# Patient Record
Sex: MALE | Race: BLACK OR AFRICAN AMERICAN | NOT HISPANIC OR LATINO | Employment: OTHER | URBAN - METROPOLITAN AREA
[De-identification: names, ages, dates, MRNs, and addresses within clinical notes are randomized per-mention and may not be internally consistent; named-entity substitution may affect disease eponyms.]

---

## 2017-01-04 ENCOUNTER — HOSPITAL ENCOUNTER (OUTPATIENT)
Dept: RADIOLOGY | Facility: HOSPITAL | Age: 77
Discharge: HOME | End: 2017-01-04
Attending: FAMILY MEDICINE
Payer: MEDICARE

## 2017-01-04 ENCOUNTER — OFFICE VISIT (OUTPATIENT)
Dept: FAMILY MEDICINE | Facility: CLINIC | Age: 77
End: 2017-01-04
Payer: MEDICARE

## 2017-01-04 VITALS
DIASTOLIC BLOOD PRESSURE: 74 MMHG | WEIGHT: 173.5 LBS | HEART RATE: 79 BPM | HEIGHT: 68 IN | SYSTOLIC BLOOD PRESSURE: 158 MMHG | OXYGEN SATURATION: 98 % | BODY MASS INDEX: 26.3 KG/M2

## 2017-01-04 DIAGNOSIS — M79.604 RIGHT LEG PAIN: ICD-10-CM

## 2017-01-04 DIAGNOSIS — I10 ESSENTIAL HYPERTENSION: ICD-10-CM

## 2017-01-04 DIAGNOSIS — R35.1 NOCTURIA: ICD-10-CM

## 2017-01-04 DIAGNOSIS — M25.551 RIGHT HIP PAIN: ICD-10-CM

## 2017-01-04 DIAGNOSIS — R35.0 URINARY FREQUENCY: ICD-10-CM

## 2017-01-04 DIAGNOSIS — Z00.00 ANNUAL PHYSICAL EXAM: Primary | ICD-10-CM

## 2017-01-04 DIAGNOSIS — Z23 NEED FOR INFLUENZA VACCINATION: ICD-10-CM

## 2017-01-04 LAB
BACTERIA #/AREA URNS AUTO: ABNORMAL /HPF
BILIRUB UR QL STRIP: NEGATIVE
CLARITY UR REFRACT.AUTO: CLEAR
COLOR UR AUTO: YELLOW
GLUCOSE UR QL STRIP: NEGATIVE
HGB UR QL STRIP: ABNORMAL
KETONES UR QL STRIP: NEGATIVE
LEUKOCYTE ESTERASE UR QL STRIP: ABNORMAL
MICROSCOPIC COMMENT: ABNORMAL
NITRITE UR QL STRIP: POSITIVE
PH UR STRIP: 5 [PH] (ref 5–8)
PROT UR QL STRIP: NEGATIVE
RBC #/AREA URNS AUTO: 1 /HPF (ref 0–4)
SP GR UR STRIP: 1.01 (ref 1–1.03)
URN SPEC COLLECT METH UR: ABNORMAL
UROBILINOGEN UR STRIP-ACNC: NEGATIVE EU/DL
WBC #/AREA URNS AUTO: 9 /HPF (ref 0–5)

## 2017-01-04 PROCEDURE — 99397 PER PM REEVAL EST PAT 65+ YR: CPT | Mod: S$PBB,,, | Performed by: FAMILY MEDICINE

## 2017-01-04 PROCEDURE — 73502 X-RAY EXAM HIP UNI 2-3 VIEWS: CPT | Mod: TC,PO,RT

## 2017-01-04 PROCEDURE — 99999 PR PBB SHADOW E&M-EST. PATIENT-LVL IV: CPT | Mod: PBBFAC,,, | Performed by: FAMILY MEDICINE

## 2017-01-04 PROCEDURE — 73502 X-RAY EXAM HIP UNI 2-3 VIEWS: CPT | Mod: 26,RT,, | Performed by: RADIOLOGY

## 2017-01-04 RX ORDER — TRAMADOL HYDROCHLORIDE AND ACETAMINOPHEN 37.5; 325 MG/1; MG/1
1 TABLET, FILM COATED ORAL 3 TIMES DAILY PRN
Qty: 40 TABLET | Refills: 0 | Status: SHIPPED | OUTPATIENT
Start: 2017-01-04 | End: 2017-01-14

## 2017-01-04 RX ORDER — CIPROFLOXACIN 500 MG/1
500 TABLET ORAL 2 TIMES DAILY
Qty: 14 TABLET | Refills: 0 | Status: SHIPPED | OUTPATIENT
Start: 2017-01-04 | End: 2017-01-11

## 2017-01-04 NOTE — PROGRESS NOTES
Subjective:       Patient ID: Laura Harris is a 76 y.o. male.    Chief Complaint: Annual Exam and Leg Pain (right leg )    HPI Comments: 76 years old male who came to the clinic for his physical examination.  Patient also with nocturia and urinary frequency for the last several weeks.  Patient with normal prostate without urinary retention.  Patient was treated for possible infection before.  No fever or chills.  Patient also with right hip pain for the last couple of weeks.  The pain is 6 out of 10 of intensity on and off aggravated with activity and better with rest and sometimes radiated to the right lower extremity.    Leg Pain        Review of Systems   Constitutional: Negative.  Negative for chills and fever.   HENT: Negative.    Eyes: Negative.    Respiratory: Negative.    Cardiovascular: Negative.  Negative for chest pain, palpitations and leg swelling.   Gastrointestinal: Negative.    Genitourinary: Positive for dysuria and frequency. Negative for flank pain, hematuria and urgency.   Musculoskeletal: Positive for arthralgias.   Skin: Negative.    Neurological: Negative.    Psychiatric/Behavioral: Negative.        Objective:      Physical Exam   Constitutional: He is oriented to person, place, and time. He appears well-developed and well-nourished. No distress.   HENT:   Head: Normocephalic and atraumatic.   Right Ear: External ear normal.   Left Ear: External ear normal.   Nose: Nose normal.   Mouth/Throat: Oropharynx is clear and moist. No oropharyngeal exudate.   Eyes: Conjunctivae and EOM are normal. Pupils are equal, round, and reactive to light. Right eye exhibits no discharge. Left eye exhibits no discharge. No scleral icterus.   Neck: Normal range of motion. Neck supple. No JVD present. No tracheal deviation present. No thyromegaly present.   Cardiovascular: Normal rate, regular rhythm, normal heart sounds and intact distal pulses.  Exam reveals no gallop and no friction rub.    No murmur  heard.  Pulmonary/Chest: Effort normal and breath sounds normal. No stridor. No respiratory distress. He has no wheezes. He has no rales. He exhibits no tenderness.   Abdominal: Soft. Bowel sounds are normal. He exhibits no distension and no mass. There is no tenderness. There is no rebound and no guarding.   Musculoskeletal: Normal range of motion. He exhibits no edema.        Right hip: He exhibits tenderness.   Lymphadenopathy:     He has no cervical adenopathy.   Neurological: He is alert and oriented to person, place, and time. He has normal reflexes. He displays normal reflexes. No cranial nerve deficit. He exhibits normal muscle tone. Coordination normal.   Skin: Skin is warm and dry. No rash noted. He is not diaphoretic. No erythema. No pallor.   Psychiatric: He has a normal mood and affect. His behavior is normal. Judgment and thought content normal.   Nursing note and vitals reviewed.      Assessment:       1. Annual physical exam    2. Urinary frequency    3. Nocturia    4. Essential hypertension    5. Right leg pain    6. Need for influenza vaccination    7. Right hip pain        Plan:         Laura was seen today for annual exam and leg pain.    Diagnoses and all orders for this visit:    Annual physical exam    Urinary frequency  -     Urine culture  -     Urinalysis  -     ciprofloxacin HCl (CIPRO) 500 MG tablet; Take 1 tablet (500 mg total) by mouth 2 (two) times daily.    Nocturia  -     Urine culture  -     Urinalysis  -     ciprofloxacin HCl (CIPRO) 500 MG tablet; Take 1 tablet (500 mg total) by mouth 2 (two) times daily.    Essential hypertension    Right leg pain  -     tramadol-acetaminophen 37.5-325 mg (ULTRACET) 37.5-325 mg Tab; Take 1 tablet by mouth 3 (three) times daily as needed.    Need for influenza vaccination  -     Influenza - High Dose (65+) (PF) (IM)    Right hip pain  -     X-Ray Hip 2 View Right; Future  -     Ambulatory referral to Orthopedics  -     tramadol-acetaminophen  37.5-325 mg (ULTRACET) 37.5-325 mg Tab; Take 1 tablet by mouth 3 (three) times daily as needed.    Continue monitoring blood pressure at home, low sodium diet.

## 2017-01-04 NOTE — MR AVS SNAPSHOT
[facility]   [place]  [place] [address]  Phone: [phone]  Fax: [phone]                  Laura Harris   2017 8:40 AM   Office Visit    Description:  Male : 1940   Provider:  Riley Villalobos MD   Department:  [facility]           Reason for Visit     Annual Exam     Leg Pain           Diagnoses this Visit        Comments    Annual physical exam    -  Primary     Urinary frequency         Nocturia         Essential hypertension         Right leg pain         Need for influenza vaccination         Right hip pain                To Do List           Future Appointments        Provider Department Dept Phone    2017 9:45 AM KENH XR1 300 LB LIMIT [facility] [phone]    2017 9:00 AM KN US1 [facility] [phone]    2017 8:45 AM RABIA UROLOGY [facility] [phone]      Goals (5 Years of Data)     None       These Medications        Disp Refills Start End    tramadol-acetaminophen 37.5-325 mg (ULTRACET) 37.5-325 mg Tab 40 tablet 0 2017    Take 1 tablet by mouth 3 (three) times daily as needed. - Oral    Pharmacy: [facility] [address] AT [facility] Ph #: [phone]       ciprofloxacin HCl (CIPRO) 500 MG tablet 14 tablet 0 2017    Take 1 tablet (500 mg total) by mouth 2 (two) times daily. - Oral    Pharmacy: [facility] [address] AT [facility] Ph #: [phone]         [facility]     [facility] Care Line -  Assistance  Registered nurses in the [facility] provide clinical advisement, health education, appointment booking, and other advisory services.  Call for this free service at [phone].             Medications           Message regarding Medications     Verify the  "changes and/or additions to your medication regime listed below are the same as discussed with your clinician today.  If any of these changes or additions are incorrect, please notify your healthcare provider.        START taking these NEW medications        Refills    tramadol-acetaminophen 37.5-325 mg (ULTRACET) 37.5-325 mg Tab 0    Sig: Take 1 tablet by mouth 3 (three) times daily as needed.    Class: Print    Route: Oral    ciprofloxacin HCl (CIPRO) 500 MG tablet 0    Sig: Take 1 tablet (500 mg total) by mouth 2 (two) times daily.    Class: Normal    Route: Oral      STOP taking these medications     hydrocodone-acetaminophen 5-325mg (NORCO) 5-325 mg per tablet Take 1 tablet by mouth every 6 (six) hours as needed for Pain.           Verify that the below list of medications is an accurate representation of the medications you are currently taking.  If none reported, the list may be blank. If incorrect, please contact your healthcare provider. Carry this list with you in case of emergency.           Current Medications     aspirin (BABY ASPIRIN) 81 MG chewable tablet Take 81 mg by mouth. 1 Tablet, Chewable Oral Every day    diltiaZEM (CARTIA XT) 300 MG 24 hr capsule Take 1 capsule (300 mg total) by mouth once daily.    losartan-hydrochlorothiazide 100-25 mg (HYZAAR) 100-25 mg per tablet Take 1 tablet by mouth once daily.    ciprofloxacin HCl (CIPRO) 500 MG tablet Take 1 tablet (500 mg total) by mouth 2 (two) times daily.    tramadol-acetaminophen 37.5-325 mg (ULTRACET) 37.5-325 mg Tab Take 1 tablet by mouth 3 (three) times daily as needed.           Clinical Reference Information           Vital Signs - Last Recorded  Most recent update: 1/4/2017  8:41 AM by Patricia Wallace MA    BP Pulse Ht Wt SpO2 BMI    (!) 158/74 (BP Location: Left arm, Patient Position: Sitting, BP Method: Manual) 79 5' 8" (1.727 m) 78.7 kg (173 lb 8 oz) 98% 26.38 kg/m2      Blood Pressure          Most Recent Value    BP  (!)  158/74    "   Allergies as of 1/4/2017     Ace Inhibitors      Immunizations Administered on Date of Encounter - 1/4/2017     Name Date Dose VIS Date Route    Influenza - High Dose  Incomplete 0.5 mL 8/7/2015 Intramuscular      Orders Placed During Today's Visit      Normal Orders This Visit    Ambulatory referral to Orthopedics     Influenza - High Dose (65+) (PF) (IM)     Urinalysis     Urine culture     Future Labs/Procedures Expected by Expires    X-Ray Hip 2 View Right  1/4/2017 1/4/2018      Smoking Cessation     If you would like to quit smoking:   You may be eligible for free services if you are a [place] resident and started smoking cigarettes before September 1, 1988.  Call the [employer]oking [employer]essation [employer] ([employer]) toll free at ([phone] or [phone].   Call 7-720-QUIT-NOW if you do not meet the above criteria.

## 2017-01-06 ENCOUNTER — HOSPITAL ENCOUNTER (OUTPATIENT)
Dept: RADIOLOGY | Facility: HOSPITAL | Age: 77
Discharge: HOME | End: 2017-01-06
Attending: UROLOGY
Payer: MEDICARE

## 2017-01-06 DIAGNOSIS — N39.0 ACUTE UTI: ICD-10-CM

## 2017-01-06 DIAGNOSIS — C67.9 MALIGNANT NEOPLASM OF URINARY BLADDER, UNSPECIFIED SITE: ICD-10-CM

## 2017-01-06 PROCEDURE — 76770 US EXAM ABDO BACK WALL COMP: CPT | Mod: 26,,, | Performed by: RADIOLOGY

## 2017-01-06 PROCEDURE — 76770 US EXAM ABDO BACK WALL COMP: CPT | Mod: TC

## 2017-01-07 LAB — BACTERIA UR CULT: NORMAL

## 2017-01-11 ENCOUNTER — HOSPITAL ENCOUNTER (OUTPATIENT)
Dept: RADIOLOGY | Facility: HOSPITAL | Age: 77
Discharge: HOME | End: 2017-01-11
Attending: ORTHOPAEDIC SURGERY
Payer: MEDICARE

## 2017-01-11 ENCOUNTER — OFFICE VISIT (OUTPATIENT)
Dept: ORTHOPEDICS | Facility: CLINIC | Age: 77
End: 2017-01-11
Payer: MEDICARE

## 2017-01-11 VITALS
SYSTOLIC BLOOD PRESSURE: 135 MMHG | WEIGHT: 172 LBS | DIASTOLIC BLOOD PRESSURE: 71 MMHG | BODY MASS INDEX: 26.07 KG/M2 | HEIGHT: 68 IN | HEART RATE: 67 BPM

## 2017-01-11 DIAGNOSIS — M54.41 ACUTE BACK PAIN WITH SCIATICA, RIGHT: Primary | ICD-10-CM

## 2017-01-11 DIAGNOSIS — M54.41 ACUTE BACK PAIN WITH SCIATICA, RIGHT: ICD-10-CM

## 2017-01-11 PROCEDURE — 99203 OFFICE O/P NEW LOW 30 MIN: CPT | Mod: S$PBB,,, | Performed by: PHYSICIAN ASSISTANT

## 2017-01-11 PROCEDURE — 72114 X-RAY EXAM L-S SPINE BENDING: CPT | Mod: TC

## 2017-01-11 PROCEDURE — 99999 PR PBB SHADOW E&M-EST. PATIENT-LVL III: CPT | Mod: PBBFAC,,, | Performed by: PHYSICIAN ASSISTANT

## 2017-01-11 PROCEDURE — 72114 X-RAY EXAM L-S SPINE BENDING: CPT | Mod: 26,,, | Performed by: RADIOLOGY

## 2017-01-11 NOTE — PROGRESS NOTES
SUBJECTIVE:     Chief Complaint & History of Present Illness:  Laura Harris is a New  patient 76 y.o. male who is seen here today with a complaint of    Chief Complaint   Patient presents with    Right Hip - Pain    .  He is seen here today for intermittent episodes of pain and tenderness in the right lateral hip radiating down the lateral thigh to the calf.  He reports he notices these episodes more often with changes in the weather they're usually shortening transient nature and have not really affected his daily activities.  At the time of his visit today his symptoms have subsided and he is essentially returned to baseline  On a scale of 1-10, with 10 being worst pain imaginable, he rates this pain as 1 on good days and 3 on bad days.  he describes the pain as tender and sore.    Review of patient's allergies indicates:   Allergen Reactions    Ace inhibitors      Other reaction(s): Angioedema         Current Outpatient Prescriptions   Medication Sig Dispense Refill    ciprofloxacin HCl (CIPRO) 500 MG tablet Take 1 tablet (500 mg total) by mouth 2 (two) times daily. 14 tablet 0    diltiaZEM (CARTIA XT) 300 MG 24 hr capsule Take 1 capsule (300 mg total) by mouth once daily. 90 capsule 0    losartan-hydrochlorothiazide 100-25 mg (HYZAAR) 100-25 mg per tablet Take 1 tablet by mouth once daily. 90 tablet 1    tramadol-acetaminophen 37.5-325 mg (ULTRACET) 37.5-325 mg Tab Take 1 tablet by mouth 3 (three) times daily as needed. 40 tablet 0    aspirin (BABY ASPIRIN) 81 MG chewable tablet Take 81 mg by mouth. 1 Tablet, Chewable Oral Every day       No current facility-administered medications for this visit.        Past Medical History   Diagnosis Date    CKD (chronic kidney disease) stage 3, GFR 30-59 ml/min 5/1/2014    Hyperlipidemia     Hypertension     Low tension glaucoma        Past Surgical History   Procedure Laterality Date    Cystoscopy         Vital Signs (Most Recent)  Vitals:    01/11/17  "0811   BP: 135/71   Pulse: 67           Review of Systems:  ROS:  Constitutional: no fever or chills  Eyes: no visual changes, Positive for open angle glaucoma, nuclear sclerosis  ENT: no nasal congestion or sore throat  Respiratory: no cough or shortness of breath  Cardiovascular: no chest pain or palpitations  Gastrointestinal: no nausea or vomiting, tolerating diet  Genitourinary: no hematuria or dysuria, Positive history of bladder cancer, chronic kidney disease stage III  Integument/Breast: no rash or pruritis  Hematologic/Lymphatic: no easy bruising or lymphadenopathy  Musculoskeletal: no arthralgias or myalgias  Neurological: no seizures or tremors  Behavioral/Psych: no auditory or visual hallucinations  Endocrine: no heat or cold intolerance                OBJECTIVE:     PHYSICAL EXAM:  Height: 5' 8" (172.7 cm) Weight: 78 kg (172 lb), General Appearance: Well nourished, well developed, in no acute distress.  Neurological: Mood & affect are normal.  Back Exam:  full range of motion, no tenderness, palpable spasm or pain on motion, negative straight-leg raise bilaterally , normal reflexes and strength bilateral lower extremities, sensory exam intact bilateral lower extremities  no kyphosis or scoliosis  Hip Examination:  full painless range of motion, without tenderness and Decreased internal rotation on the right    RADIOGRAPHS:  X-rays the lumbar spine taken today films reviewed by me demonstrate well-preserved disc spaces throughout the lumbar spine without evidence of lordosis kyphosis scoliosis, no evidence of step off there is moderate arthritic changes in the facet regions L4-L5 L5-S1    ASSESSMENT/PLAN:     Plan: We discussed with the patient at length all the different treatment options available for his spine including anti-inflammatories, acetaminophen, rest, ice, physical therapy, strengthening and range of motion exercise, occasional cortisone injections for temporary relief, and finally possible " surgical interventions  Patient has having no symptoms or pain at this point a course of physical therapy was recommended if he does have a recurrence of problems otherwise we will follow up when necessary

## 2017-01-11 NOTE — LETTER
January 11, 2017      Riley Villalobos MD  [address] [facility] [address]           [facility] - Orthopedics  [address]  [place] [address]  Phone: [phone]          Patient: Laura Harris   MR Number: [id number]   YOB: 1940   Date of Visit: 1/11/2017       Dear Dr. Riley Villalobos:    Thank you for referring Laura Harris to me for evaluation. Attached you will find relevant portions of my assessment and plan of care.    If you have questions, please do not hesitate to call me. I look forward to following Laura Harris along with you.    Sincerely,    Mao Morrison PA-C    Enclosure  CC:  No Recipients    If you would like to receive this communication electronically, please contact [email or web address] or [phone] to request more information on [facility] access.    For providers and/or their staff who would like to refer a patient to [facility], please contact us through our one-stop-shop provider referral line, [facility], at [phone].    If you feel you have received this communication in error or would no longer like to receive these types of communications, please e-mail [email or web address]

## 2017-01-18 ENCOUNTER — HOSPITAL ENCOUNTER (OUTPATIENT)
Dept: UROLOGY | Facility: HOSPITAL | Age: 77
Discharge: HOME | End: 2017-01-18
Attending: UROLOGY
Payer: MEDICARE

## 2017-01-18 VITALS
RESPIRATION RATE: 16 BRPM | DIASTOLIC BLOOD PRESSURE: 82 MMHG | SYSTOLIC BLOOD PRESSURE: 185 MMHG | HEIGHT: 68 IN | WEIGHT: 175 LBS | HEART RATE: 68 BPM | BODY MASS INDEX: 26.52 KG/M2 | TEMPERATURE: 98 F

## 2017-01-18 DIAGNOSIS — C67.9 MALIGNANT NEOPLASM OF URINARY BLADDER, UNSPECIFIED SITE: ICD-10-CM

## 2017-01-18 DIAGNOSIS — N39.0 ACUTE UTI: ICD-10-CM

## 2017-01-18 LAB
BILIRUB SERPL-MCNC: NORMAL MG/DL
BLOOD URINE, POC: NORMAL
COLOR, POC UA: YELLOW
GLUCOSE UR QL STRIP: NORMAL
KETONES UR QL STRIP: NORMAL
LEUKOCYTE ESTERASE URINE, POC: NORMAL
NITRITE, POC UA: NORMAL
PH, POC UA: 5
PROTEIN, POC: NORMAL
SPECIFIC GRAVITY, POC UA: 1.01
UROBILINOGEN, POC UA: NORMAL

## 2017-01-18 PROCEDURE — 52000 CYSTOURETHROSCOPY: CPT

## 2017-01-18 RX ORDER — DOXYCYCLINE HYCLATE 100 MG
100 TABLET ORAL
Status: COMPLETED | OUTPATIENT
Start: 2017-01-18 | End: 2017-01-18

## 2017-01-18 RX ORDER — LIDOCAINE HYDROCHLORIDE 20 MG/ML
10 JELLY TOPICAL
Status: COMPLETED | OUTPATIENT
Start: 2017-01-18 | End: 2017-01-18

## 2017-01-18 RX ADMIN — Medication 100 MG: at 09:01

## 2017-01-18 RX ADMIN — LIDOCAINE HYDROCHLORIDE 10 ML: 20 JELLY TOPICAL at 09:01

## 2017-01-18 NOTE — PATIENT INSTRUCTIONS
What to Expect After a Cystoscopy  For the next 24-48 hours, you may feel a mild burning when you urinate. This burning is normal and expected. Drink plenty of water to dilute the urine to help relieve the burning sensation. You may also see a small amount of blood in your urine after the procedure.    Unless you are already taking antibiotics, you may be given an antibiotic after the test to prevent infection.    Signs and Symptoms to Report  Call the [facility] at [phone] if you develop any of the following:  · Fever of 101 degrees or higher  · Chills or persistent bleeding  · Inability to urinate

## 2017-01-18 NOTE — IP AVS SNAPSHOT
[address]  [place] [address]  Phone: [phone]           I have received a copy of my After Visit Summary and discharge instructions from [facility].    INSTRUCTIONS RECEIVED AND UNDERSTOOD BY:                     Patient/Patient Representative: ________________________________________________________________     Date/Time: ________________________________________________________________                     Instructions Given By: ________________________________________________________________     Date/Time: ________________________________________________________________

## 2017-01-18 NOTE — H&P
Patient ID: Laura Harris is a 76 y.o. male.     Chief Complaint: Urinary Frequency     HPI Comments: Laura Harris is a 76 y.o. 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   8/2009 had left ureteral stricture, stent and subsequent removal.   Last cysto 4/15 showed no recurrence of bladder cancer.   Last upper tract imaging 1/17 showed no hydro.   Has CKD.     Having urinary frequency every 3 hours. Some dysuria. No gross hematuria. Some urgency.   Nocturia x 2.   No fevers.     12/8/16 and 1/4/17 - urine culture Klebsiella     Lab Results   Component Value Date    PSA 1.3 03/28/2015    PSA 1.08 05/07/2012    PSA 1.6 04/27/2011    PSA 1.2 08/04/2010    PSA 1.6 04/25/2009    PSA 0.8 02/04/2008    PSA 1.0 09/19/2006    PSA 1.3 08/12/2005    PSA 0.8 01/29/2004    PSADIAG 1.6 04/02/2014        Urinary Frequency    Associated symptoms include frequency and urgency. Pertinent negatives include no chills, flank pain, hematuria, constipation or rash.         Past Medical History   Diagnosis Date    CKD (chronic kidney disease) stage 3, GFR 30-59 ml/min 5/1/2014    Hyperlipidemia     Hypertension     Low tension glaucoma        Past Surgical History   Procedure Laterality Date    Cystoscopy         Family History   Problem Relation Age of Onset    Melanoma Neg Hx        Social History     Social History    Marital status: Single     Spouse name: N/A    Number of children: N/A    Years of education: N/A     Occupational History    Not on file.     Social History Main Topics    Smoking status: Current Every Day Smoker     Packs/day: 1.00     Types: Cigarettes    Smokeless tobacco: Current User    Alcohol use No    Drug use: No    Sexual activity: Not on file     Other Topics Concern    Not on file     Social History Narrative       Allergies:  Ace inhibitors    Medications:    Current Outpatient Prescriptions:     aspirin (BABY ASPIRIN) 81 MG chewable tablet,  Take 81 mg by mouth. 1 Tablet, Chewable Oral Every day, Disp: , Rfl:     diltiaZEM (CARTIA XT) 300 MG 24 hr capsule, Take 1 capsule (300 mg total) by mouth once daily., Disp: 90 capsule, Rfl: 0    losartan-hydrochlorothiazide 100-25 mg (HYZAAR) 100-25 mg per tablet, Take 1 tablet by mouth once daily., Disp: 90 tablet, Rfl: 1       Review of Systems   Constitutional: Negative for chills, fever and unexpected weight change.   HENT: Negative for hearing loss and nosebleeds.   Eyes: Positive for visual disturbance.   Respiratory: Negative for chest tightness.   Cardiovascular: Negative for chest pain.   Gastrointestinal: Negative for constipation and diarrhea.   Genitourinary:  Negative for flank pain and hematuria.   Musculoskeletal: Positive for arthralgias and back pain. Negative for joint swelling.   Skin: Negative for rash.   Neurological: Negative for seizures.   Hematological: Does not bruise/bleed easily.   Psychiatric/Behavioral: Negative for behavioral problems.       Objective:      Physical Exam   Constitutional: He is oriented to person, place, and time. He appears well-developed and well-nourished.   HENT:   Head: Normocephalic and atraumatic.   Eyes: No scleral icterus.   Neck: Neck supple.   Cardiovascular: Normal rate and regular rhythm.   Pulmonary/Chest: Effort normal. No respiratory distress.   Abdominal: He exhibits no mass. A hernia is present. Hernia confirmed positive in the right inguinal area. Hernia confirmed negative in the left inguinal area.   Genitourinary: Testes normal and penis normal. Circumcised.   Genitourinary Comments:Musculoskeletal: He exhibits no tenderness.   Lymphadenopathy:   He has no cervical adenopathy. No inguinal adenopathy noted on the right or left side.   Neurological: He is alert and oriented to person, place, and time.   Skin: Skin is warm. No rash noted.     Psychiatric: He has a normal mood and affect.     urine dip nitrite positive  Assessment:       1.  Malignant neoplasm of urinary bladder, unspecified site    2. Acute UTI    3. CKD (chronic kidney disease) stage 3, GFR 30-59 ml/min        Plan:     Cysto today

## 2017-01-18 NOTE — IP AVS SNAPSHOT
[facility]  [address]  [facility] [address]  Phone: [phone]  Fax: [phone]                  Laura Harris   2017  8:45 AM   Cystoscopy    Description:  Male : 1940   Provider:  [facility]   Department:  [facility]           Visit Information     Date & Time Provider Department    2017  8:45 AM [facility]      Recent Lab Values        4/15/2015 2016 2016 2017                  9:39 AM  9:00 AM 10:38 AM  9:19 AM        Urine Culture - - KLEBSIELLA PNEUMONIAE  &gt;100,000 cfu/ml   KLEBSIELLA PNEUMONIAE  &gt;100,000 cfu/ml          Color YELLOW - - Yellow        Specific Gravity 5 1.015 - 1.010        pH NEG 5 - 5.0        Leukocytes NEG n - -        Blood 50 n - -        Nitrite NEG + - Positive (A)        Ketones NEG n - Negative        Bilirubin NEG n - -        Urobilinogen NEG n - Negative        Protein NEG n - -        Glucose NEG n - -                 Reason for Visit     Bladder Cancer           Diagnoses this Visit        Comments    Malignant neoplasm of urinary bladder, unspecified site         Acute UTI                To Do List           Goals (5 Years of Data)     None           Medications                ** Verify the list of medication(s) below is accurate and up to date. Carry this with you in case of emergency. If your medications have changed, please notify your healthcare provider.             Medication List      TAKE these medications        Additional Info                      BABY ASPIRIN 81 MG Chew   Refills:  0   Dose:  81 mg   Generic drug:  aspirin    Instructions:  Take 81 mg by mouth. 1 Tablet, Chewable Oral Every day     Begin Date    AM    Noon    PM    Bedtime       diltiaZEM 300 MG 24 hr capsule   Commonly known as:  CARTIA XT   Quantity:  90 capsule   Refills:  0   Dose:  300 mg    Instructions:  Take 1 capsule (300 mg total) by mouth  "once daily.     Begin Date    AM    Noon    PM    Bedtime       losartan-hydrochlorothiazide 100-25 mg 100-25 mg per tablet   Commonly known as:  HYZAAR   Quantity:  90 tablet   Refills:  1   Dose:  1 tablet    Instructions:  Take 1 tablet by mouth once daily.     Begin Date    AM    Noon    PM    Bedtime               Your Vitals Were     BP Pulse Temp Resp Height Weight    185/82 68 98.3 °F (36.8 °C) 16 5' 8" (1.727 m) 79.4 kg (175 lb)    BMI                26.61 kg/m2          Allergies as of 1/18/2017     Ace Inhibitors      Immunizations Administered on Date of Encounter - 1/18/2017     None      Orders Placed During Today's Visit      Normal Orders This Visit    Cystoscopy     POCT urinalysis, dipstick or tablet reag       Administrations This Visit     doxycycline tablet 100 mg     Admin Date Action Dose Route Administered By             01/18/2017 Given 100 mg Oral Angie Pond RN                    lidocaine HCl 2% urojet 10 mL     Admin Date Action Dose Route Administered By             01/18/2017 Given 10 mL Urethral Angie Pond RN                      Instructions    What to Expect After a Cystoscopy  For the next 24-48 hours, you may feel a mild burning when you urinate. This burning is normal and expected. Drink plenty of water to dilute the urine to help relieve the burning sensation. You may also see a small amount of blood in your urine after the procedure.    Unless you are already taking antibiotics, you may be given an antibiotic after the test to prevent infection.    Signs and Symptoms to Report  Call the [facility] at [phone] if you develop any of the following:  · Fever of 101 degrees or higher  · Chills or persistent bleeding  · Inability to urinate       Smoking Cessation     If you would like to quit smoking:   You may be eligible for free services if you are a [place] resident and started smoking cigarettes before September 1, 1988.  Call the Smoking " [facility] toll free at [phone] or [phone].   Call 1-800-QUIT-NOW if you do not meet the above criteria.            Advance Directives     An advance directive is a document which, in the event you are no longer able to make decisions for yourself, tells your healthcare team what kind of treatment you do or do not want to receive, or who you would like to make those decisions for you.  If you do not currently have an advance directive, [facility] encourages you to create one.  For more information call:  [phone]), [phone] ([phone]),  or log on to [email or web address].        [facility]     [facility] Care [facility] - 24/7 Assistance  Registered nurses in the [facility] provide clinical advisement, health education, appointment booking, and other advisory services.  Call for this free service at [phone].        Language Assistance Services     ATTENTION: Language assistance services are available, free of charge. Please call [phone].      ATENCIÓN: Si habla español, tiene a saab disposición servicios gratuitos de asistencia lingüística. Llame al [phone].     CHÚ Ý: N?u b?n nói Ti?ng Vi?t, có các d?ch v? h? tr? ngôn ng? mi?n phí dành cho b?n. G?i s? [phone].         [facility] complies with applicable Federal civil rights laws and does not discriminate on the basis of race, color, national origin, age, disability, or sex.

## 2017-01-18 NOTE — PROCEDURES
Procedures: Flexible cystourethroscopy    Pre Procedure Diagnosis:h/o bladder cancer    Post Procedure Diagnosis:Normal cystoscopy    Surgeon: Ciara Mayorga MD    Anesthesia: 2% uro-jet lidocaine jelly for local analgesia    Flexible cysto-urethroscopy was performed after consent was obtained.  The risks and benefits were explained.    2% lidocaine urojet was used for local analgesia.  The genitalia was prepped and draped in the sterile fashion with betadine.    The flexible scope was advanced into the urethra and into the bladder.  Bilateral ureteral orifice were evaluated and noted to be normal with clear efflux.  The bladder was completely surveyed in a systematic fashion.   No bladder tumors or lesions were seen.  No strictures were noted.  The prostate showed Significant hypertrophy.  There was Mild median lobe present.    The patient tolerated the procedure well without complication.    They will follow up in 1 month. Restart tamsulosin.

## 2017-03-23 RX ORDER — DILTIAZEM HYDROCHLORIDE 300 MG/1
300 CAPSULE, COATED, EXTENDED RELEASE ORAL DAILY
Qty: 90 CAPSULE | Refills: 1 | Status: SHIPPED | OUTPATIENT
Start: 2017-03-23 | End: 2017-10-06 | Stop reason: SDUPTHER

## 2017-05-20 ENCOUNTER — HOSPITAL ENCOUNTER (EMERGENCY)
Facility: HOSPITAL | Age: 77
Discharge: HOME | End: 2017-05-20
Attending: EMERGENCY MEDICINE
Payer: MEDICARE

## 2017-05-20 VITALS
RESPIRATION RATE: 16 BRPM | BODY MASS INDEX: 25.76 KG/M2 | HEART RATE: 70 BPM | DIASTOLIC BLOOD PRESSURE: 74 MMHG | SYSTOLIC BLOOD PRESSURE: 155 MMHG | HEIGHT: 68 IN | OXYGEN SATURATION: 98 % | WEIGHT: 170 LBS | TEMPERATURE: 97 F

## 2017-05-20 DIAGNOSIS — M72.2 PLANTAR FASCIITIS OF RIGHT FOOT: Primary | ICD-10-CM

## 2017-05-20 DIAGNOSIS — R52 PAIN: ICD-10-CM

## 2017-05-20 PROCEDURE — 99283 EMERGENCY DEPT VISIT LOW MDM: CPT

## 2017-05-20 RX ORDER — TRAMADOL HYDROCHLORIDE AND ACETAMINOPHEN 37.5; 325 MG/1; MG/1
1 TABLET, FILM COATED ORAL EVERY 4 HOURS PRN
COMMUNITY
End: 2017-05-31

## 2017-05-20 RX ORDER — TRAMADOL HYDROCHLORIDE 50 MG/1
50 TABLET ORAL EVERY 6 HOURS PRN
Qty: 10 TABLET | Refills: 0 | Status: SHIPPED | OUTPATIENT
Start: 2017-05-20 | End: 2017-06-22

## 2017-05-20 RX ORDER — CELECOXIB 100 MG/1
100 CAPSULE ORAL 2 TIMES DAILY
COMMUNITY
End: 2017-05-31

## 2017-05-20 NOTE — ED NOTES
Pt co rt heel pain for the past 2 weeks, pt denies chest pain or sob, denies injury or trauma, no deformity noted, pt awake alert in no acute distress, rt heel non tender to palpate pt reports pain when walking on heel, skin warm dry intact, rt dp pulse +2

## 2017-05-20 NOTE — ED PROVIDER NOTES
Encounter Date: 5/20/2017       History     Chief Complaint   Patient presents with    Heel Pain     Right heel pain with no history of injury. Gait WNL     Review of patient's allergies indicates:   Allergen Reactions    Ace inhibitors      Other reaction(s): Angioedema    Hydrocodone Diarrhea     HPI Comments: Laura Harris 76 y.o. presented to the ED with c/o right foot pain for the past two weeks. He state pain is its worse first thing in the morning upon exiting bed and is redued with stretching and walking for a bit. He states pain returned later in the day after walking for some time.      Patient is a 76 y.o. male presenting with the following complaint: foot injury. The history is provided by the patient.   Foot Injury    The incident occurred at home. There was no injury mechanism. The incident occurred several weeks ago. The pain is present in the right foot and right heel. The quality of the pain is described as aching. The pain is at a severity of 6/10. The pain has been intermittent since onset. Pertinent negatives include no numbness, no inability to bear weight, no loss of motion, no muscle weakness, no loss of sensation and no tingling. He reports no foreign bodies present. The symptoms are aggravated by palpation (first few steps in the morning ). He has tried NSAIDs for the symptoms. The treatment provided mild relief.     Past Medical History:   Diagnosis Date    CKD (chronic kidney disease) stage 3, GFR 30-59 ml/min 5/1/2014    Hyperlipidemia     Hypertension     Low tension glaucoma      Past Surgical History:   Procedure Laterality Date    CYSTOSCOPY       Family History   Problem Relation Age of Onset    Melanoma Neg Hx      Social History   Substance Use Topics    Smoking status: Current Every Day Smoker     Packs/day: 1.00     Types: Cigarettes    Smokeless tobacco: Current User    Alcohol use No     Review of Systems   Constitutional: Negative for activity change, appetite  change, chills and fever.   HENT: Negative for sore throat.    Eyes: Negative for visual disturbance.   Respiratory: Negative for chest tightness and shortness of breath.    Cardiovascular: Negative for chest pain and leg swelling.   Gastrointestinal: Negative for nausea and vomiting.   Genitourinary: Negative for dysuria.   Musculoskeletal: Positive for arthralgias. Negative for back pain, gait problem and joint swelling.        Right foot pain   Skin: Negative for color change, rash and wound.   Neurological: Negative for tingling, weakness and numbness.   Hematological: Does not bruise/bleed easily.       Physical Exam   Initial Vitals   BP Pulse Resp Temp SpO2   05/20/17 1015 05/20/17 1015 05/20/17 1015 05/20/17 1015 05/20/17 1015   169/77 71 16 98.3 °F (36.8 °C) 98 %     Physical Exam    Nursing note and vitals reviewed.  Constitutional: He appears well-developed and well-nourished. He is cooperative.  Non-toxic appearance. He does not appear ill. No distress.   HENT:   Head: Normocephalic and atraumatic.   Eyes: Conjunctivae and lids are normal.   Neck: Normal range of motion. Neck supple.   Cardiovascular: Normal rate.   Pulses:       Dorsalis pedis pulses are 2+ on the right side, and 2+ on the left side.        Posterior tibial pulses are 2+ on the right side, and 2+ on the left side.   Pulmonary/Chest: No respiratory distress.   Abdominal: Soft. Normal appearance.   Musculoskeletal:        Right ankle: Normal. Achilles tendon normal.        Right foot: There is tenderness. There is normal range of motion, no bony tenderness, no swelling, normal capillary refill, no crepitus and no deformity.        Feet:    Neurological: He is alert and oriented to person, place, and time. GCS eye subscore is 4. GCS verbal subscore is 5. GCS motor subscore is 6.   Skin: Skin is warm, dry and intact. No abrasion, no bruising, no ecchymosis and no rash noted. No erythema.   Psychiatric: He has a normal mood and affect. His  speech is normal and behavior is normal. Thought content normal.         ED Course   Procedures  Labs Reviewed - No data to display      Imaging Results         X-Ray Foot Complete Right (Final result) Result time:  05/20/17 11:40:50    Final result by Sander Vu MD (05/20/17 11:40:50)    Impression:        No acute radiographic findings.      Electronically signed by: SANDER VU MD  Date:     05/20/17  Time:    11:40     Narrative:    Comparison: None    Technique: 3 views of the right foot.    Findings: There is no evidence of displaced fracture, dislocation, or bone destruction.  There are no abnormal radiopaque retained foreign bodies.  There are extensive vascular calcifications.  There is a hallux valgus deformity.                   Medical Decision Making:   Initial Assessment:   76 y.o. Male with PMH of HTN, HLD, glaucoma, and CKD presented with right nontraumatic heel pain for the past several weeks. He presents in no distress wit smooth steady gait to the room with TTP of the right plantar fascia with no bony tenderness, erythema , deformity or edema  Differential Diagnosis:   Plantar fascitis, fracture, bone spur, arthritis  Clinical Tests:   Radiological Study: Ordered and Reviewed  ED Management:  X-ray with no acute findings. Patient with CKD and not a canidate for NSAID'S. Instructed patient to take pain medication as needed however stretches and brace will likely provided more relief. He will follow up with podiatrist fr possible orthotic.              Attending Attestation:     Physician Attestation Statement for NP/PA:   I discussed this assessment and plan of this patient with the NP/PA, but I did not personally examine the patient. The face to face encounter was performed by the NP/PA.                  ED Course     Clinical Impression:   The primary encounter diagnosis was Plantar fasciitis of right foot. A diagnosis of Pain was also pertinent to this visit.          Jonna Cody,  PA  05/20/17 1925       Ruy Noe MD  05/21/17 0740

## 2017-05-31 ENCOUNTER — OFFICE VISIT (OUTPATIENT)
Dept: FAMILY MEDICINE | Facility: CLINIC | Age: 77
End: 2017-05-31
Payer: MEDICARE

## 2017-05-31 VITALS
SYSTOLIC BLOOD PRESSURE: 140 MMHG | BODY MASS INDEX: 25.79 KG/M2 | HEIGHT: 68 IN | HEART RATE: 72 BPM | WEIGHT: 170.19 LBS | DIASTOLIC BLOOD PRESSURE: 76 MMHG

## 2017-05-31 DIAGNOSIS — I10 ESSENTIAL HYPERTENSION: Primary | ICD-10-CM

## 2017-05-31 DIAGNOSIS — M72.2 PLANTAR FASCIITIS: ICD-10-CM

## 2017-05-31 PROCEDURE — 99213 OFFICE O/P EST LOW 20 MIN: CPT | Mod: PBBFAC,PO | Performed by: FAMILY MEDICINE

## 2017-05-31 PROCEDURE — 99999 PR PBB SHADOW E&M-EST. PATIENT-LVL III: CPT | Mod: PBBFAC,,, | Performed by: FAMILY MEDICINE

## 2017-05-31 PROCEDURE — 99214 OFFICE O/P EST MOD 30 MIN: CPT | Mod: S$PBB,,, | Performed by: FAMILY MEDICINE

## 2017-05-31 RX ORDER — METHYLPREDNISOLONE 4 MG/1
TABLET ORAL
Qty: 21 TABLET | Refills: 0 | Status: SHIPPED | OUTPATIENT
Start: 2017-05-31 | End: 2017-06-21

## 2017-05-31 NOTE — PATIENT INSTRUCTIONS
Taking Your Blood Pressure  Blood pressure is the force of blood against the artery wall as it moves from the heart through the blood vessels. You can take your own blood pressure reading using a digital monitor. Take readings as often as your healthcare provider instructs. Take each reading at the same time of day.  Step 1. Relax    · Take your blood pressure at the same time every day, such as in the morning or evening, or at the time your healthcare provider recommends.  · Wait at least a half-hour after smoking, eating, drinking caffeinated beverages, or exercising.  · Sit comfortably at a table with both feet on the floor. Do not cross your legs or feet. Place the monitor near you.  · Rest for a few minutes before you begin.  Step 2. Wrap the cuff    · Place your arm on the table, palm up. Your arm should be at the level of your heart. Wrap the cuff around your upper arm, just above your elbow. Its best done on bare skin, not over clothing. Most cuffs will indicate where the brachial artery (the blood vessel in the middle of the arm at the inner side of the elbow) should line up with the cuff. Look in your monitor's instruction booklet for an illustration. You can also bring your cuff to your healthcare provider and have them show you how to correctly place the cuff.  · Make sure your cuff fits. If it doesnt wrap around your upper arm, order a larger cuff.  Step 3. Inflate the cuff    · Pump the cuff until the scale reads 160. If you have a self-inflating cuff, push the button that starts the pump.  · The cuff will tighten, then loosen.  · The numbers will change. When they stop changing, your blood pressure reading will appear.  · Take 2 or 3 readings one minute apart.  Step 4. Write down the results of each reading    · Write down your blood pressure numbers for each reading. Note the date and time. Keep your results in one place, such as a notebook. Even if your monitor has a built-in memory, keep a hard  copy of the readings.  · Remove the cuff from your arm. Turn off the machine.  · Share your blood pressure records with your healthcare providers at each visit.  Date Last Reviewed: 4/27/2016  © 2268-4996 [employer]. [address] [place] [address], [place] [address]. All rights reserved. This information is not intended as a substitute for professional medical care. Always follow your healthcare professional's instructions.

## 2017-05-31 NOTE — PROGRESS NOTES
Subjective:       Patient ID: Laura Harris is a 76 y.o. male.    Chief Complaint: Foot Pain (right foot)    76 years old male came to the clinic with borderline blood pressure .  No chest pain palpitations orthopnea or PND.  Patient with right  plantar pain , the pain is 9 out of 10 intensity on and off aggravated with activity and better with rest.  Patient recently evaluated at the emergency room and treated with possible plantar fasciitis.      Foot Pain   Pertinent negatives include no chest pain.     Review of Systems   Constitutional: Negative.    HENT: Negative.    Eyes: Negative.    Respiratory: Negative.    Cardiovascular: Negative.  Negative for chest pain, palpitations and leg swelling.   Gastrointestinal: Negative.    Genitourinary: Negative.    Musculoskeletal: Negative.    Skin: Negative.    Neurological: Negative.    Psychiatric/Behavioral: Negative.        Objective:      Physical Exam   Constitutional: He is oriented to person, place, and time. He appears well-developed and well-nourished. No distress.   HENT:   Head: Normocephalic and atraumatic.   Right Ear: External ear normal.   Left Ear: External ear normal.   Nose: Nose normal.   Mouth/Throat: Oropharynx is clear and moist. No oropharyngeal exudate.   Eyes: Conjunctivae and EOM are normal. Pupils are equal, round, and reactive to light. Right eye exhibits no discharge. Left eye exhibits no discharge. No scleral icterus.   Neck: Normal range of motion. Neck supple. No JVD present. No tracheal deviation present. No thyromegaly present.   Cardiovascular: Normal rate, regular rhythm, normal heart sounds and intact distal pulses.  Exam reveals no gallop and no friction rub.    No murmur heard.  Pulmonary/Chest: Effort normal and breath sounds normal. No stridor. No respiratory distress. He has no wheezes. He has no rales. He exhibits no tenderness.   Abdominal: Soft. Bowel sounds are normal. He exhibits no distension and no mass. There is no  tenderness. There is no rebound and no guarding.   Musculoskeletal: He exhibits no edema.        Right foot: There is decreased range of motion and tenderness.   Lymphadenopathy:     He has no cervical adenopathy.   Neurological: He is alert and oriented to person, place, and time. He has normal reflexes. He displays normal reflexes. No cranial nerve deficit. He exhibits normal muscle tone. Coordination normal.   Skin: Skin is warm and dry. No rash noted. He is not diaphoretic. No erythema. No pallor.   Psychiatric: He has a normal mood and affect. His behavior is normal. Judgment and thought content normal.   Nursing note and vitals reviewed.      Assessment:       1. Essential hypertension    2. Plantar fasciitis        Plan:         Laura was seen today for foot pain.    Diagnoses and all orders for this visit:    Essential hypertension    Plantar fasciitis  -     methylPREDNISolone (MEDROL DOSEPACK) 4 mg tablet; Take as directed  -     Ambulatory referral to Podiatry    Continue monitoring blood pressure at home, low sodium diet.

## 2017-06-01 ENCOUNTER — OFFICE VISIT (OUTPATIENT)
Dept: PODIATRY | Facility: CLINIC | Age: 77
End: 2017-06-01
Payer: MEDICARE

## 2017-06-01 VITALS
HEIGHT: 68 IN | WEIGHT: 170 LBS | HEART RATE: 85 BPM | DIASTOLIC BLOOD PRESSURE: 74 MMHG | BODY MASS INDEX: 25.76 KG/M2 | SYSTOLIC BLOOD PRESSURE: 162 MMHG

## 2017-06-01 DIAGNOSIS — Q66.50 PES PLANUS, CONGENITAL, UNSPECIFIED LATERALITY: ICD-10-CM

## 2017-06-01 DIAGNOSIS — M62.461 GASTROCNEMIUS EQUINUS OF RIGHT LOWER EXTREMITY: ICD-10-CM

## 2017-06-01 DIAGNOSIS — M72.2 PLANTAR FASCIITIS, RIGHT: Primary | ICD-10-CM

## 2017-06-01 DIAGNOSIS — M79.671 PAIN OF RIGHT HEEL: ICD-10-CM

## 2017-06-01 PROCEDURE — 99213 OFFICE O/P EST LOW 20 MIN: CPT | Mod: PBBFAC,PO | Performed by: PODIATRIST

## 2017-06-01 PROCEDURE — 99203 OFFICE O/P NEW LOW 30 MIN: CPT | Mod: S$PBB,,, | Performed by: PODIATRIST

## 2017-06-01 PROCEDURE — 99999 PR PBB SHADOW E&M-EST. PATIENT-LVL III: CPT | Mod: PBBFAC,,, | Performed by: PODIATRIST

## 2017-06-01 NOTE — LETTER
June 2, 2017      Riley Villalobos MD  [address] [place] [address]           [facility]  [address] [place] [address]  Phone: [phone]          Patient: Laura Harris   MR Number: [id number]   YOB: 1940   Date of Visit: 6/1/2017       Dear Dr. Riley Villalobos:    Thank you for referring Laura Harris to me for evaluation. Attached you will find relevant portions of my assessment and plan of care.    If you have questions, please do not hesitate to call me. I look forward to following Laura Harris along with you.    Sincerely,    Kel Rush, DPSAUL    Enclosure  CC:  No Recipients    If you would like to receive this communication electronically, please contact [email or web address] or [phone] to request more information on [employer] access.    For providers and/or their staff who would like to refer a patient to [facility], please contact us through our one-stop-shop provider referral line, [facility], at [phone].    If you feel you have received this communication in error or would no longer like to receive these types of communications, please e-mail [email or web address]

## 2017-06-01 NOTE — PATIENT INSTRUCTIONS
Recommended Sof Sol Fit Series Arch Supports with ow arch found on amaz[employer].com or [employer] at [facility].    No flat shoes        Bent-Knee Calf Stretch  This exercise is designed to stretch and strengthen your feet and ankles. Before beginning the exercise, read through all the instructions. While exercising, breathe normally and dont bounce. If you feel any pain, stop the exercise. If pain persists, inform your healthcare provider:    · Stand an arms length away from a wall. Place the palms of your hands on the wall. Step forward about 12 inches with your ______ foot.  · Keeping toes pointed forward and both heels on the floor, bend both knees and lean forward. Hold for ___30___ seconds. Relax.  · Repeat __3____ times. Do __3____ sets a day.  Date Last Reviewed: 8/16/2015  © 0312-9527 [employer]. [address] [place] [address]. All rights reserved. This information is not intended as a substitute for professional medical care. Always follow your healthcare professional's instructions.        Understanding Plantar Fasciitis    Plantar fasciitis is a condition that causes foot and heel pain. The plantar fascia is a tough band of tissue that runs across the bottom of the foot from the heel to the toes. This tissue pulls on the heel bone. It supports the arch of the foot as it pushes off the ground. If the tissue becomes irritated or red and swollen (inflamed), it is called plantar fasciitis.  How to say it  PLAN-tuhr fa-see-IY-[id number]   What causes plantar fasciitis?  Plantar fasciitis most often occurs from overusing the plantar fascia. The tissue may become damaged from activities that put repeated stress on the heel and foot. Or it may wear down over time with age and ankle stiffness. You are more likely to have plantar fasciitis if you:  · Do activities that require a lot of running, jumping, or dancing  · Have a job that requires being on your feet for long periods  · Are  overweight or obese  · Have certain foot problems, such as a tight Achilles tendon, flat feet, or high arches  · Often wear poorly fitting shoes  Symptoms of plantar fasciitis  The condition most often causes pain in the heel and the bottom of the foot. The pain may occur when you take your first steps in the morning. It may get better as you walk throughout the day. But as you continue to put weight on the foot, the pain often returns. Pain may also occur after standing or sitting for long periods.  Treating plantar fasciitis  Treatments for plantar fasciitis include:  · Resting the foot. This involves limiting movements that make your foot hurt. You may also need to avoid certain sports and types of work for a time.  · Using cold packs. Put an ice pack on the heel and foot to help reduce pain and swelling.  · Taking pain medicines. Prescription and over-the-counter pain medicines can help relieve pain and swelling.  · Using heel cups or foot inserts (orthotics). These are placed in the shoes to help support the heel or arch and cushion the heel. You may also be told to buy proper-fitting shoes with good arch support and cushioned soles.  · Taping the foot. This supports the arch and limits the movement of the plantar fascia to help relieve symptoms.  · Wearing a night splint. This stretches the plantar fascia and leg muscles while you sleep. This may help relieve pain.  · Doing exercises and physical therapy. These stretch and strengthen the plantar fascia and the muscles in the leg that support the heel and foot.  · Getting shots of medicine into the foot. These may help relieve symptoms for a time.  · Having surgery. This may be needed if other treatments fail to relieve symptoms. During surgery, the surgeon may partially cut the plantar fascia to release tension.  Possible complications of plantar fasciitis  Without proper care and treatment, healing may take longer than normal. Also, symptoms may continue or  get worse. Over time, the plantar fascia may be damaged. This can make it hard to walk or even stand without pain.  When to call your healthcare provider  Call your healthcare provider right away if you have any of these:  · Fever of 100.4°F (38°C) or higher, or as directed  · Symptoms that dont get better with treatment, or get worse  · New symptoms, such as numbness, tingling, or weakness in the foot   Date Last Reviewed: 3/10/2016  © 9049-2741 [employer]. [address] [place] [address]. All rights reserved. This information is not intended as a substitute for professional medical care. Always follow your healthcare professional's instructions.        Treating Plantar Fasciitis  First, your healthcare provider tries to determine the cause of your problem in order to suggest ways to relieve pain. If your pain is due to poor foot mechanics, custom-made shoe inserts (orthoses) may help.    Reduce symptoms  · To relieve mild symptoms, try aspirin, ibuprofen, or other medicines as directed. Rubbing ice on the affected area may also help.  · To reduce severe pain and swelling, your healthcare provider may prescribe pills or injections or a walking cast in some instances. Physical therapy, such as ultrasound or a daily stretching program, may also be recommended. Surgery is rarely required.  · To reduce symptoms caused by poor foot mechanics, your foot may be taped. This supports the arch and temporarily controls movement. Night splints may also help by stretching the fascia.  Control movement  If taping helps, your healthcare provider may prescribe orthoses. Built from plaster casts of your feet, these inserts control the way your foot moves. As a result, your symptoms should go away.  Reduce overuse  Every time your foot strikes the ground, the plantar fascia is stretched. You can reduce the strain on the plantar fascia and the possibility of overuse by following these suggestions:  · Lose  any excess weight.  · Avoid running on hard or uneven ground.  · Use orthoses at all times in your shoes and house slippers.  If surgery is needed  Your healthcare provider may consider surgery if other types of treatment don't control your pain. During surgery, the plantar fascia is partially cut to release tension. As you heal, fibrous tissue fills the space between the heel bone and the plantar fascia.   Date Last Reviewed: 10/14/2015  © 2265-1875 [employer]. [address] [place] [address]. All rights reserved. This information is not intended as a substitute for professional medical care. Always follow your healthcare professional's instructions.

## 2017-06-03 NOTE — PROGRESS NOTES
"Subjective:      Patient ID: Laura Harris is a 76 y.o. male.    Chief Complaint: Plantar Fasciitis (Right Foot)    Laura is a 76 y.o. male who presents to the clinic complaining of heel pain in the right foot, especially with the first step in the morning. The pain is described as Aching and Sharp. The onset of the pain was gradual and has improved over the past several weeks. Laura rates the pain as 5/10. He denies a history of trauma. Prior treatments include taking medrol dose juan miguel prescribed per Dr. Dewey. He was [place] [facility] [place], however it did not help with his pain.   Vitals:    06/01/17 0735   BP: (!) 162/74   Pulse: 85   Weight: 77.1 kg (170 lb)   Height: 5' 8" (1.727 m)   PainSc:   5   PainLoc: Foot      Past Medical History:   Diagnosis Date    CKD (chronic kidney disease) stage 3, GFR 30-59 ml/min 5/1/2014    Hyperlipidemia     Hypertension     Low tension glaucoma        Past Surgical History:   Procedure Laterality Date    CYSTOSCOPY         Family History   Problem Relation Age of Onset    Melanoma Neg Hx        Social History     Social History    Marital status: Single     Spouse name: N/A    Number of children: N/A    Years of education: N/A     Social History Main Topics    Smoking status: Current Every Day Smoker     Packs/day: 1.00     Types: Cigarettes    Smokeless tobacco: Current User    Alcohol use No    Drug use: No    Sexual activity: Not Asked     Other Topics Concern    None     Social History Narrative    None       Current Outpatient Prescriptions   Medication Sig Dispense Refill    diltiaZEM (CARTIA XT) 300 MG 24 hr capsule Take 1 capsule (300 mg total) by mouth once daily. 90 capsule 1    losartan-hydrochlorothiazide 100-25 mg (HYZAAR) 100-25 mg per tablet Take 1 tablet by mouth once daily. 90 tablet 1    methylPREDNISolone (MEDROL DOSEPACK) 4 mg tablet Take as directed 21 tablet 0    tramadol (ULTRAM) 50 mg tablet Take 1 tablet (50 mg total) " by mouth every 6 (six) hours as needed for Pain. 10 tablet 0     No current facility-administered medications for this visit.        Review of patient's allergies indicates:   Allergen Reactions    Ace inhibitors      Other reaction(s): Angioedema    Hydrocodone Diarrhea         Review of Systems   Constitution: Negative for chills, fever, weakness and malaise/fatigue.   Cardiovascular: Negative for chest pain, claudication and leg swelling.   Respiratory: Negative for cough and shortness of breath.    Skin: Negative for color change, itching and rash.   Musculoskeletal: Negative for back pain, joint pain, muscle cramps and muscle weakness.   Gastrointestinal: Negative for nausea and vomiting.   Neurological: Negative for numbness and paresthesias.   Psychiatric/Behavioral: Negative for altered mental status.           Objective:      Physical Exam   Constitutional: He is oriented to person, place, and time. No distress.   Cardiovascular:   Pulses:       Dorsalis pedis pulses are 2+ on the right side, and 2+ on the left side.        Posterior tibial pulses are 2+ on the right side, and 2+ on the left side.   CFT< 3 secs all toes bilateral foot, skin temp warm bilateral foot, diminished digital hair growth bilateral foot, no lower extremity edema bilateral.     Musculoskeletal:        Feet:    + equinus that reduces with knee bent bilateral.    Flexible pes planus bilateral foot.   Neurological: He is alert and oriented to person, place, and time. He has normal strength. Gait normal.   Skin: Skin is warm, dry and intact. Capillary refill takes less than 2 seconds. No ecchymosis and no rash noted. He is not diaphoretic. No cyanosis or erythema. Nails show no clubbing.   No open lesions or macerations bilateral lower extremity.               Assessment:       Encounter Diagnoses   Name Primary?    Plantar fasciitis, right Yes    Pain of right heel     Gastrocnemius equinus of right lower extremity     Pes planus,  "congenital, unspecified laterality          Plan:       Laura was seen today for plantar fasciitis.    Diagnoses and all orders for this visit:    Plantar fasciitis, right    Pain of right heel    Gastrocnemius equinus of right lower extremity    Pes planus, congenital, unspecified laterality      I counseled the patient on his conditions, their implications and medical management.    Dispensed literature on and demonstrated calf muscle stretches. Reviewed appropriate shoe gear and discussed activity modification such as avoiding flat shoes and barefoot walking. Recommend 1/2-1" heel height.    Recommended Sof Sol Fit Series Arch Supports with low arch.    Rest, ice and elevation. May continue taking OTC NSAIDs.    RTC 3-4 weeks or prn.    .         "

## 2017-06-21 RX ORDER — LOSARTAN POTASSIUM AND HYDROCHLOROTHIAZIDE 25; 100 MG/1; MG/1
1 TABLET ORAL DAILY
Qty: 90 TABLET | Refills: 1 | Status: SHIPPED | OUTPATIENT
Start: 2017-06-21 | End: 2018-01-11 | Stop reason: SDUPTHER

## 2017-06-22 ENCOUNTER — OFFICE VISIT (OUTPATIENT)
Dept: PODIATRY | Facility: CLINIC | Age: 77
End: 2017-06-22
Payer: MEDICARE

## 2017-06-22 VITALS
DIASTOLIC BLOOD PRESSURE: 65 MMHG | BODY MASS INDEX: 25.76 KG/M2 | WEIGHT: 170 LBS | HEIGHT: 68 IN | SYSTOLIC BLOOD PRESSURE: 154 MMHG | HEART RATE: 64 BPM

## 2017-06-22 DIAGNOSIS — N18.30 CKD (CHRONIC KIDNEY DISEASE) STAGE 3, GFR 30-59 ML/MIN: ICD-10-CM

## 2017-06-22 DIAGNOSIS — M72.2 PLANTAR FASCIITIS, RIGHT: Primary | ICD-10-CM

## 2017-06-22 PROCEDURE — 99999 PR PBB SHADOW E&M-EST. PATIENT-LVL III: CPT | Mod: PBBFAC,,, | Performed by: PODIATRIST

## 2017-06-22 PROCEDURE — 99213 OFFICE O/P EST LOW 20 MIN: CPT | Mod: PBBFAC,PO | Performed by: PODIATRIST

## 2017-06-22 PROCEDURE — 1159F MED LIST DOCD IN RCRD: CPT | Mod: ,,, | Performed by: PODIATRIST

## 2017-06-22 PROCEDURE — 1125F AMNT PAIN NOTED PAIN PRSNT: CPT | Mod: ,,, | Performed by: PODIATRIST

## 2017-06-22 PROCEDURE — 99212 OFFICE O/P EST SF 10 MIN: CPT | Mod: S$PBB,,, | Performed by: PODIATRIST

## 2017-06-22 NOTE — PROGRESS NOTES
"Subjective:      Patient ID: Laura Harris is a 76 y.o. male.    Chief Complaint: Foot Pain (3 wk f/u)    Laura is a 76 y.o. male who presents to the clinic complaining of heel pain in the right foot, especially with the first step in the morning. The pain is described as Aching and Sharp. The onset of the pain was gradual and has improved over the past several weeks. Laura rates the pain as 5/10. He denies a history of trauma. Prior treatments include taking medrol dose juan miguel prescribed per Dr. Dewey. He was prescribed Ultram at [facility] visit, however it did not help with his pain.     6/22/17: Follow up for right heel pain. Relates he completed medrol dose juan miguel prescribed per Dr. Dewey. Pain is minimal today. Wearing OTC cushioned arch support and dress shoes.  Vitals:    06/22/17 0735   BP: (!) 154/65   Pulse: 64   Weight: 77.1 kg (170 lb)   Height: 5' 8" (1.727 m)   PainSc:   2   PainLoc: Foot      Past Medical History:   Diagnosis Date    CKD (chronic kidney disease) stage 3, GFR 30-59 ml/min 5/1/2014    Hyperlipidemia     Hypertension     Low tension glaucoma        Past Surgical History:   Procedure Laterality Date    CYSTOSCOPY         Family History   Problem Relation Age of Onset    Melanoma Neg Hx        Social History     Social History    Marital status: Single     Spouse name: N/A    Number of children: N/A    Years of education: N/A     Social History Main Topics    Smoking status: Current Every Day Smoker     Packs/day: 1.00     Types: Cigarettes    Smokeless tobacco: Current User    Alcohol use No    Drug use: No    Sexual activity: Not Asked     Other Topics Concern    None     Social History Narrative    None       Current Outpatient Prescriptions   Medication Sig Dispense Refill    diltiaZEM (CARTIA XT) 300 MG 24 hr capsule Take 1 capsule (300 mg total) by mouth once daily. 90 capsule 1    losartan-hydrochlorothiazide 100-25 mg (HYZAAR) 100-25 mg per tablet Take 1 tablet by " mouth once daily. 90 tablet 1     No current facility-administered medications for this visit.        Review of patient's allergies indicates:   Allergen Reactions    Ace inhibitors      Other reaction(s): Angioedema    Hydrocodone Diarrhea         Review of Systems   Constitution: Negative for chills, fever, weakness and malaise/fatigue.   Cardiovascular: Negative for chest pain, claudication and leg swelling.   Respiratory: Negative for cough and shortness of breath.    Skin: Negative for color change, itching and rash.   Musculoskeletal: Negative for back pain, joint pain, muscle cramps and muscle weakness.   Gastrointestinal: Negative for nausea and vomiting.   Neurological: Negative for numbness and paresthesias.   Psychiatric/Behavioral: Negative for altered mental status.           Objective:      Physical Exam   Constitutional: He is oriented to person, place, and time. No distress.   Cardiovascular:   Pulses:       Dorsalis pedis pulses are 2+ on the right side, and 2+ on the left side.        Posterior tibial pulses are 2+ on the right side, and 2+ on the left side.   CFT< 3 secs all toes bilateral foot, skin temp warm bilateral foot, diminished digital hair growth bilateral foot, no lower extremity edema bilateral.     Musculoskeletal:        Feet:    + equinus that reduces with knee bent bilateral.    Flexible pes planus bilateral foot.   Neurological: He is alert and oriented to person, place, and time. He has normal strength. Gait normal.   Skin: Skin is warm, dry and intact. Capillary refill takes less than 2 seconds. No ecchymosis and no rash noted. He is not diaphoretic. No cyanosis or erythema. Nails show no clubbing.   No open lesions or macerations bilateral lower extremity.               Assessment:       Encounter Diagnoses   Name Primary?    Plantar fasciitis, right Yes    CKD (chronic kidney disease) stage 3, GFR 30-59 ml/min          Plan:       Laura was seen today for foot  pain.    Diagnoses and all orders for this visit:    Plantar fasciitis, right    CKD (chronic kidney disease) stage 3, GFR 30-59 ml/min      I counseled the patient on his conditions, their implications and medical management.    Reviewed his labs noting CKD and discussed implications on medication use such as avoiding NSAIDs and staying well hydrated.    Discussed continued activity and shoe gear modification.    Discussed pain should continue to improve over next 3-4 weeks.    RTC prn.    .

## 2017-06-22 NOTE — LETTER
June 22, 2017      Riley Villalobos MD  [address] [place] [address]           [facility]  [address] [place] [address]  Phone: [phone]          Patient: Laura Harris   MR Number: [id number]   YOB: 1940   Date of Visit: 6/22/2017       Dear Dr. Riley Villalobos:    Thank you for referring Laura Harris to me for evaluation. Attached you will find relevant portions of my assessment and plan of care.    If you have questions, please do not hesitate to call me. I look forward to following Laura Harris along with you.    Sincerely,    Kel Rush, DPSAUL    Enclosure  CC:  No Recipients    If you would like to receive this communication electronically, please contact [email or web address] or [phone] to request more information on [employer] access.    For providers and/or their staff who would like to refer a patient to [facility], please contact us through our one-stop-shop provider referral line, [facility], at [phone].    If you feel you have received this communication in error or would no longer like to receive these types of communications, please e-mail [email or web address]

## 2017-09-15 ENCOUNTER — HOSPITAL ENCOUNTER (EMERGENCY)
Facility: HOSPITAL | Age: 77
Discharge: HOME | End: 2017-09-15
Attending: EMERGENCY MEDICINE
Payer: MEDICARE

## 2017-09-15 VITALS
BODY MASS INDEX: 25.76 KG/M2 | DIASTOLIC BLOOD PRESSURE: 81 MMHG | HEIGHT: 68 IN | RESPIRATION RATE: 18 BRPM | SYSTOLIC BLOOD PRESSURE: 188 MMHG | HEART RATE: 60 BPM | OXYGEN SATURATION: 98 % | TEMPERATURE: 98 F | WEIGHT: 170 LBS

## 2017-09-15 DIAGNOSIS — J40 BRONCHITIS: Primary | ICD-10-CM

## 2017-09-15 DIAGNOSIS — R05.9 COUGH: ICD-10-CM

## 2017-09-15 LAB
FLUAV AG SPEC QL IA: NEGATIVE
FLUBV AG SPEC QL IA: NEGATIVE
SPECIMEN SOURCE: NORMAL

## 2017-09-15 PROCEDURE — 87400 INFLUENZA A/B EACH AG IA: CPT | Mod: 59

## 2017-09-15 PROCEDURE — 99284 EMERGENCY DEPT VISIT MOD MDM: CPT

## 2017-09-15 RX ORDER — ALBUTEROL SULFATE 90 UG/1
1-2 AEROSOL, METERED RESPIRATORY (INHALATION) EVERY 6 HOURS PRN
Qty: 1 INHALER | Refills: 0 | Status: SHIPPED | OUTPATIENT
Start: 2017-09-15 | End: 2018-02-15

## 2017-09-15 RX ORDER — METHYLPREDNISOLONE 4 MG/1
TABLET ORAL
Qty: 1 PACKAGE | Refills: 0 | Status: SHIPPED | OUTPATIENT
Start: 2017-09-15 | End: 2018-02-15

## 2017-09-15 RX ORDER — AZITHROMYCIN 250 MG/1
250 TABLET, FILM COATED ORAL DAILY
Qty: 6 TABLET | Refills: 0 | Status: SHIPPED | OUTPATIENT
Start: 2017-09-15 | End: 2018-02-15

## 2017-09-15 NOTE — ED PROVIDER NOTES
Encounter Date: 9/15/2017       History     Chief Complaint   Patient presents with    Cough     productive cough that began a week ago with white, thin mucus. Reports SOB with activity. Denies N/V, diarrhea or constipation.     Nasal Congestion     Patient is a 76-year-old smoker with a past medical history chronic kidney disease hypertension and lipidemia who presents to emergency department for evaluation of a cough that began a week ago within white mucus but it hasn't improved.  He denies any other symptoms except for mild shortness of breath.  He does not use an inhaler has no history of COPD.  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  He denies any real headaches fevers anorexia abdominal pain back pain lower extremity edema history of CHF or PE hemoptysis weight loss or cancer.          Review of patient's allergies indicates:   Allergen Reactions    Ace inhibitors      Other reaction(s): Angioedema    Hydrocodone Diarrhea    Ultram [tramadol] Nausea Only and Other (See Comments)     Dizziness     Past Medical History:   Diagnosis Date    CKD (chronic kidney disease) stage 3, GFR 30-59 ml/min 5/1/2014    Hyperlipidemia     Hypertension     Low tension glaucoma      Past Surgical History:   Procedure Laterality Date    CYSTOSCOPY       Family History   Problem Relation Age of Onset    Melanoma Neg Hx      Social History   Substance Use Topics    Smoking status: Current Every Day Smoker     Packs/day: 1.00     Types: Cigarettes    Smokeless tobacco: Current User    Alcohol use No     Review of Systems   Constitutional: Negative for chills, fatigue and fever.   HENT: Negative for ear pain, rhinorrhea and sore throat.    Eyes: Negative for pain and visual disturbance.   Respiratory: Positive for cough and shortness of breath. Negative for chest tightness, wheezing and stridor.    Cardiovascular: Negative for chest pain, palpitations and leg  swelling.   Gastrointestinal: Negative for abdominal pain, diarrhea, nausea and vomiting.   Genitourinary: Negative for difficulty urinating, dysuria and flank pain.   Musculoskeletal: Negative for arthralgias.   Skin: Negative for rash.   Neurological: Negative for weakness, numbness and headaches.   Psychiatric/Behavioral: Negative for agitation and confusion.   All other systems reviewed and are negative.      Physical Exam     Initial Vitals [09/15/17 0937]   BP Pulse Resp Temp SpO2   (!) 179/90 96 18 98.3 °F (36.8 °C) 97 %      MAP       119.67         Physical Exam    Nursing note and vitals reviewed.  Constitutional: He appears well-developed and well-nourished. No distress.   HENT:   Head: Normocephalic and atraumatic.   Mouth/Throat: Oropharynx is clear and moist.   Eyes: Conjunctivae and EOM are normal. Pupils are equal, round, and reactive to light.   Neck: Normal range of motion. Neck supple.   Cardiovascular: Normal rate, regular rhythm, normal heart sounds and intact distal pulses. Exam reveals no gallop and no friction rub.    No murmur heard.  Pulmonary/Chest: No respiratory distress. He has wheezes (Mild on the left posterior and expiratory wheezing). He has no rhonchi. He has no rales. He exhibits no tenderness.   Abdominal: Soft. Bowel sounds are normal. There is no tenderness. There is no rebound and no guarding.   Musculoskeletal: Normal range of motion. He exhibits no edema.   Neurological: He is alert and oriented to person, place, and time. He has normal strength. No sensory deficit.   Skin: Skin is warm and dry. No rash noted.   Psychiatric: He has a normal mood and affect.         ED Course   Procedures  Labs Reviewed   INFLUENZA A AND B ANTIGEN             Medical Decision Making:   Patient appears to have a bronchitis.  Given the needs a smoker and his symptoms have lasted for a week, I will start him on albuterol azithromycin and a Medrol Dosepak.  Chest x-ray shows no acute  cardiopulmonary process no signs of pulmonary edema pneumothorax pneumonia or mass.  Patient is to follow-up with his regular physician in a given specific return precautions.  Patient's blood pressure is elevated here today be hasn't taken his medicine.  He can be discharged home and take his medicines and needs to follow-up with his primary care physician for repeat blood pressure check.                   ED Course      Clinical Impression:   The primary encounter diagnosis was Bronchitis. A diagnosis of Cough was also pertinent to this visit.                           Robinson Amor MD  09/15/17 3212

## 2017-09-15 NOTE — ED NOTES
Pt presents to ED with c/o cough and congestion x 2 weeks. Pt states started to feel better yesterday but today got worse. Reports productive cough with nasal congestion. Subjective fever but unsure bc he did not check temp at home. NAD noted. Chronic smoker x 1 pack per day.

## 2017-10-06 RX ORDER — DILTIAZEM HYDROCHLORIDE 300 MG/1
300 CAPSULE, COATED, EXTENDED RELEASE ORAL DAILY
Qty: 90 CAPSULE | Refills: 1 | Status: SHIPPED | OUTPATIENT
Start: 2017-10-06 | End: 2018-04-10 | Stop reason: SDUPTHER

## 2017-11-21 ENCOUNTER — TELEPHONE (OUTPATIENT)
Dept: UROLOGY | Facility: CLINIC | Age: 77
End: 2017-11-21

## 2017-11-21 DIAGNOSIS — C67.9 MALIGNANT NEOPLASM OF URINARY BLADDER, UNSPECIFIED SITE: Primary | ICD-10-CM

## 2017-11-21 NOTE — TELEPHONE ENCOUNTER
Order in for cysto, ultrasound, cmp and cxr.   ----- Message from Giovanna Jaramillo LPN sent at 11/21/2017  1:45 PM CST -----  Contact: Home: [phone]       ----- Message -----  From: Bindu Marinelli MA  Sent: 11/21/2017   1:27 PM  To: Mukesh HUGHES Staff    Home: [phone]   Pt states that he has an annual visit due to Jan. 2018. He does not have a recall. Pt said he usually gets a cysto. Can you verify if he needs a cysto or office visit. If yes to cysto, orders needed.

## 2018-01-10 ENCOUNTER — HOSPITAL ENCOUNTER (OUTPATIENT)
Dept: RADIOLOGY | Facility: HOSPITAL | Age: 78
Discharge: HOME | End: 2018-01-10
Attending: UROLOGY
Payer: MEDICARE

## 2018-01-10 ENCOUNTER — HOSPITAL ENCOUNTER (OUTPATIENT)
Dept: UROLOGY | Facility: HOSPITAL | Age: 78
Discharge: HOME | End: 2018-01-10
Attending: UROLOGY
Payer: MEDICARE

## 2018-01-10 VITALS
SYSTOLIC BLOOD PRESSURE: 190 MMHG | HEIGHT: 69 IN | BODY MASS INDEX: 25.47 KG/M2 | HEART RATE: 54 BPM | TEMPERATURE: 98 F | WEIGHT: 171.94 LBS | RESPIRATION RATE: 16 BRPM | DIASTOLIC BLOOD PRESSURE: 74 MMHG

## 2018-01-10 DIAGNOSIS — C67.9 MALIGNANT NEOPLASM OF URINARY BLADDER, UNSPECIFIED SITE: ICD-10-CM

## 2018-01-10 PROCEDURE — 71046 X-RAY EXAM CHEST 2 VIEWS: CPT | Mod: 26,,, | Performed by: RADIOLOGY

## 2018-01-10 PROCEDURE — 76770 US EXAM ABDO BACK WALL COMP: CPT | Mod: 26,GC,, | Performed by: RADIOLOGY

## 2018-01-10 PROCEDURE — 52000 CYSTOURETHROSCOPY: CPT

## 2018-01-10 PROCEDURE — 71046 X-RAY EXAM CHEST 2 VIEWS: CPT | Mod: TC

## 2018-01-10 PROCEDURE — 76770 US EXAM ABDO BACK WALL COMP: CPT | Mod: TC

## 2018-01-10 PROCEDURE — 88112 CYTOPATH CELL ENHANCE TECH: CPT | Mod: 26,,, | Performed by: PATHOLOGY

## 2018-01-10 PROCEDURE — 52000 CYSTOURETHROSCOPY: CPT | Mod: ,,, | Performed by: UROLOGY

## 2018-01-10 PROCEDURE — 88112 CYTOPATH CELL ENHANCE TECH: CPT | Performed by: PATHOLOGY

## 2018-01-10 RX ORDER — TAMSULOSIN HYDROCHLORIDE 0.4 MG/1
0.4 CAPSULE ORAL
Qty: 30 CAPSULE | Refills: 12 | Status: SHIPPED | OUTPATIENT
Start: 2018-01-10 | End: 2018-03-05

## 2018-01-10 RX ORDER — LIDOCAINE HYDROCHLORIDE 20 MG/ML
10 JELLY TOPICAL
Status: COMPLETED | OUTPATIENT
Start: 2018-01-10 | End: 2018-01-10

## 2018-01-10 RX ADMIN — LIDOCAINE HYDROCHLORIDE 10 ML: 20 JELLY TOPICAL at 10:01

## 2018-01-10 NOTE — PATIENT INSTRUCTIONS
What to Expect After a Cystoscopy  For the next 24-48 hours, you may feel a mild burning when you urinate. This burning is normal and expected. Drink plenty of water to dilute the urine to help relieve the burning sensation. You may also see a small amount of blood in your urine after the procedure.    Unless you are already taking antibiotics, you may be given an antibiotic after the test to prevent infection.    Signs and Symptoms to Report  Call the [facility] at [phone] if you develop any of the following:  · Fever of 101 degrees or higher  · Chills or persistent bleeding  · Inability to urinate

## 2018-01-10 NOTE — PROCEDURES
Procedures: Flexible cystourethroscopy    Pre Procedure Diagnosis:bladder cancer    Post Procedure Diagnosis:BPH    Surgeon: Ciara Mayorga MD    Anesthesia: 2% uro-jet lidocaine jelly for local analgesia    Flexible cysto-urethroscopy was performed after consent was obtained.  The risks and benefits were explained.    2% lidocaine urojet was used for local analgesia.  The genitalia was prepped and draped in the sterile fashion with betadine.     The flexible scope was advanced into the urethra and into the bladder.  Bilateral ureteral orifice were evaluated and noted to be normal with clear efflux.  The bladder was completely surveyed in a systematic fashion.   No bladder tumors or lesions were seen.  No strictures were noted.  The prostate showed Significant hypertrophy.  There was Mild median lobe present.    The patient tolerated the procedure well without complication.    They will follow up in 2 months after he restarts flomax. He is having urinary frequency, intermittency.

## 2018-01-10 NOTE — H&P
Patient ID: Laura Harris is a 77 y.o. male.     Chief Complaint: Urinary Frequency     HPI Comments: Laura Harris is a 77 y.o. 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   8/2009 had left ureteral stricture, stent and subsequent removal.   Last cysto 1/17 showed no recurrence of bladder cancer.   Last upper tract imaging renal ultrasound 1/18 and cxr ok.   Has CKD.     BPH - started back on flomax 1/2017. Was Having urinary frequency every 3 hours. Some dysuria. No gross hematuria. Some urgency.   Nocturia x 2.   No fevers.      12/8/16 and 1/4/17 - urine culture Klebsiella     Lab Results   Component Value Date    PSA 1.3 03/28/2015    PSA 1.08 05/07/2012    PSA 1.6 04/27/2011    PSA 1.2 08/04/2010    PSA 1.6 04/25/2009    PSA 0.8 02/04/2008    PSA 1.0 09/19/2006    PSA 1.3 08/12/2005    PSA 0.8 01/29/2004    PSADIAG 1.6 04/02/2014        Urinary Frequency    Associated symptoms include frequency and urgency. Pertinent negatives include no chills, flank pain, hematuria, constipation or rash.              Past Medical History   Diagnosis Date    CKD (chronic kidney disease) stage 3, GFR 30-59 ml/min 5/1/2014    Hyperlipidemia      Hypertension      Low tension glaucoma                 Past Surgical History   Procedure Laterality Date    Cystoscopy                   Family History   Problem Relation Age of Onset    Melanoma Neg Hx           Social History   Social History            Social History    Marital status: Single       Spouse name: N/A    Number of children: N/A    Years of education: N/A          Occupational History    Not on file.            Social History Main Topics    Smoking status: Current Every Day Smoker       Packs/day: 1.00       Types: Cigarettes    Smokeless tobacco: Current User    Alcohol use No    Drug use: No    Sexual activity: Not on file           Other Topics Concern    Not on file      Social History Narrative             Allergies:  Ace inhibitors     Medications:     Current Outpatient Prescriptions:     aspirin (BABY ASPIRIN) 81 MG chewable tablet, Take 81 mg by mouth. 1 Tablet, Chewable Oral Every day, Disp: , Rfl:     diltiaZEM (CARTIA XT) 300 MG 24 hr capsule, Take 1 capsule (300 mg total) by mouth once daily., Disp: 90 capsule, Rfl: 0    losartan-hydrochlorothiazide 100-25 mg (HYZAAR) 100-25 mg per tablet, Take 1 tablet by mouth once daily., Disp: 90 tablet, Rfl: 1        Review of Systems   Constitutional: Negative for chills, fever and unexpected weight change.   HENT: Negative for hearing loss and nosebleeds.   Eyes: Positive for visual disturbance.   Respiratory: Negative for chest tightness.   Cardiovascular: Negative for chest pain.   Gastrointestinal: Negative for constipation and diarrhea.   Genitourinary:  Negative for flank pain and hematuria.   Musculoskeletal: Positive for arthralgias and back pain. Negative for joint swelling.   Skin: Negative for rash.   Neurological: Negative for seizures.   Hematological: Does not bruise/bleed easily.   Psychiatric/Behavioral: Negative for behavioral problems.       Objective:      Physical Exam   Constitutional: He is oriented to person, place, and time. He appears well-developed and well-nourished.   HENT:   Head: Normocephalic and atraumatic.   Eyes: No scleral icterus.   Neck: Neck supple.   Cardiovascular: Normal rate and regular rhythm.   Pulmonary/Chest: Effort normal. No respiratory distress.   Abdominal: He exhibits no mass. A hernia is present. Hernia confirmed positive in the right inguinal area. Hernia confirmed negative in the left inguinal area.   Genitourinary: Testes normal and penis normal. Circumcised.   Genitourinary Comments:Musculoskeletal: He exhibits no tenderness.   Lymphadenopathy:   He has no cervical adenopathy. No inguinal adenopathy noted on the right or left side.   Neurological: He is alert and oriented to person, place, and time.    Skin: Skin is warm. No rash noted.     Psychiatric: He has a normal mood and affect.     urine dip nitrite positive  Assessment:       1. Malignant neoplasm of urinary bladder, unspecified site    2. Acute UTI    3. CKD (chronic kidney disease) stage 3, GFR 30-59 ml/min        Plan:     Cysto today

## 2018-01-11 RX ORDER — LOSARTAN POTASSIUM AND HYDROCHLOROTHIAZIDE 25; 100 MG/1; MG/1
1 TABLET ORAL DAILY
Qty: 90 TABLET | Refills: 1 | Status: SHIPPED | OUTPATIENT
Start: 2018-01-11 | End: 2018-08-14

## 2018-01-11 NOTE — TELEPHONE ENCOUNTER
----- Message from Diana Jameson sent at 1/11/2018  9:00 AM CST -----  Contact: [phone]/ self   Patient needs you to call [facility] to authorize his losartan Rx. Please advise.

## 2018-02-15 ENCOUNTER — HOSPITAL ENCOUNTER (OUTPATIENT)
Dept: RADIOLOGY | Facility: HOSPITAL | Age: 78
Discharge: HOME | End: 2018-02-15
Attending: FAMILY MEDICINE
Payer: MEDICARE

## 2018-02-15 ENCOUNTER — OFFICE VISIT (OUTPATIENT)
Dept: FAMILY MEDICINE | Facility: CLINIC | Age: 78
End: 2018-02-15
Payer: MEDICARE

## 2018-02-15 VITALS
HEIGHT: 69 IN | HEART RATE: 77 BPM | SYSTOLIC BLOOD PRESSURE: 142 MMHG | BODY MASS INDEX: 25.48 KG/M2 | OXYGEN SATURATION: 97 % | DIASTOLIC BLOOD PRESSURE: 80 MMHG | WEIGHT: 172 LBS

## 2018-02-15 DIAGNOSIS — I10 ESSENTIAL HYPERTENSION: ICD-10-CM

## 2018-02-15 DIAGNOSIS — R35.1 NOCTURIA: ICD-10-CM

## 2018-02-15 DIAGNOSIS — G89.29 CHRONIC LEFT HIP PAIN: ICD-10-CM

## 2018-02-15 DIAGNOSIS — M25.552 CHRONIC LEFT HIP PAIN: ICD-10-CM

## 2018-02-15 DIAGNOSIS — N40.1 BENIGN PROSTATIC HYPERPLASIA (BPH) WITH URINARY URGENCY: Primary | ICD-10-CM

## 2018-02-15 DIAGNOSIS — Z23 NEED FOR INFLUENZA VACCINATION: ICD-10-CM

## 2018-02-15 DIAGNOSIS — R39.15 BENIGN PROSTATIC HYPERPLASIA (BPH) WITH URINARY URGENCY: Primary | ICD-10-CM

## 2018-02-15 DIAGNOSIS — L84 CORN OF TOE: ICD-10-CM

## 2018-02-15 PROCEDURE — 1125F AMNT PAIN NOTED PAIN PRSNT: CPT | Mod: ,,, | Performed by: FAMILY MEDICINE

## 2018-02-15 PROCEDURE — 73502 X-RAY EXAM HIP UNI 2-3 VIEWS: CPT | Mod: 26,LT,, | Performed by: RADIOLOGY

## 2018-02-15 PROCEDURE — 99999 PR PBB SHADOW E&M-EST. PATIENT-LVL IV: CPT | Mod: PBBFAC,,, | Performed by: FAMILY MEDICINE

## 2018-02-15 PROCEDURE — 73502 X-RAY EXAM HIP UNI 2-3 VIEWS: CPT | Mod: TC,FY,PO,LT

## 2018-02-15 PROCEDURE — 99214 OFFICE O/P EST MOD 30 MIN: CPT | Mod: S$PBB,,, | Performed by: FAMILY MEDICINE

## 2018-02-15 PROCEDURE — 1159F MED LIST DOCD IN RCRD: CPT | Mod: ,,, | Performed by: FAMILY MEDICINE

## 2018-02-15 PROCEDURE — 90662 IIV NO PRSV INCREASED AG IM: CPT | Mod: PBBFAC,PO

## 2018-02-15 PROCEDURE — 99214 OFFICE O/P EST MOD 30 MIN: CPT | Mod: PBBFAC,PO,25 | Performed by: FAMILY MEDICINE

## 2018-02-15 RX ORDER — FINASTERIDE 5 MG/1
5 TABLET, FILM COATED ORAL DAILY
Qty: 30 TABLET | Refills: 11 | Status: SHIPPED | OUTPATIENT
Start: 2018-02-15 | End: 2018-03-06

## 2018-02-15 RX ORDER — FINASTERIDE 5 MG/1
5 TABLET, FILM COATED ORAL DAILY
Qty: 30 TABLET | Refills: 11 | Status: SHIPPED | OUTPATIENT
Start: 2018-02-15 | End: 2018-02-15 | Stop reason: SDUPTHER

## 2018-02-15 NOTE — PATIENT INSTRUCTIONS

## 2018-02-15 NOTE — PROGRESS NOTES
Subjective:       Patient ID: Laura Harris is a 77 y.o. male.    Chief Complaint: No chief complaint on file.    77 years old male came to the clinic for blood pressure check.  Blood pressure today is borderline.  No chest pain palpitations orthopnea or PND.  Patient with BPH associated with nocturia and urgency.  He is taking Flomax with no significant improvement.  Patient also with left hip pain for the last couple years.  The pain is 3 out of 10 of intensity on and off aggravated with activity and better with rest.  Patient with toe corn.  He requests evaluation by podiatric service.      Review of Systems   Constitutional: Negative.    HENT: Negative.    Eyes: Negative.    Respiratory: Negative.    Cardiovascular: Negative.    Gastrointestinal: Negative.    Genitourinary: Positive for frequency and urgency. Negative for dysuria and hematuria.   Musculoskeletal: Positive for arthralgias.   Skin: Negative.    Neurological: Negative.    Psychiatric/Behavioral: Negative.        Objective:      Physical Exam   Constitutional: He is oriented to person, place, and time. He appears well-developed and well-nourished. No distress.   HENT:   Head: Normocephalic and atraumatic.   Right Ear: External ear normal.   Left Ear: External ear normal.   Nose: Nose normal.   Mouth/Throat: Oropharynx is clear and moist. No oropharyngeal exudate.   Eyes: Conjunctivae and EOM are normal. Pupils are equal, round, and reactive to light. Right eye exhibits no discharge. Left eye exhibits no discharge. No scleral icterus.   Neck: Normal range of motion. Neck supple. No JVD present. No tracheal deviation present. No thyromegaly present.   Cardiovascular: Normal rate, regular rhythm, normal heart sounds and intact distal pulses.  Exam reveals no gallop and no friction rub.    No murmur heard.  Pulmonary/Chest: Effort normal and breath sounds normal. No stridor. No respiratory distress. He has no wheezes. He has no rales. He exhibits no  tenderness.   Abdominal: Soft. Bowel sounds are normal. He exhibits no distension and no mass. There is no tenderness. There is no rebound and no guarding.   Musculoskeletal: He exhibits no edema.        Left hip: He exhibits decreased range of motion and tenderness.   Lymphadenopathy:     He has no cervical adenopathy.   Neurological: He is alert and oriented to person, place, and time. He has normal reflexes. He displays normal reflexes. No cranial nerve deficit. He exhibits normal muscle tone. Coordination normal.   Skin: Skin is warm and dry. No rash noted. He is not diaphoretic. No erythema. No pallor.   Psychiatric: He has a normal mood and affect. His behavior is normal. Judgment and thought content normal.   Nursing note and vitals reviewed.      Assessment:       1. Benign prostatic hyperplasia (BPH) with urinary urgency    2. Nocturia    3. Essential hypertension    4. Corn of toe    5. Need for influenza vaccination    6. Chronic left hip pain        Plan:         Diagnoses and all orders for this visit:    Benign prostatic hyperplasia (BPH) with urinary urgency  -     finasteride (PROSCAR) 5 mg tablet; Take 1 tablet (5 mg total) by mouth once daily.    Nocturia  -     finasteride (PROSCAR) 5 mg tablet; Take 1 tablet (5 mg total) by mouth once daily.    Essential hypertension    [place] of toe  -     Ambulatory referral to Podiatry    Need for influenza vaccination    Chronic left hip pain  -     X-Ray Hip 2 or 3 views Left; Future  -     Ambulatory referral to Orthopedics    Other orders  -     Discontinue: finasteride (PROSCAR) 5 mg tablet; Take 1 tablet (5 mg total) by mouth once daily.    Continue monitoring blood pressure at home, low sodium diet.  Follow-up with urology to consider interventional treatment.

## 2018-02-19 ENCOUNTER — TELEPHONE (OUTPATIENT)
Dept: FAMILY MEDICINE | Facility: CLINIC | Age: 78
End: 2018-02-19

## 2018-02-19 NOTE — TELEPHONE ENCOUNTER
----- Message from Erika Carr sent at 2/19/2018  9:17 AM CST -----  Contact: [phone]/ self   Pt its requesting xray test results done 02/15/18. Please advise

## 2018-02-20 ENCOUNTER — TELEPHONE (OUTPATIENT)
Dept: ORTHOPEDICS | Facility: CLINIC | Age: 78
End: 2018-02-20

## 2018-03-05 ENCOUNTER — OFFICE VISIT (OUTPATIENT)
Dept: PODIATRY | Facility: CLINIC | Age: 78
End: 2018-03-05
Payer: MEDICARE

## 2018-03-05 VITALS
BODY MASS INDEX: 25.48 KG/M2 | SYSTOLIC BLOOD PRESSURE: 166 MMHG | WEIGHT: 172 LBS | DIASTOLIC BLOOD PRESSURE: 72 MMHG | HEIGHT: 69 IN | HEART RATE: 66 BPM

## 2018-03-05 DIAGNOSIS — M89.8X7 EXOSTOSIS OF TOE: Primary | ICD-10-CM

## 2018-03-05 DIAGNOSIS — M20.10 VALGUS DEFORMITY OF GREAT TOE, UNSPECIFIED LATERALITY: ICD-10-CM

## 2018-03-05 DIAGNOSIS — L84 CORN OR CALLUS: ICD-10-CM

## 2018-03-05 PROCEDURE — 99213 OFFICE O/P EST LOW 20 MIN: CPT | Mod: S$PBB,,, | Performed by: PODIATRIST

## 2018-03-05 PROCEDURE — 99999 PR PBB SHADOW E&M-EST. PATIENT-LVL III: CPT | Mod: PBBFAC,,, | Performed by: PODIATRIST

## 2018-03-05 PROCEDURE — 99213 OFFICE O/P EST LOW 20 MIN: CPT | Mod: PBBFAC,PO | Performed by: PODIATRIST

## 2018-03-05 NOTE — LETTER
March 5, 2018      Riley Villalobos MD  [address] [facility] [place] [address]           [place] [facility]  [address] [facility] [place] [address]  Phone: [phone]          Patient: Laura Harris   MR Number: [id number]   YOB: 1940   Date of Visit: 3/5/2018       Dear Dr. Riley Villalobos:    Thank you for referring Laura Harris to me for evaluation. Attached you will find relevant portions of my assessment and plan of care.    If you have questions, please do not hesitate to call me. I look forward to following Laura Harris along with you.    Sincerely,    Kel Rush, DPSAUL    Enclosure  CC:  No Recipients    If you would like to receive this communication electronically, please contact [email or web address] or [phone] to request more information on [employer] Link access.    For providers and/or their staff who would like to refer a patient to [facility], please contact us through our one-stop-shop provider referral line, [facility], at [phone].    If you feel you have received this communication in error or would no longer like to receive these types of communications, please e-mail [email or web address]

## 2018-03-06 ENCOUNTER — OFFICE VISIT (OUTPATIENT)
Dept: UROLOGY | Facility: CLINIC | Age: 78
End: 2018-03-06
Payer: MEDICARE

## 2018-03-06 VITALS
SYSTOLIC BLOOD PRESSURE: 178 MMHG | DIASTOLIC BLOOD PRESSURE: 79 MMHG | HEIGHT: 69 IN | BODY MASS INDEX: 25.77 KG/M2 | HEART RATE: 81 BPM | WEIGHT: 174 LBS

## 2018-03-06 DIAGNOSIS — N18.30 CKD (CHRONIC KIDNEY DISEASE) STAGE 3, GFR 30-59 ML/MIN: ICD-10-CM

## 2018-03-06 DIAGNOSIS — I10 ESSENTIAL HYPERTENSION: ICD-10-CM

## 2018-03-06 DIAGNOSIS — C67.9 MALIGNANT NEOPLASM OF URINARY BLADDER, UNSPECIFIED SITE: Primary | ICD-10-CM

## 2018-03-06 PROCEDURE — 99214 OFFICE O/P EST MOD 30 MIN: CPT | Mod: S$PBB,,, | Performed by: UROLOGY

## 2018-03-06 PROCEDURE — 99213 OFFICE O/P EST LOW 20 MIN: CPT | Mod: PBBFAC | Performed by: UROLOGY

## 2018-03-06 PROCEDURE — 99999 PR PBB SHADOW E&M-EST. PATIENT-LVL III: CPT | Mod: PBBFAC,,, | Performed by: UROLOGY

## 2018-03-06 RX ORDER — TAMSULOSIN HYDROCHLORIDE 0.4 MG/1
0.4 CAPSULE ORAL
Qty: 30 CAPSULE | Refills: 12 | Status: SHIPPED | OUTPATIENT
Start: 2018-03-06 | End: 2019-03-24 | Stop reason: SDUPTHER

## 2018-03-06 NOTE — PROGRESS NOTES
Subjective:       Patient ID: Laura Harris is a 77 y.o. male.    Chief Complaint: Follow-up (from cysto) and Medication Problem    Laura Harris is a 77 y.o. 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   8/2009 had left ureteral stricture, stent and subsequent removal.   Last cysto 1/17 showed no recurrence of bladder cancer.   Last upper tract imaging renal ultrasound 1/18 and cxr ok.   Has CKD.     BPH - started back on flomax 1/2017. He had stopped it and was restarted in January 2018.      12/8/16 and 1/4/17 - urine culture Klebsiella     His pcp added finasteride.   Patient is now having some ED.         Past Surgical History:   Procedure Laterality Date    CYSTOSCOPY         Past Medical History:   Diagnosis Date    CKD (chronic kidney disease) stage 3, GFR 30-59 ml/min 5/1/2014    Hyperlipidemia     Hypertension     Low tension glaucoma        Social History     Social History    Marital status: Single     Spouse name: N/A    Number of children: N/A    Years of education: N/A     Occupational History    Not on file.     Social History Main Topics    Smoking status: Current Every Day Smoker     Packs/day: 1.00     Types: Cigarettes    Smokeless tobacco: Current User    Alcohol use No    Drug use: No    Sexual activity: Not on file     Other Topics Concern    Not on file     Social History Narrative    No narrative on file       Family History   Problem Relation Age of Onset    Melanoma Neg Hx        Current Outpatient Prescriptions   Medication Sig Dispense Refill    diltiaZEM (CARTIA XT) 300 MG 24 hr capsule Take 1 capsule (300 mg total) by mouth once daily. 90 capsule 1    losartan-hydrochlorothiazide 100-25 mg (HYZAAR) 100-25 mg per tablet Take 1 tablet by mouth once daily. 90 tablet 1    tamsulosin (FLOMAX) 0.4 mg Cp24 Take 1 capsule (0.4 mg total) by mouth after dinner. 30 capsule 12     No current facility-administered medications for  this visit.        Review of patient's allergies indicates:   Allergen Reactions    Ace inhibitors      Other reaction(s): Angioedema    Ultram [tramadol] Nausea Only and Other (See Comments)     Dizziness        BMP  Lab Results   Component Value Date     01/10/2018    K 4.0 01/10/2018     01/10/2018    CO2 29 01/10/2018    BUN 18 01/10/2018    CREATININE 1.6 (H) 01/10/2018    CALCIUM 10.1 01/10/2018    ANIONGAP 8 01/10/2018    ESTGFRAFRICA 47.0 (A) 01/10/2018    EGFRNONAA 40.7 (A) 01/10/2018       Review of Systems   Constitutional: Negative for chills, fever and unexpected weight change.   HENT: Negative for hearing loss and nosebleeds.    Eyes: Negative for visual disturbance.   Respiratory: Negative for chest tightness.    Cardiovascular: Negative for chest pain.   Gastrointestinal: Negative for diarrhea.   Musculoskeletal: Negative for joint swelling.        Hip pain   Skin: Negative for rash.   Neurological: Negative for seizures.   Hematological: Does not bruise/bleed easily.   Psychiatric/Behavioral: Negative for behavioral problems.     Objective:      Physical Exam   Constitutional: He is oriented to person, place, and time. He appears well-developed and well-nourished.   HENT:   Head: Normocephalic and atraumatic.   Eyes: No scleral icterus.   Neck: Neck supple.   Cardiovascular: Normal rate and regular rhythm.    Pulmonary/Chest: Effort normal. No respiratory distress.   Abdominal: He exhibits no mass. Hernia confirmed negative in the right inguinal area and confirmed negative in the left inguinal area.   Genitourinary: Testes normal and penis normal. Circumcised.   Genitourinary Comments: Prostate was smooth without nodularity. No rectal masses.  40 grams.  No external hemorrhoids.   Normal perineum.     Musculoskeletal: He exhibits no tenderness.   Lymphadenopathy:     He has no cervical adenopathy. No inguinal adenopathy noted on the right or left side.   Neurological: He is alert and  oriented to person, place, and time.   Skin: Skin is warm. No rash noted.     Psychiatric: He has a normal mood and affect.       Assessment:       1. Malignant neoplasm of urinary bladder, unspecified site    2. Essential hypertension    3. CKD (chronic kidney disease) stage 3, GFR 30-59 ml/min        Plan:   Laura was seen today for follow-up and medication problem.    Diagnoses and all orders for this visit:    Malignant neoplasm of urinary bladder, unspecified site  -     POCT urinalysis, dipstick or tablet reag  -     Cystoscopy; Future    Essential hypertension    CKD (chronic kidney disease) stage 3, GFR 30-59 ml/min    Other orders  -     tamsulosin (FLOMAX) 0.4 mg Cp24; Take 1 capsule (0.4 mg total) by mouth after dinner.    I spent 25 minutes with the patient of which more than half was spent in direct consultation with the patient in regards to our treatment and plan.    Information on rezum given.   Stop finasteride to see if erections improve.   Continue flomax.

## 2018-03-06 NOTE — PROGRESS NOTES
"Subjective:      Patient ID: Laura Harris is a 77 y.o. male.    Chief Complaint: Callasya (Left 2nd metatarsal)    Complains of pain along the medial side of the left second toe in certain pairs of his shoes for past several months that seems to be getting worse. He has used Dr. Zepeda corn pads which helped remove the callus. Denies trauma. Relates intermittent burning pain.     Vitals:    03/05/18 0917   BP: (!) 166/72   Pulse: 66   Weight: 78 kg (172 lb)   Height: 5' 9" (1.753 m)   PainSc: 0-No pain      Past Medical History:   Diagnosis Date    CKD (chronic kidney disease) stage 3, GFR 30-59 ml/min 5/1/2014    Hyperlipidemia     Hypertension     Low tension glaucoma        Past Surgical History:   Procedure Laterality Date    CYSTOSCOPY         Family History   Problem Relation Age of Onset    Melanoma Neg Hx        Social History     Social History    Marital status: Single     Spouse name: N/A    Number of children: N/A    Years of education: N/A     Social History Main Topics    Smoking status: Current Every Day Smoker     Packs/day: 1.00     Types: Cigarettes    Smokeless tobacco: Current User    Alcohol use No    Drug use: No    Sexual activity: Not Asked     Other Topics Concern    None     Social History Narrative    None       Current Outpatient Prescriptions   Medication Sig Dispense Refill    diltiaZEM (CARTIA XT) 300 MG 24 hr capsule Take 1 capsule (300 mg total) by mouth once daily. 90 capsule 1    finasteride (PROSCAR) 5 mg tablet Take 1 tablet (5 mg total) by mouth once daily. 30 tablet 11    losartan-hydrochlorothiazide 100-25 mg (HYZAAR) 100-25 mg per tablet Take 1 tablet by mouth once daily. 90 tablet 1     No current facility-administered medications for this visit.        Review of patient's allergies indicates:   Allergen Reactions    Ace inhibitors      Other reaction(s): Angioedema    Ultram [tramadol] Nausea Only and Other (See Comments)     Dizziness "         Review of Systems   Constitution: Negative for chills, fever, weakness and malaise/fatigue.   Cardiovascular: Negative for chest pain, claudication and leg swelling.   Respiratory: Negative for cough and shortness of breath.    Skin: Negative for itching, poor wound healing and rash.   Musculoskeletal: Negative for back pain, joint pain, muscle cramps and muscle weakness.   Gastrointestinal: Negative for nausea and vomiting.   Neurological: Negative for numbness and paresthesias.   Psychiatric/Behavioral: Negative for altered mental status.           Objective:      Physical Exam   Constitutional: He is oriented to person, place, and time. He appears well-developed and well-nourished. No distress.   Cardiovascular:   Pulses:       Dorsalis pedis pulses are 2+ on the right side, and 2+ on the left side.        Posterior tibial pulses are 2+ on the right side, and 2+ on the left side.   CFT< 3 secs all toes bilateral foot, skin temp warm bilateral foot, diminished digital hair growth bilateral foot, mild lower extremity edema bilateral.     Musculoskeletal:   Non-track bound hallux abducto valgus underlapping left second toe with palpable bony prominence medial second toe PIPJ left foot. Reciprocal bony prominence palpated on lateral left hallux IPJ.       Neurological: He is alert and oriented to person, place, and time. He has normal strength. No sensory deficit.   Skin: Skin is warm, dry and intact. Capillary refill takes less than 2 seconds. Lesion noted. No ecchymosis and no rash noted. He is not diaphoretic. No cyanosis or erythema. No pallor. Nails show no clubbing.   + HPK lateral left second ote PIPJ and medial left hallux IPJ.    No open lesions or macerations bilateral lower extremity.               Assessment:       Encounter Diagnoses   Name Primary?    Exostosis of toe - Left Foot Yes    Valgus deformity of great toe, unspecified laterality     Corn or callus          Plan:       Laura was  seen today for [facility].    Diagnoses and all orders for this visit:    Exostosis of toe - Left Foot    Valgus deformity of great toe, unspecified laterality    Corn or callus      I counseled the patient on his conditions, their implications and medical management.    Discussed importance of wearing shoes with wider and longer toe box to accommodate his toes. Mentioned toe spacers, silicone toe sleeves, bunion splints vs surgical intervention addressing the bunion deformity and hammertoe in detail.    Patient elected for non-operative treatment as this time.    As a courtesy callus on left second and first toes trimmed using a steile #15 scalpel to healthy appearing skin.He tolerated the procedure well without any complication.    RTC prn.    .

## 2018-03-16 ENCOUNTER — OFFICE VISIT (OUTPATIENT)
Dept: ORTHOPEDICS | Facility: CLINIC | Age: 78
End: 2018-03-16
Payer: MEDICARE

## 2018-03-16 VITALS — WEIGHT: 174 LBS | BODY MASS INDEX: 25.77 KG/M2 | HEIGHT: 69 IN

## 2018-03-16 DIAGNOSIS — M70.62 TROCHANTERIC BURSITIS, LEFT HIP: Primary | ICD-10-CM

## 2018-03-16 PROCEDURE — 99213 OFFICE O/P EST LOW 20 MIN: CPT | Mod: S$PBB,,, | Performed by: ORTHOPAEDIC SURGERY

## 2018-03-16 PROCEDURE — 99212 OFFICE O/P EST SF 10 MIN: CPT | Mod: PBBFAC,PO | Performed by: ORTHOPAEDIC SURGERY

## 2018-03-16 PROCEDURE — 99999 PR PBB SHADOW E&M-EST. PATIENT-LVL II: CPT | Mod: PBBFAC,,, | Performed by: ORTHOPAEDIC SURGERY

## 2018-03-16 NOTE — LETTER
March 16, 2018      Riley Villalobos MD  [address] [facility]  [place] [address]           [facility]  [address] [place] [address] [address]  [place] [place] [address]  Phone: [phone]          Patient: Laura Harris   MR Number: [id number]   YOB: 1940   Date of Visit: 3/16/2018       Dear Dr. Riley Villalobos:    Thank you for referring Laura Harris to me for evaluation. Attached you will find relevant portions of my assessment and plan of care.    If you have questions, please do not hesitate to call me. I look forward to following Laura Harris along with you.    Sincerely,    Jefry Wade MD    Enclosure  CC:  No Recipients    If you would like to receive this communication electronically, please contact [email or web address] or [phone] to request more information on [employer] Link access.    For providers and/or their staff who would like to refer a patient to [facility], please contact us through our one-stop-shop provider referral line, [facility] [place], at [phone].    If you feel you have received this communication in error or would no longer like to receive these types of communications, please e-mail [email or web address]

## 2018-03-16 NOTE — PROGRESS NOTES
Subjective:      Patient ID: Laura Harris is a 77 y.o. male.    Chief Complaint: Pain of the Left Hip    HPI      Laura Harris is seen for evaluation and treatment of hip pain.  They have experienced problems with their left hip over the past 6 months Pain is located laterally. They have been treated with over the counter analgesics.   Symptoms have recently improved. Ambulation reportedly has not been impaired. Self care ADLs are not painful.    The patient reports that his symptoms are episodic and sometimes aggravated by prolonged walking.  He reports that he is currently asymptomatic  Review of Systems   Constitution: Negative for fever and weight loss.   HENT: Negative for congestion.    Eyes: Negative for visual disturbance.   Cardiovascular: Negative for chest pain.   Respiratory: Negative for shortness of breath.    Hematologic/Lymphatic: Negative for bleeding problem. Does not bruise/bleed easily.   Skin: Negative for poor wound healing.   Gastrointestinal: Negative for abdominal pain.   Genitourinary: Negative for dysuria.   Neurological: Negative for seizures.   Psychiatric/Behavioral: Negative for altered mental status.   Allergic/Immunologic: Negative for persistent infections.         Objective:            Ortho/SPM Exam      Left Hip    The patient is not in acute distress.   Body habitus is:normal.   The patient walks without a limp.   The skin over the hip is:intact.   There is:no local tenderness.  Range of motion- Flexion 120, External rotation 35, internal rotation 35.  Resisted SLR negative.  Pain with rotation negative  Sciatic tension findings negative.  Shortening/lengthening compared to the contralateral side absent, exam deferred.  Pulses DP present, PT present.  Motor normal 5/5 strength in all tested muscle groups.   Sensory normal.    I reviewed the relevant radiographic images for the patient's condition: Recent films are normal for the patient's age      Assessment:       No  diagnosis found.       Bursitis is currently quiescent  Plan:       There are no diagnoses linked to this encounter.    I explained my diagnostic impression and the reasoning behind it in detail, using layman's terms.  Models and/or pictures were used to help in the explanation.  Ice and over-the-counter analgesics are recommended

## 2018-03-27 ENCOUNTER — OFFICE VISIT (OUTPATIENT)
Dept: OPTOMETRY | Facility: CLINIC | Age: 78
End: 2018-03-27
Payer: MEDICARE

## 2018-03-27 DIAGNOSIS — H52.03 HYPEROPIA OF BOTH EYES WITH ASTIGMATISM: ICD-10-CM

## 2018-03-27 DIAGNOSIS — H52.203 HYPEROPIA OF BOTH EYES WITH ASTIGMATISM: ICD-10-CM

## 2018-03-27 DIAGNOSIS — H50.10 EXODEVIATION: ICD-10-CM

## 2018-03-27 DIAGNOSIS — I10 HYPERTENSION, UNSPECIFIED TYPE: ICD-10-CM

## 2018-03-27 DIAGNOSIS — H25.13 NUCLEAR SCLEROSIS OF BOTH EYES: ICD-10-CM

## 2018-03-27 DIAGNOSIS — H40.1230 LOW-TENSION GLAUCOMA OF BOTH EYES, UNSPECIFIED GLAUCOMA STAGE: Primary | ICD-10-CM

## 2018-03-27 PROCEDURE — 92250 FUNDUS PHOTOGRAPHY W/I&R: CPT | Mod: PBBFAC | Performed by: OPTOMETRIST

## 2018-03-27 PROCEDURE — 99213 OFFICE O/P EST LOW 20 MIN: CPT | Mod: PBBFAC | Performed by: OPTOMETRIST

## 2018-03-27 PROCEDURE — 92015 DETERMINE REFRACTIVE STATE: CPT | Mod: ,,, | Performed by: OPTOMETRIST

## 2018-03-27 PROCEDURE — 99999 PR PBB SHADOW E&M-EST. PATIENT-LVL III: CPT | Mod: PBBFAC,,, | Performed by: OPTOMETRIST

## 2018-03-27 PROCEDURE — 92004 COMPRE OPH EXAM NEW PT 1/>: CPT | Mod: S$PBB,,, | Performed by: OPTOMETRIST

## 2018-03-27 RX ORDER — LATANOPROST 50 UG/ML
1 SOLUTION/ DROPS OPHTHALMIC DAILY
Qty: 2.5 ML | Refills: 5 | Status: SHIPPED | OUTPATIENT
Start: 2018-03-27 | End: 2020-08-25 | Stop reason: SDUPTHER

## 2018-03-27 NOTE — PATIENT INSTRUCTIONS
Nuclear sclerosis of both eyes, consistent with age.  No compelling need for cataract surgery in either eye.    Hyperopia with astigmatism in each eye, and presbyopia consistent with age.   New spectacle lens Rx issued for use as desired.     Prior diagnosis of bilateral low-tension glaucoma.  Has been followed by Dr. Soliz, who prescribed Xalatan Ophthalmic solution for use daily in both eyes.  However, Mr. Long notes that he rarely uses Xalatan drops.  Stereo disc photos in both eyes.  Ms Mohr to schedule HVF test (24-2 TAI Standard) and OCT retinal nerve fiber layer thickness analysis and follow-up visit with Dr. Soliz.  Stressed need to use Xalatan/Latanoprost regularly (daily) in each eye.  New Rx for Xalatan Ophthalmic Solution sent to pharmacy - instill one drop into both eyes every evening.     Repeat refraction in one year.

## 2018-03-28 NOTE — PROGRESS NOTES
"HPI     Glaucoma    Additional comments: Overdue for glaucoma follow-up.  Prior diagnosis of bilateral low tension glaucoma.  States he rarely uses glaucoma eyedrops (both eyes).  Last instilled drops this AM.  Difficulty with distance VA and near VA with Rx - "but not blurry".           Comments   Patient's age: 77 y.o. AA male  Occupation: retired  Approximate date of last eye examination:  02/2014  Name of last eye doctor seen: Dr. Soliz at [facility]  Wears glasses? yes     If yes, wears  Full-time or part-time?  Full-time  Present glasses are: Bifocal, SV Distance, SV Reading?  Bifocal lenses ("I   think")  Approximate age of present glasses:  "ten years at least"   Got new glasses following last exam, or subsequently?:  no   Any problem with VA with glasses?  See notes above  Wears CLs?:  no  Headaches?  no  Eye pain/discomfort?  None, but bothered by "brightness" outside                                                                                  Flashes?  no  Floaters?  Sometimes (in OD?)  Diplopia/Double vision?  no  Patient's Ocular History:         Any eye surgeries? none         Any eye injury?  no         Any treatment for eye disease?  Bilateral low tension glaucoma -   rarely uses drops (Xalatan/Latanoprost)  Family history of eye disease?  Mother: glaucoma  Significant patient medical history:         1. Diabetes?  no       If yes, IDDM or NIDDM? n/a   2. HBP?  Yes.  Takes medication.  States well-controlled              3. Other (describe):  None reported   ! OTC eyedrops currently using:  ATs - uses rarely   ! Prescription eye meds currently using:  Xalatan/Latanoprost prescribed   for use once per day, but rarely uses.  States he did instill one drop   into both eyes this morning.   ! Any history of allergy/adverse reaction to any eye meds used   previously?  no   ! Any history of allergy/adverse reaction to eyedrops used during prior   eye exam(s)? no   ! Any history of allergy/adverse reaction " "to Epinephrine or similar meds?   None known    ! Patient okay with use of anesthetic eyedrops to check eye pressure?  no          ! Patient okay with use of eyedrops to dilate pupils today?  no   !  Allergies/Medications/Medical History/Family History reviewed today?    no      PD =     Desired reading distance =  18"                                                                  Last edited by Eric Goldstein, OD on 3/27/2018  8:33 AM. (History)            Assessment /Plan     For exam results, see Encounter Report.    1. Low-tension glaucoma of both eyes, unspecified glaucoma stage  Rios Visual Field - OU - Extended - Both Eyes    Color Fundus Photography - OU - Both Eyes    latanoprost (XALATAN) 0.005 % ophthalmic solution   2. Nuclear sclerosis of both eyes     3. Hypertension, unspecified type     4. Exodeviation     5. Hyperopia of both eyes with astigmatism                  Nuclear sclerosis of both eyes, consistent with age.  No compelling need for cataract surgery in either eye.    Hyperopia with astigmatism in each eye, and presbyopia consistent with age.   New spectacle lens Rx issued for use as desired.     Prior diagnosis of bilateral low-tension glaucoma.  Has been followed by Dr. Soliz, who prescribed Xalatan Ophthalmic solution for use daily in both eyes.  However, Mr. Long notes that he rarely uses Xalatan drops.  Stereo disc photos in both eyes.  Ms Mohr to schedule HVF test (24-2 TAI Standard) and OCT retinal nerve fiber layer thickness analysis and follow-up visit with Dr. Soliz.  Stressed need to use Xalatan/Latanoprost regularly (daily) in each eye.  New Rx for Xalatan Ophthalmic Solution sent to pharmacy - instill one drop into both eyes every evening.     Repeat refraction in one year.       "

## 2018-04-10 RX ORDER — DILTIAZEM HYDROCHLORIDE 300 MG/1
300 CAPSULE, COATED, EXTENDED RELEASE ORAL DAILY
Qty: 90 CAPSULE | Refills: 1 | Status: SHIPPED | OUTPATIENT
Start: 2018-04-10 | End: 2018-10-17 | Stop reason: SDUPTHER

## 2018-06-15 NOTE — PROGRESS NOTES
Assessment /Plan     For exam results, see Encounter Report.    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  Participation is voluntarily and patient can withdraw from study at any point in time without harm or prejudice. Patient voiced good understanding and we had Q & A. Patient signed consent and was given a signed copy of the study consent.         Plan  RTC 6 months with IOP & OCT RNFL  Sooner prn with good understanding

## 2018-06-20 ENCOUNTER — OFFICE VISIT (OUTPATIENT)
Dept: OPHTHALMOLOGY | Facility: CLINIC | Age: 78
End: 2018-06-20
Attending: OPTOMETRIST
Payer: MEDICARE

## 2018-06-20 DIAGNOSIS — H25.13 NUCLEAR SCLEROSIS OF BOTH EYES: ICD-10-CM

## 2018-06-20 DIAGNOSIS — H40.1230 LOW-TENSION GLAUCOMA OF BOTH EYES, UNSPECIFIED GLAUCOMA STAGE: ICD-10-CM

## 2018-06-20 DIAGNOSIS — H40.1232 LOW-TENSION GLAUCOMA OF BOTH EYES, MODERATE STAGE: ICD-10-CM

## 2018-06-20 DIAGNOSIS — H50.10 EXODEVIATION: ICD-10-CM

## 2018-06-20 DIAGNOSIS — H40.1232 LOW-TENSION GLAUCOMA OF BOTH EYES, MODERATE STAGE: Primary | ICD-10-CM

## 2018-06-20 PROCEDURE — 92083 EXTENDED VISUAL FIELD XM: CPT | Mod: PBBFAC

## 2018-06-20 PROCEDURE — 99999 PR PBB SHADOW E&M-EST. PATIENT-LVL II: CPT | Mod: PBBFAC,,, | Performed by: OPHTHALMOLOGY

## 2018-06-20 PROCEDURE — 99212 OFFICE O/P EST SF 10 MIN: CPT | Mod: PBBFAC | Performed by: OPHTHALMOLOGY

## 2018-06-20 PROCEDURE — 92014 COMPRE OPH EXAM EST PT 1/>: CPT | Mod: S$PBB,,, | Performed by: OPHTHALMOLOGY

## 2018-06-20 PROCEDURE — 92083 EXTENDED VISUAL FIELD XM: CPT | Mod: 26,S$PBB,, | Performed by: OPHTHALMOLOGY

## 2018-06-20 RX ORDER — TAMSULOSIN HYDROCHLORIDE 0.4 MG/1
CAPSULE ORAL
Refills: 12 | COMMUNITY
Start: 2018-05-02 | End: 2019-03-26

## 2018-06-20 NOTE — LETTER
June 20, 2018      Eric Goldstein, OD  [address]  [place] [facility] [id number]           [facility] [address] - Ophthalmology  [address]  [facility] [address]  Phone: [phone]  Fax: [phone]          Patient: Laura Harris   MR Number: [id number]   YOB: 1940   Date of Visit: 6/20/2018       Dear Dr. Eric Goldstein:    Thank you for referring Laura Harris to me for evaluation. Attached you will find relevant portions of my assessment and plan of care.    If you have questions, please do not hesitate to call me. I look forward to following Laura Harris along with you.    Sincerely,    Brennan Soliz MD    Enclosure  CC:  No Recipients    If you would like to receive this communication electronically, please contact [email or web address] or [phone] to request more information on [employer] access.    For providers and/or their staff who would like to refer a patient to [facility], please contact us through our one-stop-shop provider referral line, [facility] [place], at [phone].    If you feel you have received this communication in error or would no longer like to receive these types of communications, please e-mail [email or web address]

## 2018-07-20 ENCOUNTER — TELEPHONE (OUTPATIENT)
Dept: ORTHOPEDICS | Facility: CLINIC | Age: 78
End: 2018-07-20

## 2018-07-20 NOTE — TELEPHONE ENCOUNTER
Spoke with pt, scheduled appt. Pt aware of date, time, and location.   ----- Message from Karly Cornell sent at 7/20/2018 12:55 PM CDT -----  Contact: [phone]/ self  Patient requesting to speak with you regarding scheduling appt. Pt stated he's experiencing pain from back to hip. Please advise.

## 2018-08-14 ENCOUNTER — OFFICE VISIT (OUTPATIENT)
Dept: ORTHOPEDICS | Facility: CLINIC | Age: 78
End: 2018-08-14
Payer: MEDICARE

## 2018-08-14 VITALS — BODY MASS INDEX: 25.77 KG/M2 | HEIGHT: 69 IN | WEIGHT: 174 LBS

## 2018-08-14 DIAGNOSIS — M54.30 SCIATICA, UNSPECIFIED LATERALITY: Primary | ICD-10-CM

## 2018-08-14 PROCEDURE — 99212 OFFICE O/P EST SF 10 MIN: CPT | Mod: PBBFAC,PO | Performed by: ORTHOPAEDIC SURGERY

## 2018-08-14 PROCEDURE — 99999 PR PBB SHADOW E&M-EST. PATIENT-LVL II: CPT | Mod: PBBFAC,,, | Performed by: ORTHOPAEDIC SURGERY

## 2018-08-14 PROCEDURE — 99213 OFFICE O/P EST LOW 20 MIN: CPT | Mod: S$PBB,,, | Performed by: ORTHOPAEDIC SURGERY

## 2018-08-14 RX ORDER — METHYLPREDNISOLONE 4 MG/1
TABLET ORAL
Qty: 1 PACKAGE | Refills: 0 | Status: SHIPPED | OUTPATIENT
Start: 2018-08-14 | End: 2019-02-15

## 2018-08-14 NOTE — PROGRESS NOTES
Subjective:      Patient ID: Laura Harris is a 77 y.o. male.    Chief Complaint: Pain of the Left Hip    HPI       the patient complains of insidious pain that starts in his buttock and radiates down the lower extremity to the lateral leg area. He has occasional back pain.  He denies groin pain. He denies numbness, weakness and incontinence.  Recently his treated with over-the-counter analgesics without much benefit    Review of Systems   Constitution: Negative for fever and weight loss.   HENT: Negative for congestion.    Eyes: Negative for visual disturbance.   Cardiovascular: Negative for chest pain.   Respiratory: Negative for shortness of breath.    Hematologic/Lymphatic: Negative for bleeding problem. Does not bruise/bleed easily.   Skin: Negative for poor wound healing.   Musculoskeletal: Positive for back pain and joint pain.   Gastrointestinal: Negative for abdominal pain.   Genitourinary: Negative for dysuria.   Neurological: Negative for seizures.   Psychiatric/Behavioral: Negative for altered mental status.   Allergic/Immunologic: Negative for persistent infections.         Objective:            Ortho/SPM Exam    Left hip-    The patient is not in acute distress.   Body habitus is:normal.   The patient walks without a limp.   The skin over the hip is:intact.   There is:no local tenderness.  Range of motion- Flexion 110, External rotation 40, internal rotation 30.  Resisted SLR negative.  Pain with rotation negative  Sciatic tension findings positive.  Shortening/lengthening compared to the contralateral side exam deferred.  Pulses DP present, PT present.  Motor normal 5/5 strength in all tested muscle groups.   Sensory normal.              Assessment:       Encounter Diagnosis   Name Primary?    Sciatica, unspecified laterality Yes        the patient's history is strongly suggestive of sciatica.  His physical findings are equivocal.  Plan:       Laura was seen today for pain.    Diagnoses and all  orders for this visit:    Sciatica, unspecified laterality        I explained my diagnostic impression and the reasoning behind it in detail, using layman's terms.  Models and/or pictures were used to help in the explanation.    Medrol Dosepak    Lumbar MRI will be recommended if symptoms persist or worsen

## 2018-08-29 NOTE — TELEPHONE ENCOUNTER
Detail Level: Generalized Pt informed xray shows arthritis, he will get a letter in the mail, verb understanding

## 2018-10-17 RX ORDER — DILTIAZEM HYDROCHLORIDE 300 MG/1
300 CAPSULE, COATED, EXTENDED RELEASE ORAL DAILY
Qty: 90 CAPSULE | Refills: 3 | Status: ON HOLD | OUTPATIENT
Start: 2018-10-17 | End: 2019-03-05 | Stop reason: HOSPADM

## 2018-10-17 NOTE — TELEPHONE ENCOUNTER
----- Message from Thaddeus Hammonds sent at 10/17/2018  8:33 AM CDT -----  Contact: [phone]  Patient would like refill of diltiaZEM (CARTIA XT) 300 MG 24 hr capsule sent to [facility] [id number]. Please call.

## 2018-11-28 ENCOUNTER — PES CALL (OUTPATIENT)
Dept: ADMINISTRATIVE | Facility: CLINIC | Age: 78
End: 2018-11-28

## 2018-12-13 ENCOUNTER — TELEPHONE (OUTPATIENT)
Dept: UROLOGY | Facility: CLINIC | Age: 78
End: 2018-12-13

## 2018-12-13 NOTE — TELEPHONE ENCOUNTER
Called pt in an attempt to inform him that his Cysto has been scheduled. Mailed out appt. Left msg for him to call back.      ----- Message from Diana Gupta RN sent at 12/13/2018  2:15 PM CST -----  Contact: pt#[phone] or [phone]  Would you please schedule?  ----- Message -----  From: Alida Rachel  Sent: 12/13/2018   9:36 AM  To: Mukesh HUGHES Staff    Needs Advice    Reason for call:Pt is calling to schedule cysto       Communication Preference:call    Additional Information:

## 2019-01-09 ENCOUNTER — TELEPHONE (OUTPATIENT)
Dept: OPTOMETRY | Facility: CLINIC | Age: 79
End: 2019-01-09

## 2019-01-23 ENCOUNTER — HOSPITAL ENCOUNTER (OUTPATIENT)
Dept: UROLOGY | Facility: HOSPITAL | Age: 79
Discharge: HOME | End: 2019-01-23
Attending: UROLOGY
Payer: MEDICARE

## 2019-01-23 VITALS
BODY MASS INDEX: 26.06 KG/M2 | HEIGHT: 68 IN | RESPIRATION RATE: 18 BRPM | TEMPERATURE: 98 F | HEART RATE: 67 BPM | DIASTOLIC BLOOD PRESSURE: 86 MMHG | SYSTOLIC BLOOD PRESSURE: 132 MMHG | WEIGHT: 171.94 LBS

## 2019-01-23 DIAGNOSIS — C67.9 MALIGNANT NEOPLASM OF URINARY BLADDER, UNSPECIFIED SITE: ICD-10-CM

## 2019-01-23 PROCEDURE — 52000 CYSTOURETHROSCOPY: CPT | Mod: ,,, | Performed by: UROLOGY

## 2019-01-23 PROCEDURE — 52000 CYSTOURETHROSCOPY: CPT

## 2019-01-23 PROCEDURE — 52000 PR CYSTOURETHROSCOPY: ICD-10-PCS | Mod: ,,, | Performed by: UROLOGY

## 2019-01-23 RX ORDER — LIDOCAINE HYDROCHLORIDE 20 MG/ML
JELLY TOPICAL
Status: COMPLETED | OUTPATIENT
Start: 2019-01-23 | End: 2019-01-23

## 2019-01-23 RX ADMIN — LIDOCAINE HYDROCHLORIDE: 20 JELLY TOPICAL at 10:01

## 2019-01-23 NOTE — PATIENT INSTRUCTIONS
What to Expect After a Cystoscopy  For the next 24-48 hours, you may feel a mild burning when you urinate. This burning is normal and expected. Drink plenty of water to dilute the urine to help relieve the burning sensation. You may also see a small amount of blood in your urine after the procedure.    Unless you are already taking antibiotics, you may be given an antibiotic after the test to prevent infection.    Signs and Symptoms to Report  Call the [facility] at [phone] if you develop any of the following:  · Fever of 101 degrees or higher  · Chills or persistent bleeding  · Inability to urinate

## 2019-01-24 NOTE — H&P
Patient ID: Laura Harris is a 78 y.o. male.     Chief Complaint: here for cysto     Laura Harris is a 78 y.o. 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   8/2009 had left ureteral stricture, stent and subsequent removal.   Last cysto 1/17 showed no recurrence of bladder cancer.   Last upper tract imaging renal ultrasound 1/18 and cxr ok.   Has CKD.     BPH - started back on flomax 1/2017. He had stopped it and was restarted in January 2018.     ROS: negative    Vitals:    01/23/19 0954   BP: 132/86   Pulse: 67   Resp: 18   Temp: 98.4 °F (36.9 °C)     PE:  Resp: normal resp effort  CV: RRR  Abdomen: soft    A: bladder cancer  Plan: surveillance cysto

## 2019-01-24 NOTE — PROCEDURES
Procedures: Flexible cystourethroscopy    Pre Procedure Diagnosis:bladder cancer    Post Procedure Diagnosis:Normal cystoscopy    Surgeon: Ciara Mayorga MD    Anesthesia: 2% uro-jet lidocaine jelly for local analgesia    Flexible cysto-urethroscopy was performed after consent was obtained.  The risks and benefits were explained.    2% lidocaine urojet was used for local analgesia.  The genitalia was prepped and draped in the sterile fashion with betadine.    The flexible scope was advanced into the urethra and into the bladder.  Bilateral ureteral orifice were evaluated and noted to be normal with clear efflux.  The bladder was completely surveyed in a systematic fashion.   No bladder tumors or lesions were seen.  No strictures were noted.  The prostate showed No hypertrophy.  There was No median lobe present.    The patient tolerated the procedure well without complication.    They will follow up in 1 year for cystoscopy

## 2019-01-29 ENCOUNTER — CLINICAL SUPPORT (OUTPATIENT)
Dept: FAMILY MEDICINE | Facility: CLINIC | Age: 79
End: 2019-01-29
Payer: MEDICARE

## 2019-01-29 DIAGNOSIS — Z23 NEED FOR INFLUENZA VACCINATION: Primary | ICD-10-CM

## 2019-01-29 PROCEDURE — 90662 IIV NO PRSV INCREASED AG IM: CPT | Mod: PBBFAC,PO

## 2019-02-15 ENCOUNTER — OFFICE VISIT (OUTPATIENT)
Dept: FAMILY MEDICINE | Facility: CLINIC | Age: 79
End: 2019-02-15
Payer: MEDICARE

## 2019-02-15 VITALS
OXYGEN SATURATION: 98 % | WEIGHT: 171.5 LBS | HEART RATE: 67 BPM | HEIGHT: 68 IN | DIASTOLIC BLOOD PRESSURE: 80 MMHG | SYSTOLIC BLOOD PRESSURE: 140 MMHG | BODY MASS INDEX: 25.99 KG/M2

## 2019-02-15 DIAGNOSIS — G89.29 CHRONIC LOW BACK PAIN WITH BILATERAL SCIATICA, UNSPECIFIED BACK PAIN LATERALITY: Primary | ICD-10-CM

## 2019-02-15 DIAGNOSIS — M54.42 CHRONIC LOW BACK PAIN WITH BILATERAL SCIATICA, UNSPECIFIED BACK PAIN LATERALITY: Primary | ICD-10-CM

## 2019-02-15 DIAGNOSIS — J20.9 ACUTE BRONCHITIS, UNSPECIFIED ORGANISM: ICD-10-CM

## 2019-02-15 DIAGNOSIS — I10 ESSENTIAL HYPERTENSION: ICD-10-CM

## 2019-02-15 DIAGNOSIS — F17.200 SMOKER: ICD-10-CM

## 2019-02-15 DIAGNOSIS — M54.41 CHRONIC LOW BACK PAIN WITH BILATERAL SCIATICA, UNSPECIFIED BACK PAIN LATERALITY: Primary | ICD-10-CM

## 2019-02-15 PROCEDURE — 99999 PR PBB SHADOW E&M-EST. PATIENT-LVL III: ICD-10-PCS | Mod: PBBFAC,,, | Performed by: FAMILY MEDICINE

## 2019-02-15 PROCEDURE — 99213 OFFICE O/P EST LOW 20 MIN: CPT | Mod: PBBFAC,PO | Performed by: FAMILY MEDICINE

## 2019-02-15 PROCEDURE — 99214 PR OFFICE/OUTPT VISIT, EST, LEVL IV, 30-39 MIN: ICD-10-PCS | Mod: S$PBB,,, | Performed by: FAMILY MEDICINE

## 2019-02-15 PROCEDURE — 99214 OFFICE O/P EST MOD 30 MIN: CPT | Mod: S$PBB,,, | Performed by: FAMILY MEDICINE

## 2019-02-15 PROCEDURE — 99999 PR PBB SHADOW E&M-EST. PATIENT-LVL III: CPT | Mod: PBBFAC,,, | Performed by: FAMILY MEDICINE

## 2019-02-15 RX ORDER — FLUTICASONE PROPIONATE AND SALMETEROL XINAFOATE 230; 21 UG/1; UG/1
2 AEROSOL, METERED RESPIRATORY (INHALATION) 2 TIMES DAILY
Qty: 12 G | Refills: 1 | Status: SHIPPED | OUTPATIENT
Start: 2019-02-15 | End: 2020-03-04

## 2019-02-15 RX ORDER — METHYLPREDNISOLONE 4 MG/1
TABLET ORAL
Qty: 30 TABLET | Refills: 0 | Status: SHIPPED | OUTPATIENT
Start: 2019-02-15 | End: 2019-11-05

## 2019-02-15 RX ORDER — METHOCARBAMOL 500 MG/1
500 TABLET, FILM COATED ORAL 2 TIMES DAILY
Qty: 20 TABLET | Refills: 0 | Status: SHIPPED | OUTPATIENT
Start: 2019-02-15 | End: 2019-02-25

## 2019-02-15 NOTE — PATIENT INSTRUCTIONS
What Is Acute Bronchitis?  Acute bronchitis is when the airways in your lungs (bronchial tubes) become red and swollen (inflamed). It is usually caused by a viral infection. But it can also occur because of a bacteria or allergen. Symptoms include a cough that produces yellow or greenish mucus and can last for days or sometimes weeks.  Inside healthy lungs    Air travels in and out of the lungs through the airways. The linings of these airways produce sticky mucus. This mucus traps particles that enter the lungs. Tiny structures called cilia then sweep the particles out of the airways.     Healthy airway: Airways are normally open. Air moves in and out easily.      Healthy cilia: Tiny, hairlike cilia sweep mucus and particles up and out of the airways.   Lungs with bronchitis  Bronchitis often occurs with a cold or the flu virus. The airways become inflamed (red and swollen). There is a deep hacking cough from the extra mucus. Other symptoms may include:  · Wheezing  · Chest discomfort  · Shortness of breath  · Mild fever  A second infection, this time due to bacteria, may then occur. And airways irritated by allergens or smoke are more likely to get infected.        Inflamed airway: Inflammation and extra mucus narrow the airway, causing shortness of breath.      Impaired cilia: Extra mucus impairs cilia, causing congestion and wheezing. Smoking makes the problem worse.   Making a diagnosis  A physical exam, health history, and certain tests help your healthcare provider make the diagnosis.  Health history  Your healthcare provider will ask you about your symptoms.  The exam  Your provider listens to your chest for signs of congestion. He or she may also check your ears, nose, and throat.  Possible tests  · A sputum test for bacteria. This requires a sample of mucus from your lungs.  · A nasal or throat swab. This tests to see if you have a bacterial infection.  · A chest X-ray. This is done if your healthcare  provider thinks you have pneumonia.  · Tests to check for an underlying condition. Other tests may be done to check for things such as allergies, asthma, or COPD (chronic obstructive pulmonary disease). You may need to see a specialist for more lung function testing.  Treating a cough  The main treatment for bronchitis is easing symptoms. Avoiding smoke, allergens, and other things that trigger coughing can often help. If the infection is bacterial, you may be given antibiotics. During the illness, it's important to get plenty of sleep. To ease symptoms:  · Dont smoke. Also avoid secondhand smoke.  · Use a humidifier. Or try breathing in steam from a hot shower. This may help loosen mucus.  · Drink a lot of water and juice. They can soothe the throat and may help thin mucus.  · Sit up or use extra pillows when in bed. This helps to lessen coughing and congestion.  · Ask your provider about using medicine. Ask about using cough medicine, pain and fever medicine, or a decongestant.  Antibiotics  Most cases of bronchitis are caused by cold or flu viruses. They dont need antibiotics to treat them, even if your mucus is thick and green or yellow. Antibiotics dont treat viral illness and antibiotics have not been shown to have any benefit in cases of acute bronchitis. Taking antibiotics when they are not needed increases your risk of getting an infection later that is antibiotic-resistant. Antibiotics can also cause severe cases of diarrhea that require other antibiotics to treat.  It is important that you accept your healthcare provider's opinion to not use antibiotics. Your provider will prescribe antibiotics if the infection is caused by bacteria. If they are prescribed:  · Take all of the medicine. Take the medicine until it is used up, even if symptoms have improved. If you dont, the bronchitis may come back.  · Take the medicines as directed. For instance, some medicines should be taken with food.  · Ask about  side effects. Ask your provider or pharmacist what side effects are common, and what to do about them.  Follow-up care  You should see your provider again in 2 to 3 weeks. By this time, symptoms should have improved. An infection that lasts longer may mean you have a more serious problem.  Prevention  · Avoid tobacco smoke. If you smoke, quit. Stay away from smoky places. Ask friends and family not to smoke around you, or in your home or car.  · Get checked for allergies.  · Ask your provider about getting a yearly flu shot. Also ask about pneumococcal or pneumonia shots.  · Wash your hands often. This helps reduce the chance of picking up viruses that cause colds and flu.  Call your healthcare provider if:  · Symptoms worsen, or you have new symptoms  · Breathing problems worsen or  become severe  · Symptoms dont get better within a week, or within 3 days of taking antibiotics   Date Last Reviewed: 2/1/2017  © 1884-9606 [employer]. [address] [place] [address], [place] [address]. All rights reserved. This information is not intended as a substitute for professional medical care. Always follow your healthcare professional's instructions.

## 2019-02-15 NOTE — PROGRESS NOTES
Subjective:       Patient ID: Laura Harris is a 78 y.o. male.    Chief Complaint: Annual Exam; Back Pain (on and off); and Hip Pain (on and off)    78 years old male came to the clinic with cough and congestion for the last couple of weeks.  No fever or chills.  Patient is a chronic smoker.  Patient with lower back pain for the last 3 months.  The pain is 6/10 of intensity on and off aggravated with activity and better with rest.  Pain radiates to both lower extremities.  Patient did not want pain management or physical therapy for now.      Review of Systems   Constitutional: Negative.    HENT: Negative.    Eyes: Negative.    Respiratory: Negative.    Cardiovascular: Negative.  Negative for chest pain, palpitations and leg swelling.   Gastrointestinal: Negative.    Genitourinary: Negative.    Musculoskeletal: Positive for back pain.   Skin: Negative.    Neurological: Negative.    Psychiatric/Behavioral: Negative.        Objective:      Physical Exam   Constitutional: He is oriented to person, place, and time. He appears well-developed and well-nourished. No distress.   HENT:   Head: Normocephalic and atraumatic.   Right Ear: External ear normal.   Left Ear: External ear normal.   Nose: Nose normal.   Mouth/Throat: Oropharynx is clear and moist. No oropharyngeal exudate.   Eyes: Conjunctivae and EOM are normal. Pupils are equal, round, and reactive to light. Right eye exhibits no discharge. Left eye exhibits no discharge. No scleral icterus.   Neck: Normal range of motion. Neck supple. No JVD present. No tracheal deviation present. No thyromegaly present.   Cardiovascular: Normal rate, regular rhythm, normal heart sounds and intact distal pulses. Exam reveals no gallop and no friction rub.   No murmur heard.  Pulmonary/Chest: Effort normal. No stridor. No respiratory distress. He has no wheezes. He has rhonchi in the right middle field. He has no rales. He exhibits no tenderness.   Abdominal: Soft. Bowel sounds  are normal. He exhibits no distension and no mass. There is no tenderness. There is no rebound and no guarding.   Musculoskeletal: Normal range of motion. He exhibits no edema or tenderness.   Lymphadenopathy:     He has no cervical adenopathy.   Neurological: He is alert and oriented to person, place, and time. He has normal reflexes. He displays normal reflexes. No cranial nerve deficit. He exhibits normal muscle tone. Coordination normal.   Skin: Skin is warm and dry. No rash noted. He is not diaphoretic. No erythema. No pallor.   Psychiatric: He has a normal mood and affect. His behavior is normal. Judgment and thought content normal.   Nursing note and vitals reviewed.      Assessment:       1. Chronic low back pain with bilateral sciatica, unspecified back pain laterality    2. Essential hypertension    3. Smoker    4. Acute bronchitis, unspecified organism        Plan:         Laura was seen today for annual exam, back pain and hip pain.    Diagnoses and all orders for this visit:    Chronic low back pain with bilateral sciatica, unspecified back pain laterality  -     methocarbamol (ROBAXIN) 500 MG Tab; Take 1 tablet (500 mg total) by mouth 2 (two) times daily. for 10 days    Essential hypertension    Smoker  -     fluticasone-salmeterol 230-21 mcg/dose (ADVAIR HFA) 230-21 mcg/actuation HFAA inhaler; Inhale 2 puffs into the lungs 2 (two) times daily. Controller  -     methylPREDNISolone (MEDROL DOSEPACK) 4 mg tablet; Take as directed    Acute bronchitis, unspecified organism  -     fluticasone-salmeterol 230-21 mcg/dose (ADVAIR HFA) 230-21 mcg/actuation HFAA inhaler; Inhale 2 puffs into the lungs 2 (two) times daily. Controller  -     methylPREDNISolone (MEDROL DOSEPACK) 4 mg tablet; Take as directed    Continue monitoring blood pressure at home, low sodium diet.

## 2019-02-25 ENCOUNTER — OFFICE VISIT (OUTPATIENT)
Dept: OPHTHALMOLOGY | Facility: CLINIC | Age: 79
End: 2019-02-25
Payer: MEDICARE

## 2019-02-25 DIAGNOSIS — H50.10 EXODEVIATION: ICD-10-CM

## 2019-02-25 DIAGNOSIS — H40.1232 LOW-TENSION GLAUCOMA OF BOTH EYES, MODERATE STAGE: Primary | ICD-10-CM

## 2019-02-25 DIAGNOSIS — H25.13 NUCLEAR SCLEROSIS OF BOTH EYES: ICD-10-CM

## 2019-02-25 PROCEDURE — 92012 PR EYE EXAM, EST PATIENT,INTERMED: ICD-10-PCS | Mod: S$PBB,,, | Performed by: OPHTHALMOLOGY

## 2019-02-25 PROCEDURE — 99999 PR PBB SHADOW E&M-EST. PATIENT-LVL III: ICD-10-PCS | Mod: PBBFAC,,, | Performed by: OPHTHALMOLOGY

## 2019-02-25 PROCEDURE — 99213 OFFICE O/P EST LOW 20 MIN: CPT | Mod: PBBFAC,25 | Performed by: OPHTHALMOLOGY

## 2019-02-25 PROCEDURE — 99999 PR PBB SHADOW E&M-EST. PATIENT-LVL III: CPT | Mod: PBBFAC,,, | Performed by: OPHTHALMOLOGY

## 2019-02-25 PROCEDURE — 92012 INTRM OPH EXAM EST PATIENT: CPT | Mod: S$PBB,,, | Performed by: OPHTHALMOLOGY

## 2019-02-25 PROCEDURE — 92133 CPTRZD OPH DX IMG PST SGM ON: CPT | Mod: PBBFAC | Performed by: OPHTHALMOLOGY

## 2019-02-25 PROCEDURE — 92133 POSTERIOR SEGMENT OCT OPTIC NERVE(OCULAR COHERENCE TOMOGRAPHY) - OU - BOTH EYES: ICD-10-PCS | Mod: 26,S$PBB,, | Performed by: OPHTHALMOLOGY

## 2019-02-25 NOTE — PROGRESS NOTES
Assessment /Plan     For exam results, see Encounter Report.    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  Participation is voluntarily and patient can withdraw from study at any point in time without harm or prejudice. Patient voiced good understanding and we had Q & A. Patient signed consent and was given a signed copy of the study consent.         Plan  RTC 6 months with IOP & DFE and HVF  Sooner prn with good understanding

## 2019-03-01 ENCOUNTER — HOSPITAL ENCOUNTER (INPATIENT)
Facility: HOSPITAL | Age: 79
LOS: 4 days | Discharge: HOME | DRG: 247 | End: 2019-03-05
Attending: EMERGENCY MEDICINE | Admitting: INTERNAL MEDICINE
Payer: MEDICARE

## 2019-03-01 DIAGNOSIS — R07.9 CHEST PAIN: ICD-10-CM

## 2019-03-01 DIAGNOSIS — I24.9 ACUTE CORONARY SYNDROME: ICD-10-CM

## 2019-03-01 DIAGNOSIS — I21.4 NSTEMI (NON-ST ELEVATED MYOCARDIAL INFARCTION): Primary | ICD-10-CM

## 2019-03-01 LAB
ABO + RH BLD: NORMAL
ALBUMIN SERPL BCP-MCNC: 3.7 G/DL
ALP SERPL-CCNC: 63 U/L
ALT SERPL W/O P-5'-P-CCNC: 12 U/L
ANION GAP SERPL CALC-SCNC: 11 MMOL/L
ANION GAP SERPL CALC-SCNC: 8 MMOL/L
ANISOCYTOSIS BLD QL SMEAR: SLIGHT
APTT BLDCRRT: 21.1 SEC
AST SERPL-CCNC: 23 U/L
BASOPHILS # BLD AUTO: 0.01 K/UL
BASOPHILS # BLD AUTO: 0.02 K/UL
BASOPHILS NFR BLD: 0.1 %
BASOPHILS NFR BLD: 0.2 %
BILIRUB SERPL-MCNC: 0.5 MG/DL
BLD GP AB SCN CELLS X3 SERPL QL: NORMAL
BNP SERPL-MCNC: 151 PG/ML
BUN SERPL-MCNC: 15 MG/DL
BUN SERPL-MCNC: 15 MG/DL
CALCIUM SERPL-MCNC: 9.4 MG/DL
CALCIUM SERPL-MCNC: 9.9 MG/DL
CHLORIDE SERPL-SCNC: 102 MMOL/L
CHLORIDE SERPL-SCNC: 104 MMOL/L
CHOLEST SERPL-MCNC: 170 MG/DL
CHOLEST/HDLC SERPL: 3.5 {RATIO}
CO2 SERPL-SCNC: 25 MMOL/L
CO2 SERPL-SCNC: 25 MMOL/L
CREAT SERPL-MCNC: 1.4 MG/DL
CREAT SERPL-MCNC: 1.5 MG/DL
DIFFERENTIAL METHOD: ABNORMAL
DIFFERENTIAL METHOD: ABNORMAL
EOSINOPHIL # BLD AUTO: 0.1 K/UL
EOSINOPHIL # BLD AUTO: 0.1 K/UL
EOSINOPHIL NFR BLD: 0.7 %
EOSINOPHIL NFR BLD: 1.3 %
ERYTHROCYTE [DISTWIDTH] IN BLOOD BY AUTOMATED COUNT: 14.5 %
ERYTHROCYTE [DISTWIDTH] IN BLOOD BY AUTOMATED COUNT: 14.6 %
EST. GFR  (AFRICAN AMERICAN): 50 ML/MIN/1.73 M^2
EST. GFR  (AFRICAN AMERICAN): 55 ML/MIN/1.73 M^2
EST. GFR  (NON AFRICAN AMERICAN): 44 ML/MIN/1.73 M^2
EST. GFR  (NON AFRICAN AMERICAN): 47 ML/MIN/1.73 M^2
GLUCOSE SERPL-MCNC: 111 MG/DL
GLUCOSE SERPL-MCNC: 151 MG/DL
HCT VFR BLD AUTO: 38 %
HCT VFR BLD AUTO: 42.5 %
HDLC SERPL-MCNC: 49 MG/DL
HDLC SERPL: 28.8 %
HGB BLD-MCNC: 12.8 G/DL
HGB BLD-MCNC: 14.3 G/DL
HYPOCHROMIA BLD QL SMEAR: ABNORMAL
LDLC SERPL CALC-MCNC: 106.6 MG/DL
LYMPHOCYTES # BLD AUTO: 1.1 K/UL
LYMPHOCYTES # BLD AUTO: 1.5 K/UL
LYMPHOCYTES NFR BLD: 15.5 %
LYMPHOCYTES NFR BLD: 18.5 %
MCH RBC QN AUTO: 27.1 PG
MCH RBC QN AUTO: 27.3 PG
MCHC RBC AUTO-ENTMCNC: 33.6 G/DL
MCHC RBC AUTO-ENTMCNC: 33.7 G/DL
MCV RBC AUTO: 80 FL
MCV RBC AUTO: 81 FL
MONOCYTES # BLD AUTO: 0.3 K/UL
MONOCYTES # BLD AUTO: 0.3 K/UL
MONOCYTES NFR BLD: 3 %
MONOCYTES NFR BLD: 4.8 %
NEUTROPHILS # BLD AUTO: 5.5 K/UL
NEUTROPHILS # BLD AUTO: 6.4 K/UL
NEUTROPHILS NFR BLD: 77.6 %
NEUTROPHILS NFR BLD: 78 %
NONHDLC SERPL-MCNC: 121 MG/DL
PLATELET # BLD AUTO: 182 K/UL
PLATELET # BLD AUTO: 184 K/UL
PLATELET BLD QL SMEAR: ABNORMAL
PMV BLD AUTO: 10.2 FL
PMV BLD AUTO: 10.3 FL
POTASSIUM SERPL-SCNC: 3.8 MMOL/L
POTASSIUM SERPL-SCNC: 4.2 MMOL/L
PROT SERPL-MCNC: 7.1 G/DL
RBC # BLD AUTO: 4.73 M/UL
RBC # BLD AUTO: 5.23 M/UL
SODIUM SERPL-SCNC: 137 MMOL/L
SODIUM SERPL-SCNC: 138 MMOL/L
TRIGL SERPL-MCNC: 72 MG/DL
TROPONIN I SERPL DL<=0.01 NG/ML-MCNC: 1.01 NG/ML
TROPONIN I SERPL DL<=0.01 NG/ML-MCNC: 41.27 NG/ML
TROPONIN I SERPL DL<=0.01 NG/ML-MCNC: 5.36 NG/ML
WBC # BLD AUTO: 7.02 K/UL
WBC # BLD AUTO: 8.23 K/UL

## 2019-03-01 PROCEDURE — 63600175 PHARM REV CODE 636 W HCPCS: Performed by: EMERGENCY MEDICINE

## 2019-03-01 PROCEDURE — 84484 ASSAY OF TROPONIN QUANT: CPT | Mod: 91

## 2019-03-01 PROCEDURE — 93010 ELECTROCARDIOGRAM REPORT: CPT | Mod: 76,,, | Performed by: INTERNAL MEDICINE

## 2019-03-01 PROCEDURE — 93010 EKG 12-LEAD: ICD-10-PCS | Mod: 76,,, | Performed by: INTERNAL MEDICINE

## 2019-03-01 PROCEDURE — 99291 CRITICAL CARE FIRST HOUR: CPT | Mod: 25

## 2019-03-01 PROCEDURE — 96366 THER/PROPH/DIAG IV INF ADDON: CPT

## 2019-03-01 PROCEDURE — 85730 THROMBOPLASTIN TIME PARTIAL: CPT

## 2019-03-01 PROCEDURE — 93005 ELECTROCARDIOGRAM TRACING: CPT

## 2019-03-01 PROCEDURE — 80061 LIPID PANEL: CPT

## 2019-03-01 PROCEDURE — 96365 THER/PROPH/DIAG IV INF INIT: CPT

## 2019-03-01 PROCEDURE — 25000003 PHARM REV CODE 250: Performed by: EMERGENCY MEDICINE

## 2019-03-01 PROCEDURE — 12000002 HC ACUTE/MED SURGE SEMI-PRIVATE ROOM

## 2019-03-01 PROCEDURE — 80053 COMPREHEN METABOLIC PANEL: CPT

## 2019-03-01 PROCEDURE — 83880 ASSAY OF NATRIURETIC PEPTIDE: CPT

## 2019-03-01 PROCEDURE — 93010 ELECTROCARDIOGRAM REPORT: CPT | Mod: ,,, | Performed by: INTERNAL MEDICINE

## 2019-03-01 PROCEDURE — 85025 COMPLETE CBC W/AUTO DIFF WBC: CPT | Mod: 91

## 2019-03-01 PROCEDURE — 25000003 PHARM REV CODE 250: Performed by: INTERNAL MEDICINE

## 2019-03-01 PROCEDURE — 86850 RBC ANTIBODY SCREEN: CPT

## 2019-03-01 PROCEDURE — 80048 BASIC METABOLIC PNL TOTAL CA: CPT

## 2019-03-01 RX ORDER — METOPROLOL SUCCINATE 50 MG/1
50 TABLET, EXTENDED RELEASE ORAL DAILY
Status: DISCONTINUED | OUTPATIENT
Start: 2019-03-02 | End: 2019-03-02

## 2019-03-01 RX ORDER — CLOPIDOGREL BISULFATE 75 MG/1
300 TABLET ORAL
Status: COMPLETED | OUTPATIENT
Start: 2019-03-01 | End: 2019-03-01

## 2019-03-01 RX ORDER — SODIUM CHLORIDE 0.9 % (FLUSH) 0.9 %
3 SYRINGE (ML) INJECTION
Status: DISCONTINUED | OUTPATIENT
Start: 2019-03-01 | End: 2019-03-05 | Stop reason: HOSPADM

## 2019-03-01 RX ORDER — HEPARIN SODIUM 10000 [USP'U]/100ML
17 INJECTION, SOLUTION INTRAVENOUS
Status: COMPLETED | OUTPATIENT
Start: 2019-03-01 | End: 2019-03-01

## 2019-03-01 RX ORDER — FENTANYL CITRATE 50 UG/ML
50 INJECTION, SOLUTION INTRAMUSCULAR; INTRAVENOUS
Status: COMPLETED | OUTPATIENT
Start: 2019-03-01 | End: 2019-03-01

## 2019-03-01 RX ORDER — NITROGLYCERIN 0.4 MG/1
0.4 TABLET SUBLINGUAL EVERY 5 MIN PRN
Status: DISCONTINUED | OUTPATIENT
Start: 2019-03-01 | End: 2019-03-05 | Stop reason: HOSPADM

## 2019-03-01 RX ORDER — AMLODIPINE BESYLATE 5 MG/1
5 TABLET ORAL DAILY
Status: DISCONTINUED | OUTPATIENT
Start: 2019-03-01 | End: 2019-03-05 | Stop reason: HOSPADM

## 2019-03-01 RX ORDER — SODIUM CHLORIDE 0.9 % (FLUSH) 0.9 %
5 SYRINGE (ML) INJECTION
Status: DISCONTINUED | OUTPATIENT
Start: 2019-03-01 | End: 2019-03-05 | Stop reason: HOSPADM

## 2019-03-01 RX ORDER — CLOPIDOGREL BISULFATE 75 MG/1
75 TABLET ORAL DAILY
Status: DISCONTINUED | OUTPATIENT
Start: 2019-03-02 | End: 2019-03-05 | Stop reason: HOSPADM

## 2019-03-01 RX ORDER — NITROGLYCERIN 20 MG/100ML
5 INJECTION INTRAVENOUS CONTINUOUS
Status: DISCONTINUED | OUTPATIENT
Start: 2019-03-01 | End: 2019-03-05

## 2019-03-01 RX ORDER — DILTIAZEM HYDROCHLORIDE 300 MG/1
300 CAPSULE, COATED, EXTENDED RELEASE ORAL DAILY
Status: DISCONTINUED | OUTPATIENT
Start: 2019-03-02 | End: 2019-03-01

## 2019-03-01 RX ORDER — HEPARIN SODIUM,PORCINE/D5W 25000/250
12 INTRAVENOUS SOLUTION INTRAVENOUS CONTINUOUS
Status: DISCONTINUED | OUTPATIENT
Start: 2019-03-01 | End: 2019-03-05

## 2019-03-01 RX ORDER — ASPIRIN 325 MG
325 TABLET, DELAYED RELEASE (ENTERIC COATED) ORAL
Status: COMPLETED | OUTPATIENT
Start: 2019-03-01 | End: 2019-03-01

## 2019-03-01 RX ADMIN — FENTANYL CITRATE 50 MCG: 50 INJECTION, SOLUTION INTRAMUSCULAR; INTRAVENOUS at 04:03

## 2019-03-01 RX ADMIN — NITROGLYCERIN 0.4 MG: 0.4 TABLET, ORALLY DISINTEGRATING SUBLINGUAL at 03:03

## 2019-03-01 RX ADMIN — NITROGLYCERIN 0.4 MG: 0.4 TABLET, ORALLY DISINTEGRATING SUBLINGUAL at 04:03

## 2019-03-01 RX ADMIN — AMLODIPINE BESYLATE 5 MG: 5 TABLET ORAL at 07:03

## 2019-03-01 RX ADMIN — CLOPIDOGREL 300 MG: 75 TABLET, FILM COATED ORAL at 04:03

## 2019-03-01 RX ADMIN — ASPIRIN 325 MG: 325 TABLET, COATED ORAL at 03:03

## 2019-03-01 RX ADMIN — HEPARIN SODIUM AND DEXTROSE 12 UNITS/KG/HR: 10000; 5 INJECTION INTRAVENOUS at 08:03

## 2019-03-01 RX ADMIN — HEPARIN SODIUM 17 UNITS/KG/HR: 10000 INJECTION, SOLUTION INTRAVENOUS at 05:03

## 2019-03-01 RX ADMIN — NITROGLYCERIN 5 MCG/MIN: 20 INJECTION INTRAVENOUS at 04:03

## 2019-03-01 NOTE — ED PROVIDER NOTES
Encounter Date: 3/1/2019    SCRIBE #1 NOTE: I, Ailyn Henning, am scribing for, and in the presence of,  Dr. Farrell. I have scribed the entire note.       History     Chief Complaint   Patient presents with    Chest Pain     mid sternal chest pain since last night. pt. reports he became diaphoretic and pain increased with light activity just pta.      Time seen by provider: 3:32 PM    This is a 78 y.o. male with PMHx of HTN and chronic right leg pain who presents with complaint of midsternal chest pain onset last night. The patient denies chest pain radiating to bilateral upper extremities, shoulders, back, or groin. He reports of associating nausea and mild SOB. The patient reports he became diaphoretic PTA. He reports taking 2 baby aspirins which resolved his diaphoresis, but did not resolve his chest pain. The patient rates his chest pain 9/10. He denies any vomiting. The patient denies any previous similar episodes in the past. He reports of no history of heart attack or diabetes mellitus.         The history is provided by the patient.     Review of patient's allergies indicates:   Allergen Reactions    Ace inhibitors      Other reaction(s): Angioedema    Ultram [tramadol] Nausea Only and Other (See Comments)     Dizziness     Past Medical History:   Diagnosis Date    Arthritis     Cataract     CKD (chronic kidney disease) stage 3, GFR 30-59 ml/min 5/1/2014    Hyperlipidemia     Hypertension     Low tension glaucoma      Past Surgical History:   Procedure Laterality Date    COLONOSCOPY N/A 5/12/2015    Performed by Jd James [facility] at [facility] ENDO    CYSTOSCOPY      RELEASE-CARPAL TUNNEL Left 5/6/2016    Performed by Denis Velasquez Jr., MD at [facility] OR     Family History   Problem Relation Age of Onset    Melanoma Neg Hx      Social History     Tobacco Use    Smoking status: Current Every Day Smoker     Packs/day: 1.00     Types: Cigarettes    Smokeless tobacco: Current User   Substance Use Topics     Alcohol use: No    Drug use: No     Review of Systems   Constitutional: Positive for diaphoresis.   HENT: Negative.    Eyes: Negative.    Respiratory: Positive for shortness of breath.    Cardiovascular: Positive for chest pain.   Gastrointestinal: Positive for nausea. Negative for vomiting.   Genitourinary: Negative.    Musculoskeletal: Negative.    Skin: Negative.    Neurological: Negative.    Psychiatric/Behavioral: Negative.        Physical Exam     Initial Vitals   BP Pulse Resp Temp SpO2   03/01/19 1505 03/01/19 1505 03/01/19 1553 03/01/19 1505 03/01/19 1505   (!) 198/88 62 (!) 26 98.7 °F (37.1 °C) 98 %      MAP       --                Physical Exam    Nursing note and vitals reviewed.  Constitutional: He appears well-developed and well-nourished. He is not diaphoretic. No distress.   HENT:   Head: Normocephalic and atraumatic.   Mouth/Throat: Oropharynx is clear and moist.   Eyes: EOM are normal. Pupils are equal, round, and reactive to light.   Cardiovascular: Normal rate, regular rhythm and intact distal pulses.   Murmur heard.  Systolic murmurs with occasion ectopic.  Radial pulses are normal and symmetric  Normal DP pulses   Pulmonary/Chest: Breath sounds normal. No respiratory distress.   Abdominal: Soft. Bowel sounds are normal. There is no tenderness. There is no rebound and no guarding.   Musculoskeletal: He exhibits edema. He exhibits no tenderness.   subtle trace edema, non pitting.    Neurological: He is alert and oriented to person, place, and time. He has normal strength.   No focal neurological deficit   Skin: Skin is dry. Capillary refill takes less than 2 seconds.   Skin is cool         ED Course   Critical Care  Date/Time: 3/1/2019 5:33 PM  Performed by: Dereck Farrell MD  Authorized by: Dereck Farrell MD   Total critical care time (exclusive of procedural time) : 45 minutes  Critical care time was exclusive of separately billable procedures and treating other patients and teaching  time.  Critical care was necessary to treat or prevent imminent or life-threatening deterioration of the following conditions: cardiac failure.  Critical care was time spent personally by me on the following activities: blood draw for specimens, discussions with consultants, interpretation of cardiac output measurements, evaluation of patient's response to treatment, examination of patient, obtaining history from patient or surrogate, ordering and review of laboratory studies, ordering and review of radiographic studies, pulse oximetry and re-evaluation of patient's condition.        Labs Reviewed   CBC W/ AUTO DIFFERENTIAL - Abnormal; Notable for the following components:       Result Value    MCV 81 (*)     RDW 14.6 (*)     Gran% 78.0 (*)     Lymph% 15.5 (*)     All other components within normal limits   COMPREHENSIVE METABOLIC PANEL - Abnormal; Notable for the following components:    Glucose 151 (*)     Creatinine 1.5 (*)     eGFR if  50 (*)     eGFR if non  44 (*)     All other components within normal limits   TROPONIN I - Abnormal; Notable for the following components:    Troponin I 1.013 (*)     All other components within normal limits   B-TYPE NATRIURETIC PEPTIDE - Abnormal; Notable for the following components:     (*)     All other components within normal limits   TROPONIN I - Abnormal; Notable for the following components:    Troponin I 5.362 (*)     All other components within normal limits   TROPONIN I - Abnormal; Notable for the following components:    Troponin I 41.275 (*)     All other components within normal limits   CBC W/ AUTO DIFFERENTIAL - Abnormal; Notable for the following components:    Hemoglobin 12.8 (*)     Hematocrit 38.0 (*)     MCV 80 (*)     Gran% 77.6 (*)     Mono% 3.0 (*)     All other components within normal limits   BASIC METABOLIC PANEL - Abnormal; Notable for the following components:    Glucose 111 (*)     eGFR if  55  (*)     eGFR if non  47 (*)     All other components within normal limits   TROPONIN I - Abnormal; Notable for the following components:    Troponin I >50.000 (*)     All other components within normal limits   APTT   APTT   LIPID PANEL   APTT   CBC W/ AUTO DIFFERENTIAL   TYPE & SCREEN     EKG Readings: (Independently Interpreted)   Normal sinus rhythm at rate 59, ST depression in lateral leads, no ST elevation. Similar to EKG on 5/2016   Other EKG Interpretations: 3:43 PM Repeat EKG: Sinus rhythm at rate 59 bpm. ST depression in lateral leads, essentially unchanged from initial EKG     ECG Results          EKG 12-lead (Final result)  Result time 03/01/19 18:10:51    Final result by Interface, Lab In [facility] (03/01/19 18:10:51)                 Narrative:    Test Reason : R07.9,    Vent. Rate : 059 BPM     Atrial Rate : 059 BPM     P-R Int : 156 ms          QRS Dur : 084 ms      QT Int : 396 ms       P-R-T Axes : 055 054 256 degrees     QTc Int : 392 ms    Sinus bradycardia  LVH with repolarization abnormality  Abnormal ECG  When compared with ECG of 03-MAY-2016 08:55,  Premature ventricular complexes are no longer Present  ST more depressed in Lateral leads  T wave inversion no longer evident in Anterior leads  Confirmed by Mitchell Mesa MD ([id number]) on 3/1/2019 6:10:42 PM    Referred By: [email or web address]   SELF           Confirmed By:Mitchell Mesa MD                            X-Rays:   Independently Interpreted Readings:   Chest X-Ray: 3:51 PM CXR no acute process     Medical Decision Making:   Initial Assessment:   Patient has acute chest pain similar with cardiac ischemia. Initial EKG was not diagnostic for ischemia.  Will check labs for elevated Troponin, repeat EKG, and administer sublingual Nitroglycerin  Independently Interpreted Test(s):   I have ordered and independently interpreted X-rays - see prior notes.  I have ordered and independently interpreted EKG Reading(s) - see prior notes  Clinical  Tests:   Lab Tests: Reviewed and Ordered  Radiological Study: Ordered and Reviewed  Medical Tests: Ordered and Reviewed  ED Management:  3:49 PM Limited bedside echo. Grossly appearing normal EF. No apparent wall motion abnormality. No pericardial effusion. IVC is normal caliber. Aorta is not dilated. Images saved locally on machine    4:17 PM Troponin elevated at 1.01. Patient has on repeat exam slight improvement of chest pain currently rated at 5. Will give fentanyl and start Tridil drip and contact Cardiology.    4:25 PM Case discussed with Dr. Mesa of Cardiology who will accept the patient for admission. I will give Plavix and start patient on Heparin.     4:48 PM Much improved reports minimal pain at 1/10, starting Tridil now.    7:29 PM repeated Troponin elevated to 5. On repeat exam he is chest pain free and in no distress. I notified Dr. Mesa of updated results and pending admission.    12:19 AM Patient boarding in ED, repeat Troponin 42, EKG unchanged. Discussed with Dr. Mesa                   ED Course as of Mar 02 0239   Fri Mar 01, 2019   [id number] This is a SORT/MSE of a 78 y.o. male presenting to the ED with c/o midsternal chest pain since last night.  Patient states that he became diaphoretic and had shortness of breath with light activity.  Pertinent exam findings remarkable for elevated blood pressure.  Care will be transferred to an alternate provider when patient is roomed for a full evaluation and final disposition. JAZZ Glover 3:09 PM   [CH]      ED Course User Index  [CH] Lloyd Molina NP     Clinical Impression:     1. NSTEMI (non-ST elevated myocardial infarction)    2. Chest pain    3. Acute coronary syndrome         I, Dr. Gurjit Farrell, personally performed the services described in this documentation. All medical record entries made by the scribe were at my direction and in my presence.  I have reviewed the chart and agree that the record reflects my personal performance and  is accurate and complete. Gurjit Farrell MD.                    Dereck Farrell MD  03/02/19 [id number]

## 2019-03-02 LAB
ANION GAP SERPL CALC-SCNC: 12 MMOL/L
APTT BLDCRRT: 57.5 SEC
APTT BLDCRRT: 58.3 SEC
BASOPHILS # BLD AUTO: 0.01 K/UL
BASOPHILS NFR BLD: 0.1 %
BUN SERPL-MCNC: 14 MG/DL
CALCIUM SERPL-MCNC: 9.4 MG/DL
CHLORIDE SERPL-SCNC: 104 MMOL/L
CO2 SERPL-SCNC: 21 MMOL/L
CREAT SERPL-MCNC: 1.5 MG/DL
DIFFERENTIAL METHOD: ABNORMAL
EOSINOPHIL # BLD AUTO: 0.1 K/UL
EOSINOPHIL NFR BLD: 1.6 %
ERYTHROCYTE [DISTWIDTH] IN BLOOD BY AUTOMATED COUNT: 14.5 %
EST. GFR  (AFRICAN AMERICAN): 50 ML/MIN/1.73 M^2
EST. GFR  (NON AFRICAN AMERICAN): 44 ML/MIN/1.73 M^2
GLUCOSE SERPL-MCNC: 106 MG/DL
HCT VFR BLD AUTO: 38.2 %
HGB BLD-MCNC: 12.7 G/DL
LYMPHOCYTES # BLD AUTO: 1.5 K/UL
LYMPHOCYTES NFR BLD: 19.2 %
MCH RBC QN AUTO: 26.7 PG
MCHC RBC AUTO-ENTMCNC: 33.2 G/DL
MCV RBC AUTO: 80 FL
MONOCYTES # BLD AUTO: 0.5 K/UL
MONOCYTES NFR BLD: 6.9 %
NEUTROPHILS # BLD AUTO: 5.4 K/UL
NEUTROPHILS NFR BLD: 71.9 %
PLATELET # BLD AUTO: 172 K/UL
PMV BLD AUTO: 9.5 FL
POTASSIUM SERPL-SCNC: 4.1 MMOL/L
RBC # BLD AUTO: 4.76 M/UL
SODIUM SERPL-SCNC: 137 MMOL/L
TROPONIN I SERPL DL<=0.01 NG/ML-MCNC: >50 NG/ML
WBC # BLD AUTO: 7.54 K/UL

## 2019-03-02 PROCEDURE — 85730 THROMBOPLASTIN TIME PARTIAL: CPT

## 2019-03-02 PROCEDURE — 20000000 HC ICU ROOM

## 2019-03-02 PROCEDURE — 36415 COLL VENOUS BLD VENIPUNCTURE: CPT

## 2019-03-02 PROCEDURE — 25000003 PHARM REV CODE 250: Performed by: INTERNAL MEDICINE

## 2019-03-02 PROCEDURE — 84484 ASSAY OF TROPONIN QUANT: CPT

## 2019-03-02 PROCEDURE — 94761 N-INVAS EAR/PLS OXIMETRY MLT: CPT

## 2019-03-02 PROCEDURE — 80048 BASIC METABOLIC PNL TOTAL CA: CPT

## 2019-03-02 PROCEDURE — 99223 1ST HOSP IP/OBS HIGH 75: CPT | Mod: AI,,, | Performed by: INTERNAL MEDICINE

## 2019-03-02 PROCEDURE — 85025 COMPLETE CBC W/AUTO DIFF WBC: CPT

## 2019-03-02 PROCEDURE — 99223 PR INITIAL HOSPITAL CARE,LEVL III: ICD-10-PCS | Mod: AI,,, | Performed by: INTERNAL MEDICINE

## 2019-03-02 RX ORDER — ISOSORBIDE MONONITRATE 60 MG/1
60 TABLET, EXTENDED RELEASE ORAL DAILY
Status: DISCONTINUED | OUTPATIENT
Start: 2019-03-02 | End: 2019-03-05

## 2019-03-02 RX ORDER — ATORVASTATIN CALCIUM 40 MG/1
80 TABLET, FILM COATED ORAL DAILY
Status: DISCONTINUED | OUTPATIENT
Start: 2019-03-02 | End: 2019-03-05 | Stop reason: HOSPADM

## 2019-03-02 RX ADMIN — ISOSORBIDE MONONITRATE 60 MG: 60 TABLET, EXTENDED RELEASE ORAL at 08:03

## 2019-03-02 RX ADMIN — AMLODIPINE BESYLATE 5 MG: 5 TABLET ORAL at 08:03

## 2019-03-02 RX ADMIN — ATORVASTATIN CALCIUM 80 MG: 40 TABLET, FILM COATED ORAL at 08:03

## 2019-03-02 RX ADMIN — CLOPIDOGREL 75 MG: 75 TABLET, FILM COATED ORAL at 08:03

## 2019-03-02 NOTE — ED NOTES
"Spoke to Dr. Mesa regarding Heparin infusion. MD states "follow the heparin nonagram and I would like it continuous." Spoke to Dr. Mesa regarding Nirto drip parameters, MD states "Hold if SPB <90."   "

## 2019-03-02 NOTE — ED NOTES
Morning labs drawn and sent to lab for analysis. Pt awake and alert. Pt denies cp at this time. RR even and unlabored on ra. Nadn. Pt connected to cardiac monitor and cont. bp cuff. Pt denies any needs at this time. Will continue to monitor.

## 2019-03-02 NOTE — ED NOTES
Pt sleeping. rr even and unlabored on ra. Nadn. Pt connected to cardiac monitor and cont BP cuff. Call light within reach. Will continue to monitor.

## 2019-03-02 NOTE — H&P
[facility]               H&P      Reason for Admission:        Assessment:    1. NSTEMI  2. HTN  3. Tobacco Depndence        Plan:    Continue nitro drip for chest pain and BP control  Start norvasc 5 mg po daily  Will attempt to wean nitro tomorrow  2d echo complete  S/p plavix load  Continue Heparin  Plan for LHC Monday  Initiate BB after intervention, bradycardic at present      NO Emergent LHC unless patient having uncontrolled chest pain, unstable ventricular arrhythmias or Cardiogenic Shock.    Mitchell Mesa MD  Cardiology Service      HPI: 77 y/o AA male with PMH of HTN, and smoking for over 60 years present with complaints of recurrent chest pain that started Thursday night. Pain was burning in quality and severe in intensity, located mid sternally and intermittent. Pain last a few minutes. He decided to present to ER on Friday where troponin was elevated. No ECG changes. He had associated dyspnea and diaphoresis with light headedness when symptoms were present. He is feeling better now on nitro drip, plavix load and heparin. No previous history of MI or CVA.           Review of Systems -Except for what was mentioned above in HPI  Constitution: Negative.   HENT: Negative.   Eyes: Negative.   Cardiovascular: Negative.   Respiratory: Negative.   Endocrine: Negative.   Hematologic/Lymphatic: Negative.   Skin: Negative.   Musculoskeletal: Negative.   Gastrointestinal: Negative.           Past Surgical History:   Procedure Laterality Date    COLONOSCOPY N/A 5/12/2015    Performed by Jd [facility] at [facility] END[facility]    CYSTOSCOPY      RELEASE-CARPAL TUNNEL Left 5/6/2016    Performed by Denis Velasquez Jr., MD at [facility] OR       Past Medical History:   Diagnosis Date    Arthritis     Cataract     CKD (chronic kidney disease) stage 3, GFR 30-59 ml/min 5/1/2014    Hyperlipidemia     Hypertension     Low tension glaucoma         reports that he has been smoking cigarettes.  He has been smoking  about 1.00 pack per day. He uses smokeless tobacco. He reports that he does not drink alcohol or use drugs.       Family History   Problem Relation Age of Onset    Melanoma Neg Hx           Review of patient's allergies indicates:   Allergen Reactions    Ace inhibitors      Other reaction(s): Angioedema    Ultram [tramadol] Nausea Only and Other (See Comments)     Dizziness       Current Discharge Medication List      CONTINUE these medications which have NOT CHANGED    Details   diltiaZEM (CARTIA XT) 300 MG 24 hr capsule Take 1 capsule (300 mg total) by mouth once daily.  Qty: 90 capsule, Refills: 3      fluticasone-salmeterol 230-21 mcg/dose (ADVAIR HFA) 230-21 mcg/actuation HFAA inhaler Inhale 2 puffs into the lungs 2 (two) times daily. Controller  Qty: 12 g, Refills: 1    Associated Diagnoses: Smoker; Acute bronchitis, unspecified organism      latanoprost (XALATAN) 0.005 % ophthalmic solution Place 1 drop into both eyes once daily.  Qty: 2.5 mL, Refills: 5    Associated Diagnoses: Low-tension glaucoma of both eyes, unspecified glaucoma stage      methylPREDNISolone (MEDROL DOSEPACK) 4 mg tablet Take as directed  Qty: 30 tablet, Refills: 0    Associated Diagnoses: Smoker; Acute bronchitis, unspecified organism      tamsulosin (FLOMAX) 0.4 mg Cp24 Refills: 12              amLODIPine  5 mg Oral Daily    atorvastatin  80 mg Oral Daily    clopidogrel  75 mg Oral Daily    metoprolol succinate  50 mg Oral Daily        heparin (porcine) in D5W 12 Units/kg/hr (03/01/19 2026)    nitroGLYCERIN 10 mcg/min (03/01/19 1700)       heparin (PORCINE), heparin (PORCINE), nitroGLYCERIN, sodium chloride 0.9%, sodium chloride 0.9%    Vitals:    03/02/19 0700 03/02/19 0715 03/02/19 0730 03/02/19 0800   BP: (!) 161/77  (!) 154/79 135/64   Pulse: (!) 54  (!) 55 (!) 54   Resp: (!) 22  (!) 28 17   Temp:  98.2 °F (36.8 °C)     TempSrc:  Oral     SpO2: 96%  96% (!) 94%   Weight:       Height:             Physical  Examination:  General Appearance: No acute distress  Mental: Alert to person, time and place  HEENT: Perrl  Chest: No pain with palpitations, no chest deformities  Heart: RRR, no murmurs, no gallops or rubs  Lung: CTAB  ABDOMEN: Soft, nontender, nondistended with good bowel sounds heard. No mass or hepatosplenomegaly  EXTREMITIES: Without cyanosis, clubbing or edema.   NEUROLOGICAL: Gross nonfocal. Skin: Warm and dry without any rash. There is no costovertebral angle tenderness.   Skin: no rashes lesions or ulcers    Lab Results   Component Value Date     03/01/2019    K 3.8 03/01/2019     03/01/2019    CO2 25 03/01/2019    BUN 15 03/01/2019    CREATININE 1.4 03/01/2019     (H) 03/01/2019    HGBA1C 6.3 (H) 04/10/2010    AST 23 03/01/2019    ALT 12 03/01/2019    ALBUMIN 3.7 03/01/2019    PROT 7.1 03/01/2019    BILITOT 0.5 03/01/2019    WBC 7.54 03/02/2019    HGB 12.7 (L) 03/02/2019    HCT 38.2 (L) 03/02/2019    MCV 80 (L) 03/02/2019     03/02/2019    INR 1.0 08/07/2009    PSA 1.3 03/28/2015    TSH 5.428 (H) 10/27/2015    CHOL 170 03/01/2019    HDL 49 03/01/2019    LDLCALC 106.6 03/01/2019    TRIG 72 03/01/2019       Recent Labs   Lab 03/02/19  0049   TROPONINI >50.000*       Recent Labs   Lab 03/02/19  0049   TROPONINI >50.000*         Lab Results   Component Value Date    TSH 5.428 (H) 10/27/2015       Lab Results   Component Value Date    HGBA1C 6.3 (H) 04/10/2010               Images and labs reviewed    ECG: NSR with LVH and St changes    The importance of plavix was explained to patient in details. Plavix should not be discontinued unless instructed by Cardiologist and plavix should be taken everyday, if not patient risk the chance of acute stent thrombosis and death. Patient questions were answered and patient verbalized understanding.

## 2019-03-02 NOTE — NURSING
Pt to room 259 from ED. AAox4. Denies CP or sob at this time. Placed on continuous monitoring. VS wnl. SB on monitor. Pt instructed on POC. Pt inst re NPO status and call for any needs. Call light in reach and urinal. Bed alarm on for safety.

## 2019-03-02 NOTE — ED NOTES
"Report received Caron MARCUM RN. Assumed care of pt at this time. Pt aaox3. rr even and unlabored on ra. Pt denies cp at this time. Pt on nitro drip and heparin drip. Pt with c/o RAC 20g stating "It is just uncomfortable when I bend my arm." New 18g R hand PIV placed. Pt connected to cardiac monitor. Call light within reach. Will continue to monitor.   "

## 2019-03-02 NOTE — ED NOTES
Pt sleeping. rr even and unlabored on ra. Nadn. Pt connected to cardiac monitor. Call light within reach. Will continue to monitor.

## 2019-03-02 NOTE — ED NOTES
Pt sleeping, easily woken. Pt denies cp at this time. rr even and unlabored. Nirto and heparin drips infusing. Pt connected to cardiac monitor and cont BP cuff. Will continue to monitor.

## 2019-03-03 PROBLEM — I21.4 NSTEMI (NON-ST ELEVATED MYOCARDIAL INFARCTION): Status: ACTIVE | Noted: 2019-03-03

## 2019-03-03 LAB
AORTIC ROOT ANNULUS: 2.95 CM
AORTIC VALVE CUSP SEPERATION: 1.7 CM
APTT BLDCRRT: 57.2 SEC
AV INDEX (PROSTH): 0.65
AV MEAN GRADIENT: 14 MMHG
AV PEAK GRADIENT: 32.95 MMHG
AV VALVE AREA: 1.85 CM2
AV VELOCITY RATIO: 0.55
BSA FOR ECHO PROCEDURE: 1.91 M2
CV ECHO LV RWT: 0.29 CM
DOP CALC AO PEAK VEL: 2.87 M/S
DOP CALC AO VTI: 52 CM
DOP CALC LVOT AREA: 2.83 CM2
DOP CALC LVOT DIAMETER: 1.9 CM
DOP CALC LVOT PEAK VEL: 1.57 M/S
DOP CALC LVOT STROKE VOLUME: 96.35 CM3
DOP CALCLVOT PEAK VEL VTI: 34 CM
E WAVE DECELERATION TIME: 236.81 MSEC
E/A RATIO: 0.67
ECHO LV POSTERIOR WALL: 0.74 CM (ref 0.6–1.1)
FRACTIONAL SHORTENING: 35 % (ref 28–44)
INTERVENTRICULAR SEPTUM: 0.89 CM (ref 0.6–1.1)
LA MAJOR: 4.46 CM
LA MINOR: 5.09 CM
LA WIDTH: 3.35 CM
LEFT ATRIUM SIZE: 3.75 CM
LEFT ATRIUM VOLUME INDEX: 26.8 ML/M2
LEFT ATRIUM VOLUME: 50.77 CM3
LEFT INTERNAL DIMENSION IN SYSTOLE: 3.28 CM (ref 2.1–4)
LEFT VENTRICLE DIASTOLIC VOLUME INDEX: 63.08 ML/M2
LEFT VENTRICLE DIASTOLIC VOLUME: 119.58 ML
LEFT VENTRICLE MASS INDEX: 73.9 G/M2
LEFT VENTRICLE SYSTOLIC VOLUME INDEX: 22.9 ML/M2
LEFT VENTRICLE SYSTOLIC VOLUME: 43.33 ML
LEFT VENTRICULAR INTERNAL DIMENSION IN DIASTOLE: 5.02 CM (ref 3.5–6)
LEFT VENTRICULAR MASS: 140.02 G
LV LATERAL E/E' RATIO: 11
MV PEAK A VEL: 0.99 M/S
MV PEAK E VEL: 0.66 M/S
PISA TR MAX VEL: 1.5 M/S
PULM VEIN S/D RATIO: 2.12
PV PEAK D VEL: 0.26 M/S
PV PEAK S VEL: 0.55 M/S
PV PEAK VELOCITY: 1.13 CM/S
RA MAJOR: 4.47 CM
TDI LATERAL: 0.06
TR MAX PG: 9 MMHG

## 2019-03-03 PROCEDURE — 36415 COLL VENOUS BLD VENIPUNCTURE: CPT

## 2019-03-03 PROCEDURE — 99232 SBSQ HOSP IP/OBS MODERATE 35: CPT | Mod: ,,, | Performed by: INTERNAL MEDICINE

## 2019-03-03 PROCEDURE — 99232 PR SUBSEQUENT HOSPITAL CARE,LEVL II: ICD-10-PCS | Mod: ,,, | Performed by: INTERNAL MEDICINE

## 2019-03-03 PROCEDURE — 85730 THROMBOPLASTIN TIME PARTIAL: CPT

## 2019-03-03 PROCEDURE — 25000003 PHARM REV CODE 250: Performed by: INTERNAL MEDICINE

## 2019-03-03 PROCEDURE — 93010 ELECTROCARDIOGRAM REPORT: CPT | Mod: ,,, | Performed by: INTERNAL MEDICINE

## 2019-03-03 PROCEDURE — 93010 EKG 12-LEAD: ICD-10-PCS | Mod: ,,, | Performed by: INTERNAL MEDICINE

## 2019-03-03 PROCEDURE — 94761 N-INVAS EAR/PLS OXIMETRY MLT: CPT

## 2019-03-03 PROCEDURE — 93005 ELECTROCARDIOGRAM TRACING: CPT

## 2019-03-03 PROCEDURE — 63600175 PHARM REV CODE 636 W HCPCS: Performed by: EMERGENCY MEDICINE

## 2019-03-03 PROCEDURE — 20000000 HC ICU ROOM

## 2019-03-03 RX ADMIN — ISOSORBIDE MONONITRATE 60 MG: 60 TABLET, EXTENDED RELEASE ORAL at 08:03

## 2019-03-03 RX ADMIN — AMLODIPINE BESYLATE 5 MG: 5 TABLET ORAL at 08:03

## 2019-03-03 RX ADMIN — ATORVASTATIN CALCIUM 80 MG: 40 TABLET, FILM COATED ORAL at 08:03

## 2019-03-03 RX ADMIN — HEPARIN SODIUM AND DEXTROSE 12 UNITS/KG/HR: 10000; 5 INJECTION INTRAVENOUS at 09:03

## 2019-03-03 RX ADMIN — CLOPIDOGREL 75 MG: 75 TABLET, FILM COATED ORAL at 08:03

## 2019-03-03 NOTE — NURSING
No acute events overnight. No complaints of CP or SOB. VSS. Pt up to chair per assist per request to get up. NAD noted. Labs pending.

## 2019-03-03 NOTE — PROGRESS NOTES
Progress Note            [facility]    Subjective: Patient is doing well. Ef normal on echo. No arrhythmias overnight. No chest pain and BP better controlled.         Review of patient's allergies indicates:   Allergen Reactions    Ace inhibitors      Other reaction(s): Angioedema    Ultram [tramadol] Nausea Only and Other (See Comments)     Dizziness          amLODIPine  5 mg Oral Daily    atorvastatin  80 mg Oral Daily    clopidogrel  75 mg Oral Daily    isosorbide mononitrate  60 mg Oral Daily        heparin (porcine) in D5W 12 Units/kg/hr (03/01/19 2026)    nitroGLYCERIN 10 mcg/min (03/01/19 1700)       heparin (PORCINE), heparin (PORCINE), nitroGLYCERIN, sodium chloride 0.9%, sodium chloride 0.9%    Vitals:    03/03/19 0600 03/03/19 0615 03/03/19 0700 03/03/19 0714   BP: 136/73  139/89    Pulse: 85 81 79 84   Resp: (!) 34 17 19 19   Temp:    98.2 °F (36.8 °C)   TempSrc:    Oral   SpO2: (!) 94% (!) 94% (!) 93% 95%   Weight:       Height:           Physical Examination:  General Appearance: No acute distress  Mental: Alert to person, time and place  HEENT: Perrl  Chest: No pain with palpitations, no chest deformities  Heart: RRR, no murmurs, no gallops or rubs  Lung: CTAB  ABDOMEN: Soft, nontender, nondistended with good bowel sounds heard. No mass or hepatosplenomegaly  EXTREMITIES: Without cyanosis, clubbing or edema.   NEUROLOGICAL: Gross nonfocal. Skin: Warm and dry without any rash. There is no costovertebral angle tenderness.   Skin: no rashes lesions or ulcers    Lab Results   Component Value Date     03/02/2019    K 4.1 03/02/2019     03/02/2019    CO2 21 (L) 03/02/2019    BUN 14 03/02/2019    CREATININE 1.5 (H) 03/02/2019     03/02/2019    HGBA1C 6.3 (H) 04/10/2010    AST 23 03/01/2019    ALT 12 03/01/2019    ALBUMIN 3.7 03/01/2019    PROT 7.1 03/01/2019    BILITOT 0.5 03/01/2019    WBC 7.54 03/02/2019    HGB 12.7 (L) 03/02/2019    HCT 38.2 (L)  03/02/2019    MCV 80 (L) 03/02/2019     03/02/2019    INR 1.0 08/07/2009    PSA 1.3 03/28/2015    TSH 5.428 (H) 10/27/2015    CHOL 170 03/01/2019    HDL 49 03/01/2019    LDLCALC 106.6 03/01/2019    TRIG 72 03/01/2019       No results for input(s): CPK, CPKMB, TROPONINI, MB in the last 24 hours.    Recent Labs   Lab 03/02/19  0049   TROPONINI >50.000*         Lab Results   Component Value Date    TSH 5.428 (H) 10/27/2015       Lab Results   Component Value Date    HGBA1C 6.3 (H) 04/10/2010           Images and labs reviewed    ECG: NSR with LVH with repolarization changes vs ischemia    Assessment:     1. NSTEMI  2. HTN  3. Tobacco Depndence           Plan:     Continue nitro drip for chest pain and BP control  Continue current medications  Continue Heparin  Plan for LHC Monday  Initiate BB after intervention, bradycardic at present        NO Emergent LHC unless patient having uncontrolled chest pain, unstable ventricular arrhythmias or Cardiogenic Shock.           Mitchell Mesa MD  [facility]

## 2019-03-04 LAB
ANION GAP SERPL CALC-SCNC: 9 MMOL/L
APTT BLDCRRT: 46.5 SEC
BASOPHILS # BLD AUTO: 0.01 K/UL
BASOPHILS NFR BLD: 0.2 %
BUN SERPL-MCNC: 17 MG/DL
CALCIUM SERPL-MCNC: 9.4 MG/DL
CHLORIDE SERPL-SCNC: 103 MMOL/L
CO2 SERPL-SCNC: 27 MMOL/L
CREAT SERPL-MCNC: 1.6 MG/DL
DIFFERENTIAL METHOD: ABNORMAL
EOSINOPHIL # BLD AUTO: 0.1 K/UL
EOSINOPHIL NFR BLD: 2.1 %
ERYTHROCYTE [DISTWIDTH] IN BLOOD BY AUTOMATED COUNT: 14.5 %
EST. GFR  (AFRICAN AMERICAN): 47 ML/MIN/1.73 M^2
EST. GFR  (NON AFRICAN AMERICAN): 40 ML/MIN/1.73 M^2
GLUCOSE SERPL-MCNC: 88 MG/DL
HCT VFR BLD AUTO: 41.2 %
HGB BLD-MCNC: 13.5 G/DL
LYMPHOCYTES # BLD AUTO: 1.6 K/UL
LYMPHOCYTES NFR BLD: 24 %
MCH RBC QN AUTO: 26.4 PG
MCHC RBC AUTO-ENTMCNC: 32.8 G/DL
MCV RBC AUTO: 81 FL
MONOCYTES # BLD AUTO: 0.6 K/UL
MONOCYTES NFR BLD: 8.8 %
NEUTROPHILS # BLD AUTO: 4.3 K/UL
NEUTROPHILS NFR BLD: 64.6 %
PLATELET # BLD AUTO: 169 K/UL
PMV BLD AUTO: 10.1 FL
POTASSIUM SERPL-SCNC: 3.9 MMOL/L
RBC # BLD AUTO: 5.11 M/UL
SODIUM SERPL-SCNC: 139 MMOL/L
WBC # BLD AUTO: 6.59 K/UL

## 2019-03-04 PROCEDURE — 20000000 HC ICU ROOM

## 2019-03-04 PROCEDURE — 92978 PR IVUS, CORONARY, 1ST VESSEL: ICD-10-PCS | Mod: 26,LC,, | Performed by: INTERNAL MEDICINE

## 2019-03-04 PROCEDURE — 92978 ENDOLUMINL IVUS OCT C 1ST: CPT | Mod: 26,LC,, | Performed by: INTERNAL MEDICINE

## 2019-03-04 PROCEDURE — C1753 CATH, INTRAVAS ULTRASOUND: HCPCS | Performed by: INTERNAL MEDICINE

## 2019-03-04 PROCEDURE — 25500020 PHARM REV CODE 255: Performed by: INTERNAL MEDICINE

## 2019-03-04 PROCEDURE — 25000003 PHARM REV CODE 250: Performed by: INTERNAL MEDICINE

## 2019-03-04 PROCEDURE — C1894 INTRO/SHEATH, NON-LASER: HCPCS | Performed by: INTERNAL MEDICINE

## 2019-03-04 PROCEDURE — 99152 PR MOD CONSCIOUS SEDATION, SAME PHYS, 5+ YRS, FIRST 15 MIN: ICD-10-PCS | Mod: ,,, | Performed by: INTERNAL MEDICINE

## 2019-03-04 PROCEDURE — 85025 COMPLETE CBC W/AUTO DIFF WBC: CPT

## 2019-03-04 PROCEDURE — 92928 PR STENT: ICD-10-PCS | Mod: LC,,, | Performed by: INTERNAL MEDICINE

## 2019-03-04 PROCEDURE — 92928 PRQ TCAT PLMT NTRAC ST 1 LES: CPT | Mod: LC,,, | Performed by: INTERNAL MEDICINE

## 2019-03-04 PROCEDURE — C9600 PERC DRUG-EL COR STENT SING: HCPCS | Mod: LC | Performed by: INTERNAL MEDICINE

## 2019-03-04 PROCEDURE — 85730 THROMBOPLASTIN TIME PARTIAL: CPT

## 2019-03-04 PROCEDURE — C1725 CATH, TRANSLUMIN NON-LASER: HCPCS | Performed by: INTERNAL MEDICINE

## 2019-03-04 PROCEDURE — 85347 COAGULATION TIME ACTIVATED: CPT | Performed by: INTERNAL MEDICINE

## 2019-03-04 PROCEDURE — 93458 PR CATH PLACE/CORON ANGIO, IMG SUPER/INTERP,W LEFT HEART VENTRICULOGRAPHY: ICD-10-PCS | Mod: 26,59,, | Performed by: INTERNAL MEDICINE

## 2019-03-04 PROCEDURE — 80048 BASIC METABOLIC PNL TOTAL CA: CPT

## 2019-03-04 PROCEDURE — C1887 CATHETER, GUIDING: HCPCS | Performed by: INTERNAL MEDICINE

## 2019-03-04 PROCEDURE — 27201423 OPTIME MED/SURG SUP & DEVICES STERILE SUPPLY: Performed by: INTERNAL MEDICINE

## 2019-03-04 PROCEDURE — 99152 MOD SED SAME PHYS/QHP 5/>YRS: CPT | Performed by: INTERNAL MEDICINE

## 2019-03-04 PROCEDURE — 99153 MOD SED SAME PHYS/QHP EA: CPT | Performed by: INTERNAL MEDICINE

## 2019-03-04 PROCEDURE — C1874 STENT, COATED/COV W/DEL SYS: HCPCS | Performed by: INTERNAL MEDICINE

## 2019-03-04 PROCEDURE — 99152 MOD SED SAME PHYS/QHP 5/>YRS: CPT | Mod: ,,, | Performed by: INTERNAL MEDICINE

## 2019-03-04 PROCEDURE — 36415 COLL VENOUS BLD VENIPUNCTURE: CPT

## 2019-03-04 PROCEDURE — 25000003 PHARM REV CODE 250: Performed by: NURSE PRACTITIONER

## 2019-03-04 PROCEDURE — 93458 L HRT ARTERY/VENTRICLE ANGIO: CPT | Mod: 59 | Performed by: INTERNAL MEDICINE

## 2019-03-04 PROCEDURE — 92978 ENDOLUMINL IVUS OCT C 1ST: CPT | Mod: LC | Performed by: INTERNAL MEDICINE

## 2019-03-04 PROCEDURE — 93458 L HRT ARTERY/VENTRICLE ANGIO: CPT | Mod: 26,59,, | Performed by: INTERNAL MEDICINE

## 2019-03-04 PROCEDURE — C1769 GUIDE WIRE: HCPCS | Performed by: INTERNAL MEDICINE

## 2019-03-04 PROCEDURE — 63600175 PHARM REV CODE 636 W HCPCS: Performed by: INTERNAL MEDICINE

## 2019-03-04 PROCEDURE — 94761 N-INVAS EAR/PLS OXIMETRY MLT: CPT

## 2019-03-04 DEVICE — STENT RSINT27518UX MICROTRAC 2.75X18RX
Type: IMPLANTABLE DEVICE | Site: CORONARY | Status: FUNCTIONAL
Brand: RESOLUTE INTEGRITY™

## 2019-03-04 RX ORDER — NAPROXEN SODIUM 220 MG/1
TABLET, FILM COATED ORAL
Status: DISCONTINUED | OUTPATIENT
Start: 2019-03-04 | End: 2019-03-04 | Stop reason: HOSPADM

## 2019-03-04 RX ORDER — VERAPAMIL HYDROCHLORIDE 2.5 MG/ML
INJECTION, SOLUTION INTRAVENOUS
Status: DISCONTINUED | OUTPATIENT
Start: 2019-03-04 | End: 2019-03-04 | Stop reason: HOSPADM

## 2019-03-04 RX ORDER — MIDAZOLAM HYDROCHLORIDE 1 MG/ML
INJECTION, SOLUTION INTRAMUSCULAR; INTRAVENOUS
Status: DISCONTINUED | OUTPATIENT
Start: 2019-03-04 | End: 2019-03-04 | Stop reason: HOSPADM

## 2019-03-04 RX ORDER — HEPARIN SODIUM 1000 [USP'U]/ML
INJECTION, SOLUTION INTRAVENOUS; SUBCUTANEOUS
Status: DISCONTINUED | OUTPATIENT
Start: 2019-03-04 | End: 2019-03-04 | Stop reason: HOSPADM

## 2019-03-04 RX ORDER — IODIXANOL 320 MG/ML
INJECTION, SOLUTION INTRAVASCULAR
Status: DISCONTINUED | OUTPATIENT
Start: 2019-03-04 | End: 2019-03-04 | Stop reason: HOSPADM

## 2019-03-04 RX ORDER — FENTANYL CITRATE 50 UG/ML
INJECTION, SOLUTION INTRAMUSCULAR; INTRAVENOUS
Status: DISCONTINUED | OUTPATIENT
Start: 2019-03-04 | End: 2019-03-04 | Stop reason: HOSPADM

## 2019-03-04 RX ORDER — HEPARIN SODIUM 200 [USP'U]/100ML
INJECTION, SOLUTION INTRAVENOUS
Status: DISCONTINUED | OUTPATIENT
Start: 2019-03-04 | End: 2019-03-05

## 2019-03-04 RX ORDER — SODIUM CHLORIDE 9 MG/ML
INJECTION, SOLUTION INTRAVENOUS CONTINUOUS
Status: DISCONTINUED | OUTPATIENT
Start: 2019-03-04 | End: 2019-03-04

## 2019-03-04 RX ORDER — SODIUM CHLORIDE 9 MG/ML
INJECTION, SOLUTION INTRAVENOUS
Status: DISCONTINUED | OUTPATIENT
Start: 2019-03-04 | End: 2019-03-05 | Stop reason: HOSPADM

## 2019-03-04 RX ORDER — DIPHENHYDRAMINE HYDROCHLORIDE 50 MG/ML
INJECTION INTRAMUSCULAR; INTRAVENOUS
Status: DISCONTINUED | OUTPATIENT
Start: 2019-03-04 | End: 2019-03-04 | Stop reason: HOSPADM

## 2019-03-04 RX ADMIN — CLOPIDOGREL 75 MG: 75 TABLET, FILM COATED ORAL at 08:03

## 2019-03-04 RX ADMIN — ISOSORBIDE MONONITRATE 60 MG: 60 TABLET, EXTENDED RELEASE ORAL at 08:03

## 2019-03-04 RX ADMIN — SODIUM CHLORIDE: 0.9 INJECTION, SOLUTION INTRAVENOUS at 09:03

## 2019-03-04 RX ADMIN — AMLODIPINE BESYLATE 5 MG: 5 TABLET ORAL at 08:03

## 2019-03-04 RX ADMIN — ATORVASTATIN CALCIUM 80 MG: 40 TABLET, FILM COATED ORAL at 08:03

## 2019-03-04 NOTE — PROCEDURES
: Brennan Stein MD  Pre-procedure diagnosis: NSTEMI  Post-procedure diagnosis: CAD    Post Procedure Note: PCI    The pt was brought to the cath lab and under sterile technique, right radial access was obtained without difficulty. Images were obtained in multiple views and severe mid LCX lesion noted. Successful IVUS guided PCI with RAFAELA with excellent result. No ventriculogram in an effort to spare contrast due to Cr. Please see full report for details. The pt tolerated the procedure well without complications. Radial band device used with successful hemostasis.     Vitals:    03/04/19 1100 03/04/19 1200 03/04/19 1240 03/04/19 1300   BP: (!) 151/88 (!) 161/78  (!) 142/62   Pulse: 91 96  85   Resp: (!) 26 (!) 33  (!) 21   Temp:   98.5 °F (36.9 °C)    TempSrc:   Oral    SpO2: (!) 93% 97%  (!) 91%   Weight:       Height:             Gen: NAD  Ext: 2+ radial pulse, no evidence of hematoma  Estimated blood loss: < 50 cc    Plan:  -Post cath care per protocol  -ASA for life, plavix at least a year  -Cardiac rehab

## 2019-03-04 NOTE — INTERVAL H&P NOTE
The patient has been examined and the H&P has been reviewed:    I concur with the findings and no changes have occurred since H&P was written.    Anesthesia/Surgery risks, benefits and alternative options discussed and understood by patient/family.          Active Hospital Problems    Diagnosis  POA    *NSTEMI (non-ST elevated myocardial infarction) [I21.4]  Yes    Acute coronary syndrome [I24.9]  Yes      Resolved Hospital Problems   No resolved problems to display.

## 2019-03-04 NOTE — NURSING
NAD noted tonight, No complaints of CP or SOB. POC probable cath lab procedure today. Pt aware of NPO status and has been NPO after midnight. Groins clipped.

## 2019-03-04 NOTE — PLAN OF CARE
Chief Complaint   Patient presents with    Chest Pain       mid sternal chest pain since last night. pt. reports he became diaphoretic and pain increased with light activity just pta.           Pt independent with ADLs, no HH/HME. Lives alone, sister: Rebeka Preciado and CHARU live across the street and can provide transportation on discharge and assistance whenever needed. Pt drives.    Pt denies any needs or concerns at this time. Discharge planning brochure and business card provided. CM numbers written on white board. Pt encouraged to call with any questions or needs. CM will continue to follow patient throughout the transitions of care, and assist with any discharge needs.       03/03/19 0478   Discharge Assessment   Assessment Type Discharge Planning Assessment   Confirmed/corrected address and phone number on facesheet? Yes   Assessment information obtained from? Patient   Expected Length of Stay (days) 3   Communicated expected length of stay with patient/caregiver yes   Prior to hospitilization cognitive status: Alert/Oriented   Prior to hospitalization functional status: Independent   Current cognitive status: Alert/Oriented   Current Functional Status: Needs Assistance   Facility Arrived From: (home)   Lives With alone   Able to Return to Prior Arrangements yes   Is patient able to care for self after discharge? Yes   Who are your caregiver(s) and their phone number(s)? Sister: Rebeka Preciado [phone]   Patient's perception of discharge disposition home or selfcare   Readmission Within the Last 30 Days no previous admission in last 30 days   Patient currently being followed by outpatient case management? No   Patient currently receives any other outside agency services? No   Equipment Currently Used at Home none   Do you have any problems affording any of your prescribed medications? No   Is the patient taking medications as prescribed? yes   Does the patient have transportation home? Yes    Transportation Anticipated family or friend will provide   Dialysis Name and Scheduled days N/A   Does the patient receive services at the [facility]? No   Discharge Plan A Home   Discharge Plan B Home with family   DME Needed Upon Discharge  (TBD)   Patient/Family in Agreement with Plan yes     Donna Ayon RN Transitional Navigator  [phone]

## 2019-03-05 VITALS
WEIGHT: 170.88 LBS | RESPIRATION RATE: 20 BRPM | OXYGEN SATURATION: 98 % | SYSTOLIC BLOOD PRESSURE: 149 MMHG | HEIGHT: 68 IN | DIASTOLIC BLOOD PRESSURE: 72 MMHG | BODY MASS INDEX: 25.9 KG/M2 | TEMPERATURE: 99 F | HEART RATE: 91 BPM

## 2019-03-05 LAB — APTT BLDCRRT: 31.5 SEC

## 2019-03-05 PROCEDURE — 99239 PR HOSPITAL DISCHARGE DAY,>30 MIN: ICD-10-PCS | Mod: ,,, | Performed by: INTERNAL MEDICINE

## 2019-03-05 PROCEDURE — 85730 THROMBOPLASTIN TIME PARTIAL: CPT

## 2019-03-05 PROCEDURE — 36415 COLL VENOUS BLD VENIPUNCTURE: CPT

## 2019-03-05 PROCEDURE — 99239 HOSP IP/OBS DSCHRG MGMT >30: CPT | Mod: ,,, | Performed by: INTERNAL MEDICINE

## 2019-03-05 PROCEDURE — 94761 N-INVAS EAR/PLS OXIMETRY MLT: CPT

## 2019-03-05 PROCEDURE — 25000003 PHARM REV CODE 250: Performed by: INTERNAL MEDICINE

## 2019-03-05 RX ORDER — METOPROLOL SUCCINATE 25 MG/1
25 TABLET, EXTENDED RELEASE ORAL DAILY
Status: DISCONTINUED | OUTPATIENT
Start: 2019-03-05 | End: 2019-03-05 | Stop reason: HOSPADM

## 2019-03-05 RX ORDER — CLOPIDOGREL BISULFATE 75 MG/1
75 TABLET ORAL DAILY
Qty: 30 TABLET | Refills: 11 | Status: SHIPPED | OUTPATIENT
Start: 2019-03-05 | End: 2020-03-02

## 2019-03-05 RX ORDER — LOSARTAN POTASSIUM 50 MG/1
50 TABLET ORAL DAILY
Status: DISCONTINUED | OUTPATIENT
Start: 2019-03-05 | End: 2019-03-05 | Stop reason: HOSPADM

## 2019-03-05 RX ORDER — AMLODIPINE BESYLATE 5 MG/1
5 TABLET ORAL DAILY
Qty: 30 TABLET | Refills: 11 | Status: SHIPPED | OUTPATIENT
Start: 2019-03-06 | End: 2020-03-02

## 2019-03-05 RX ORDER — METOPROLOL SUCCINATE 25 MG/1
25 TABLET, EXTENDED RELEASE ORAL DAILY
Qty: 30 TABLET | Refills: 11 | Status: SHIPPED | OUTPATIENT
Start: 2019-03-06 | End: 2020-03-02 | Stop reason: SDUPTHER

## 2019-03-05 RX ORDER — NITROGLYCERIN 0.4 MG/1
0.4 TABLET SUBLINGUAL EVERY 5 MIN PRN
Qty: 30 TABLET | Refills: 2 | Status: SHIPPED | OUTPATIENT
Start: 2019-03-05 | End: 2021-07-14 | Stop reason: SDUPTHER

## 2019-03-05 RX ORDER — ATORVASTATIN CALCIUM 80 MG/1
80 TABLET, FILM COATED ORAL DAILY
Qty: 90 TABLET | Refills: 3 | Status: SHIPPED | OUTPATIENT
Start: 2019-03-05 | End: 2020-06-24 | Stop reason: SDUPTHER

## 2019-03-05 RX ORDER — LOSARTAN POTASSIUM 50 MG/1
50 TABLET ORAL DAILY
Qty: 90 TABLET | Refills: 3 | Status: SHIPPED | OUTPATIENT
Start: 2019-03-06 | End: 2020-02-26

## 2019-03-05 RX ADMIN — LOSARTAN POTASSIUM 50 MG: 50 TABLET, FILM COATED ORAL at 07:03

## 2019-03-05 RX ADMIN — METOPROLOL SUCCINATE 25 MG: 25 TABLET, EXTENDED RELEASE ORAL at 07:03

## 2019-03-05 RX ADMIN — CLOPIDOGREL 75 MG: 75 TABLET, FILM COATED ORAL at 07:03

## 2019-03-05 RX ADMIN — AMLODIPINE BESYLATE 5 MG: 5 TABLET ORAL at 07:03

## 2019-03-05 RX ADMIN — ATORVASTATIN CALCIUM 80 MG: 40 TABLET, FILM COATED ORAL at 07:03

## 2019-03-05 NOTE — NURSING
Rested well w/o distress. No complaints of CP or SOB. Right wrist vascband off since 1935. Gauze and tegaderm dsg remains CDI. No bleeding or hematoma at site. Pt given stent card as well. POC probable transfer out of ICU today.

## 2019-03-05 NOTE — PROGRESS NOTES
Progress Note            [facility]    Subjective: Patient doing well with no acute concerns. Denies chest pain or dyspnea . BP elevated      Review of patient's allergies indicates:   Allergen Reactions    Ace inhibitors      Other reaction(s): Angioedema    Ultram [tramadol] Nausea Only and Other (See Comments)     Dizziness          amLODIPine  5 mg Oral Daily    atorvastatin  80 mg Oral Daily    clopidogrel  75 mg Oral Daily    losartan  50 mg Oral Daily    metoprolol succinate  25 mg Oral Daily        sodium chloride 0.9% Stopped (03/04/19 1800)       sodium chloride 0.9%, nitroGLYCERIN, sodium chloride 0.9%, sodium chloride 0.9%    Vitals:    03/05/19 0600 03/05/19 0615 03/05/19 0707 03/05/19 0718   BP: (!) 180/84  (!) 184/87 (!) 184/87   Pulse: 66 67 76    Resp: (!) 25 17 (!) 46    Temp:   98.7 °F (37.1 °C)    TempSrc:   Oral    SpO2: (!) 93% (!) 93% (!) 94%    Weight: 77.5 kg (170 lb 13.7 oz)      Height:           Physical Examination:  General Appearance: No acute distress  Mental: Alert to person, time and place  HEENT: Perrl  Chest: No pain with palpitations, no chest deformities  Heart: RRR, no murmurs, no gallops or rubs  Lung: CTAB  ABDOMEN: Soft, nontender, nondistended with good bowel sounds heard. No mass or hepatosplenomegaly  EXTREMITIES: Without cyanosis, clubbing or edema.   NEUROLOGICAL: Gross nonfocal. Skin: Warm and dry without any rash. There is no costovertebral angle tenderness.   Skin: no rashes lesions or ulcers    Lab Results   Component Value Date     03/04/2019    K 3.9 03/04/2019     03/04/2019    CO2 27 03/04/2019    BUN 17 03/04/2019    CREATININE 1.6 (H) 03/04/2019    GLU 88 03/04/2019    HGBA1C 6.3 (H) 04/10/2010    AST 23 03/01/2019    ALT 12 03/01/2019    ALBUMIN 3.7 03/01/2019    PROT 7.1 03/01/2019    BILITOT 0.5 03/01/2019    WBC 6.59 03/04/2019    HGB 13.5 (L) 03/04/2019    HCT 41.2 03/04/2019    MCV 81 (L) 03/04/2019    PLT  169 03/04/2019    INR 1.0 08/07/2009    PSA 1.3 03/28/2015    TSH 5.428 (H) 10/27/2015    CHOL 170 03/01/2019    HDL 49 03/01/2019    LDLCALC 106.6 03/01/2019    TRIG 72 03/01/2019       No results for input(s): CPK, CPKMB, TROPONINI, MB in the last 24 hours.    Recent Labs   Lab 03/02/19  0049   TROPONINI >50.000*         Lab Results   Component Value Date    TSH 5.428 (H) 10/27/2015       Lab Results   Component Value Date    HGBA1C 6.3 (H) 04/10/2010           Images and labs reviewed    ECG: NSR      Assessment/Plan    1. NSTEMI s/p LHC with LCx lesion and RAFAELA placement. Continue asa, plavix, lipitor.   2. HTN uncontrolled. Will start losartan 50 mg po daily and metoprolol 25 mg po daily. HR have improved after LCx intervention  3. Bradycardia now improved after Lcx intervention, likely ischemic in etiology.   4. Tobacco dependence- smoking cessation discussed with patient.    Patient doing well and stable for discharge.  No chest pain or dyspnea.    The importance of plavix was explained to patient in details. Plavix should not be discontinued unless instructed by Cardiologist and plavix should be taken everyday, if not patient risk the chance of acute stent thrombosis and death. Patient questions were answered and patient verbalized understanding.      Mitchell Mesa MD  [facility]

## 2019-03-05 NOTE — NURSING
Notified Dr. Mesa of blood pressure trends overniight of SBP > 180 and DBP > 90. Ordered to give am doses now and he will see the patient shortly.

## 2019-03-05 NOTE — DISCHARGE SUMMARY
DC Summary    Admission Date: 3/1/2019  Discharge Date: 3/5/2019  Attending Physician: Mitchell Mesa      Consulting Physician(s): Dr. Stein for [facility]  Condition on Discharge: Stable    Final Diagnosis:   NSTEMI, CAD, ACS  Procedures:   C with RAFAELA to Lcx  History of Present Illness : Refer to H&P admission note  Laboratory/Data :   Lab Results   Component Value Date     03/04/2019    K 3.9 03/04/2019     03/04/2019    CO2 27 03/04/2019    BUN 17 03/04/2019    CREATININE 1.6 (H) 03/04/2019    GLU 88 03/04/2019    HGBA1C 6.3 (H) 04/10/2010    AST 23 03/01/2019    ALT 12 03/01/2019    ALBUMIN 3.7 03/01/2019    PROT 7.1 03/01/2019    BILITOT 0.5 03/01/2019    WBC 6.59 03/04/2019    HGB 13.5 (L) 03/04/2019    HCT 41.2 03/04/2019    MCV 81 (L) 03/04/2019     03/04/2019    INR 1.0 08/07/2009    PSA 1.3 03/28/2015    TSH 5.428 (H) 10/27/2015    CHOL 170 03/01/2019    HDL 49 03/01/2019    LDLCALC 106.6 03/01/2019    TRIG 72 03/01/2019       No results for input(s): CPK, CPKMB, TROPONINI, MB in the last 24 hours.    Recent Labs   Lab 03/02/19  0049   TROPONINI >50.000*         Lab Results   Component Value Date    TSH 5.428 (H) 10/27/2015       Lab Results   Component Value Date    HGBA1C 6.3 (H) 04/10/2010         Hospital Course:       1. NSTEMI s/p [facility] with LCx lesion and RAFAELA placement. Continue asa, plavix, lipitor.   2. HTN uncontrolled. Will start losartan 50 mg po daily and metoprolol 25 mg po daily. HR have improved after LCx intervention  3. Bradycardia now improved after Lcx intervention, likely ischemic in etiology.   4. Tobacco dependence- smoking cessation discussed with patient.     Patient doing well and stable for discharge.  No chest pain or dyspnea.       Discharge Medications:    Laura Harris   Home Medication Instructions TATO:[id number]    Printed on:03/05/19 0741   Medication Information                      amLODIPine (NORVASC) 5 MG tablet  Take 1 tablet (5 mg total) by mouth  once daily.             atorvastatin (LIPITOR) 80 MG tablet  Take 1 tablet (80 mg total) by mouth once daily.             clopidogrel (PLAVIX) 75 mg tablet  Take 1 tablet (75 mg total) by mouth once daily.             fluticasone-salmeterol 230-21 mcg/dose (ADVAIR HFA) 230-21 mcg/actuation HFAA inhaler  Inhale 2 puffs into the lungs 2 (two) times daily. Controller             latanoprost (XALATAN) 0.005 % ophthalmic solution  Place 1 drop into both eyes once daily.             losartan (COZAAR) 50 MG tablet  Take 1 tablet (50 mg total) by mouth once daily.             methylPREDNISolone (MEDROL DOSEPACK) 4 mg tablet  Take as directed             metoprolol succinate (TOPROL-XL) 25 MG 24 hr tablet  Take 1 tablet (25 mg total) by mouth once daily.             nitroGLYCERIN (NITROSTAT) 0.4 MG SL tablet  Place 1 tablet (0.4 mg total) under the tongue every 5 (five) minutes as needed for Chest pain.             tamsulosin (FLOMAX) 0.4 mg Cp24                 Addendum 3/8/2019:  Aspirin 81 mg added to medication list.    Discharge Instructions: Heart healthy diet, compliance with medications  Follow up Appointments: f/u in 2 weeks with Dr. Mesa    Disposition: discharge HOME    The importance of plavix was explained to patient in details. Plavix should not be discontinued unless instructed by Cardiologist and plavix should be taken everyday, if not patient risk the chance of acute stent thrombosis and death. Patient questions were answered and patient verbalized understanding.        > 30 minutes was spent working on the discharge of this patient.

## 2019-03-06 LAB — OHS CV CATH LVEDP PRE: 10

## 2019-03-08 DIAGNOSIS — I25.10 CORONARY ARTERY DISEASE INVOLVING NATIVE CORONARY ARTERY OF NATIVE HEART WITHOUT ANGINA PECTORIS: Primary | ICD-10-CM

## 2019-03-08 RX ORDER — ASPIRIN 81 MG/1
81 TABLET ORAL DAILY
Refills: 0 | Status: ON HOLD | COMMUNITY
Start: 2019-03-08 | End: 2021-09-16

## 2019-03-26 RX ORDER — TAMSULOSIN HYDROCHLORIDE 0.4 MG/1
CAPSULE ORAL
Qty: 30 CAPSULE | Refills: 12 | Status: SHIPPED | OUTPATIENT
Start: 2019-03-26 | End: 2020-04-01

## 2019-04-04 LAB
POC ACTIVATED CLOTTING TIME K: 153 SEC (ref 74–137)
POC ACTIVATED CLOTTING TIME K: 268 SEC (ref 74–137)
SAMPLE: ABNORMAL
SAMPLE: ABNORMAL

## 2019-04-12 ENCOUNTER — PES CALL (OUTPATIENT)
Dept: ADMINISTRATIVE | Facility: CLINIC | Age: 79
End: 2019-04-12

## 2019-08-01 ENCOUNTER — TELEPHONE (OUTPATIENT)
Dept: FAMILY MEDICINE | Facility: CLINIC | Age: 79
End: 2019-08-01

## 2019-08-01 NOTE — TELEPHONE ENCOUNTER
----- Message from Azucena Chandler sent at 8/1/2019  1:49 PM CDT -----  Contact: self  Type:  Patient Returning Call    Who Called:   Patient   Who Left Message for Patient:  Does the patient know what this is regarding?:  Would the patient rather a call back or a response via [employer]? call  Best Call Back Number:[phone]  Additional Information:

## 2019-09-06 ENCOUNTER — HOSPITAL ENCOUNTER (EMERGENCY)
Facility: HOSPITAL | Age: 79
Discharge: HOME | End: 2019-09-06
Attending: EMERGENCY MEDICINE
Payer: MEDICARE

## 2019-09-06 VITALS
WEIGHT: 170 LBS | DIASTOLIC BLOOD PRESSURE: 82 MMHG | OXYGEN SATURATION: 99 % | BODY MASS INDEX: 25.85 KG/M2 | HEART RATE: 56 BPM | RESPIRATION RATE: 18 BRPM | TEMPERATURE: 99 F | SYSTOLIC BLOOD PRESSURE: 178 MMHG

## 2019-09-06 DIAGNOSIS — R19.7 DIARRHEA, UNSPECIFIED TYPE: Primary | ICD-10-CM

## 2019-09-06 LAB
ALBUMIN SERPL BCP-MCNC: 3.7 G/DL (ref 3.5–5.2)
ALP SERPL-CCNC: 93 U/L (ref 55–135)
ALT SERPL W/O P-5'-P-CCNC: 11 U/L (ref 10–44)
ANION GAP SERPL CALC-SCNC: 9 MMOL/L (ref 8–16)
AST SERPL-CCNC: 15 U/L (ref 10–40)
BACTERIA #/AREA URNS HPF: NORMAL /HPF
BASOPHILS # BLD AUTO: 0.03 K/UL (ref 0–0.2)
BASOPHILS NFR BLD: 0.6 % (ref 0–1.9)
BILIRUB SERPL-MCNC: 0.5 MG/DL (ref 0.1–1)
BILIRUB UR QL STRIP: NEGATIVE
BUN SERPL-MCNC: 12 MG/DL (ref 8–23)
CALCIUM SERPL-MCNC: 8.9 MG/DL (ref 8.7–10.5)
CHLORIDE SERPL-SCNC: 112 MMOL/L (ref 95–110)
CLARITY UR: CLEAR
CO2 SERPL-SCNC: 23 MMOL/L (ref 23–29)
COLOR UR: YELLOW
CREAT SERPL-MCNC: 1.3 MG/DL (ref 0.5–1.4)
DIFFERENTIAL METHOD: ABNORMAL
EOSINOPHIL # BLD AUTO: 0.3 K/UL (ref 0–0.5)
EOSINOPHIL NFR BLD: 5.1 % (ref 0–8)
ERYTHROCYTE [DISTWIDTH] IN BLOOD BY AUTOMATED COUNT: 14.3 % (ref 11.5–14.5)
EST. GFR  (AFRICAN AMERICAN): 60 ML/MIN/1.73 M^2
EST. GFR  (NON AFRICAN AMERICAN): 52 ML/MIN/1.73 M^2
GLUCOSE SERPL-MCNC: 65 MG/DL (ref 70–110)
GLUCOSE UR QL STRIP: NEGATIVE
HCT VFR BLD AUTO: 35.9 % (ref 40–54)
HGB BLD-MCNC: 11.7 G/DL (ref 14–18)
HGB UR QL STRIP: ABNORMAL
KETONES UR QL STRIP: NEGATIVE
LEUKOCYTE ESTERASE UR QL STRIP: ABNORMAL
LYMPHOCYTES # BLD AUTO: 1.5 K/UL (ref 1–4.8)
LYMPHOCYTES NFR BLD: 28.4 % (ref 18–48)
MAGNESIUM SERPL-MCNC: 1.4 MG/DL (ref 1.6–2.6)
MCH RBC QN AUTO: 26.2 PG (ref 27–31)
MCHC RBC AUTO-ENTMCNC: 32.6 G/DL (ref 32–36)
MCV RBC AUTO: 81 FL (ref 82–98)
MICROSCOPIC COMMENT: NORMAL
MONOCYTES # BLD AUTO: 0.4 K/UL (ref 0.3–1)
MONOCYTES NFR BLD: 7.8 % (ref 4–15)
NEUTROPHILS # BLD AUTO: 3 K/UL (ref 1.8–7.7)
NEUTROPHILS NFR BLD: 58.1 % (ref 38–73)
NITRITE UR QL STRIP: NEGATIVE
PH UR STRIP: 6 [PH] (ref 5–8)
PLATELET # BLD AUTO: 202 K/UL (ref 150–350)
PMV BLD AUTO: 10.6 FL (ref 9.2–12.9)
POTASSIUM SERPL-SCNC: 3.3 MMOL/L (ref 3.5–5.1)
PROT SERPL-MCNC: 6.7 G/DL (ref 6–8.4)
PROT UR QL STRIP: NEGATIVE
RBC # BLD AUTO: 4.46 M/UL (ref 4.6–6.2)
RBC #/AREA URNS HPF: 1 /HPF (ref 0–4)
SODIUM SERPL-SCNC: 144 MMOL/L (ref 136–145)
SP GR UR STRIP: 1.01 (ref 1–1.03)
SQUAMOUS #/AREA URNS HPF: 0 /HPF
URN SPEC COLLECT METH UR: ABNORMAL
UROBILINOGEN UR STRIP-ACNC: NEGATIVE EU/DL
WBC # BLD AUTO: 5.25 K/UL (ref 3.9–12.7)
WBC #/AREA URNS HPF: 2 /HPF (ref 0–5)

## 2019-09-06 PROCEDURE — 83735 ASSAY OF MAGNESIUM: CPT

## 2019-09-06 PROCEDURE — 99284 EMERGENCY DEPT VISIT MOD MDM: CPT

## 2019-09-06 PROCEDURE — 81000 URINALYSIS NONAUTO W/SCOPE: CPT

## 2019-09-06 PROCEDURE — 25000003 PHARM REV CODE 250: Performed by: EMERGENCY MEDICINE

## 2019-09-06 PROCEDURE — 80053 COMPREHEN METABOLIC PANEL: CPT

## 2019-09-06 PROCEDURE — 85025 COMPLETE CBC W/AUTO DIFF WBC: CPT

## 2019-09-06 RX ORDER — POTASSIUM CHLORIDE 20 MEQ/1
20 TABLET, EXTENDED RELEASE ORAL
Status: COMPLETED | OUTPATIENT
Start: 2019-09-06 | End: 2019-09-06

## 2019-09-06 RX ADMIN — POTASSIUM CHLORIDE 20 MEQ: 20 TABLET, EXTENDED RELEASE ORAL at 01:09

## 2019-09-06 NOTE — ED PROVIDER NOTES
Encounter Date: 9/6/2019    SCRIBE #1 NOTE: I, Brenda Nunez, am scribing for, and in the presence of,  Dr. Rivas. I have scribed the entire note.       History     Chief Complaint   Patient presents with    Diarrhea     78 year old male presents to ed cc of diarrhea patient reports had heart attack few months ago reports medications cardiologist prescribed are causing diarrhea and drowsinees has not notified cardiologist of reactions     Time seen by provider: 10:45 AM    This is a 78 y.o. male who presents with complaint of non bloody diarrhea. Patient reports he had a heart attack in May and was given 5 different medications. He reports 2-3 episodes of diarrhea every day since taking his new medications. He states he would get diarrhea from drinking water. He also reports dizziness, increased sleep, and no weight gain with the medication. Patient denies any of these symptoms prior to taking his new medication, and no diet changes. He denies any nausea, vomiting, or abdominal pain. He has no other complaints at the moment.     The history is provided by the patient.     Review of patient's allergies indicates:   Allergen Reactions    Ace inhibitors      Other reaction(s): Angioedema    Ultram [tramadol] Nausea Only and Other (See Comments)     Dizziness     Past Medical History:   Diagnosis Date    Arthritis     Cataract     CKD (chronic kidney disease) stage 3, GFR 30-59 ml/min 5/1/2014    Hyperlipidemia     Hypertension     Low tension glaucoma      Past Surgical History:   Procedure Laterality Date    COLONOSCOPY N/A 5/12/2015    [facility] ENDO    CYSTOSCOPY      INSERTION, STENT, CORONARY ARTERY N/A 3/4/2019    [facility] CATH LAB/EP    Left heart cath N/A 3/4/2019    [facility] CATH LAB/EP    PTCA, Single Vessel  3/4/2019    [facility] CATH LAB/EP    RELEASE-CARPAL TUNNEL  Left 5/6/2016    [facility] Jr[facility] at [facility] OR    Ultrasound-coronary N/A 3/4/2019    [facility] by [facility] at [facility] CATH LAB/EP     Family History   Problem Relation Age of Onset    Melanoma Neg Hx      Social History     Tobacco Use    Smoking status: Current Every Day Smoker     Packs/day: 1.00     Types: Cigarettes    Smokeless tobacco: Current User   Substance Use Topics    Alcohol use: No    Drug use: No     Review of Systems   Constitutional:        No weight gain.   Increased sleepiness.    Gastrointestinal: Positive for diarrhea (2-3X a day). Negative for abdominal pain, blood in stool, nausea and vomiting.   Neurological: Positive for dizziness.   All other systems reviewed and are negative.      Physical Exam     Initial Vitals [09/06/19 1005]   BP Pulse Resp Temp SpO2   (!) 177/82 71 20 98.6 °F (37 °C) 98 %      MAP       --         Physical Exam    Nursing note and vitals reviewed.  Constitutional: He appears well-developed and well-nourished. He is not diaphoretic. No distress.   HENT:   Head: Normocephalic and atraumatic.   Mouth/Throat: Oropharynx is clear and moist.   Eyes: Conjunctivae and EOM are normal.   Conjunctiva look good, normal appearing.    Neck: Normal range of motion. Neck supple.   Cardiovascular: Normal rate and regular rhythm. Exam reveals no gallop and no friction rub.    Murmur heard.  Systolic ejection murmur.    Pulmonary/Chest: Breath sounds normal. He has no wheezes. He has no rhonchi. He has no rales.   Abdominal: Soft. There is no tenderness. There is no rebound and no guarding.   Abdomen is soft and non tender.    Musculoskeletal: Normal range of motion. He exhibits no edema or tenderness.   No lower extremity edema.    Lymphadenopathy:     He has no cervical adenopathy.   Neurological: He is alert and oriented to person, place, and time. He has normal strength.   Skin: Skin is warm and dry. No rash noted.         ED Course    Procedures  Labs Reviewed   CBC W/ AUTO DIFFERENTIAL - Abnormal; Notable for the following components:       Result Value    RBC 4.46 (*)     Hemoglobin 11.7 (*)     Hematocrit 35.9 (*)     Mean Corpuscular Volume 81 (*)     Mean Corpuscular Hemoglobin 26.2 (*)     All other components within normal limits   COMPREHENSIVE METABOLIC PANEL - Abnormal; Notable for the following components:    Potassium 3.3 (*)     Chloride 112 (*)     Glucose 65 (*)     eGFR if non  52 (*)     All other components within normal limits   MAGNESIUM - Abnormal; Notable for the following components:    Magnesium 1.4 (*)     All other components within normal limits   URINALYSIS - Abnormal; Notable for the following components:    Occult Blood UA 1+ (*)     Leukocytes, UA Trace (*)     All other components within normal limits   URINALYSIS MICROSCOPIC          Imaging Results    None          Medical Decision Making:   Clinical Tests:   Lab Tests: Ordered and Reviewed  ED Management:  78-year-old male who complains of diarrhea since beginning his cardiac medications after having a MI recently.  He denies melena or hematochezia.  Abdominal exam is benign.  He is noted with a slightly low potassium level of 3.3 for which he was orally supplemented.  I have suggested he use Imodium for diarrhea and follow up with his cardiologist as soon as able.  Patient may also return here for any worsening of his condition.                      Clinical Impression:       ICD-10-CM ICD-9-CM   1. Diarrhea, unspecified type R19.7 787.91            I, Dr. uRy Noe, personally performed the services described in this documentation. All medical record entries made by the scribe were at my direction and in my presence. I have reviewed the chart and agree that the record reflects my personal performance and is accurate and complete. Ruy Noe MD.  6:20 AM 09/11/2019       Ryu Noe MD  09/11/19 0621

## 2019-09-06 NOTE — ED TRIAGE NOTES
Pt seen in the ED with complaints of diarrhea. Pt reports that he had a heart attack in May and that he got prescribed multiple medications after he had an angiogram. Pt reports that since he started taking the new medications he has been having diarrhea, and that sometimes he feels itchy. There are no new events today, the pt wanted to get this checked out. Pt denies any itching, or other signs of allergic reaction, the last diarrhea episode was today. Pt denies chest pain, SOB, taking any ABX prior to diarrhea or any abd discomfort. Pt is in no acute distress during time of assessment, stable, will continue to monitor.

## 2019-09-06 NOTE — PLAN OF CARE
Post Acute CM at patients bedside to address social needs. Patient drove himself to the ER today. He is ambulatory. He can in for diarrhea that he has been having since his MI with stent placement in May. He can afford all of his own meds and has them here at the bedside. He does take them as prescribed. He has support from family at home and is planning to be discharged from the ER with instructions to take imodium OTC as needed for the diarrhea. CM also recommended high fiber diet and continue to drink plenty fluids.     CM will address C3 department regarding AWV. Patient is overdue and needs to be scheduled.     CM also instructed patient on the nurse triage line.     Patient verbalized understanding to above.

## 2019-09-23 NOTE — PROGRESS NOTES
Assessment /Plan     For exam results, see Encounter Report.    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  Participation is voluntarily and patient can withdraw from study at any point in time without harm or prejudice. Patient voiced good understanding and we had Q & A. Patient signed consent and was given a signed copy of the study consent.         Plan  RTC 6 months with IOP & OCT RNFL  Sooner prn with good understanding

## 2019-09-24 ENCOUNTER — PATIENT OUTREACH (OUTPATIENT)
Dept: ADMINISTRATIVE | Facility: OTHER | Age: 79
End: 2019-09-24

## 2019-09-25 ENCOUNTER — CLINICAL SUPPORT (OUTPATIENT)
Dept: OPHTHALMOLOGY | Facility: CLINIC | Age: 79
End: 2019-09-25
Payer: MEDICARE

## 2019-09-25 ENCOUNTER — OFFICE VISIT (OUTPATIENT)
Dept: OPHTHALMOLOGY | Facility: CLINIC | Age: 79
End: 2019-09-25
Payer: MEDICARE

## 2019-09-25 DIAGNOSIS — H40.1232 LOW-TENSION GLAUCOMA OF BOTH EYES, MODERATE STAGE: Primary | ICD-10-CM

## 2019-09-25 DIAGNOSIS — H25.13 NUCLEAR SCLEROSIS OF BOTH EYES: ICD-10-CM

## 2019-09-25 DIAGNOSIS — H40.1232 LOW-TENSION GLAUCOMA OF BOTH EYES, MODERATE STAGE: ICD-10-CM

## 2019-09-25 PROCEDURE — 99999 PR PBB SHADOW E&M-EST. PATIENT-LVL II: ICD-10-PCS | Mod: PBBFAC,,, | Performed by: OPHTHALMOLOGY

## 2019-09-25 PROCEDURE — 92014 PR EYE EXAM, EST PATIENT,COMPREHESV: ICD-10-PCS | Mod: S$PBB,,, | Performed by: OPHTHALMOLOGY

## 2019-09-25 PROCEDURE — 99999 PR PBB SHADOW E&M-EST. PATIENT-LVL II: CPT | Mod: PBBFAC,,, | Performed by: OPHTHALMOLOGY

## 2019-09-25 PROCEDURE — 92083 EXTENDED VISUAL FIELD XM: CPT | Mod: PBBFAC

## 2019-09-25 PROCEDURE — 99212 OFFICE O/P EST SF 10 MIN: CPT | Mod: PBBFAC,25 | Performed by: OPHTHALMOLOGY

## 2019-09-25 PROCEDURE — 92014 COMPRE OPH EXAM EST PT 1/>: CPT | Mod: S$PBB,,, | Performed by: OPHTHALMOLOGY

## 2019-09-25 PROCEDURE — 92083 EXTENDED VISUAL FIELD XM: CPT | Mod: 26,S$PBB,, | Performed by: OPHTHALMOLOGY

## 2019-09-25 PROCEDURE — 92083 HUMPHREY VISUAL FIELD - OU - BOTH EYES: ICD-10-PCS | Mod: 26,S$PBB,, | Performed by: OPHTHALMOLOGY

## 2019-10-07 ENCOUNTER — OFFICE VISIT (OUTPATIENT)
Dept: HOME HEALTH SERVICES | Facility: CLINIC | Age: 79
End: 2019-10-07
Payer: MEDICARE

## 2019-10-07 VITALS
DIASTOLIC BLOOD PRESSURE: 81 MMHG | HEART RATE: 70 BPM | WEIGHT: 170 LBS | BODY MASS INDEX: 25.76 KG/M2 | HEIGHT: 68 IN | SYSTOLIC BLOOD PRESSURE: 146 MMHG

## 2019-10-07 DIAGNOSIS — I25.10 CORONARY ARTERY DISEASE INVOLVING NATIVE CORONARY ARTERY OF NATIVE HEART WITHOUT ANGINA PECTORIS: ICD-10-CM

## 2019-10-07 DIAGNOSIS — H25.13 NUCLEAR SCLEROSIS OF BOTH EYES: ICD-10-CM

## 2019-10-07 DIAGNOSIS — I77.1 TORTUOUS AORTA: ICD-10-CM

## 2019-10-07 DIAGNOSIS — H40.1232 LOW-TENSION GLAUCOMA OF BOTH EYES, MODERATE STAGE: ICD-10-CM

## 2019-10-07 DIAGNOSIS — Z98.890 S/P LEFT HEART CATHETERIZATION BY PERCUTANEOUS APPROACH: ICD-10-CM

## 2019-10-07 DIAGNOSIS — Z85.51 HISTORY OF BLADDER CANCER: ICD-10-CM

## 2019-10-07 DIAGNOSIS — I10 HYPERTENSION, UNSPECIFIED TYPE: ICD-10-CM

## 2019-10-07 DIAGNOSIS — Z00.00 ENCOUNTER FOR PREVENTIVE HEALTH EXAMINATION: Primary | ICD-10-CM

## 2019-10-07 PROCEDURE — G0439 PR MEDICARE ANNUAL WELLNESS SUBSEQUENT VISIT: ICD-10-PCS | Mod: S$GLB,,, | Performed by: NURSE PRACTITIONER

## 2019-10-07 PROCEDURE — G0439 PPPS, SUBSEQ VISIT: HCPCS | Mod: S$GLB,,, | Performed by: NURSE PRACTITIONER

## 2019-10-07 NOTE — PROGRESS NOTES
"Laura Harris presented for a  Medicare AWV and comprehensive Health Risk Assessment today. The following components were reviewed and updated:    · Medical history  · Family History  · Social history  · Allergies and Current Medications  · Health Risk Assessment  · Health Maintenance  · Care Team     ** See Completed Assessments for Annual Wellness Visit within the encounter summary.**       The following assessments were completed:  · Living Situation  · CAGE  · Depression Screening  · Timed Get Up and Go  · Whisper Test  · Cognitive Function Screening  ·   ·   ·   · Nutrition Screening  · ADL Screening  · PAQ Screening    Vitals:    10/07/19 0942   BP: (!) 146/81   Pulse: 70   Weight: 77.1 kg (170 lb)   Height: 5' 8" (1.727 m)     Body mass index is 25.85 kg/m².  Physical Exam   Constitutional: He is oriented to person, place, and time. He appears well-developed.   HENT:   Head: Normocephalic and atraumatic.   Eyes: Pupils are equal, round, and reactive to light. EOM are normal.   Neck: Normal range of motion.   Cardiovascular: Normal rate and regular rhythm.   Pulmonary/Chest: Effort normal and breath sounds normal. No respiratory distress.   Musculoskeletal: Normal range of motion. He exhibits no edema.   Neurological: He is alert and oriented to person, place, and time.   Skin: Skin is warm and dry.   Psychiatric: He has a normal mood and affect. His behavior is normal.         Diagnoses and health risks identified today and associated recommendations/orders:    1. Encounter for preventive health examination  Assessment completed. Preventive measures reviewed.    2. Low-tension glaucoma of both eyes, moderate stage  Stable, followed by Optometry.    3. Nuclear sclerosis of both eyes  Stable, followed by Optometry.    4. Hypertension, unspecified type  Stable, followed by PCP.    5. History of bladder cancer  Stable, followed by Urology.    6. Tortuous aorta  Stable, followed by Cardiology.    7. Coronary " artery disease  Stable, followed by Cardiology.    8. S/P Left heart Catheterization by percutaneous approach  Stable, followed by Cardiology.    Provided Laura with a 5-10 year written screening schedule and personal prevention plan. Recommendations were developed using the USPSTF age appropriate recommendations. Education, counseling, and referrals were provided as needed. After Visit Summary printed and given to patient which includes a list of additional screenings\tests needed.    No follow-ups on file.    Zoe Murdock, KEDAR  I offered to discuss end of life issues, including information on how to make advance directives that the patient could use to name someone who would make medical decisions on their behalf if they became too ill to make themselves.    ___Patient declined  _X_Patient is interested, I provided paper work and offered to discuss.

## 2019-10-07 NOTE — PATIENT INSTRUCTIONS
Counseling and Referral of Other Preventative  (Italic type indicates deductible and co-insurance are waived)    Patient Name: Laura Harris  Today's Date: 10/7/2019    Health Maintenance       Date Due Completion Date    Shingles Vaccine (1 of 2) 11/25/2019 (Originally 10/25/1990) ---    Influenza Vaccine (1) 11/25/2019 (Originally 9/1/2019) 1/29/2019    Lipid Panel 03/01/2024 3/1/2019    TETANUS VACCINE 10/22/2025 10/22/2015        No orders of the defined types were placed in this encounter.    The following information is provided to all patients.  This information is to help you find resources for any of the problems found today that may be affecting your health:                Living healthy guide: www.[facility].[place].gov      Understanding Diabetes: www.diabetes.org      Eating healthy: www.cdc.gov/healthyweight      [facility] home safety checklist: www.cdc.gov/steadi/patient.html      Agency on Aging: www.goea.[place].gov      Alcoholics anonymous (AA): www.aa.org      Physical Activity: www.adelso.nih.gov/sn7muza      Tobacco use: www.quitwithusla.org

## 2019-10-08 ENCOUNTER — HOSPITAL ENCOUNTER (EMERGENCY)
Facility: HOSPITAL | Age: 79
Discharge: HOME | End: 2019-10-08
Attending: EMERGENCY MEDICINE
Payer: MEDICARE

## 2019-10-08 VITALS
TEMPERATURE: 99 F | OXYGEN SATURATION: 99 % | RESPIRATION RATE: 17 BRPM | DIASTOLIC BLOOD PRESSURE: 58 MMHG | HEART RATE: 82 BPM | SYSTOLIC BLOOD PRESSURE: 122 MMHG

## 2019-10-08 DIAGNOSIS — R06.02 SHORTNESS OF BREATH: ICD-10-CM

## 2019-10-08 DIAGNOSIS — R07.9 CHEST PAIN: Primary | ICD-10-CM

## 2019-10-08 LAB
ALBUMIN SERPL BCP-MCNC: 3.4 G/DL (ref 3.5–5.2)
ALP SERPL-CCNC: 62 U/L (ref 55–135)
ALT SERPL W/O P-5'-P-CCNC: 11 U/L (ref 10–44)
ANION GAP SERPL CALC-SCNC: 15 MMOL/L (ref 8–16)
AST SERPL-CCNC: 11 U/L (ref 10–40)
BASOPHILS # BLD AUTO: 0.03 K/UL (ref 0–0.2)
BASOPHILS NFR BLD: 0.5 % (ref 0–1.9)
BILIRUB SERPL-MCNC: 0.3 MG/DL (ref 0.1–1)
BNP SERPL-MCNC: 29 PG/ML (ref 0–99)
BUN SERPL-MCNC: 57 MG/DL (ref 8–23)
CALCIUM SERPL-MCNC: 9 MG/DL (ref 8.7–10.5)
CHLORIDE SERPL-SCNC: 111 MMOL/L (ref 95–110)
CO2 SERPL-SCNC: 17 MMOL/L (ref 23–29)
CREAT SERPL-MCNC: 1.6 MG/DL (ref 0.5–1.4)
DIFFERENTIAL METHOD: ABNORMAL
EOSINOPHIL # BLD AUTO: 0.2 K/UL (ref 0–0.5)
EOSINOPHIL NFR BLD: 2.7 % (ref 0–8)
ERYTHROCYTE [DISTWIDTH] IN BLOOD BY AUTOMATED COUNT: 14.9 % (ref 11.5–14.5)
EST. GFR  (AFRICAN AMERICAN): 47 ML/MIN/1.73 M^2
EST. GFR  (NON AFRICAN AMERICAN): 40 ML/MIN/1.73 M^2
GLUCOSE SERPL-MCNC: 168 MG/DL (ref 70–110)
HCT VFR BLD AUTO: 28.6 % (ref 40–54)
HGB BLD-MCNC: 9 G/DL (ref 14–18)
INFLUENZA A, MOLECULAR: NEGATIVE
INFLUENZA B, MOLECULAR: NEGATIVE
LIPASE SERPL-CCNC: 40 U/L (ref 4–60)
LYMPHOCYTES # BLD AUTO: 1.2 K/UL (ref 1–4.8)
LYMPHOCYTES NFR BLD: 22.6 % (ref 18–48)
MCH RBC QN AUTO: 26 PG (ref 27–31)
MCHC RBC AUTO-ENTMCNC: 31.5 G/DL (ref 32–36)
MCV RBC AUTO: 83 FL (ref 82–98)
MONOCYTES # BLD AUTO: 0.2 K/UL (ref 0.3–1)
MONOCYTES NFR BLD: 3.5 % (ref 4–15)
NEUTROPHILS # BLD AUTO: 3.9 K/UL (ref 1.8–7.7)
NEUTROPHILS NFR BLD: 70.7 % (ref 38–73)
PLATELET # BLD AUTO: 192 K/UL (ref 150–350)
PMV BLD AUTO: 10.6 FL (ref 9.2–12.9)
POTASSIUM SERPL-SCNC: 4.2 MMOL/L (ref 3.5–5.1)
PROT SERPL-MCNC: 6.1 G/DL (ref 6–8.4)
RBC # BLD AUTO: 3.46 M/UL (ref 4.6–6.2)
SODIUM SERPL-SCNC: 143 MMOL/L (ref 136–145)
SPECIMEN SOURCE: NORMAL
TROPONIN I SERPL DL<=0.01 NG/ML-MCNC: 0.01 NG/ML (ref 0–0.03)
TROPONIN I SERPL DL<=0.01 NG/ML-MCNC: 0.01 NG/ML (ref 0–0.03)
WBC # BLD AUTO: 5.49 K/UL (ref 3.9–12.7)

## 2019-10-08 PROCEDURE — 87502 INFLUENZA DNA AMP PROBE: CPT

## 2019-10-08 PROCEDURE — 93010 EKG 12-LEAD: ICD-10-PCS | Mod: ,,, | Performed by: INTERNAL MEDICINE

## 2019-10-08 PROCEDURE — 83690 ASSAY OF LIPASE: CPT

## 2019-10-08 PROCEDURE — 93010 ELECTROCARDIOGRAM REPORT: CPT | Mod: ,,, | Performed by: INTERNAL MEDICINE

## 2019-10-08 PROCEDURE — 84484 ASSAY OF TROPONIN QUANT: CPT

## 2019-10-08 PROCEDURE — 93005 ELECTROCARDIOGRAM TRACING: CPT

## 2019-10-08 PROCEDURE — 25000003 PHARM REV CODE 250: Performed by: EMERGENCY MEDICINE

## 2019-10-08 PROCEDURE — 94640 AIRWAY INHALATION TREATMENT: CPT

## 2019-10-08 PROCEDURE — 83880 ASSAY OF NATRIURETIC PEPTIDE: CPT

## 2019-10-08 PROCEDURE — 25000242 PHARM REV CODE 250 ALT 637 W/ HCPCS: Performed by: EMERGENCY MEDICINE

## 2019-10-08 PROCEDURE — 80053 COMPREHEN METABOLIC PANEL: CPT

## 2019-10-08 PROCEDURE — 85025 COMPLETE CBC W/AUTO DIFF WBC: CPT

## 2019-10-08 PROCEDURE — 96360 HYDRATION IV INFUSION INIT: CPT

## 2019-10-08 PROCEDURE — 63600175 PHARM REV CODE 636 W HCPCS: Performed by: EMERGENCY MEDICINE

## 2019-10-08 PROCEDURE — 96361 HYDRATE IV INFUSION ADD-ON: CPT

## 2019-10-08 PROCEDURE — 99285 EMERGENCY DEPT VISIT HI MDM: CPT | Mod: 25

## 2019-10-08 RX ORDER — IPRATROPIUM BROMIDE AND ALBUTEROL SULFATE 2.5; .5 MG/3ML; MG/3ML
3 SOLUTION RESPIRATORY (INHALATION)
Status: COMPLETED | OUTPATIENT
Start: 2019-10-08 | End: 2019-10-08

## 2019-10-08 RX ORDER — ASPIRIN 325 MG
325 TABLET ORAL
Status: COMPLETED | OUTPATIENT
Start: 2019-10-08 | End: 2019-10-08

## 2019-10-08 RX ADMIN — SODIUM CHLORIDE, SODIUM LACTATE, POTASSIUM CHLORIDE, AND CALCIUM CHLORIDE 500 ML: .6; .31; .03; .02 INJECTION, SOLUTION INTRAVENOUS at 06:10

## 2019-10-08 RX ADMIN — ASPIRIN 325 MG ORAL TABLET 325 MG: 325 PILL ORAL at 04:10

## 2019-10-08 RX ADMIN — IPRATROPIUM BROMIDE AND ALBUTEROL SULFATE 3 ML: .5; 3 SOLUTION RESPIRATORY (INHALATION) at 06:10

## 2019-10-08 NOTE — ED PROVIDER NOTES
"Encounter Date: 10/8/2019    SCRIBE #1 NOTE: I, Tony Puentes, am scribing for, and in the presence of,  Dr. Cole. I have scribed the entire note.       History     Chief Complaint   Patient presents with    Shortness of Breath     pt presents to ED today c/o SOB and chills x 2 days ago. pt reports history of MI     Dizziness     Time seen by provider: 4:13 PM    This is a 78 y.o. male with PMHx including HTN and NSTEMI in 03/2019 s/p stent presents with shortness of breath with diaphoresis. He reports a sudden onset of SOB with diaphoresis and nausea beginning 45 minutes PTA, slightly improved since onset. The patient also notes that his BP was running high yesterday, and he arrives with a BP of 129/62mmHg. He denies any chest pain, but does reports a mild "chest discomfort" to the middle of his chest. Patient denies any fever, syncope, vomiting, abdominal pain, diarrhea, leg pain/swelling, numbness, or weakness. He is concerned he is having another heart attack.  Of note, the patient received a flu shot yesterday.    The history is provided by the patient.     Review of patient's allergies indicates:   Allergen Reactions    Ace inhibitors      Other reaction(s): Angioedema    Ultram [tramadol] Nausea Only and Other (See Comments)     Dizziness     Past Medical History:   Diagnosis Date    Arthritis     Cataract     CKD (chronic kidney disease) stage 3, GFR 30-59 ml/min 5/1/2014    Coronary artery disease involving native coronary artery of native heart without angina pectoris 10/7/2019    Hyperlipidemia     Hypertension     Low tension glaucoma      Past Surgical History:   Procedure Laterality Date    CORONARY STENT PLACEMENT N/A 3/4/2019    Procedure: INSERTION, STENT, CORONARY ARTERY;  Surgeon: Brennan Stein MD;  Location: [facility] CATH LAB/EP;  Service: Cardiology;  Laterality: N/A;    CYSTOSCOPY      LEFT HEART CATHETERIZATION N/A 3/4/2019    Procedure: Left heart cath;  Surgeon: Brennan" MD Emil;  Location: [facility] CATH LAB/EP;  Service: Cardiology;  Laterality: N/A;     Family History   Problem Relation Age of Onset    Melanoma Neg Hx      Social History     Tobacco Use    Smoking status: Current Every Day Smoker     Packs/day: 1.00     Types: Cigarettes    Smokeless tobacco: Current User   Substance Use Topics    Alcohol use: No    Drug use: No     Review of Systems   Constitutional: Positive for diaphoresis. Negative for chills and fever.   HENT: Negative for sore throat.    Respiratory: Positive for shortness of breath. Negative for cough.    Cardiovascular: Negative for chest pain.   Gastrointestinal: Positive for nausea. Negative for abdominal pain, diarrhea and vomiting.   Genitourinary: Negative for dysuria, frequency and urgency.   Musculoskeletal: Negative for back pain, neck pain and neck stiffness.   Skin: Negative for rash and wound.   Neurological: Negative for syncope and weakness.   Hematological: Does not bruise/bleed easily.   Psychiatric/Behavioral: Negative for agitation, behavioral problems and confusion.       Physical Exam     Initial Vitals [10/08/19 1605]   BP Pulse Resp Temp SpO2   129/62 71 20 98.1 °F (36.7 °C) 100 %      MAP       --         Physical Exam    Nursing note and vitals reviewed.  Constitutional: He appears well-developed and well-nourished. He is not diaphoretic.  Non-toxic appearance. He does not appear ill. No distress.   HENT:   Head: Normocephalic and atraumatic.   Mouth/Throat: Oropharynx is clear and moist.   Eyes: Conjunctivae and EOM are normal.   Neck: Normal range of motion. Neck supple.   Cardiovascular: Normal rate, regular rhythm, normal heart sounds and intact distal pulses. Exam reveals no decreased pulses.    2+ radial pulses   Pulmonary/Chest: Effort normal and breath sounds normal. No accessory muscle usage. No tachypnea and no bradypnea. No respiratory distress. He has no decreased breath sounds. He exhibits no tenderness.    Abdominal: Soft. There is no tenderness. There is no rebound and no guarding.   Musculoskeletal: Normal range of motion. He exhibits no edema or tenderness.        Right lower leg: Normal. He exhibits no tenderness and no edema.        Left lower leg: Normal. He exhibits no tenderness and no edema.   No peripheral edema   Neurological: He is alert and oriented to person, place, and time. He has normal strength. No cranial nerve deficit.   Skin: Skin is warm and dry. Capillary refill takes less than 2 seconds.   Psychiatric: He has a normal mood and affect.         ED Course   Procedures  Labs Reviewed   CBC W/ AUTO DIFFERENTIAL - Abnormal; Notable for the following components:       Result Value    RBC 3.46 (*)     Hemoglobin 9.0 (*)     Hematocrit 28.6 (*)     Mean Corpuscular Hemoglobin 26.0 (*)     Mean Corpuscular Hemoglobin Conc 31.5 (*)     RDW 14.9 (*)     Mono # 0.2 (*)     Mono% 3.5 (*)     All other components within normal limits   COMPREHENSIVE METABOLIC PANEL - Abnormal; Notable for the following components:    Chloride 111 (*)     CO2 17 (*)     Glucose 168 (*)     BUN, Bld 57 (*)     Creatinine 1.6 (*)     Albumin 3.4 (*)     eGFR if  47 (*)     eGFR if non  40 (*)     All other components within normal limits   INFLUENZA A & B BY MOLECULAR   TROPONIN I   B-TYPE NATRIURETIC PEPTIDE   TROPONIN I   LIPASE   LIPASE          Imaging Results          X-Ray Chest 1 View (Final result)  Result time 10/08/19 17:01:47   Procedure changed from X-Ray Chest PA And Lateral     Final result by Madhu Magallon MD (10/08/19 17:01:47)                 Impression:      No acute cardiopulmonary process.      Electronically signed by: Madhu Magallon MD  Date:    10/08/2019  Time:    17:01             Narrative:    EXAMINATION:  XR CHEST 1 VIEW    CLINICAL HISTORY:  SOB; Shortness of breath    TECHNIQUE:  Single frontal view of the chest was  performed.    COMPARISON:  03/01/2019    FINDINGS:  Trachea is patent.  Cardiac silhouette appears normal in size.  There is atherosclerosis.  Lungs are well expanded and overall unchanged from prior study.  No large effusion, pneumothorax or free air below the diaphragm.  No acute osseous abnormality.                                 Medical Decision Making:   Initial Assessment:   This is a 78 y.o. male with PMHx including HTN and MI in 03/2019 s/p stent presents with 1 day of chest discomfort with associated SOB.  Differential Diagnosis:   Myocardial ischemia, pericarditis, pulmonary embolus, chest wall pain, pleural inflammation and pulmonary infectious causes.  pulmonary infectious process, COPD, asthma, pulmonary embolus and congestive heart failure.    Independently Interpreted Test(s):   I have ordered and independently interpreted EKG Reading(s) - see prior notes  Clinical Tests:   Lab Tests: Ordered and Reviewed  Radiological Study: Ordered and Reviewed  Medical Tests: Ordered and Reviewed  ED Management:  Upon re-evaluation, the patient's status has improved.    After complete ED evaluation, clinical impression is most consistent with SOB.  - troponin negative x2  - CXR negative for acute finding  - EKG without ischemic changes.    Low suspicion for ACS.    cardiology follow-up within 2-3 days was recommended.    After taking into careful account the patient's history, physical exam findings, as well as empirical and objective data obtained throughout ED workup, I feel no emergent medical condition has been identified. No further evaluation or admission was felt to be required, and the patient is stable for discharge from the ED. The patient and any additional family present were updated with test results, overall clinical impression, and recommended further plan of care, including discharge instructions as provided and outpatient follow-up for continued evaluation and management as needed. All questions  were answered. The patient expressed understanding and agreed with current plan for discharge and follow-up plan of care. Strict ED return precautions were provided, including return/worsening of current symptoms, new symptoms, or any other concerns.                     ED Course as of Oct 09 0044   Tue Oct 08, 2019   1622 BP: 129/62 [LD]   1622 Temp: 98.1 °F (36.7 °C) [LD]   1622 Pulse: 71 [LD]   1622 Resp: 20 [LD]   1622 SpO2: 100 % [LD]   1750 EKG with sinus rhythm with rate of 65 bpm.  Lateral T wave inversions.  + occasional PVCs.  Similar to previous EKG from 3/29/19    [LD]   1755 Troponin I: 0.011 [LD]   1935 Influenza A, Molecular: Negative [LD]   1935 Influenza B, Molecular: Negative [LD]   1935 Lipase: 40 [LD]   2023 Troponin I: 0.011 [LD]   2038 Patient states that he is feeling better and is ready to go.    [LD]      ED Course User Index  [LD] Taina Cole MD     Clinical Impression:       ICD-10-CM ICD-9-CM   1. Chest pain R07.9 786.50   2. Shortness of breath R06.02 786.05     Disposition:   Disposition: Discharged  Condition: Stable      I, Taina Cole,  personally performed the services described in this documentation. All medical record entries made by the scribe were at my direction and in my presence.  I have reviewed the chart and agree that the record reflects my personal performance and is accurate and complete. Taina Cole M.D. 12:46 AM10/09/2019       Taina Cole MD  10/09/19 0046

## 2019-10-09 NOTE — ED TRIAGE NOTES
Pt presents to ED with complaint of having a heart attack.  Reports shortness of breath over the past 2 days with mild cough and intermittent indigestion.  SoB worsened 45 minutes PTA with diaphoresis.  Hx of MI in March.  Rec'd flu shot this morning.

## 2019-10-09 NOTE — ED NOTES
"Pt currently in bed, appears to be resting comfortably. When asked if he still feels SOB pt replied "I don't know". Pt's respirations are even and unlabored. NAD noted.   "

## 2019-11-05 ENCOUNTER — OFFICE VISIT (OUTPATIENT)
Dept: FAMILY MEDICINE | Facility: CLINIC | Age: 79
End: 2019-11-05
Payer: MEDICARE

## 2019-11-05 VITALS
DIASTOLIC BLOOD PRESSURE: 60 MMHG | BODY MASS INDEX: 27.26 KG/M2 | HEART RATE: 58 BPM | HEIGHT: 68 IN | SYSTOLIC BLOOD PRESSURE: 110 MMHG | OXYGEN SATURATION: 97 % | WEIGHT: 179.88 LBS

## 2019-11-05 DIAGNOSIS — I10 ESSENTIAL HYPERTENSION: Primary | ICD-10-CM

## 2019-11-05 DIAGNOSIS — N18.30 CKD (CHRONIC KIDNEY DISEASE) STAGE 3, GFR 30-59 ML/MIN: ICD-10-CM

## 2019-11-05 DIAGNOSIS — I73.9 CLAUDICATION: ICD-10-CM

## 2019-11-05 DIAGNOSIS — D63.8 CHRONIC DISEASE ANEMIA: ICD-10-CM

## 2019-11-05 DIAGNOSIS — F17.200 SMOKER: ICD-10-CM

## 2019-11-05 DIAGNOSIS — R39.15 BENIGN PROSTATIC HYPERPLASIA (BPH) WITH URINARY URGENCY: ICD-10-CM

## 2019-11-05 DIAGNOSIS — I21.4 NSTEMI (NON-ST ELEVATED MYOCARDIAL INFARCTION): ICD-10-CM

## 2019-11-05 DIAGNOSIS — N40.1 BENIGN PROSTATIC HYPERPLASIA (BPH) WITH URINARY URGENCY: ICD-10-CM

## 2019-11-05 DIAGNOSIS — I73.9 PAD (PERIPHERAL ARTERY DISEASE): ICD-10-CM

## 2019-11-05 PROCEDURE — 99214 PR OFFICE/OUTPT VISIT, EST, LEVL IV, 30-39 MIN: ICD-10-PCS | Mod: S$PBB,,, | Performed by: FAMILY MEDICINE

## 2019-11-05 PROCEDURE — 99214 OFFICE O/P EST MOD 30 MIN: CPT | Mod: PBBFAC,PO | Performed by: FAMILY MEDICINE

## 2019-11-05 PROCEDURE — 99999 PR PBB SHADOW E&M-EST. PATIENT-LVL IV: ICD-10-PCS | Mod: PBBFAC,,, | Performed by: FAMILY MEDICINE

## 2019-11-05 PROCEDURE — 99214 OFFICE O/P EST MOD 30 MIN: CPT | Mod: S$PBB,,, | Performed by: FAMILY MEDICINE

## 2019-11-05 PROCEDURE — 99999 PR PBB SHADOW E&M-EST. PATIENT-LVL IV: CPT | Mod: PBBFAC,,, | Performed by: FAMILY MEDICINE

## 2019-11-05 RX ORDER — FERROUS SULFATE 325(65) MG
325 TABLET ORAL
Qty: 90 TABLET | Refills: 3 | Status: SHIPPED | OUTPATIENT
Start: 2019-11-05 | End: 2020-07-27

## 2019-11-05 NOTE — PROGRESS NOTES
Subjective:       Patient ID: Laura Harris is a 79 y.o. male.    Chief Complaint: Follow-up (on ER) and Hypertension    79 years old male came to the clinic after recent hospitalization secondary to acute coronary syndrome.  Patient is doing significantly better.  Patient with good compliance with blood pressure medicine.  No Chest pain, palpitation, orthopnea  Or PND.  He reports episodic claudication.  Patient with decreased kidney function but stable in comparison with previous reports .  Patient with mild anemia but stable in comparison with previous reports.  Patient with good compliance with Flomax.  No Urinary symptoms.    Review of Systems   Constitutional: Negative.    HENT: Negative.    Eyes: Negative.    Respiratory: Negative.    Cardiovascular: Negative.  Negative for chest pain, palpitations and leg swelling.   Gastrointestinal: Negative.    Genitourinary: Negative.    Musculoskeletal: Negative.    Skin: Negative.    Neurological: Negative.    Psychiatric/Behavioral: Negative.        Objective:      Physical Exam   Constitutional: He is oriented to person, place, and time. He appears well-developed and well-nourished. No distress.   HENT:   Head: Normocephalic and atraumatic.   Right Ear: External ear normal.   Left Ear: External ear normal.   Nose: Nose normal.   Mouth/Throat: Oropharynx is clear and moist. No oropharyngeal exudate.   Eyes: Pupils are equal, round, and reactive to light. Conjunctivae and EOM are normal. Right eye exhibits no discharge. Left eye exhibits no discharge. No scleral icterus.   Neck: Normal range of motion. Neck supple. No JVD present. No tracheal deviation present. No thyromegaly present.   Cardiovascular: Normal rate, regular rhythm, normal heart sounds and intact distal pulses. Exam reveals no gallop and no friction rub.   No murmur heard.  Pulmonary/Chest: Effort normal and breath sounds normal. No stridor. No respiratory distress. He has no wheezes. He has no rales.  He exhibits no tenderness.   Abdominal: Soft. Bowel sounds are normal. He exhibits no distension and no mass. There is no tenderness. There is no rebound and no guarding.   Musculoskeletal: Normal range of motion. He exhibits no edema or tenderness.   Lymphadenopathy:     He has no cervical adenopathy.   Neurological: He is alert and oriented to person, place, and time. He has normal reflexes. He displays normal reflexes. No cranial nerve deficit. He exhibits normal muscle tone. Coordination normal.   Skin: Skin is warm and dry. No rash noted. He is not diaphoretic. No erythema. No pallor.   Psychiatric: He has a normal mood and affect. His behavior is normal. Judgment and thought content normal.   Nursing note and vitals reviewed.      Assessment:       1. Essential hypertension    2. Benign prostatic hyperplasia (BPH) with urinary urgency    3. NSTEMI (non-ST elevated myocardial infarction)    4. CKD (chronic kidney disease) stage 3, GFR 30-59 ml/min    5. Claudication    6. Smoker    7. PAD (peripheral artery disease)    8. Chronic disease anemia        Plan:         Laura was seen today for follow-up and hypertension.    Diagnoses and all orders for this visit:    Essential hypertension  -     TSH; Future  -     Comprehensive metabolic panel; Future  -     Lipid panel; Future  -     CBC auto differential; Future    Benign prostatic hyperplasia (BPH) with urinary urgency  -     Comprehensive metabolic panel; Future    NSTEMI (non-ST elevated myocardial infarction)  -     Lipid panel; Future  -     Ambulatory referral to Cardiology    CKD (chronic kidney disease) stage 3, GFR 30-59 ml/min  -     Comprehensive metabolic panel; Future    Claudication  -     Ambulatory referral to Cardiology    Smoker  -     US Lower Extremity Arteries Bilateral; Future  -     Ambulatory referral to Cardiology    PAD (peripheral artery disease)  -     US Lower Extremity Arteries Bilateral; Future  -     Lipid panel; Future  -      Ambulatory referral to Cardiology    Chronic disease anemia  -     CBC auto differential; Future  -     ferrous sulfate (FEOSOL) 325 mg (65 mg iron) Tab tablet; Take 1 tablet (325 mg total) by mouth daily with breakfast.

## 2019-11-06 ENCOUNTER — LAB VISIT (OUTPATIENT)
Dept: LAB | Facility: HOSPITAL | Age: 79
End: 2019-11-06
Attending: FAMILY MEDICINE
Payer: MEDICARE

## 2019-11-06 DIAGNOSIS — N40.1 BENIGN PROSTATIC HYPERPLASIA (BPH) WITH URINARY URGENCY: ICD-10-CM

## 2019-11-06 DIAGNOSIS — R39.15 BENIGN PROSTATIC HYPERPLASIA (BPH) WITH URINARY URGENCY: ICD-10-CM

## 2019-11-06 DIAGNOSIS — I21.4 NSTEMI (NON-ST ELEVATED MYOCARDIAL INFARCTION): ICD-10-CM

## 2019-11-06 DIAGNOSIS — I73.9 PAD (PERIPHERAL ARTERY DISEASE): ICD-10-CM

## 2019-11-06 DIAGNOSIS — I10 ESSENTIAL HYPERTENSION: ICD-10-CM

## 2019-11-06 DIAGNOSIS — D63.8 CHRONIC DISEASE ANEMIA: ICD-10-CM

## 2019-11-06 DIAGNOSIS — N18.30 CKD (CHRONIC KIDNEY DISEASE) STAGE 3, GFR 30-59 ML/MIN: ICD-10-CM

## 2019-11-06 LAB
ALBUMIN SERPL BCP-MCNC: 3.6 G/DL (ref 3.5–5.2)
ALP SERPL-CCNC: 82 U/L (ref 55–135)
ALT SERPL W/O P-5'-P-CCNC: 30 U/L (ref 10–44)
ANION GAP SERPL CALC-SCNC: 8 MMOL/L (ref 8–16)
AST SERPL-CCNC: 18 U/L (ref 10–40)
BASOPHILS # BLD AUTO: 0.05 K/UL (ref 0–0.2)
BASOPHILS NFR BLD: 1.1 % (ref 0–1.9)
BILIRUB SERPL-MCNC: 0.4 MG/DL (ref 0.1–1)
BUN SERPL-MCNC: 17 MG/DL (ref 8–23)
CALCIUM SERPL-MCNC: 8.6 MG/DL (ref 8.7–10.5)
CHLORIDE SERPL-SCNC: 110 MMOL/L (ref 95–110)
CHOLEST SERPL-MCNC: 106 MG/DL (ref 120–199)
CHOLEST/HDLC SERPL: 2.9 {RATIO} (ref 2–5)
CO2 SERPL-SCNC: 24 MMOL/L (ref 23–29)
CREAT SERPL-MCNC: 1.6 MG/DL (ref 0.5–1.4)
DIFFERENTIAL METHOD: ABNORMAL
EOSINOPHIL # BLD AUTO: 0.2 K/UL (ref 0–0.5)
EOSINOPHIL NFR BLD: 4.7 % (ref 0–8)
ERYTHROCYTE [DISTWIDTH] IN BLOOD BY AUTOMATED COUNT: 17.1 % (ref 11.5–14.5)
EST. GFR  (AFRICAN AMERICAN): 46.3 ML/MIN/1.73 M^2
EST. GFR  (NON AFRICAN AMERICAN): 40.1 ML/MIN/1.73 M^2
GLUCOSE SERPL-MCNC: 97 MG/DL (ref 70–110)
HCT VFR BLD AUTO: 24.3 % (ref 40–54)
HDLC SERPL-MCNC: 36 MG/DL (ref 40–75)
HDLC SERPL: 34 % (ref 20–50)
HGB BLD-MCNC: 7.1 G/DL (ref 14–18)
IMM GRANULOCYTES # BLD AUTO: 0.02 K/UL (ref 0–0.04)
IMM GRANULOCYTES NFR BLD AUTO: 0.4 % (ref 0–0.5)
LDLC SERPL CALC-MCNC: 58.6 MG/DL (ref 63–159)
LYMPHOCYTES # BLD AUTO: 1.2 K/UL (ref 1–4.8)
LYMPHOCYTES NFR BLD: 26.4 % (ref 18–48)
MCH RBC QN AUTO: 23.8 PG (ref 27–31)
MCHC RBC AUTO-ENTMCNC: 29.2 G/DL (ref 32–36)
MCV RBC AUTO: 82 FL (ref 82–98)
MONOCYTES # BLD AUTO: 0.4 K/UL (ref 0.3–1)
MONOCYTES NFR BLD: 9.4 % (ref 4–15)
NEUTROPHILS # BLD AUTO: 2.7 K/UL (ref 1.8–7.7)
NEUTROPHILS NFR BLD: 58 % (ref 38–73)
NONHDLC SERPL-MCNC: 70 MG/DL
NRBC BLD-RTO: 0 /100 WBC
PLATELET # BLD AUTO: 207 K/UL (ref 150–350)
PMV BLD AUTO: 11.8 FL (ref 9.2–12.9)
POTASSIUM SERPL-SCNC: 4 MMOL/L (ref 3.5–5.1)
PROT SERPL-MCNC: 6.4 G/DL (ref 6–8.4)
RBC # BLD AUTO: 2.98 M/UL (ref 4.6–6.2)
SODIUM SERPL-SCNC: 142 MMOL/L (ref 136–145)
TRIGL SERPL-MCNC: 57 MG/DL (ref 30–150)
TSH SERPL DL<=0.005 MIU/L-ACNC: 3.35 UIU/ML (ref 0.4–4)
WBC # BLD AUTO: 4.7 K/UL (ref 3.9–12.7)

## 2019-11-06 PROCEDURE — 36415 COLL VENOUS BLD VENIPUNCTURE: CPT | Mod: PO

## 2019-11-06 PROCEDURE — 84443 ASSAY THYROID STIM HORMONE: CPT

## 2019-11-06 PROCEDURE — 85025 COMPLETE CBC W/AUTO DIFF WBC: CPT

## 2019-11-06 PROCEDURE — 80061 LIPID PANEL: CPT

## 2019-11-06 PROCEDURE — 80053 COMPREHEN METABOLIC PANEL: CPT

## 2019-11-12 ENCOUNTER — HOSPITAL ENCOUNTER (OUTPATIENT)
Dept: RADIOLOGY | Facility: HOSPITAL | Age: 79
Discharge: HOME | End: 2019-11-12
Attending: FAMILY MEDICINE
Payer: MEDICARE

## 2019-11-12 DIAGNOSIS — F17.200 SMOKER: ICD-10-CM

## 2019-11-12 DIAGNOSIS — I73.9 PAD (PERIPHERAL ARTERY DISEASE): ICD-10-CM

## 2019-11-12 PROCEDURE — 93925 LOWER EXTREMITY STUDY: CPT | Mod: 26,,, | Performed by: RADIOLOGY

## 2019-11-12 PROCEDURE — 93925 US LOWER EXTREMITY ARTERIES BILATERAL: ICD-10-PCS | Mod: 26,,, | Performed by: RADIOLOGY

## 2019-11-12 PROCEDURE — 93925 LOWER EXTREMITY STUDY: CPT | Mod: TC

## 2019-11-13 ENCOUNTER — TELEPHONE (OUTPATIENT)
Dept: FAMILY MEDICINE | Facility: CLINIC | Age: 79
End: 2019-11-13

## 2019-11-13 DIAGNOSIS — I73.9 PAD (PERIPHERAL ARTERY DISEASE): Primary | ICD-10-CM

## 2019-11-13 NOTE — TELEPHONE ENCOUNTER
Patient with PAD.    Vascular surgery was ordered .    Please contact the patient with appointment.

## 2019-11-16 ENCOUNTER — TELEPHONE (OUTPATIENT)
Dept: FAMILY MEDICINE | Facility: CLINIC | Age: 79
End: 2019-11-16

## 2019-11-16 DIAGNOSIS — D63.8 CHRONIC DISEASE ANEMIA: Primary | ICD-10-CM

## 2019-11-16 NOTE — TELEPHONE ENCOUNTER
Try to contact Abran Kimberly by phone, he was not responding.  Please try to contact the patient for hematology evaluation a soon as possible.

## 2019-11-16 NOTE — TELEPHONE ENCOUNTER
Patient with significant anemia in comparison with previous reports.    Please advise the patient to continue with iron daily.    Follow-up appointment with hematologist to consider additional treatment.    Please contact the patient with the appointment.

## 2019-11-18 ENCOUNTER — TELEPHONE (OUTPATIENT)
Dept: HEMATOLOGY/ONCOLOGY | Facility: CLINIC | Age: 79
End: 2019-11-18

## 2019-11-18 ENCOUNTER — TELEPHONE (OUTPATIENT)
Dept: FAMILY MEDICINE | Facility: CLINIC | Age: 79
End: 2019-11-18

## 2019-11-18 NOTE — TELEPHONE ENCOUNTER
Called patient, the referral coordinator will call patient today to schedule appointments to vascular surgeon and hematologist. Verbalized understanding.

## 2019-11-19 ENCOUNTER — INITIAL CONSULT (OUTPATIENT)
Dept: HEMATOLOGY/ONCOLOGY | Facility: CLINIC | Age: 79
End: 2019-11-19
Payer: MEDICARE

## 2019-11-19 ENCOUNTER — LAB VISIT (OUTPATIENT)
Dept: LAB | Facility: HOSPITAL | Age: 79
End: 2019-11-19
Attending: INTERNAL MEDICINE
Payer: MEDICARE

## 2019-11-19 VITALS
WEIGHT: 181.19 LBS | RESPIRATION RATE: 16 BRPM | SYSTOLIC BLOOD PRESSURE: 172 MMHG | HEART RATE: 69 BPM | HEIGHT: 68 IN | DIASTOLIC BLOOD PRESSURE: 73 MMHG | TEMPERATURE: 98 F | BODY MASS INDEX: 27.46 KG/M2 | OXYGEN SATURATION: 99 %

## 2019-11-19 DIAGNOSIS — Z71.89 ADVANCE CARE PLANNING: ICD-10-CM

## 2019-11-19 DIAGNOSIS — D53.9 ANEMIA ASSOCIATED WITH NUTRITIONAL DEFICIENCY: ICD-10-CM

## 2019-11-19 DIAGNOSIS — E55.9 VITAMIN D DEFICIENCY: ICD-10-CM

## 2019-11-19 DIAGNOSIS — D63.8 ANEMIA OF CHRONIC DISEASE: ICD-10-CM

## 2019-11-19 DIAGNOSIS — Z98.890 S/P LEFT HEART CATHETERIZATION BY PERCUTANEOUS APPROACH: ICD-10-CM

## 2019-11-19 DIAGNOSIS — Z12.5 SCREENING FOR MALIGNANT NEOPLASM OF PROSTATE: ICD-10-CM

## 2019-11-19 DIAGNOSIS — D63.8 ANEMIA OF CHRONIC DISEASE: Primary | ICD-10-CM

## 2019-11-19 PROBLEM — D64.9 ABSOLUTE ANEMIA: Status: ACTIVE | Noted: 2019-11-19

## 2019-11-19 LAB
25(OH)D3+25(OH)D2 SERPL-MCNC: 23 NG/ML (ref 30–96)
ALBUMIN SERPL BCP-MCNC: 4 G/DL (ref 3.5–5.2)
ALP SERPL-CCNC: 95 U/L (ref 55–135)
ALT SERPL W/O P-5'-P-CCNC: 28 U/L (ref 10–44)
ANION GAP SERPL CALC-SCNC: 10 MMOL/L (ref 8–16)
AST SERPL-CCNC: 21 U/L (ref 10–40)
BASOPHILS # BLD AUTO: 0.04 K/UL (ref 0–0.2)
BASOPHILS NFR BLD: 0.8 % (ref 0–1.9)
BILIRUB SERPL-MCNC: 0.4 MG/DL (ref 0.1–1)
BUN SERPL-MCNC: 13 MG/DL (ref 8–23)
CALCIUM SERPL-MCNC: 9.3 MG/DL (ref 8.7–10.5)
CHLORIDE SERPL-SCNC: 109 MMOL/L (ref 95–110)
CO2 SERPL-SCNC: 25 MMOL/L (ref 23–29)
COMPLEXED PSA SERPL-MCNC: 1.4 NG/ML (ref 0–4)
CREAT SERPL-MCNC: 1.5 MG/DL (ref 0.5–1.4)
DIFFERENTIAL METHOD: ABNORMAL
EOSINOPHIL # BLD AUTO: 0.3 K/UL (ref 0–0.5)
EOSINOPHIL NFR BLD: 5.1 % (ref 0–8)
ERYTHROCYTE [DISTWIDTH] IN BLOOD BY AUTOMATED COUNT: 19.5 % (ref 11.5–14.5)
ERYTHROCYTE [SEDIMENTATION RATE] IN BLOOD BY WESTERGREN METHOD: 23 MM/HR (ref 0–10)
EST. GFR  (AFRICAN AMERICAN): 50 ML/MIN/1.73 M^2
EST. GFR  (NON AFRICAN AMERICAN): 43 ML/MIN/1.73 M^2
FERRITIN SERPL-MCNC: 24 NG/ML (ref 20–300)
FOLATE SERPL-MCNC: 7.3 NG/ML (ref 4–24)
GLUCOSE SERPL-MCNC: 75 MG/DL (ref 70–110)
HCT VFR BLD AUTO: 30.3 % (ref 40–54)
HGB BLD-MCNC: 9.1 G/DL (ref 14–18)
IRON SERPL-MCNC: 241 UG/DL (ref 45–160)
LDH SERPL L TO P-CCNC: 281 U/L (ref 110–260)
LYMPHOCYTES # BLD AUTO: 1.1 K/UL (ref 1–4.8)
LYMPHOCYTES NFR BLD: 22.9 % (ref 18–48)
MCH RBC QN AUTO: 23.7 PG (ref 27–31)
MCHC RBC AUTO-ENTMCNC: 30 G/DL (ref 32–36)
MCV RBC AUTO: 79 FL (ref 82–98)
MONOCYTES # BLD AUTO: 0.3 K/UL (ref 0.3–1)
MONOCYTES NFR BLD: 6.3 % (ref 4–15)
NEUTROPHILS # BLD AUTO: 3.2 K/UL (ref 1.8–7.7)
NEUTROPHILS NFR BLD: 64.9 % (ref 38–73)
PLATELET # BLD AUTO: 276 K/UL (ref 150–350)
PMV BLD AUTO: 10.4 FL (ref 9.2–12.9)
POTASSIUM SERPL-SCNC: 3.9 MMOL/L (ref 3.5–5.1)
PROT SERPL-MCNC: 7.1 G/DL (ref 6–8.4)
RBC # BLD AUTO: 3.84 M/UL (ref 4.6–6.2)
RETICS/RBC NFR AUTO: 3.5 % (ref 0.4–2)
SATURATED IRON: 51 % (ref 20–50)
SODIUM SERPL-SCNC: 144 MMOL/L (ref 136–145)
TOTAL IRON BINDING CAPACITY: 469 UG/DL (ref 250–450)
TRANSFERRIN SERPL-MCNC: 317 MG/DL (ref 200–375)
URATE SERPL-MCNC: 7.5 MG/DL (ref 3.4–7)
VIT B12 SERPL-MCNC: 266 PG/ML (ref 210–950)
WBC # BLD AUTO: 4.94 K/UL (ref 3.9–12.7)

## 2019-11-19 PROCEDURE — 86334 PATHOLOGIST INTERPRETATION IFE: ICD-10-PCS | Mod: 26,,, | Performed by: PATHOLOGY

## 2019-11-19 PROCEDURE — 85652 RBC SED RATE AUTOMATED: CPT

## 2019-11-19 PROCEDURE — 99205 OFFICE O/P NEW HI 60 MIN: CPT | Mod: S$PBB,,, | Performed by: INTERNAL MEDICINE

## 2019-11-19 PROCEDURE — 83520 IMMUNOASSAY QUANT NOS NONAB: CPT

## 2019-11-19 PROCEDURE — 80053 COMPREHEN METABOLIC PANEL: CPT

## 2019-11-19 PROCEDURE — 85045 AUTOMATED RETICULOCYTE COUNT: CPT

## 2019-11-19 PROCEDURE — 83540 ASSAY OF IRON: CPT

## 2019-11-19 PROCEDURE — 99205 PR OFFICE/OUTPT VISIT, NEW, LEVL V, 60-74 MIN: ICD-10-PCS | Mod: S$PBB,,, | Performed by: INTERNAL MEDICINE

## 2019-11-19 PROCEDURE — 84165 PROTEIN E-PHORESIS SERUM: CPT | Mod: 26,,, | Performed by: PATHOLOGY

## 2019-11-19 PROCEDURE — 84165 PROTEIN E-PHORESIS SERUM: CPT

## 2019-11-19 PROCEDURE — 82668 ASSAY OF ERYTHROPOIETIN: CPT

## 2019-11-19 PROCEDURE — 99214 OFFICE O/P EST MOD 30 MIN: CPT | Mod: PBBFAC,PO,25 | Performed by: INTERNAL MEDICINE

## 2019-11-19 PROCEDURE — 82746 ASSAY OF FOLIC ACID SERUM: CPT

## 2019-11-19 PROCEDURE — 86334 IMMUNOFIX E-PHORESIS SERUM: CPT | Mod: 26,,, | Performed by: PATHOLOGY

## 2019-11-19 PROCEDURE — 84165 PATHOLOGIST INTERPRETATION SPE: ICD-10-PCS | Mod: 26,,, | Performed by: PATHOLOGY

## 2019-11-19 PROCEDURE — 83615 LACTATE (LD) (LDH) ENZYME: CPT

## 2019-11-19 PROCEDURE — 99497 ADVNCD CARE PLAN 30 MIN: CPT | Mod: PBBFAC,PO | Performed by: INTERNAL MEDICINE

## 2019-11-19 PROCEDURE — 99999 PR PBB SHADOW E&M-EST. PATIENT-LVL IV: CPT | Mod: PBBFAC,,, | Performed by: INTERNAL MEDICINE

## 2019-11-19 PROCEDURE — 82607 VITAMIN B-12: CPT

## 2019-11-19 PROCEDURE — 84153 ASSAY OF PSA TOTAL: CPT

## 2019-11-19 PROCEDURE — 86334 IMMUNOFIX E-PHORESIS SERUM: CPT

## 2019-11-19 PROCEDURE — 84550 ASSAY OF BLOOD/URIC ACID: CPT

## 2019-11-19 PROCEDURE — 36415 COLL VENOUS BLD VENIPUNCTURE: CPT

## 2019-11-19 PROCEDURE — 85025 COMPLETE CBC W/AUTO DIFF WBC: CPT

## 2019-11-19 PROCEDURE — 82728 ASSAY OF FERRITIN: CPT

## 2019-11-19 PROCEDURE — 99999 PR PBB SHADOW E&M-EST. PATIENT-LVL IV: ICD-10-PCS | Mod: PBBFAC,,, | Performed by: INTERNAL MEDICINE

## 2019-11-19 PROCEDURE — 82306 VITAMIN D 25 HYDROXY: CPT

## 2019-11-19 NOTE — LETTER
November 19, 2019      Riley Villalobos MD  [address]  [place] [address]           [place] [facility]  [address]  [facility] [address]  Phone: [phone]          Patient: Laura Harris   MR Number: [id number]   YOB: 1940   Date of Visit: 11/19/2019       Dear Dr. Riley Villalobos:    Thank you for referring Laura Harris to me for evaluation. Attached you will find relevant portions of my assessment and plan of care.    If you have questions, please do not hesitate to call me. I look forward to following Laura Harris along with you.    Sincerely,    Buck Glass MD    Enclosure  CC:  No Recipients    If you would like to receive this communication electronically, please contact [email or web address] or [phone] to request more information on [facility] [employer] access.    For providers and/or their staff who would like to refer a patient to [facility], please contact us through our one-stop-shop provider referral line, [facility], at [phone].    If you feel you have received this communication in error or would no longer like to receive these types of communications, please e-mail [email or web address]

## 2019-11-19 NOTE — PROGRESS NOTES
PATIENT: Laura Harris  MRN: [id number]  DATE: 11/19/2019    Diagnosis:   1. Anemia of chronic disease    2. Anemia associated with nutritional deficiency    3. Vitamin D deficiency    4. Screening for malignant neoplasm of prostate    5. S/P left heart catheterization by percutaneous approach      Chief Complaint: Anemia    Subjective:     History of Present Illness:     This is an 79-year-old male referred by Dr. Maco Denton for anemia evaluation.    CBC done 11/06/2019 reveals hemoglobin 7.1, MCV 82.  Platelets and WBC within normal limits.    September 2019, hemoglobin was 11.7.    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  In August 2009 he had a left ureteral stricture that was treated with placement of a stent and subsequent removal.    He is on Flomax for BPH.  He also has stage III CKD.    He has CAD status post placement of a stent March 2019.  Follows up with cardiology    He complains of tiredness and fatigue and dyspnea on exertion that has been worsening over the past few months.  Denies any blood in the stools or hematuria.    Past Medical History:   Past Medical History:   Diagnosis Date    Anemia of chronic disease 11/19/2019    Arthritis     Cataract     CKD (chronic kidney disease) stage 3, GFR 30-59 ml/min 5/1/2014    Coronary artery disease involving native coronary artery of native heart without angina pectoris 10/7/2019    Hyperlipidemia     Hypertension     Low tension glaucoma        Past Surgical History:   Past Surgical History:   Procedure Laterality Date    CORONARY STENT PLACEMENT N/A 3/4/2019    Procedure: INSERTION, STENT, CORONARY ARTERY;  Surgeon: Brennan Stein MD;  Location: [facility] CATH LAB/EP;  Service: Cardiology;  Laterality: N/A;    CYSTOSCOPY      LEFT HEART CATHETERIZATION N/A 3/4/2019    Procedure: Left heart cath;  Surgeon: Brennan Stein MD;  Location: [facility] CATH  LAB/EP;  Service: Cardiology;  Laterality: N/A;       Family History:   Family History   Problem Relation Age of Onset    Melanoma Neg Hx        Social History:  reports that he quit smoking about 64 years ago. His smoking use included cigarettes. He smoked 1.00 pack per day. He uses smokeless tobacco. He reports that he does not drink alcohol or use drugs.    Allergies:  Review of patient's allergies indicates:   Allergen Reactions    Ace inhibitors      Other reaction(s): Angioedema    Ultram [tramadol] Nausea Only and Other (See Comments)     Dizziness       Medications:  Current Outpatient Medications   Medication Sig Dispense Refill    amLODIPine (NORVASC) 5 MG tablet Take 1 tablet (5 mg total) by mouth once daily. 30 tablet 11    aspirin (ECOTRIN) 81 MG EC tablet Take 1 tablet (81 mg total) by mouth once daily.  0    atorvastatin (LIPITOR) 80 MG tablet Take 1 tablet (80 mg total) by mouth once daily. 90 tablet 3    clopidogrel (PLAVIX) 75 mg tablet Take 1 tablet (75 mg total) by mouth once daily. 30 tablet 11    ferrous sulfate (FEOSOL) 325 mg (65 mg iron) Tab tablet Take 1 tablet (325 mg total) by mouth daily with breakfast. 90 tablet 3    fluticasone-salmeterol 230-21 mcg/dose (ADVAIR HFA) 230-21 mcg/actuation HFAA inhaler Inhale 2 puffs into the lungs 2 (two) times daily. Controller 12 g 1    losartan (COZAAR) 50 MG tablet Take 1 tablet (50 mg total) by mouth once daily. 90 tablet 3    metoprolol succinate (TOPROL-XL) 25 MG 24 hr tablet Take 1 tablet (25 mg total) by mouth once daily. 30 tablet 11    nitroGLYCERIN (NITROSTAT) 0.4 MG SL tablet Place 1 tablet (0.4 mg total) under the tongue every 5 (five) minutes as needed for Chest pain. 30 tablet 2    tamsulosin (FLOMAX) 0.4 mg Cap TAKE 1 CAPSULE(0.4 MG) BY MOUTH AFTER DINNER 30 capsule 12    latanoprost (XALATAN) 0.005 % ophthalmic solution Place 1 drop into both eyes once daily. 2.5 mL 5     No current facility-administered medications for  "this visit.        Review of Systems   Constitutional: Positive for fatigue. Negative for fever and unexpected weight change.   HENT: Negative for mouth sores and nosebleeds.    Eyes: Negative for photophobia and pain.   Respiratory: Positive for shortness of breath. Negative for wheezing.    Cardiovascular: Negative for chest pain and palpitations.   Gastrointestinal: Negative for abdominal pain and vomiting.   Genitourinary: Negative for flank pain and hematuria.   Musculoskeletal: Negative for back pain, neck pain and neck stiffness.   Skin: Negative for rash and wound.   Neurological: Negative for seizures and syncope.   Hematological: Negative for adenopathy. Does not bruise/bleed easily.   Psychiatric/Behavioral: Negative for behavioral problems and confusion. The patient is nervous/anxious.    All other systems reviewed and are negative.      Objective:     Vitals:    11/19/19 0739   BP: (!) 172/73   Pulse: 69   Resp: 16   Temp: 98.2 °F (36.8 °C)   SpO2: 99%   Weight: 82.2 kg (181 lb 3.5 oz)   Height: 5' 8" (1.727 m)     BMI: Body mass index is 27.55 kg/m².    Physical Exam   Constitutional: He is oriented to person, place, and time.  Non-toxic appearance. He does not have a sickly appearance. No distress.   HENT:   Nose: No epistaxis.   Mouth/Throat: Oropharynx is clear and moist. Mucous membranes are not pale, not dry and not cyanotic. No lacerations. No oropharyngeal exudate.   Eyes: Conjunctivae are normal. No scleral icterus.   Neck: Neck supple. No thyroid mass and no thyromegaly present.   Cardiovascular: Normal rate, regular rhythm, S1 normal and normal heart sounds.   Pulmonary/Chest: Effort normal and breath sounds normal. No accessory muscle usage or stridor. No respiratory distress. He has no wheezes. He has no rales.   Abdominal: Soft. He exhibits no ascites and no mass. There is no hepatosplenomegaly. There is no tenderness.   Musculoskeletal: He exhibits no edema.   Lymphadenopathy:        Head " (right side): No submental and no submandibular adenopathy present.        Head (left side): No submental and no submandibular adenopathy present.     He has no cervical adenopathy.     He has no axillary adenopathy.   Neurological: He is alert and oriented to person, place, and time. He has normal strength. No cranial nerve deficit or sensory deficit. Gait normal.   Skin: No bruising, no petechiae and no rash noted. He is not diaphoretic. No cyanosis.   Psychiatric: He has a normal mood and affect. Judgment and thought content normal. Cognition and memory are normal.   Vitals reviewed.      Assessment:       1. Anemia of chronic disease    2. Anemia associated with nutritional deficiency    3. Vitamin D deficiency    4. Screening for malignant neoplasm of prostate    5. S/P left heart catheterization by percutaneous approach      Plan:   Normocytic anemia  -he has developed severe anemia over the past year and is symptomatic  -will initiate a workup for anemia, will perform extensive labs today  -I discussed differential diagnosis and reviewed the chronology of his labs with him in detail  -will see him back for follow-up next week.  Depending on his anemia workup will determine if we need to pursue a bone marrow biopsy    Transitional cell carcinoma of the bladder  -diagnosed in 2003  -had a stricture in the left ureter in August 2019 that was treated  -follows with Urology    Stage 3 chronic kidney disease  -creatinine stable    Coronary artery disease  -follows with cardiology    I discussed the importance of advance care planning today and explained the process to the patient. I reviewed the role for advance directives and their purpose in directing future healthcare if the patient is unable to speak for him/herself. The patient was then provided with detailed information about the importance of designating a Health Care Power of  (HCPOA) and was instructed to communicate with this person about their  wishes for future healthcare, should he/she become sick and lose decision-making capacity. At this point in time, the patient does have full decision-making capacity. The patient hasn't previously appointed a HCPOA. After our discussion, the patient completed a HCPOA. The form was witnessed and will be scanned into EP[employer].

## 2019-11-20 LAB
ALBUMIN SERPL ELPH-MCNC: 4 G/DL (ref 3.35–5.55)
ALPHA1 GLOB SERPL ELPH-MCNC: 0.41 G/DL (ref 0.17–0.41)
ALPHA2 GLOB SERPL ELPH-MCNC: 0.77 G/DL (ref 0.43–0.99)
B-GLOBULIN SERPL ELPH-MCNC: 0.8 G/DL (ref 0.5–1.1)
GAMMA GLOB SERPL ELPH-MCNC: 0.82 G/DL (ref 0.67–1.58)
INTERPRETATION SERPL IFE-IMP: NORMAL
KAPPA LC SER QL IA: 2.35 MG/DL (ref 0.33–1.94)
KAPPA LC/LAMBDA SER IA: 1.22 (ref 0.26–1.65)
LAMBDA LC SER QL IA: 1.92 MG/DL (ref 0.57–2.63)
PATHOLOGIST INTERPRETATION IFE: NORMAL
PATHOLOGIST INTERPRETATION SPE: NORMAL
PROT SERPL-MCNC: 6.8 G/DL (ref 6–8.4)

## 2019-11-21 LAB — EPO SERPL-ACNC: 101.8 MIU/ML (ref 2.6–18.5)

## 2019-11-27 ENCOUNTER — OFFICE VISIT (OUTPATIENT)
Dept: HEMATOLOGY/ONCOLOGY | Facility: CLINIC | Age: 79
End: 2019-11-27
Payer: MEDICARE

## 2019-11-27 VITALS
SYSTOLIC BLOOD PRESSURE: 168 MMHG | BODY MASS INDEX: 27.39 KG/M2 | RESPIRATION RATE: 16 BRPM | HEART RATE: 69 BPM | TEMPERATURE: 97 F | WEIGHT: 180.13 LBS | OXYGEN SATURATION: 99 % | DIASTOLIC BLOOD PRESSURE: 76 MMHG

## 2019-11-27 DIAGNOSIS — D63.8 ANEMIA OF CHRONIC DISEASE: ICD-10-CM

## 2019-11-27 DIAGNOSIS — E55.9 VITAMIN D DEFICIENCY: Primary | ICD-10-CM

## 2019-11-27 DIAGNOSIS — D53.9 ANEMIA ASSOCIATED WITH NUTRITIONAL DEFICIENCY: ICD-10-CM

## 2019-11-27 PROCEDURE — 99213 OFFICE O/P EST LOW 20 MIN: CPT | Mod: S$PBB,,, | Performed by: INTERNAL MEDICINE

## 2019-11-27 PROCEDURE — 99999 PR PBB SHADOW E&M-EST. PATIENT-LVL III: CPT | Mod: PBBFAC,,, | Performed by: INTERNAL MEDICINE

## 2019-11-27 PROCEDURE — 1126F AMNT PAIN NOTED NONE PRSNT: CPT | Mod: ,,, | Performed by: INTERNAL MEDICINE

## 2019-11-27 PROCEDURE — 1159F PR MEDICATION LIST DOCUMENTED IN MEDICAL RECORD: ICD-10-PCS | Mod: ,,, | Performed by: INTERNAL MEDICINE

## 2019-11-27 PROCEDURE — 1126F PR PAIN SEVERITY QUANTIFIED, NO PAIN PRESENT: ICD-10-PCS | Mod: ,,, | Performed by: INTERNAL MEDICINE

## 2019-11-27 PROCEDURE — 99213 OFFICE O/P EST LOW 20 MIN: CPT | Mod: PBBFAC,PO | Performed by: INTERNAL MEDICINE

## 2019-11-27 PROCEDURE — 99213 PR OFFICE/OUTPT VISIT, EST, LEVL III, 20-29 MIN: ICD-10-PCS | Mod: S$PBB,,, | Performed by: INTERNAL MEDICINE

## 2019-11-27 PROCEDURE — 99999 PR PBB SHADOW E&M-EST. PATIENT-LVL III: ICD-10-PCS | Mod: PBBFAC,,, | Performed by: INTERNAL MEDICINE

## 2019-11-27 PROCEDURE — 1159F MED LIST DOCD IN RCRD: CPT | Mod: ,,, | Performed by: INTERNAL MEDICINE

## 2019-11-27 RX ORDER — CHOLECALCIFEROL (VITAMIN D3) 1250 MCG
50000 TABLET ORAL WEEKLY
Qty: 12 TABLET | Refills: 3 | Status: SHIPPED | OUTPATIENT
Start: 2019-11-27 | End: 2022-08-03

## 2019-11-27 NOTE — PROGRESS NOTES
PATIENT: Laura Harris  MRN: [id number]  DATE: 11/27/2019    Diagnosis:   1. Vitamin D deficiency    2. Anemia associated with nutritional deficiency    3. Anemia of chronic disease      Chief Complaint: Anemia    Subjective:     History of Present Illness:     He is here for follow-up of anemia.  PCP is Dr. Maco Denton.    CBC done 11/06/2019 reveals hemoglobin 7.1, MCV 82.  Platelets and WBC within normal limits.    September 2019, hemoglobin was 11.7.    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  In August 2009 he had a left ureteral stricture that was treated with placement of a stent and subsequent removal.    He is on Flomax for BPH.  He also has stage III CKD.    He has CAD status post placement of a stent March 2019.  Follows up with cardiology    He complains of tiredness and fatigue and dyspnea on exertion that has been worsening over the past few months.  Denies any blood in the stools or hematuria.    INTERVAL HISTORY:  -underwent workup for anemia and is here to discuss test results    Past Medical History:   Past Medical History:   Diagnosis Date    Anemia of chronic disease 11/19/2019    Arthritis     Cataract     CKD (chronic kidney disease) stage 3, GFR 30-59 ml/min 5/1/2014    Coronary artery disease involving native coronary artery of native heart without angina pectoris 10/7/2019    Hyperlipidemia     Hypertension     Low tension glaucoma        Past Surgical History:   Past Surgical History:   Procedure Laterality Date    CORONARY STENT PLACEMENT N/A 3/4/2019    Procedure: INSERTION, STENT, CORONARY ARTERY;  Surgeon: Brennan Stein MD;  Location: [facility] CATH LAB/EP;  Service: Cardiology;  Laterality: N/A;    CYSTOSCOPY      LEFT HEART CATHETERIZATION N/A 3/4/2019    Procedure: Left heart cath;  Surgeon: Brennan Stein MD;  Location: [facility] CATH LAB/EP;  Service: Cardiology;  Laterality: N/A;        Family History:   Family History   Problem Relation Age of Onset    Melanoma Neg Hx        Social History:  reports that he quit smoking about 64 years ago. His smoking use included cigarettes. He smoked 1.00 pack per day. He uses smokeless tobacco. He reports that he does not drink alcohol or use drugs.    Allergies:  Review of patient's allergies indicates:   Allergen Reactions    Ace inhibitors      Other reaction(s): Angioedema    Ultram [tramadol] Nausea Only and Other (See Comments)     Dizziness       Medications:  Current Outpatient Medications   Medication Sig Dispense Refill    amLODIPine (NORVASC) 5 MG tablet Take 1 tablet (5 mg total) by mouth once daily. 30 tablet 11    aspirin (ECOTRIN) 81 MG EC tablet Take 1 tablet (81 mg total) by mouth once daily.  0    atorvastatin (LIPITOR) 80 MG tablet Take 1 tablet (80 mg total) by mouth once daily. 90 tablet 3    cholecalciferol, vitamin D3, 50,000 unit Tab Take 50,000 Units by mouth once a week. 12 tablet 3    clopidogrel (PLAVIX) 75 mg tablet Take 1 tablet (75 mg total) by mouth once daily. 30 tablet 11    ferrous sulfate (FEOSOL) 325 mg (65 mg iron) Tab tablet Take 1 tablet (325 mg total) by mouth daily with breakfast. 90 tablet 3    fluticasone-salmeterol 230-21 mcg/dose (ADVAIR HFA) 230-21 mcg/actuation HFAA inhaler Inhale 2 puffs into the lungs 2 (two) times daily. Controller 12 g 1    latanoprost (XALATAN) 0.005 % ophthalmic solution Place 1 drop into both eyes once daily. 2.5 mL 5    losartan (COZAAR) 50 MG tablet Take 1 tablet (50 mg total) by mouth once daily. 90 tablet 3    metoprolol succinate (TOPROL-XL) 25 MG 24 hr tablet Take 1 tablet (25 mg total) by mouth once daily. 30 tablet 11    nitroGLYCERIN (NITROSTAT) 0.4 MG SL tablet Place 1 tablet (0.4 mg total) under the tongue every 5 (five) minutes as needed for Chest pain. 30 tablet 2    tamsulosin (FLOMAX) 0.4 mg Cap TAKE 1 CAPSULE(0.4 MG) BY MOUTH AFTER DINNER 30 capsule 12      No current facility-administered medications for this visit.        Review of Systems   Constitutional: Positive for fatigue. Negative for fever and unexpected weight change.   HENT: Negative for mouth sores and nosebleeds.    Eyes: Negative for photophobia and pain.   Respiratory: Negative for shortness of breath and wheezing.    Cardiovascular: Negative for chest pain and palpitations.   Gastrointestinal: Negative for abdominal pain and vomiting.   Genitourinary: Negative for flank pain and hematuria.   Musculoskeletal: Negative for back pain, neck pain and neck stiffness.   Skin: Negative for rash and wound.   Neurological: Negative for seizures and syncope.   Hematological: Negative for adenopathy. Does not bruise/bleed easily.   Psychiatric/Behavioral: Negative for behavioral problems and confusion. The patient is nervous/anxious.    All other systems reviewed and are negative.      Objective:     Vitals:    11/27/19 0902   BP: (!) 168/76   Pulse: 69   Resp: 16   Temp: 97 °F (36.1 °C)   SpO2: 99%   Weight: 81.7 kg (180 lb 1.9 oz)     BMI: Body mass index is 27.39 kg/m².    Physical Exam   Constitutional: He is oriented to person, place, and time.  Non-toxic appearance. He does not have a sickly appearance. No distress.   HENT:   Nose: No epistaxis.   Mouth/Throat: Oropharynx is clear and moist. Mucous membranes are not pale, not dry and not cyanotic. No lacerations. No oropharyngeal exudate.   Eyes: Conjunctivae are normal. No scleral icterus.   Neck: Neck supple. No thyroid mass and no thyromegaly present.   Cardiovascular: Normal rate, regular rhythm, S1 normal and normal heart sounds.   Pulmonary/Chest: Effort normal and breath sounds normal. No accessory muscle usage or stridor. No respiratory distress. He has no wheezes. He has no rales.   Abdominal: Soft. He exhibits no ascites and no mass. There is no hepatosplenomegaly. There is no tenderness.   Musculoskeletal: He exhibits no edema.    Lymphadenopathy:        Head (right side): No submental and no submandibular adenopathy present.        Head (left side): No submental and no submandibular adenopathy present.     He has no cervical adenopathy.     He has no axillary adenopathy.   Neurological: He is alert and oriented to person, place, and time. He has normal strength. No cranial nerve deficit or sensory deficit. Gait normal.   Skin: No bruising, no petechiae and no rash noted. He is not diaphoretic. No cyanosis.   Psychiatric: He has a normal mood and affect. Judgment and thought content normal. Cognition and memory are normal.   Vitals reviewed.      Assessment:       1. Vitamin D deficiency    2. Anemia associated with nutritional deficiency    3. Anemia of chronic disease      Plan:   Normocytic anemia  -workup reviewed  -B12 deficiency and vitamin-D deficiency noted  -asked him to start over-the-counter vitamin B12, 5000 mcg once daily  -gave prescription for vitamin D3, 50,000 international units weekly  -will repeat CBC, retic count along with a B12 and vitamin D3 level in 3 months.  If his hemoglobin remains low or worsens then will pursue bone marrow biopsy    Transitional cell carcinoma of the bladder  -diagnosed in 2003  -had a stricture in the left ureter in August 2019 that was treated  -follows with Urology    Stage 3 chronic kidney disease  -creatinine stable    Coronary artery disease  -follows with cardiology

## 2019-12-27 ENCOUNTER — TELEPHONE (OUTPATIENT)
Dept: CARDIOLOGY | Facility: CLINIC | Age: 79
End: 2019-12-27

## 2019-12-27 NOTE — TELEPHONE ENCOUNTER
"I left V/Messages [phone],  And    [phone]>not accepting call" .    , Im reaching out to you, due to I had to rescheduled your appointment from 1-   to 1- at 12;20pm.     not here, so I saved you a spot and rescheduled you.    Please call us back and confirmed your new appointment.      Ann Ibanez (rita).  "

## 2020-01-02 ENCOUNTER — TELEPHONE (OUTPATIENT)
Dept: CARDIOLOGY | Facility: CLINIC | Age: 80
End: 2020-01-02

## 2020-01-02 NOTE — TELEPHONE ENCOUNTER
"2nd attempt.  I called patient to let him Know that we moved his appointment to see  from  1-  To  1-  Due to  Not Here.    Pt Ph. # [phone] " Recording - Mail Box full, cant leave a message".    [phone] I left  2nd  V/Message there regarding his appointments.      Ann Ibanez (rita)  "

## 2020-01-15 ENCOUNTER — OFFICE VISIT (OUTPATIENT)
Dept: CARDIOLOGY | Facility: CLINIC | Age: 80
End: 2020-01-15
Payer: MEDICARE

## 2020-01-15 VITALS
DIASTOLIC BLOOD PRESSURE: 75 MMHG | SYSTOLIC BLOOD PRESSURE: 144 MMHG | HEART RATE: 70 BPM | HEIGHT: 68 IN | WEIGHT: 175 LBS | BODY MASS INDEX: 26.52 KG/M2

## 2020-01-15 DIAGNOSIS — E78.2 MIXED HYPERLIPIDEMIA: ICD-10-CM

## 2020-01-15 DIAGNOSIS — I10 ESSENTIAL HYPERTENSION: ICD-10-CM

## 2020-01-15 DIAGNOSIS — I25.10 CORONARY ARTERY DISEASE INVOLVING NATIVE CORONARY ARTERY OF NATIVE HEART WITHOUT ANGINA PECTORIS: Primary | ICD-10-CM

## 2020-01-15 DIAGNOSIS — F17.201 TOBACCO USE DISORDER, MODERATE, IN SUSTAINED REMISSION: ICD-10-CM

## 2020-01-15 DIAGNOSIS — I73.9 PAD (PERIPHERAL ARTERY DISEASE): ICD-10-CM

## 2020-01-15 DIAGNOSIS — I77.1 TORTUOUS AORTA: ICD-10-CM

## 2020-01-15 PROCEDURE — 1126F PR PAIN SEVERITY QUANTIFIED, NO PAIN PRESENT: ICD-10-PCS | Mod: ,,, | Performed by: INTERNAL MEDICINE

## 2020-01-15 PROCEDURE — 1159F PR MEDICATION LIST DOCUMENTED IN MEDICAL RECORD: ICD-10-PCS | Mod: ,,, | Performed by: INTERNAL MEDICINE

## 2020-01-15 PROCEDURE — 99999 PR PBB SHADOW E&M-EST. PATIENT-LVL III: CPT | Mod: PBBFAC,,, | Performed by: INTERNAL MEDICINE

## 2020-01-15 PROCEDURE — 99215 PR OFFICE/OUTPT VISIT, EST, LEVL V, 40-54 MIN: ICD-10-PCS | Mod: S$PBB,,, | Performed by: INTERNAL MEDICINE

## 2020-01-15 PROCEDURE — 99999 PR PBB SHADOW E&M-EST. PATIENT-LVL III: ICD-10-PCS | Mod: PBBFAC,,, | Performed by: INTERNAL MEDICINE

## 2020-01-15 PROCEDURE — 99215 OFFICE O/P EST HI 40 MIN: CPT | Mod: S$PBB,,, | Performed by: INTERNAL MEDICINE

## 2020-01-15 PROCEDURE — 99213 OFFICE O/P EST LOW 20 MIN: CPT | Mod: PBBFAC,PO | Performed by: INTERNAL MEDICINE

## 2020-01-15 PROCEDURE — 1159F MED LIST DOCD IN RCRD: CPT | Mod: ,,, | Performed by: INTERNAL MEDICINE

## 2020-01-15 PROCEDURE — 1126F AMNT PAIN NOTED NONE PRSNT: CPT | Mod: ,,, | Performed by: INTERNAL MEDICINE

## 2020-01-15 NOTE — LETTER
January 16, 2020      Riley Villalobos MD  [address] [facility] [address]           [facility]  [address] [place] [address] [address]  [facility] [address]  Phone: [phone]          Patient: Laura Harris   MR Number: [id number]   YOB: 1940   Date of Visit: 1/15/2020       Dear Dr. Riley Villalobos:    Thank you for referring Laura Harris to me for evaluation. Attached you will find relevant portions of my assessment and plan of care.    If you have questions, please do not hesitate to call me. I look forward to following Laura Harris along with you.    Sincerely,    Bob Pinto MD    Enclosure  CC:  No Recipients    If you would like to receive this communication electronically, please contact [email or web address] or [phone] to request more information on [facility] access.    For providers and/or their staff who would like to refer a patient to [facility], please contact us through our one-stop-shop provider referral line, [facility], at [phone].    If you feel you have received this communication in error or would no longer like to receive these types of communications, please e-mail [email or web address]

## 2020-01-15 NOTE — PROGRESS NOTES
Subjective:   Patient ID:  Laura Harris is a 79 y.o. male who presents for follow up of Coronary Artery Disease; Hyperlipidemia; Hypertension; and Peripheral Arterial Disease      HPI:     Laura Harris 79 y.o. male is here follow up and feeling well without any new complaints. He was admitted for NSTEMI that required LCX PCI. He feels great. He quit smoking. He has HTN, HLP, and PAD with olaf IIa. No ulcers. No cardiovascular limitations.           3/2019 PCI of NSTEMI    · PCI with RAFAELA (2.75 x 18) to mid LCX for NSTEMI  · Normal EF  · Grade I diastolic dysfunction  · Mild AS wth MG 14 mmHg      US 2019     High grade L SFA disease             Patient Active Problem List    Diagnosis Date Noted    Hyperlipidemia 2020    PAD (peripheral artery disease) 2020    Tobacco use disorder, moderate, in sustained remission 2020    Vitamin D deficiency 2019    Anemia of chronic disease 2019    Absolute anemia 2019    Advance care planning 2019    History of bladder cancer 10/07/2019    Tortuous aorta 10/07/2019    Coronary artery disease involving native coronary artery of native heart without angina pectoris 10/07/2019         3/2019 PCI of NSTEMI    · PCI with RAFAELA (2.75 x 18) to mid LCX for NSTEMI  · Normal EF  · Grade I diastolic dysfunction  · Mild AS wth MG 14 mmHg        S/P left heart catheterization by percutaneous approach 10/07/2019    NSTEMI (non-ST elevated myocardial infarction) 2019    Acute coronary syndrome 2019    Carpal tunnel syndrome of left wrist 2016    Carpal tunnel syndrome on both sides 2015    Hypertension 2014    Ureteral stricture 2013    Low tension glaucoma 2012    Nuclear sclerosis - Both Eyes 2012    Exodeviation 2012     Alternating             Right Arm BP - Sittin/75  Left Arm BP - Sittin/74        LABS    LAST HbA1c  Lab Results   Component Value Date     HGBA1C 6.3 (H) 04/10/2010       Lipid panel  Lab Results   Component Value Date    CHOL 106 (L) 11/06/2019    CHOL 170 03/01/2019    CHOL 178 10/27/2015     Lab Results   Component Value Date    HDL 36 (L) 11/06/2019    HDL 49 03/01/2019    HDL 30 (L) 10/27/2015     Lab Results   Component Value Date    LDLCALC 58.6 (L) 11/06/2019    LDLCALC 106.6 03/01/2019    LDLCALC 115.4 10/27/2015     Lab Results   Component Value Date    TRIG 57 11/06/2019    TRIG 72 03/01/2019    TRIG 163 (H) 10/27/2015     Lab Results   Component Value Date    CHOLHDL 34.0 11/06/2019    CHOLHDL 28.8 03/01/2019    CHOLHDL 16.9 (L) 10/27/2015            Review of Systems   Constitution: Negative for diaphoresis, night sweats, weight gain and weight loss.   HENT: Negative for congestion.    Eyes: Negative for blurred vision, discharge and double vision.   Cardiovascular: Positive for claudication. Negative for chest pain, cyanosis, dyspnea on exertion, irregular heartbeat, leg swelling, near-syncope, orthopnea, palpitations, paroxysmal nocturnal dyspnea and syncope.   Respiratory: Negative for cough, shortness of breath and wheezing.    Endocrine: Negative for cold intolerance, heat intolerance and polyphagia.   Hematologic/Lymphatic: Negative for adenopathy and bleeding problem. Does not bruise/bleed easily.   Skin: Negative for dry skin and nail changes.   Musculoskeletal: Negative for arthritis, back pain, falls, joint pain, myalgias and neck pain.   Gastrointestinal: Negative for bloating, abdominal pain, change in bowel habit and constipation.   Genitourinary: Negative for bladder incontinence, dysuria, flank pain, genital sores and missed menses.   Neurological: Negative for aphonia, brief paralysis, difficulty with concentration, dizziness and weakness.   Psychiatric/Behavioral: Negative for altered mental status and memory loss. The patient does not have insomnia.    Allergic/Immunologic: Negative for environmental allergies.        Objective:   Physical Exam   Constitutional: He is oriented to person, place, and time. He appears well-developed and well-nourished. He is not intubated.   HENT:   Head: Normocephalic and atraumatic.   Right Ear: External ear normal.   Left Ear: External ear normal.   Mouth/Throat: Oropharynx is clear and moist.   Eyes: Pupils are equal, round, and reactive to light. Conjunctivae and EOM are normal. Right eye exhibits no discharge. Left eye exhibits no discharge. No scleral icterus.   Neck: Normal range of motion. Neck supple. Normal carotid pulses, no hepatojugular reflux and no JVD present. Carotid bruit is not present. No tracheal deviation present. No thyromegaly present.   Cardiovascular: Normal rate, regular rhythm, S1 normal and S2 normal.  No extrasystoles are present. PMI is not displaced. Exam reveals no gallop, no S3, no distant heart sounds, no friction rub and no midsystolic click.   Murmur heard.   Systolic murmur is present with a grade of 2/6 at the upper right sternal border.  Pulses:       Carotid pulses are 2+ on the right side, and 2+ on the left side.       Radial pulses are 2+ on the right side, and 2+ on the left side.        Femoral pulses are 2+ on the right side, and 2+ on the left side.       Popliteal pulses are 1+ on the right side, and 1+ on the left side.        Dorsalis pedis pulses are 0 on the right side, and 0 on the left side.        Posterior tibial pulses are 0 on the right side, and 0 on the left side.       Monophasic L PT and DP doppler signals      Biphasic R DP and PT doppler signals       Pulmonary/Chest: Effort normal and breath sounds normal. No accessory muscle usage or stridor. No apnea, no tachypnea and no bradypnea. He is not intubated. No respiratory distress. He has no decreased breath sounds. He has no wheezes. He has no rales. He exhibits no tenderness and no bony tenderness.   Abdominal: He exhibits no distension, no pulsatile liver, no abdominal bruit,  no ascites, no pulsatile midline mass and no mass. There is no tenderness. There is no rebound and no guarding.   Musculoskeletal: Normal range of motion. He exhibits no edema or tenderness.   Lymphadenopathy:     He has no cervical adenopathy.   Neurological: He is alert and oriented to person, place, and time. He has normal reflexes. No cranial nerve deficit. Coordination normal.   Skin: Skin is warm. No rash noted. No erythema. No pallor.       No ulcers         Psychiatric: He has a normal mood and affect. His behavior is normal. Judgment and thought content normal.       Assessment:     1. Coronary artery disease involving native coronary artery of native heart without angina pectoris    2. Tortuous aorta    3. Mixed hyperlipidemia    4. PAD (peripheral artery disease)    5. Essential hypertension    6. Tobacco use disorder, moderate, in sustained remission        Plan:         DAPT with aspirin + plavix  Statin therapy  BP target < 130/80      Cardiac rehab II for NSTEMI  PAD walking program   Pletal 50 mg po bid for PAD  No intervention for PAD at time   Best medical therapy       Follow up with echo to monitor EF + AS  Follow up with labs + ABIX       Establish care with podiatry  Proper foot care      Continue with current medical plan and lifestyle changes.  Return sooner for concerns or questions. If symptoms persist go to the ED  I have reviewed all pertinent data on this patient       I have reviewed the patient's medical history in detail and updated the computerized patient record.    Orders Placed This Encounter   Procedures    Lipid panel     Standing Status:   Future     Standing Expiration Date:   3/15/2021    Comprehensive metabolic panel     Standing Status:   Future     Standing Expiration Date:   3/15/2021    CBC auto differential     Standing Status:   Future     Standing Expiration Date:   3/15/2021    Ambulatory Referral to Podiatry     Referral Priority:   Routine     Referral Type:    Consultation     Referral Reason:   Specialty Services Required     Requested Specialty:   Podiatry     Number of Visits Requested:   1    Cardiac Rehab Phase II     Standing Status:   Future     Standing Expiration Date:   1/15/2021     Order Specific Question:   Department     Answer:   [facility] CARDIAC REHAB     Order Specific Question:   Select qualifying diagnosis:     Answer:   I21.4 - Non-ST elevation (NSTEMI) myocardial infarction    Cardiac Rehab Phase II     Standing Status:   Future     Standing Expiration Date:   1/15/2021     Order Specific Question:   Department     Answer:   [facility] CARDIAC REHAB     Order Specific Question:   Select qualifying diagnosis:     Answer:   Other - Specify     Order Specific Question:   Other (Specify):     Answer:   PAD program after he completes cardiac rehab II    CV Exercise RON     Standing Status:   Future     Standing Expiration Date:   1/15/2021    Echo Color Flow Doppler? Yes     Standing Status:   Future     Standing Expiration Date:   1/15/2021     Order Specific Question:   Color Flow Doppler?     Answer:   Yes       Follow up as scheduled. Return sooner for concerns or questions            He expressed verbal understanding and agreed with the plan        Patient's Medications   New Prescriptions    CILOSTAZOL (PLETAL) 50 MG TAB    Take 1 tablet (50 mg total) by mouth 2 (two) times daily.   Previous Medications    AMLODIPINE (NORVASC) 5 MG TABLET    Take 1 tablet (5 mg total) by mouth once daily.    ASPIRIN (ECOTRIN) 81 MG EC TABLET    Take 1 tablet (81 mg total) by mouth once daily.    ATORVASTATIN (LIPITOR) 80 MG TABLET    Take 1 tablet (80 mg total) by mouth once daily.    CHOLECALCIFEROL, VITAMIN D3, 50,000 UNIT TAB    Take 50,000 Units by mouth once a week.    CLOPIDOGREL (PLAVIX) 75 MG TABLET    Take 1 tablet (75 mg total) by mouth once daily.    FERROUS SULFATE (FEOSOL) 325 MG (65 MG IRON) TAB TABLET    Take 1 tablet (325 mg total) by mouth daily with  breakfast.    FLUTICASONE-SALMETEROL 230-21 MCG/DOSE (ADVAIR HFA) 230-21 MCG/ACTUATION HFAA INHALER    Inhale 2 puffs into the lungs 2 (two) times daily. Controller    LATANOPROST (XALATAN) 0.005 % OPHTHALMIC SOLUTION    Place 1 drop into both eyes once daily.    LOSARTAN (COZAAR) 50 MG TABLET    Take 1 tablet (50 mg total) by mouth once daily.    METOPROLOL SUCCINATE (TOPROL-XL) 25 MG 24 HR TABLET    Take 1 tablet (25 mg total) by mouth once daily.    NITROGLYCERIN (NITROSTAT) 0.4 MG SL TABLET    Place 1 tablet (0.4 mg total) under the tongue every 5 (five) minutes as needed for Chest pain.    TAMSULOSIN (FLOMAX) 0.4 MG CAP    TAKE 1 CAPSULE(0.4 MG) BY MOUTH AFTER DINNER   Modified Medications    No medications on file   Discontinued Medications    No medications on file

## 2020-01-16 ENCOUNTER — TELEPHONE (OUTPATIENT)
Dept: CARDIOLOGY | Facility: CLINIC | Age: 80
End: 2020-01-16

## 2020-01-16 PROBLEM — E78.5 HYPERLIPIDEMIA: Status: ACTIVE | Noted: 2020-01-16

## 2020-01-16 PROBLEM — F17.201 TOBACCO USE DISORDER, MODERATE, IN SUSTAINED REMISSION: Status: ACTIVE | Noted: 2020-01-16

## 2020-01-16 PROBLEM — I73.9 PAD (PERIPHERAL ARTERY DISEASE): Status: ACTIVE | Noted: 2020-01-16

## 2020-01-16 RX ORDER — CILOSTAZOL 50 MG/1
50 TABLET ORAL 2 TIMES DAILY
Qty: 60 TABLET | Refills: 11 | Status: ON HOLD | OUTPATIENT
Start: 2020-01-16 | End: 2021-09-16 | Stop reason: CLARIF

## 2020-01-16 NOTE — PATIENT INSTRUCTIONS

## 2020-02-26 RX ORDER — LOSARTAN POTASSIUM 50 MG/1
TABLET ORAL
Qty: 90 TABLET | Refills: 3 | Status: SHIPPED | OUTPATIENT
Start: 2020-02-26 | End: 2020-07-27

## 2020-02-26 RX ORDER — LOSARTAN POTASSIUM 50 MG/1
50 TABLET ORAL DAILY
Qty: 90 TABLET | Refills: 3 | Status: SHIPPED | OUTPATIENT
Start: 2020-02-26 | End: 2021-06-14 | Stop reason: SDUPTHER

## 2020-03-02 RX ORDER — METOPROLOL SUCCINATE 25 MG/1
TABLET, EXTENDED RELEASE ORAL
Qty: 30 TABLET | Refills: 11 | OUTPATIENT
Start: 2020-03-02

## 2020-03-02 RX ORDER — CLOPIDOGREL BISULFATE 75 MG/1
TABLET ORAL
Qty: 90 TABLET | Refills: 3 | Status: SHIPPED | OUTPATIENT
Start: 2020-03-02 | End: 2021-03-03

## 2020-03-02 RX ORDER — METOPROLOL SUCCINATE 25 MG/1
25 TABLET, EXTENDED RELEASE ORAL DAILY
Qty: 90 TABLET | Refills: 3 | Status: SHIPPED | OUTPATIENT
Start: 2020-03-02 | End: 2021-01-23

## 2020-03-02 RX ORDER — AMLODIPINE BESYLATE 5 MG/1
TABLET ORAL
Qty: 90 TABLET | Refills: 3 | Status: SHIPPED | OUTPATIENT
Start: 2020-03-02 | End: 2021-03-03

## 2020-03-04 DIAGNOSIS — J20.9 ACUTE BRONCHITIS, UNSPECIFIED ORGANISM: ICD-10-CM

## 2020-03-04 DIAGNOSIS — F17.200 SMOKER: ICD-10-CM

## 2020-03-04 RX ORDER — FLUTICASONE PROPIONATE AND SALMETEROL XINAFOATE 230; 21 UG/1; UG/1
AEROSOL, METERED RESPIRATORY (INHALATION)
Qty: 12 G | Refills: 1 | Status: SHIPPED | OUTPATIENT
Start: 2020-03-04 | End: 2022-08-03

## 2020-03-27 ENCOUNTER — LAB VISIT (OUTPATIENT)
Dept: LAB | Facility: HOSPITAL | Age: 80
End: 2020-03-27
Attending: INTERNAL MEDICINE
Payer: MEDICARE

## 2020-03-27 DIAGNOSIS — E55.9 VITAMIN D DEFICIENCY: ICD-10-CM

## 2020-03-27 DIAGNOSIS — D53.9 ANEMIA ASSOCIATED WITH NUTRITIONAL DEFICIENCY: ICD-10-CM

## 2020-03-27 LAB
25(OH)D3+25(OH)D2 SERPL-MCNC: 54 NG/ML (ref 30–96)
ALBUMIN SERPL BCP-MCNC: 3.8 G/DL (ref 3.5–5.2)
ALP SERPL-CCNC: 77 U/L (ref 55–135)
ALT SERPL W/O P-5'-P-CCNC: 14 U/L (ref 10–44)
ANION GAP SERPL CALC-SCNC: 12 MMOL/L (ref 8–16)
AST SERPL-CCNC: 14 U/L (ref 10–40)
BASOPHILS # BLD AUTO: 0.05 K/UL (ref 0–0.2)
BASOPHILS NFR BLD: 0.8 % (ref 0–1.9)
BILIRUB SERPL-MCNC: 0.5 MG/DL (ref 0.1–1)
BUN SERPL-MCNC: 19 MG/DL (ref 8–23)
CALCIUM SERPL-MCNC: 9.6 MG/DL (ref 8.7–10.5)
CHLORIDE SERPL-SCNC: 108 MMOL/L (ref 95–110)
CO2 SERPL-SCNC: 24 MMOL/L (ref 23–29)
CREAT SERPL-MCNC: 1.8 MG/DL (ref 0.5–1.4)
DIFFERENTIAL METHOD: ABNORMAL
EOSINOPHIL # BLD AUTO: 0.3 K/UL (ref 0–0.5)
EOSINOPHIL NFR BLD: 5 % (ref 0–8)
ERYTHROCYTE [DISTWIDTH] IN BLOOD BY AUTOMATED COUNT: 17 % (ref 11.5–14.5)
EST. GFR  (AFRICAN AMERICAN): 40 ML/MIN/1.73 M^2
EST. GFR  (NON AFRICAN AMERICAN): 35 ML/MIN/1.73 M^2
GLUCOSE SERPL-MCNC: 92 MG/DL (ref 70–110)
HCT VFR BLD AUTO: 43.6 % (ref 40–54)
HGB BLD-MCNC: 14 G/DL (ref 14–18)
IMM GRANULOCYTES # BLD AUTO: 0.01 K/UL (ref 0–0.04)
IMM GRANULOCYTES NFR BLD AUTO: 0.2 % (ref 0–0.5)
LDH SERPL L TO P-CCNC: 270 U/L (ref 110–260)
LYMPHOCYTES # BLD AUTO: 1.8 K/UL (ref 1–4.8)
LYMPHOCYTES NFR BLD: 27.1 % (ref 18–48)
MCH RBC QN AUTO: 25.6 PG (ref 27–31)
MCHC RBC AUTO-ENTMCNC: 32.1 G/DL (ref 32–36)
MCV RBC AUTO: 80 FL (ref 82–98)
MONOCYTES # BLD AUTO: 0.7 K/UL (ref 0.3–1)
MONOCYTES NFR BLD: 10.7 % (ref 4–15)
NEUTROPHILS # BLD AUTO: 3.7 K/UL (ref 1.8–7.7)
NEUTROPHILS NFR BLD: 56.2 % (ref 38–73)
NRBC BLD-RTO: 0 /100 WBC
PLATELET # BLD AUTO: 173 K/UL (ref 150–350)
PMV BLD AUTO: ABNORMAL FL (ref 9.2–12.9)
POTASSIUM SERPL-SCNC: 4.2 MMOL/L (ref 3.5–5.1)
PROT SERPL-MCNC: 7 G/DL (ref 6–8.4)
RBC # BLD AUTO: 5.47 M/UL (ref 4.6–6.2)
RETICS/RBC NFR AUTO: 1.4 % (ref 0.4–2)
SODIUM SERPL-SCNC: 144 MMOL/L (ref 136–145)
VIT B12 SERPL-MCNC: >2000 PG/ML (ref 210–950)
WBC # BLD AUTO: 6.54 K/UL (ref 3.9–12.7)

## 2020-03-27 PROCEDURE — 85045 AUTOMATED RETICULOCYTE COUNT: CPT

## 2020-03-27 PROCEDURE — 80053 COMPREHEN METABOLIC PANEL: CPT

## 2020-03-27 PROCEDURE — 82306 VITAMIN D 25 HYDROXY: CPT

## 2020-03-27 PROCEDURE — 82607 VITAMIN B-12: CPT

## 2020-03-27 PROCEDURE — 83615 LACTATE (LD) (LDH) ENZYME: CPT

## 2020-03-27 PROCEDURE — 36415 COLL VENOUS BLD VENIPUNCTURE: CPT

## 2020-03-27 PROCEDURE — 85025 COMPLETE CBC W/AUTO DIFF WBC: CPT

## 2020-03-30 ENCOUNTER — OFFICE VISIT (OUTPATIENT)
Dept: HEMATOLOGY/ONCOLOGY | Facility: CLINIC | Age: 80
End: 2020-03-30
Payer: MEDICARE

## 2020-03-30 DIAGNOSIS — D51.9 ANEMIA DUE TO VITAMIN B12 DEFICIENCY, UNSPECIFIED B12 DEFICIENCY TYPE: ICD-10-CM

## 2020-03-30 DIAGNOSIS — E55.9 VITAMIN D DEFICIENCY: ICD-10-CM

## 2020-03-30 DIAGNOSIS — D53.9 ANEMIA ASSOCIATED WITH NUTRITIONAL DEFICIENCY: Primary | ICD-10-CM

## 2020-03-30 PROCEDURE — 99442 PR PHYSICIAN TELEPHONE EVALUATION 11-20 MIN: CPT | Mod: 95,,, | Performed by: INTERNAL MEDICINE

## 2020-03-30 PROCEDURE — 99442 PR PHYSICIAN TELEPHONE EVALUATION 11-20 MIN: ICD-10-PCS | Mod: 95,,, | Performed by: INTERNAL MEDICINE

## 2020-03-30 NOTE — PROGRESS NOTES
PATIENT: Laura Harris  MRN: [id number]  DATE: 3/30/2020    The patient location is:  home  The chief complaint leading to consultation is:  Anemia  Visit type: Virtual visit with synchronous audio  Total time spent with patient:  15 min  Each patient to whom he or she provides medical services by telemedicine is:  (1) informed of the relationship between the physician and patient and the respective role of any other health care provider with respect to management of the patient; and (2) notified that he or she may decline to receive medical services by telemedicine and may withdraw from such care at any time.      Diagnosis:   1. Anemia associated with nutritional deficiency    2. Vitamin D deficiency    3. Anemia due to vitamin B12 deficiency, unspecified B12 deficiency type      Chief Complaint: No chief complaint on file.    Subjective:     History of Present Illness:     He is here for follow-up of anemia.  PCP is Dr. Maco Denton.    CBC done 11/06/2019 reveals hemoglobin 7.1, MCV 82.  Platelets and WBC within normal limits.    September 2019, hemoglobin was 11.7.    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  In August 2009 he had a left ureteral stricture that was treated with placement of a stent and subsequent removal.    He is on Flomax for BPH.  He also has stage III CKD.    He has CAD status post placement of a stent March 2019.  Follows up with cardiology    He complains of tiredness and fatigue and dyspnea on exertion that has been worsening over the past few months.  Denies any blood in the stools or hematuria.    INTERVAL HISTORY:  -underwent workup for anemia and is here to discuss test results    Past Medical History:   Past Medical History:   Diagnosis Date    Anemia of chronic disease 11/19/2019    Arthritis     Cataract     CKD (chronic kidney disease) stage 3, GFR 30-59 ml/min 5/1/2014    Coronary  artery disease involving native coronary artery of native heart without angina pectoris 10/7/2019    Hyperlipidemia     Hypertension     Low tension glaucoma        Past Surgical History:   Past Surgical History:   Procedure Laterality Date    CORONARY STENT PLACEMENT N/A 3/4/2019    Procedure: INSERTION, STENT, CORONARY ARTERY;  Surgeon: Brennan Stein MD;  Location: [facility] CATH LAB/EP;  Service: Cardiology;  Laterality: N/A;    CYSTOSCOPY      LEFT HEART CATHETERIZATION N/A 3/4/2019    Procedure: Left heart cath;  Surgeon: Brennan Stein MD;  Location: [facility] CATH LAB/EP;  Service: Cardiology;  Laterality: N/A;       Family History:   Family History   Problem Relation Age of Onset    Melanoma Neg Hx        Social History:  reports that he quit smoking about 65 years ago. His smoking use included cigarettes. He smoked 1.00 pack per day. He uses smokeless tobacco. He reports that he does not drink alcohol or use drugs.    Allergies:  Review of patient's allergies indicates:   Allergen Reactions    Ace inhibitors      Other reaction(s): Angioedema    Ultram [tramadol] Nausea Only and Other (See Comments)     Dizziness       Medications:  Current Outpatient Medications   Medication Sig Dispense Refill    ADVAIR -21 mcg/actuation HFAA inhaler INHALE 2 PUFFS INTO THE LUNGS TWICE DAILY( CONTROLLER) 12 g 1    amLODIPine (NORVASC) 5 MG tablet TAKE 1 TABLET BY MOUTH DAILY 90 tablet 3    aspirin (ECOTRIN) 81 MG EC tablet Take 1 tablet (81 mg total) by mouth once daily.  0    atorvastatin (LIPITOR) 80 MG tablet Take 1 tablet (80 mg total) by mouth once daily. 90 tablet 3    cholecalciferol, vitamin D3, 50,000 unit Tab Take 50,000 Units by mouth once a week. 12 tablet 3    cilostazol (PLETAL) 50 MG Tab Take 1 tablet (50 mg total) by mouth 2 (two) times daily. 60 tablet 11    clopidogreL (PLAVIX) 75 mg tablet TAKE 1 TABLET BY MOUTH DAILY 90 tablet 3    ferrous sulfate (FEOSOL) 325 mg (65 mg iron) Tab  tablet Take 1 tablet (325 mg total) by mouth daily with breakfast. 90 tablet 3    latanoprost (XALATAN) 0.005 % ophthalmic solution Place 1 drop into both eyes once daily. 2.5 mL 5    losartan (COZAAR) 50 MG tablet TAKE 1 TABLET BY MOUTH DAILY 90 tablet 3    losartan (COZAAR) 50 MG tablet Take 1 tablet (50 mg total) by mouth once daily. 90 tablet 3    metoprolol succinate (TOPROL-XL) 25 MG 24 hr tablet Take 1 tablet (25 mg total) by mouth once daily. 90 tablet 3    nitroGLYCERIN (NITROSTAT) 0.4 MG SL tablet Place 1 tablet (0.4 mg total) under the tongue every 5 (five) minutes as needed for Chest pain. 30 tablet 2    tamsulosin (FLOMAX) 0.4 mg Cap TAKE 1 CAPSULE(0.4 MG) BY MOUTH AFTER DINNER 30 capsule 12     No current facility-administered medications for this visit.        Review of Systems   Constitutional: Negative for fatigue, fever and unexpected weight change.   HENT: Negative for mouth sores and nosebleeds.    Eyes: Negative for photophobia and pain.   Respiratory: Negative for shortness of breath and wheezing.    Cardiovascular: Negative for chest pain and palpitations.   Gastrointestinal: Negative for abdominal pain and vomiting.   Genitourinary: Negative for flank pain and hematuria.   Musculoskeletal: Negative for back pain, neck pain and neck stiffness.   Skin: Negative for rash and wound.   Neurological: Negative for seizures and syncope.   Hematological: Negative for adenopathy. Does not bruise/bleed easily.   Psychiatric/Behavioral: Negative for behavioral problems and confusion. The patient is nervous/anxious.    All other systems reviewed and are negative.      Objective:     No exam done as this is a virtual visit    Assessment:       1. Anemia associated with nutritional deficiency    2. Vitamin D deficiency    3. Anemia due to vitamin B12 deficiency, unspecified B12 deficiency type      Plan:   Normocytic anemia - secondary to vitamin-D and B12 deficiency  -anemia resolved with B12 and  vitamin-D supplementation  -continue B12 5000 mcg daily  -continue vitamin-D, 84199 units weekly  -okay to stop oral iron    Repeat labs and follow-up in 4 mo    Transitional cell carcinoma of the bladder  -diagnosed in 2003  -had a stricture in the left ureter in August 2019 that was treated  -follows with Urology    Stage 3 chronic kidney disease  -creatinine stable    Coronary artery disease  -follows with cardiology

## 2020-04-01 ENCOUNTER — TELEPHONE (OUTPATIENT)
Dept: UROLOGY | Facility: CLINIC | Age: 80
End: 2020-04-01

## 2020-04-01 DIAGNOSIS — C67.9 MALIGNANT NEOPLASM OF URINARY BLADDER, UNSPECIFIED SITE: Primary | ICD-10-CM

## 2020-04-01 RX ORDER — TAMSULOSIN HYDROCHLORIDE 0.4 MG/1
CAPSULE ORAL
Qty: 30 CAPSULE | Refills: 0 | Status: SHIPPED | OUTPATIENT
Start: 2020-04-01 | End: 2021-11-08

## 2020-04-01 NOTE — TELEPHONE ENCOUNTER
See if he can do virtual visit.   Hasn't been seen since 1/2019.   Also needs cysto maybe June/july

## 2020-06-24 RX ORDER — ATORVASTATIN CALCIUM 80 MG/1
80 TABLET, FILM COATED ORAL DAILY
Qty: 90 TABLET | Refills: 3 | Status: SHIPPED | OUTPATIENT
Start: 2020-06-24 | End: 2021-06-24

## 2020-07-17 DIAGNOSIS — Z71.89 COMPLEX CARE COORDINATION: ICD-10-CM

## 2020-07-24 ENCOUNTER — LAB VISIT (OUTPATIENT)
Dept: LAB | Facility: HOSPITAL | Age: 80
End: 2020-07-24
Attending: INTERNAL MEDICINE
Payer: MEDICARE

## 2020-07-24 DIAGNOSIS — D51.9 ANEMIA DUE TO VITAMIN B12 DEFICIENCY, UNSPECIFIED B12 DEFICIENCY TYPE: ICD-10-CM

## 2020-07-24 DIAGNOSIS — D53.9 ANEMIA ASSOCIATED WITH NUTRITIONAL DEFICIENCY: ICD-10-CM

## 2020-07-24 DIAGNOSIS — E55.9 VITAMIN D DEFICIENCY: ICD-10-CM

## 2020-07-24 LAB
25(OH)D3+25(OH)D2 SERPL-MCNC: 41 NG/ML (ref 30–96)
ALBUMIN SERPL BCP-MCNC: 3.7 G/DL (ref 3.5–5.2)
ALP SERPL-CCNC: 69 U/L (ref 55–135)
ALT SERPL W/O P-5'-P-CCNC: 11 U/L (ref 10–44)
ANION GAP SERPL CALC-SCNC: 8 MMOL/L (ref 8–16)
AST SERPL-CCNC: 12 U/L (ref 10–40)
BASOPHILS # BLD AUTO: 0.04 K/UL (ref 0–0.2)
BASOPHILS NFR BLD: 0.8 % (ref 0–1.9)
BILIRUB SERPL-MCNC: 0.6 MG/DL (ref 0.1–1)
BUN SERPL-MCNC: 17 MG/DL (ref 8–23)
CALCIUM SERPL-MCNC: 8.7 MG/DL (ref 8.7–10.5)
CHLORIDE SERPL-SCNC: 109 MMOL/L (ref 95–110)
CO2 SERPL-SCNC: 23 MMOL/L (ref 23–29)
CREAT SERPL-MCNC: 1.6 MG/DL (ref 0.5–1.4)
DIFFERENTIAL METHOD: ABNORMAL
EOSINOPHIL # BLD AUTO: 0.2 K/UL (ref 0–0.5)
EOSINOPHIL NFR BLD: 4.2 % (ref 0–8)
ERYTHROCYTE [DISTWIDTH] IN BLOOD BY AUTOMATED COUNT: 14.1 % (ref 11.5–14.5)
EST. GFR  (AFRICAN AMERICAN): 46 ML/MIN/1.73 M^2
EST. GFR  (NON AFRICAN AMERICAN): 40 ML/MIN/1.73 M^2
FERRITIN SERPL-MCNC: 53 NG/ML (ref 20–300)
GLUCOSE SERPL-MCNC: 121 MG/DL (ref 70–110)
HCT VFR BLD AUTO: 40.5 % (ref 40–54)
HGB BLD-MCNC: 13.7 G/DL (ref 14–18)
IMM GRANULOCYTES # BLD AUTO: 0.01 K/UL (ref 0–0.04)
IMM GRANULOCYTES NFR BLD AUTO: 0.2 % (ref 0–0.5)
IRON SERPL-MCNC: 74 UG/DL (ref 45–160)
LYMPHOCYTES # BLD AUTO: 1.2 K/UL (ref 1–4.8)
LYMPHOCYTES NFR BLD: 23.3 % (ref 18–48)
MCH RBC QN AUTO: 27.6 PG (ref 27–31)
MCHC RBC AUTO-ENTMCNC: 33.8 G/DL (ref 32–36)
MCV RBC AUTO: 82 FL (ref 82–98)
MONOCYTES # BLD AUTO: 0.5 K/UL (ref 0.3–1)
MONOCYTES NFR BLD: 8.9 % (ref 4–15)
NEUTROPHILS # BLD AUTO: 3.2 K/UL (ref 1.8–7.7)
NEUTROPHILS NFR BLD: 62.6 % (ref 38–73)
NRBC BLD-RTO: 0 /100 WBC
PLATELET # BLD AUTO: 200 K/UL (ref 150–350)
PMV BLD AUTO: 11.2 FL (ref 9.2–12.9)
POTASSIUM SERPL-SCNC: 3.2 MMOL/L (ref 3.5–5.1)
PROT SERPL-MCNC: 6.6 G/DL (ref 6–8.4)
RBC # BLD AUTO: 4.96 M/UL (ref 4.6–6.2)
SATURATED IRON: 23 % (ref 20–50)
SODIUM SERPL-SCNC: 140 MMOL/L (ref 136–145)
TOTAL IRON BINDING CAPACITY: 317 UG/DL (ref 250–450)
TRANSFERRIN SERPL-MCNC: 214 MG/DL (ref 200–375)
VIT B12 SERPL-MCNC: >2000 PG/ML (ref 210–950)
WBC # BLD AUTO: 5.06 K/UL (ref 3.9–12.7)

## 2020-07-24 PROCEDURE — 82728 ASSAY OF FERRITIN: CPT

## 2020-07-24 PROCEDURE — 85025 COMPLETE CBC W/AUTO DIFF WBC: CPT

## 2020-07-24 PROCEDURE — 83540 ASSAY OF IRON: CPT

## 2020-07-24 PROCEDURE — 36415 COLL VENOUS BLD VENIPUNCTURE: CPT

## 2020-07-24 PROCEDURE — 82607 VITAMIN B-12: CPT

## 2020-07-24 PROCEDURE — 80053 COMPREHEN METABOLIC PANEL: CPT

## 2020-07-24 PROCEDURE — 82306 VITAMIN D 25 HYDROXY: CPT

## 2020-07-27 ENCOUNTER — OFFICE VISIT (OUTPATIENT)
Dept: HEMATOLOGY/ONCOLOGY | Facility: CLINIC | Age: 80
End: 2020-07-27
Payer: MEDICARE

## 2020-07-27 ENCOUNTER — TELEPHONE (OUTPATIENT)
Dept: UROLOGY | Facility: CLINIC | Age: 80
End: 2020-07-27

## 2020-07-27 VITALS
BODY MASS INDEX: 26.92 KG/M2 | SYSTOLIC BLOOD PRESSURE: 198 MMHG | HEART RATE: 69 BPM | DIASTOLIC BLOOD PRESSURE: 86 MMHG | RESPIRATION RATE: 18 BRPM | TEMPERATURE: 98 F | OXYGEN SATURATION: 98 % | WEIGHT: 177 LBS

## 2020-07-27 DIAGNOSIS — D53.9 ANEMIA ASSOCIATED WITH NUTRITIONAL DEFICIENCY: Primary | ICD-10-CM

## 2020-07-27 DIAGNOSIS — E55.9 VITAMIN D DEFICIENCY: ICD-10-CM

## 2020-07-27 DIAGNOSIS — D51.9 ANEMIA DUE TO VITAMIN B12 DEFICIENCY, UNSPECIFIED B12 DEFICIENCY TYPE: ICD-10-CM

## 2020-07-27 DIAGNOSIS — D63.8 ANEMIA OF CHRONIC DISEASE: ICD-10-CM

## 2020-07-27 PROCEDURE — 99213 OFFICE O/P EST LOW 20 MIN: CPT | Mod: S$PBB,,, | Performed by: INTERNAL MEDICINE

## 2020-07-27 PROCEDURE — 99999 PR PBB SHADOW E&M-EST. PATIENT-LVL III: CPT | Mod: PBBFAC,,, | Performed by: INTERNAL MEDICINE

## 2020-07-27 PROCEDURE — 99999 PR PBB SHADOW E&M-EST. PATIENT-LVL III: ICD-10-PCS | Mod: PBBFAC,,, | Performed by: INTERNAL MEDICINE

## 2020-07-27 PROCEDURE — 99213 OFFICE O/P EST LOW 20 MIN: CPT | Mod: PBBFAC,PO | Performed by: INTERNAL MEDICINE

## 2020-07-27 PROCEDURE — 99213 PR OFFICE/OUTPT VISIT, EST, LEVL III, 20-29 MIN: ICD-10-PCS | Mod: S$PBB,,, | Performed by: INTERNAL MEDICINE

## 2020-07-27 NOTE — TELEPHONE ENCOUNTER
I think I sent the following message 4/2020.     See if he can do virtual visit.   Hasn't been seen since 1/2019.   Also needs cysto maybe June/july

## 2020-07-27 NOTE — PROGRESS NOTES
PATIENT: Laura Harris  MRN: [id number]  DATE: 7/27/2020    Diagnosis:   1. Anemia associated with nutritional deficiency    2. Vitamin D deficiency    3. Anemia due to vitamin B12 deficiency, unspecified B12 deficiency type    4. Anemia of chronic disease      Chief Complaint: Anemia    Subjective:     History of Present Illness:     He is here for follow-up of anemia.  PCP is Dr. Maco Denton.    CBC done 11/06/2019 reveals hemoglobin 7.1, MCV 82.  Platelets and WBC within normal limits.    September 2019, hemoglobin was 11.7.    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  In August 2009 he had a left ureteral stricture that was treated with placement of a stent and subsequent removal.    He is on Flomax for BPH.  He also has stage III CKD.    He has CAD status post placement of a stent March 2019.  Follows up with cardiology    He complains of tiredness and fatigue and dyspnea on exertion that has been worsening over the past few months.  Denies any blood in the stools or hematuria.    INTERVAL HISTORY:  -here for follow-up of anemia  -continues to be active, working on his house  -complains of dry skin and itching in the neck      Past Medical History:   Past Medical History:   Diagnosis Date    Anemia of chronic disease 11/19/2019    Arthritis     Cataract     CKD (chronic kidney disease) stage 3, GFR 30-59 ml/min 5/1/2014    Coronary artery disease involving native coronary artery of native heart without angina pectoris 10/7/2019    Hyperlipidemia     Hypertension     Low tension glaucoma        Past Surgical History:   Past Surgical History:   Procedure Laterality Date    CORONARY STENT PLACEMENT N/A 3/4/2019    Procedure: INSERTION, STENT, CORONARY ARTERY;  Surgeon: Brennan Stein MD;  Location: [facility] CATH LAB/EP;  Service: Cardiology;  Laterality: N/A;    CYSTOSCOPY      LEFT HEART CATHETERIZATION N/A  3/4/2019    Procedure: Left heart cath;  Surgeon: Brennan Stein MD;  Location: [facility] CATH LAB/EP;  Service: Cardiology;  Laterality: N/A;       Family History:   Family History   Problem Relation Age of Onset    Melanoma Neg Hx        Social History:  reports that he quit smoking about 65 years ago. His smoking use included cigarettes. He smoked 1.00 pack per day. He uses smokeless tobacco. He reports that he does not drink alcohol or use drugs.    Allergies:  Review of patient's allergies indicates:   Allergen Reactions    Ace inhibitors      Other reaction(s): Angioedema    Ultram [tramadol] Nausea Only and Other (See Comments)     Dizziness       Medications:  Current Outpatient Medications   Medication Sig Dispense Refill    ADVAIR -21 mcg/actuation HFAA inhaler INHALE 2 PUFFS INTO THE LUNGS TWICE DAILY( CONTROLLER) 12 g 1    amLODIPine (NORVASC) 5 MG tablet TAKE 1 TABLET BY MOUTH DAILY 90 tablet 3    aspirin (ECOTRIN) 81 MG EC tablet Take 1 tablet (81 mg total) by mouth once daily.  0    atorvastatin (LIPITOR) 80 MG tablet Take 1 tablet (80 mg total) by mouth once daily. 90 tablet 3    cholecalciferol, vitamin D3, 50,000 unit Tab Take 50,000 Units by mouth once a week. 12 tablet 3    cilostazol (PLETAL) 50 MG Tab Take 1 tablet (50 mg total) by mouth 2 (two) times daily. 60 tablet 11    clopidogreL (PLAVIX) 75 mg tablet TAKE 1 TABLET BY MOUTH DAILY 90 tablet 3    latanoprost (XALATAN) 0.005 % ophthalmic solution Place 1 drop into both eyes once daily. 2.5 mL 5    losartan (COZAAR) 50 MG tablet Take 1 tablet (50 mg total) by mouth once daily. 90 tablet 3    metoprolol succinate (TOPROL-XL) 25 MG 24 hr tablet Take 1 tablet (25 mg total) by mouth once daily. 90 tablet 3    nitroGLYCERIN (NITROSTAT) 0.4 MG SL tablet Place 1 tablet (0.4 mg total) under the tongue every 5 (five) minutes as needed for Chest pain. 30 tablet 2    tamsulosin (FLOMAX) 0.4 mg Cap TAKE 1 CAPSULE(0.4 MG) BY MOUTH  AFTER DINNER 30 capsule 0     No current facility-administered medications for this visit.        Review of Systems   Constitutional: Negative for fatigue, fever and unexpected weight change.   HENT: Negative for mouth sores and nosebleeds.    Eyes: Negative for photophobia and pain.   Respiratory: Negative for shortness of breath and wheezing.    Cardiovascular: Negative for chest pain and palpitations.   Gastrointestinal: Negative for abdominal pain and vomiting.   Genitourinary: Negative for flank pain and hematuria.   Musculoskeletal: Negative for back pain, neck pain and neck stiffness.   Skin: Negative for rash and wound.   Neurological: Negative for seizures and syncope.   Hematological: Negative for adenopathy. Does not bruise/bleed easily.   Psychiatric/Behavioral: Negative for behavioral problems and confusion. The patient is nervous/anxious.    All other systems reviewed and are negative.      Objective:     Vitals:    07/27/20 1126   BP: (!) 198/86   Pulse: 69   Resp: 18   Temp: 98 °F (36.7 °C)   SpO2: 98%   Weight: 80.3 kg (177 lb 0.5 oz)     Body mass index is 26.92 kg/m².    General appearance: no acute distress. conversant  Ear, Nose, Mouth, Throat: oropharynx clear. mucous membranes moist. no oral ulcers. no gum bleeding.  Neck: no masses or enlarged lymph nodes  Lungs: clear to auscultation. no rales. breathing nonlabored  Heart: rhythm regular. no gallops. no edema.  Abdomen: soft and nontender. no ascites. no hepatosplenomegaly.  Neurologic/Psychiatric: alert. oriented to time, place and person. no anxiety or agitation.        Assessment:       1. Anemia associated with nutritional deficiency    2. Vitamin D deficiency    3. Anemia due to vitamin B12 deficiency, unspecified B12 deficiency type    4. Anemia of chronic disease      Plan:   Normocytic anemia - secondary to vitamin-D and B12 deficiency  -anemia resolved with B12 and vitamin-D supplementation  -continue B12 5000 mcg daily  -continue  vitamin-D, 13360 units weekly  -okay to stop oral iron    Repeat labs and follow-up in 6 mo    Uncontrolled blood pressure  -asked him to start checking his blood pressures at home and keep a log  -follow-up Dr. Maco Denton    Transitional cell carcinoma of the bladder  -diagnosed in 2003  -had a stricture in the left ureter in August 2019 that was treated  -follows with Urology    Stage 3 chronic kidney disease  -creatinine stable    Coronary artery disease  -follows with cardiology  -on plavix
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
## 2020-07-29 ENCOUNTER — OFFICE VISIT (OUTPATIENT)
Dept: OPHTHALMOLOGY | Facility: CLINIC | Age: 80
End: 2020-07-29
Payer: MEDICARE

## 2020-07-29 DIAGNOSIS — H40.1232 LOW-TENSION GLAUCOMA OF BOTH EYES, MODERATE STAGE: Primary | ICD-10-CM

## 2020-07-29 DIAGNOSIS — H25.13 NUCLEAR SCLEROSIS OF BOTH EYES: ICD-10-CM

## 2020-07-29 PROCEDURE — 99999 PR PBB SHADOW E&M-EST. PATIENT-LVL III: CPT | Mod: PBBFAC,,, | Performed by: OPHTHALMOLOGY

## 2020-07-29 PROCEDURE — 92133 POSTERIOR SEGMENT OCT OPTIC NERVE(OCULAR COHERENCE TOMOGRAPHY) - OU - BOTH EYES: ICD-10-PCS | Mod: 26,S$PBB,, | Performed by: OPHTHALMOLOGY

## 2020-07-29 PROCEDURE — 99213 OFFICE O/P EST LOW 20 MIN: CPT | Mod: PBBFAC,25 | Performed by: OPHTHALMOLOGY

## 2020-07-29 PROCEDURE — 92014 PR EYE EXAM, EST PATIENT,COMPREHESV: ICD-10-PCS | Mod: S$PBB,,, | Performed by: OPHTHALMOLOGY

## 2020-07-29 PROCEDURE — 99999 PR PBB SHADOW E&M-EST. PATIENT-LVL III: ICD-10-PCS | Mod: PBBFAC,,, | Performed by: OPHTHALMOLOGY

## 2020-07-29 PROCEDURE — 92014 COMPRE OPH EXAM EST PT 1/>: CPT | Mod: S$PBB,,, | Performed by: OPHTHALMOLOGY

## 2020-07-29 PROCEDURE — 92133 CPTRZD OPH DX IMG PST SGM ON: CPT | Mod: PBBFAC | Performed by: OPHTHALMOLOGY

## 2020-08-04 ENCOUNTER — TELEPHONE (OUTPATIENT)
Dept: UROLOGY | Facility: CLINIC | Age: 80
End: 2020-08-04

## 2020-08-04 NOTE — TELEPHONE ENCOUNTER
Several attempts to contact the patient were made. Unable to leave a VM on [phone] due to the VM being full. A recall will be sent out the patient.    Shala

## 2020-08-25 DIAGNOSIS — H40.1230 LOW-TENSION GLAUCOMA OF BOTH EYES, UNSPECIFIED GLAUCOMA STAGE: ICD-10-CM

## 2020-08-25 RX ORDER — LATANOPROST 50 UG/ML
1 SOLUTION/ DROPS OPHTHALMIC DAILY
Qty: 7.5 ML | Refills: 4 | Status: ON HOLD | OUTPATIENT
Start: 2020-08-25 | End: 2021-09-16 | Stop reason: CLARIF

## 2020-08-31 ENCOUNTER — TELEPHONE (OUTPATIENT)
Dept: UROLOGY | Facility: CLINIC | Age: 80
End: 2020-08-31

## 2020-08-31 NOTE — TELEPHONE ENCOUNTER
Phone call returned to the patient. Unable to leave a message. Last office was in 2018. Patient needs to follow up.    ----- Message from Julia Everett sent at 8/31/2020 12:34 PM CDT -----  Regarding: Pt  Reason: Refill request for tamsulosin (FLOMAX) 0.4 mg Cap    Pharmacy:[facility] #[id number] [place] [id number] [address] AT [facility] [address]    Communication: [phone]

## 2020-09-02 ENCOUNTER — TELEPHONE (OUTPATIENT)
Dept: FAMILY MEDICINE | Facility: CLINIC | Age: 80
End: 2020-09-02

## 2020-09-02 NOTE — TELEPHONE ENCOUNTER
I called the patient to schedule him an office visit, but there was no answer on neither number on file. The voicemail on the cell phone was full and could not leave a message.

## 2020-09-02 NOTE — TELEPHONE ENCOUNTER
----- Message from Denia Elias sent at 9/2/2020 10:04 AM CDT -----  Type:  Sooner Appointment Request     Name of Caller: patient  Symptoms or Reason: physical  Would the patient rather a call back or a response via [facility]? Call back  Best Call Back Number: [phone]  Additional Information: n/a

## 2020-09-21 ENCOUNTER — OUTPATIENT CASE MANAGEMENT (OUTPATIENT)
Dept: ADMINISTRATIVE | Facility: OTHER | Age: 80
End: 2020-09-21

## 2020-09-24 ENCOUNTER — PATIENT MESSAGE (OUTPATIENT)
Dept: ADMINISTRATIVE | Facility: OTHER | Age: 80
End: 2020-09-24

## 2020-10-26 ENCOUNTER — PATIENT OUTREACH (OUTPATIENT)
Dept: ADMINISTRATIVE | Facility: OTHER | Age: 80
End: 2020-10-26

## 2020-10-26 NOTE — PROGRESS NOTES
LINKS immunization registry updated  Care Everywhere updated  Health Maintenance updated  Chart reviewed for overdue [facility]active [facility] Encounters (JAYCEE) health maintenance testing (CRS, Breast Ca, Diabetic Eye Exam)   Orders entered:N/A

## 2020-10-27 ENCOUNTER — OFFICE VISIT (OUTPATIENT)
Dept: UROLOGY | Facility: CLINIC | Age: 80
End: 2020-10-27
Payer: MEDICARE

## 2020-10-27 VITALS
WEIGHT: 177 LBS | BODY MASS INDEX: 26.83 KG/M2 | HEIGHT: 68 IN | SYSTOLIC BLOOD PRESSURE: 178 MMHG | DIASTOLIC BLOOD PRESSURE: 81 MMHG | HEART RATE: 61 BPM

## 2020-10-27 DIAGNOSIS — Z85.51 HISTORY OF BLADDER CANCER: Primary | ICD-10-CM

## 2020-10-27 DIAGNOSIS — N40.0 BENIGN PROSTATIC HYPERPLASIA, UNSPECIFIED WHETHER LOWER URINARY TRACT SYMPTOMS PRESENT: ICD-10-CM

## 2020-10-27 DIAGNOSIS — I10 ESSENTIAL HYPERTENSION: ICD-10-CM

## 2020-10-27 DIAGNOSIS — N13.5 URETERAL STRICTURE: ICD-10-CM

## 2020-10-27 DIAGNOSIS — I25.10 CORONARY ARTERY DISEASE INVOLVING NATIVE CORONARY ARTERY OF NATIVE HEART WITHOUT ANGINA PECTORIS: ICD-10-CM

## 2020-10-27 PROCEDURE — 99214 PR OFFICE/OUTPT VISIT, EST, LEVL IV, 30-39 MIN: ICD-10-PCS | Mod: S$PBB,,, | Performed by: UROLOGY

## 2020-10-27 PROCEDURE — 99999 PR PBB SHADOW E&M-EST. PATIENT-LVL III: ICD-10-PCS | Mod: PBBFAC,,, | Performed by: UROLOGY

## 2020-10-27 PROCEDURE — 99214 OFFICE O/P EST MOD 30 MIN: CPT | Mod: S$PBB,,, | Performed by: UROLOGY

## 2020-10-27 PROCEDURE — 99213 OFFICE O/P EST LOW 20 MIN: CPT | Mod: PBBFAC | Performed by: UROLOGY

## 2020-10-27 PROCEDURE — 99999 PR PBB SHADOW E&M-EST. PATIENT-LVL III: CPT | Mod: PBBFAC,,, | Performed by: UROLOGY

## 2020-10-27 RX ORDER — TAMSULOSIN HYDROCHLORIDE 0.4 MG/1
0.4 CAPSULE ORAL
Qty: 90 CAPSULE | Refills: 4 | Status: SHIPPED | OUTPATIENT
Start: 2020-10-27 | End: 2020-11-26

## 2020-10-27 NOTE — PROGRESS NOTES
Subjective:       Patient ID: Laura Harris is a 80 y.o. male.    Chief Complaint: Medication Refill (flomax)    HPI  Laura Harris is a 80 y.o. male who has a history of grade 3 papillary transitional cell carcinoma back in 2003.   8/2009 had left ureteral stricture, stent and subsequent removal.   Last cysto 1/19 showed no recurrence of bladder cancer.   Last upper tract imaging renal ultrasound 1/18 and cxr ok.   Has CKD.    Nocturia 2 times at night. Urinary frequency every 2 hours.   Stream is weak. Has intermittency.   Ran out of flomax 3-4 months. Things have gotten worse.     He quit smoking 1.5 years ago after his MI.    BMP  Lab Results   Component Value Date     07/24/2020    K 3.2 (L) 07/24/2020     07/24/2020    CO2 23 07/24/2020    BUN 17 07/24/2020    CREATININE 1.6 (H) 07/24/2020    CALCIUM 8.7 07/24/2020    ANIONGAP 8 07/24/2020    ESTGFRAFRICA 46 (A) 07/24/2020    EGFRNONAA 40 (A) 07/24/2020          BPH - started back on flomax 1/2017. He had stopped it and was restarted in January 2018.     Lab Results   Component Value Date    PSA 1.4 11/19/2019    PSA 1.3 03/28/2015    PSA 1.08 05/07/2012    PSA 1.6 04/27/2011    PSA 1.2 08/04/2010    PSA 1.6 04/25/2009    PSA 0.8 02/04/2008    PSA 1.0 09/19/2006    PSA 1.3 08/12/2005    PSA 0.8 01/29/2004    PSADIAG 1.6 04/02/2014         Past Surgical History:   Procedure Laterality Date    CORONARY STENT PLACEMENT N/A 3/4/2019    Procedure: INSERTION, STENT, CORONARY ARTERY;  Surgeon: Brennan Stein MD;  Location: [facility] CATH LAB/EP;  Service: Cardiology;  Laterality: N/A;    CYSTOSCOPY      LEFT HEART CATHETERIZATION N/A 3/4/2019    Procedure: Left heart cath;  Surgeon: Brennan Stein MD;  Location: [facility] CATH LAB/EP;  Service: Cardiology;  Laterality: N/A;       Past Medical History:   Diagnosis Date    Anemia of chronic disease 11/19/2019    Arthritis     Cataract     CKD (chronic kidney disease) stage 3, GFR 30-59 ml/min  2014    Coronary artery disease involving native coronary artery of native heart without angina pectoris 10/7/2019    Hyperlipidemia     Hypertension     Low tension glaucoma        Social History     Socioeconomic History    Marital status: Single     Spouse name: Not on file    Number of children: Not on file    Years of education: Not on file    Highest education level: Not on file   Occupational History    Not on file   Social Needs    Financial resource strain: Not on file    Food insecurity     Worry: Not on file     Inability: Not on file    Transportation needs     Medical: Not on file     Non-medical: Not on file   Tobacco Use    Smoking status: Former Smoker     Packs/day: 1.00     Types: Cigarettes     Quit date: 1955     Years since quittin.8    Smokeless tobacco: Current User   Substance and Sexual Activity    Alcohol use: No    Drug use: No    Sexual activity: Not on file   Lifestyle    Physical activity     Days per week: Not on file     Minutes per session: Not on file    Stress: Not on file   Relationships    Social connections     Talks on phone: Not on file     Gets together: Not on file     Attends [employer] service: Not on file     Active member of club or organization: Not on file     Attends meetings of clubs or organizations: Not on file     Relationship status: Not on file   Other Topics Concern    Not on file   Social History Narrative    Not on file       Family History   Problem Relation Age of Onset    Melanoma Neg Hx        Current Outpatient Medications   Medication Sig Dispense Refill    ADVAIR -21 mcg/actuation HFAA inhaler INHALE 2 PUFFS INTO THE LUNGS TWICE DAILY( CONTROLLER) 12 g 1    amLODIPine (NORVASC) 5 MG tablet TAKE 1 TABLET BY MOUTH DAILY 90 tablet 3    aspirin (ECOTRIN) 81 MG EC tablet Take 1 tablet (81 mg total) by mouth once daily.  0    atorvastatin (LIPITOR) 80 MG tablet Take 1 tablet (80 mg total) by mouth once daily. 90  tablet 3    cholecalciferol, vitamin D3, 50,000 unit Tab Take 50,000 Units by mouth once a week. 12 tablet 3    cilostazol (PLETAL) 50 MG Tab Take 1 tablet (50 mg total) by mouth 2 (two) times daily. 60 tablet 11    clopidogreL (PLAVIX) 75 mg tablet TAKE 1 TABLET BY MOUTH DAILY 90 tablet 3    latanoprost (XALATAN) 0.005 % ophthalmic solution Place 1 drop into both eyes once daily. 7.5 mL 4    losartan (COZAAR) 50 MG tablet Take 1 tablet (50 mg total) by mouth once daily. 90 tablet 3    metoprolol succinate (TOPROL-XL) 25 MG 24 hr tablet Take 1 tablet (25 mg total) by mouth once daily. 90 tablet 3    nitroGLYCERIN (NITROSTAT) 0.4 MG SL tablet Place 1 tablet (0.4 mg total) under the tongue every 5 (five) minutes as needed for Chest pain. 30 tablet 2    tamsulosin (FLOMAX) 0.4 mg Cap TAKE 1 CAPSULE(0.4 MG) BY MOUTH AFTER DINNER 30 capsule 0     No current facility-administered medications for this visit.        Review of patient's allergies indicates:   Allergen Reactions    Ace inhibitors      Other reaction(s): Angioedema    Ultram [tramadol] Nausea Only and Other (See Comments)     Dizziness        BMP  Lab Results   Component Value Date     07/24/2020    K 3.2 (L) 07/24/2020     07/24/2020    CO2 23 07/24/2020    BUN 17 07/24/2020    CREATININE 1.6 (H) 07/24/2020    CALCIUM 8.7 07/24/2020    ANIONGAP 8 07/24/2020    ESTGFRAFRICA 46 (A) 07/24/2020    EGFRNONAA 40 (A) 07/24/2020       Review of Systems   Constitutional: Negative for chills, fever and unexpected weight change.   HENT: Negative for hearing loss and nosebleeds.    Eyes: Negative for visual disturbance.   Respiratory: Negative for chest tightness and shortness of breath.    Cardiovascular: Negative for chest pain.   Gastrointestinal: Positive for diarrhea. Negative for constipation.   Genitourinary: Positive for frequency and nocturia. Negative for dysuria and urgency.   Musculoskeletal: Negative for joint swelling.   Skin: Negative  for rash.   Neurological: Negative for seizures.   Hematological: Does not bruise/bleed easily.   Psychiatric/Behavioral: Negative for behavioral problems.     Objective:      Physical Exam  Constitutional:       Appearance: He is well-developed.   HENT:      Head: Normocephalic and atraumatic.   Eyes:      General: No scleral icterus.  Neck:      Musculoskeletal: Neck supple.   Cardiovascular:      Rate and Rhythm: Normal rate and regular rhythm.   Pulmonary:      Effort: Pulmonary effort is normal. No respiratory distress.   Abdominal:      Palpations: There is no mass.      Hernia: There is no hernia in the right inguinal area or left inguinal area.   Genitourinary:     Penis: Normal and circumcised.       Scrotum/Testes: Normal.      Comments: Prostate was smooth without nodularity. No rectal masses.  40 grams. External hemorrhoids were not present. Normal perineum.    Musculoskeletal:         General: No tenderness.   Lymphadenopathy:      Cervical: No cervical adenopathy.      Lower Body: No right inguinal adenopathy. No left inguinal adenopathy.   Skin:     General: Skin is warm.      Findings: No rash.   Neurological:      Mental Status: He is alert and oriented to person, place, and time.       no urine today.    bladder scan - The patient's PVR was noted to be 84cc.    Assessment:       1. History of bladder cancer    2. Ureteral stricture    3. Essential hypertension    4. Coronary artery disease involving native coronary artery of native heart without angina pectoris        Plan:   Laura was seen today for medication refill.    Diagnoses and all orders for this visit:    History of bladder cancer  -     Cystoscopy; Future    Ureteral stricture    Essential hypertension    Coronary artery disease involving native coronary artery of native heart without angina pectoris    Benign prostatic hyperplasia, unspecified whether lower urinary tract symptoms present  -     Cystoscopy; Future    Other orders  -      tamsulosin (FLOMAX) 0.4 mg Cap; Take 1 capsule (0.4 mg total) by mouth after dinner.      Needs cystoscopy  Restart flomax.

## 2020-11-11 ENCOUNTER — PROCEDURE VISIT (OUTPATIENT)
Dept: UROLOGY | Facility: CLINIC | Age: 80
End: 2020-11-11
Payer: MEDICARE

## 2020-11-11 ENCOUNTER — HOSPITAL ENCOUNTER (EMERGENCY)
Facility: HOSPITAL | Age: 80
Discharge: HOME | End: 2020-11-11
Attending: EMERGENCY MEDICINE
Payer: MEDICARE

## 2020-11-11 VITALS
DIASTOLIC BLOOD PRESSURE: 81 MMHG | SYSTOLIC BLOOD PRESSURE: 177 MMHG | WEIGHT: 182 LBS | TEMPERATURE: 98 F | OXYGEN SATURATION: 99 % | HEART RATE: 65 BPM | BODY MASS INDEX: 27.58 KG/M2 | HEIGHT: 68 IN | RESPIRATION RATE: 17 BRPM

## 2020-11-11 VITALS
BODY MASS INDEX: 27.68 KG/M2 | SYSTOLIC BLOOD PRESSURE: 173 MMHG | WEIGHT: 182.63 LBS | RESPIRATION RATE: 17 BRPM | DIASTOLIC BLOOD PRESSURE: 75 MMHG | HEART RATE: 33 BPM | TEMPERATURE: 98 F | HEIGHT: 68 IN

## 2020-11-11 DIAGNOSIS — N40.0 BENIGN PROSTATIC HYPERPLASIA, UNSPECIFIED WHETHER LOWER URINARY TRACT SYMPTOMS PRESENT: ICD-10-CM

## 2020-11-11 DIAGNOSIS — R00.1 BRADYCARDIA: ICD-10-CM

## 2020-11-11 DIAGNOSIS — Z85.51 HISTORY OF BLADDER CANCER: ICD-10-CM

## 2020-11-11 DIAGNOSIS — I49.3 PVC (PREMATURE VENTRICULAR CONTRACTION): Primary | ICD-10-CM

## 2020-11-11 LAB
ALBUMIN SERPL BCP-MCNC: 3.7 G/DL (ref 3.5–5.2)
ALP SERPL-CCNC: 81 U/L (ref 55–135)
ALT SERPL W/O P-5'-P-CCNC: 16 U/L (ref 10–44)
ANION GAP SERPL CALC-SCNC: 11 MMOL/L (ref 8–16)
AST SERPL-CCNC: 14 U/L (ref 10–40)
BASOPHILS # BLD AUTO: 0.05 K/UL (ref 0–0.2)
BASOPHILS NFR BLD: 0.9 % (ref 0–1.9)
BILIRUB SERPL-MCNC: 0.4 MG/DL (ref 0.1–1)
BNP SERPL-MCNC: 306 PG/ML (ref 0–99)
BUN SERPL-MCNC: 19 MG/DL (ref 8–23)
CALCIUM SERPL-MCNC: 8.8 MG/DL (ref 8.7–10.5)
CHLORIDE SERPL-SCNC: 112 MMOL/L (ref 95–110)
CO2 SERPL-SCNC: 21 MMOL/L (ref 23–29)
CREAT SERPL-MCNC: 1.5 MG/DL (ref 0.5–1.4)
DIFFERENTIAL METHOD: NORMAL
EOSINOPHIL # BLD AUTO: 0.2 K/UL (ref 0–0.5)
EOSINOPHIL NFR BLD: 3.3 % (ref 0–8)
ERYTHROCYTE [DISTWIDTH] IN BLOOD BY AUTOMATED COUNT: 13.8 % (ref 11.5–14.5)
EST. GFR  (AFRICAN AMERICAN): 49.8 ML/MIN/1.73 M^2
EST. GFR  (NON AFRICAN AMERICAN): 43 ML/MIN/1.73 M^2
GLUCOSE SERPL-MCNC: 78 MG/DL (ref 70–110)
HCT VFR BLD AUTO: 43.5 % (ref 40–54)
HGB BLD-MCNC: 14 G/DL (ref 14–18)
IMM GRANULOCYTES # BLD AUTO: 0.02 K/UL (ref 0–0.04)
IMM GRANULOCYTES NFR BLD AUTO: 0.4 % (ref 0–0.5)
LYMPHOCYTES # BLD AUTO: 1.8 K/UL (ref 1–4.8)
LYMPHOCYTES NFR BLD: 32.1 % (ref 18–48)
MAGNESIUM SERPL-MCNC: 1.5 MG/DL (ref 1.6–2.6)
MCH RBC QN AUTO: 27.5 PG (ref 27–31)
MCHC RBC AUTO-ENTMCNC: 32.2 G/DL (ref 32–36)
MCV RBC AUTO: 85 FL (ref 82–98)
MONOCYTES # BLD AUTO: 0.6 K/UL (ref 0.3–1)
MONOCYTES NFR BLD: 10.2 % (ref 4–15)
NEUTROPHILS # BLD AUTO: 2.9 K/UL (ref 1.8–7.7)
NEUTROPHILS NFR BLD: 53.1 % (ref 38–73)
NRBC BLD-RTO: 0 /100 WBC
PHOSPHATE SERPL-MCNC: 3 MG/DL (ref 2.7–4.5)
PLATELET # BLD AUTO: 190 K/UL (ref 150–350)
PMV BLD AUTO: 10.8 FL (ref 9.2–12.9)
POTASSIUM SERPL-SCNC: 4 MMOL/L (ref 3.5–5.1)
PROT SERPL-MCNC: 6.6 G/DL (ref 6–8.4)
RBC # BLD AUTO: 5.1 M/UL (ref 4.6–6.2)
SODIUM SERPL-SCNC: 144 MMOL/L (ref 136–145)
TROPONIN I SERPL DL<=0.01 NG/ML-MCNC: 0.01 NG/ML (ref 0–0.03)
WBC # BLD AUTO: 5.49 K/UL (ref 3.9–12.7)

## 2020-11-11 PROCEDURE — 93010 ELECTROCARDIOGRAM REPORT: CPT | Mod: ,,, | Performed by: INTERNAL MEDICINE

## 2020-11-11 PROCEDURE — 99284 EMERGENCY DEPT VISIT MOD MDM: CPT | Mod: 25

## 2020-11-11 PROCEDURE — 99285 EMERGENCY DEPT VISIT HI MDM: CPT | Mod: ,,, | Performed by: EMERGENCY MEDICINE

## 2020-11-11 PROCEDURE — 52000 CYSTOSCOPY: ICD-10-PCS | Mod: S$PBB,,, | Performed by: UROLOGY

## 2020-11-11 PROCEDURE — 52000 CYSTOURETHROSCOPY: CPT | Mod: PBBFAC | Performed by: UROLOGY

## 2020-11-11 PROCEDURE — 83880 ASSAY OF NATRIURETIC PEPTIDE: CPT

## 2020-11-11 PROCEDURE — 99285 PR EMERGENCY DEPT VISIT,LEVEL V: ICD-10-PCS | Mod: ,,, | Performed by: EMERGENCY MEDICINE

## 2020-11-11 PROCEDURE — 25000003 PHARM REV CODE 250: Performed by: EMERGENCY MEDICINE

## 2020-11-11 PROCEDURE — 84100 ASSAY OF PHOSPHORUS: CPT

## 2020-11-11 PROCEDURE — 93005 ELECTROCARDIOGRAM TRACING: CPT

## 2020-11-11 PROCEDURE — 80053 COMPREHEN METABOLIC PANEL: CPT

## 2020-11-11 PROCEDURE — 83735 ASSAY OF MAGNESIUM: CPT

## 2020-11-11 PROCEDURE — 84484 ASSAY OF TROPONIN QUANT: CPT

## 2020-11-11 PROCEDURE — 85025 COMPLETE CBC W/AUTO DIFF WBC: CPT

## 2020-11-11 PROCEDURE — 93010 EKG 12-LEAD: ICD-10-PCS | Mod: ,,, | Performed by: INTERNAL MEDICINE

## 2020-11-11 RX ORDER — TALC
500 POWDER (GRAM) TOPICAL ONCE
Status: DISCONTINUED | OUTPATIENT
Start: 2020-11-11 | End: 2020-11-11

## 2020-11-11 RX ORDER — LIDOCAINE HYDROCHLORIDE 20 MG/ML
JELLY TOPICAL
Status: COMPLETED | OUTPATIENT
Start: 2020-11-11 | End: 2020-11-11

## 2020-11-11 RX ORDER — LANOLIN ALCOHOL/MO/W.PET/CERES
400 CREAM (GRAM) TOPICAL ONCE
Status: COMPLETED | OUTPATIENT
Start: 2020-11-11 | End: 2020-11-11

## 2020-11-11 RX ORDER — DOXYCYCLINE HYCLATE 100 MG
100 TABLET ORAL
Status: COMPLETED | OUTPATIENT
Start: 2020-11-11 | End: 2020-11-11

## 2020-11-11 RX ADMIN — LIDOCAINE HYDROCHLORIDE: 20 JELLY TOPICAL at 09:11

## 2020-11-11 RX ADMIN — Medication 100 MG: at 09:11

## 2020-11-11 RX ADMIN — Medication 400 MG: at 11:11

## 2020-11-11 NOTE — PROCEDURES
"Cystoscopy    Date/Time: 11/11/2020 9:30 AM  Performed by: Ciara Mayorga MD  Authorized by: Ciara Mayorga MD     Consent Done?:  Yes (Written)  Time out: Immediately prior to procedure a "time out" was called to verify the correct patient, procedure, equipment, support staff and site/side marked as required.    Indications: history bladder cancer    Position:  Supine  Anesthesia:  Intraurethral instillation  Patient sedated?: No    Preparation: Patient was prepped and draped in usual sterile fashion      Scope type:  Flexible cystoscope  Stent inserted: No    Stent removed: No    External exam normal: Yes    Digital exam performed: No    Urethra normal: Yes    Prostate normal: Yes  Bladder neck normal: Bladder neck normal   Bladder normal: Yes      Patient tolerance:  Patient tolerated the procedure well with no immediate complications     No recurrence. F/u 1 year cysto.   Sent to ER for low HR in the 30s. Symptomatic with some SOB with tying his shoes. MI history 9/2019.      "

## 2020-11-11 NOTE — PATIENT INSTRUCTIONS
What to Expect After a Cystoscopy  For the next 24-48 hours, you may feel a mild burning when you urinate. This burning is normal and expected. Drink plenty of water to dilute the urine to help relieve the burning sensation. You may also see a small amount of blood in your urine after the procedure.    Unless you are already taking antibiotics, you may be given an antibiotic after the test to prevent infection.    Signs and Symptoms to Report  Call the [facility] at [phone] if you develop any of the following:  · Fever of 101 degrees or higher  · Chills or persistent bleeding  · Inability to urinate

## 2020-11-11 NOTE — ED TRIAGE NOTES
Laura Harris, a 80 y.o. male presents to the ED via wheelchair from clinic with clinic staff with CC bradycardia, reports was at clinic for a urology procedure, post procedure HR was in 40s and was sent to ED. Pt c/o intermittent dizziness, occurs with sudden changes in positions, states has been going on for years, denies currently. Pt does report feeling fatigue yesterday. Denies CP SOB or nausea. No other complaints at this time.     Patient identifiers verified verbally with patient and correct for Laura Harris.    LOC/ APPEARANCE: The patient is AAOx4. Pt is speaking appropriately, no slurred speech. Pt changed into hospital gown. Continuous cardiac monitor, cont pulse ox, and auto BP cuff applied to patient. Pt is clean and well groomed. No JVD visible. Pt updated on POC. Bed low and locked with side rails up x2, call bell in pt reach.  SKIN: Skin is warm dry and intact, and color is consistent with ethnicity. Capillary refill <3 seconds. No breakdown or brusing visible. Mucus membranes moist, acyanotic.  RESPIRATORY: Airway is open and patent. Respirations-spontaneous, unlabored, regular rate, equal bilaterally on inspiration and expiration. No accessory muscle use noted. Lungs clear to auscultation in all fields bilaterally anterior and posterior.   CARDIAC: Patient bradycardic. Patient has no c/o chest pain. Peripheral pulses present equal and strong throughout. Swelling noted to right ankle.  ABDOMEN: Soft and non-tender to palpation with no distention noted. Normoactive bowel sounds x4 quadrants. Pt has no complaints of abnormal bowel movements, denies blood in stool. Pt reports normal appetite.   NEUROLOGIC: Eyes open spontaneously and facial expression symmetrical. Pt behavior appropriate to situation, and pt follows commands. Pt reports sensation present in all extremities when touched with a finger, denies any numbness or tingling. PERRLA  MUSCULOSKELETAL: Spontaneous movement noted to all  extremities. Hand  equal and leg strength strong +5 bilaterally.   : No complaints of frequency, burning, urgency or blood in the urine. No complaints of incontinence.

## 2020-11-11 NOTE — ED PROVIDER NOTES
Encounter Date: 11/11/2020       History     Chief Complaint   Patient presents with    Bradycardia    Dizziness     Laura Harris is an 80 M hx of NSTEMI 3/19 s/p cath with LCx PCI, hypertension, hyperlipidemia, CKD presenting to the emergency department from cystoscopy for bradycardia.  During his procedure, his heart rate was in the 30s to 40s.  He did complain of mild shortness of breath at that time but denied chest pain, lightheadedness.  He has hx of bradycardia.  He for several years has been having dizziness when bending down to tie his shoes and standing up quickly.  This typically resolves within seconds and occurs daily.  He currently denies chest pain, shortness of breath, dizziness or lightheadedness.  He does report mild bilateral lower extremity swelling has been persistent for the past couple months.  He is eating and drinking well.  No recent fevers, chills.        Review of patient's allergies indicates:   Allergen Reactions    Ace inhibitors      Other reaction(s): Angioedema    Ultram [tramadol] Nausea Only and Other (See Comments)     Dizziness     Past Medical History:   Diagnosis Date    Anemia of chronic disease 11/19/2019    Arthritis     Cataract     CKD (chronic kidney disease) stage 3, GFR 30-59 ml/min 5/1/2014    Coronary artery disease involving native coronary artery of native heart without angina pectoris 10/7/2019    Hyperlipidemia     Hypertension     Low tension glaucoma      Past Surgical History:   Procedure Laterality Date    CORONARY STENT PLACEMENT N/A 3/4/2019    Procedure: INSERTION, STENT, CORONARY ARTERY;  Surgeon: Brennan Stein MD;  Location: [facility] CATH LAB/EP;  Service: Cardiology;  Laterality: N/A;    CYSTOSCOPY      LEFT HEART CATHETERIZATION N/A 3/4/2019    Procedure: Left heart cath;  Surgeon: Brennan Stein MD;  Location: [facility] CATH LAB/EP;  Service: Cardiology;  Laterality: N/A;     Family History   Problem Relation Age of Onset    Melanoma  Neg Hx      Social History     Tobacco Use    Smoking status: Former Smoker     Packs/day: 1.00     Types: Cigarettes     Quit date: 1955     Years since quittin.9    Smokeless tobacco: Current User   Substance Use Topics    Alcohol use: No    Drug use: No     Review of Systems   Constitutional: Negative for chills and fever.   HENT: Negative for congestion and sinus pressure.    Respiratory: Positive for shortness of breath. Negative for cough.    Cardiovascular: Positive for leg swelling. Negative for chest pain and palpitations.   Gastrointestinal: Negative for abdominal pain, diarrhea, nausea and vomiting.   Genitourinary: Positive for frequency and urgency. Negative for dysuria.   Musculoskeletal: Negative for back pain.   Skin: Negative for pallor.   Neurological: Positive for dizziness and light-headedness. Negative for weakness.   Psychiatric/Behavioral: Negative for confusion.       Physical Exam     Initial Vitals [20 1000]   BP Pulse Resp Temp SpO2   (!) 177/77 (!) 46 18 97.6 °F (36.4 °C) 98 %      MAP       --         Physical Exam    Nursing note and vitals reviewed.  Constitutional: He appears well-developed. No distress.   HENT:   Mouth/Throat: Oropharynx is clear and moist.   Eyes: Conjunctivae and EOM are normal. Pupils are equal, round, and reactive to light.   Neck: Neck supple.   Cardiovascular: Regular rhythm.   bradycardia   Pulmonary/Chest: Breath sounds normal. He has no wheezes. He has no rales.   Abdominal: Soft. Bowel sounds are normal. There is no abdominal tenderness.   Musculoskeletal: Edema (trace edema to ankles) present.   Neurological: He is alert and oriented to person, place, and time.   Skin: Skin is warm. No rash noted.         ED Course   Procedures  Labs Reviewed   COMPREHENSIVE METABOLIC PANEL - Abnormal; Notable for the following components:       Result Value    Chloride 112 (*)     CO2 21 (*)     Creatinine 1.5 (*)     eGFR if  49.8 (*)      eGFR if non  43.0 (*)     All other components within normal limits   MAGNESIUM - Abnormal; Notable for the following components:    Magnesium 1.5 (*)     All other components within normal limits   B-TYPE NATRIURETIC PEPTIDE - Abnormal; Notable for the following components:     (*)     All other components within normal limits   CBC W/ AUTO DIFFERENTIAL   TROPONIN I   PHOSPHORUS     EKG Readings: (Independently Interpreted)   EKG:  Sinus rhythm at 62, occasional PVCs, T-wave inversions in inferior lateral leads, no ST elevations.  T-wave changes noted on previous EKG October 8, 2019       Imaging Results    None          Medical Decision Making:   History:   Old Medical Records: I decided to obtain old medical records.  Old Records Summarized: records from clinic visits.       <> Summary of Records: Summarized in HPI  Initial Assessment:   Emergent evaluation of a 80-year-old M transferred from cystoscopy for bradycardia.  Heart rate has been ranging from the 50s to 70s in the emergency department.  He currently denies chest pain, dizziness or lightheadedness.  EKG reviewed with sinus rhythm, multiple PVCs.  No evidence of heart block.  He is also hypertensive, denies symptoms.  Took BP meds this morning at 5:00 a.m..  Differential Diagnosis:   Electrolyte derangement, arrhythmia, ectopic beat, medication effect  Clinical Tests:   Lab Tests: Reviewed and Ordered  ED Management:  - labs  - cardiac monitor  - EKG                     ED Course as of Nov 12 0720 Wed Nov 11, 2020   1108 Troponin I: 0.013 [GM]   1108 Phosphorus: 3.0 [GM]   1108 Chloride(!): 112 [GM]   1108 CO2(!): 21 [GM]   1108 Creatinine(!): 1.5 [GM]      ED Course User Index  [GM] Inga Gallardo MD     Patient remains asymptomatic while in the emergency department.  Labs reviewed, mild hypomagnesemia.  Mag-Ox ordered.  Patient has had a long standing history of bradycardia, recommend follow-up with his cardiologist for  further evaluation.            Clinical Impression:       ICD-10-CM ICD-9-CM   1. PVC (premature ventricular contraction)  I49.3 427.69   2. Bradycardia  R00.1 427.89                                               Inga Gallardo MD  11/12/20 2966

## 2021-01-05 ENCOUNTER — PATIENT MESSAGE (OUTPATIENT)
Dept: ADMINISTRATIVE | Facility: HOSPITAL | Age: 81
End: 2021-01-05

## 2021-01-09 ENCOUNTER — HOSPITAL ENCOUNTER (EMERGENCY)
Facility: HOSPITAL | Age: 81
Discharge: HOME | End: 2021-01-09
Attending: EMERGENCY MEDICINE
Payer: MEDICARE

## 2021-01-09 VITALS
DIASTOLIC BLOOD PRESSURE: 82 MMHG | OXYGEN SATURATION: 100 % | BODY MASS INDEX: 27.58 KG/M2 | SYSTOLIC BLOOD PRESSURE: 140 MMHG | WEIGHT: 182 LBS | TEMPERATURE: 97 F | RESPIRATION RATE: 18 BRPM | HEART RATE: 70 BPM | HEIGHT: 68 IN

## 2021-01-09 DIAGNOSIS — M79.601 RIGHT ARM PAIN: Primary | ICD-10-CM

## 2021-01-09 DIAGNOSIS — R52 PAIN: ICD-10-CM

## 2021-01-09 PROCEDURE — 25000003 PHARM REV CODE 250: Performed by: EMERGENCY MEDICINE

## 2021-01-09 PROCEDURE — 99284 EMERGENCY DEPT VISIT MOD MDM: CPT | Mod: 25

## 2021-01-09 RX ORDER — ACETAMINOPHEN 500 MG
1000 TABLET ORAL
Status: COMPLETED | OUTPATIENT
Start: 2021-01-09 | End: 2021-01-09

## 2021-01-09 RX ADMIN — ACETAMINOPHEN 1000 MG: 500 TABLET ORAL at 10:01

## 2021-01-22 ENCOUNTER — PATIENT MESSAGE (OUTPATIENT)
Dept: ADMINISTRATIVE | Facility: OTHER | Age: 81
End: 2021-01-22

## 2021-02-03 ENCOUNTER — PES CALL (OUTPATIENT)
Dept: ADMINISTRATIVE | Facility: CLINIC | Age: 81
End: 2021-02-03

## 2021-04-05 ENCOUNTER — PATIENT MESSAGE (OUTPATIENT)
Dept: ADMINISTRATIVE | Facility: HOSPITAL | Age: 81
End: 2021-04-05

## 2021-04-16 ENCOUNTER — PATIENT MESSAGE (OUTPATIENT)
Dept: RESEARCH | Facility: HOSPITAL | Age: 81
End: 2021-04-16

## 2021-04-28 ENCOUNTER — PES CALL (OUTPATIENT)
Dept: ADMINISTRATIVE | Facility: CLINIC | Age: 81
End: 2021-04-28

## 2021-06-01 ENCOUNTER — LAB VISIT (OUTPATIENT)
Dept: LAB | Facility: HOSPITAL | Age: 81
End: 2021-06-01
Attending: NURSE PRACTITIONER
Payer: MEDICARE

## 2021-06-01 DIAGNOSIS — Z12.11 SCREENING FOR COLON CANCER: ICD-10-CM

## 2021-06-01 PROCEDURE — 82274 ASSAY TEST FOR BLOOD FECAL: CPT | Performed by: NURSE PRACTITIONER

## 2021-06-09 ENCOUNTER — OFFICE VISIT (OUTPATIENT)
Dept: FAMILY MEDICINE | Facility: CLINIC | Age: 81
End: 2021-06-09
Payer: MEDICARE

## 2021-06-09 VITALS
SYSTOLIC BLOOD PRESSURE: 148 MMHG | BODY MASS INDEX: 27.93 KG/M2 | DIASTOLIC BLOOD PRESSURE: 60 MMHG | HEIGHT: 68 IN | OXYGEN SATURATION: 98 % | WEIGHT: 184.31 LBS | HEART RATE: 60 BPM

## 2021-06-09 DIAGNOSIS — Z12.11 SCREENING FOR COLON CANCER: ICD-10-CM

## 2021-06-09 DIAGNOSIS — I10 HYPERTENSION, UNSPECIFIED TYPE: ICD-10-CM

## 2021-06-09 DIAGNOSIS — I73.9 PAD (PERIPHERAL ARTERY DISEASE): ICD-10-CM

## 2021-06-09 DIAGNOSIS — E78.5 HYPERLIPIDEMIA, UNSPECIFIED HYPERLIPIDEMIA TYPE: ICD-10-CM

## 2021-06-09 DIAGNOSIS — I70.0 ABDOMINAL AORTIC ATHEROSCLEROSIS: ICD-10-CM

## 2021-06-09 DIAGNOSIS — E55.9 VITAMIN D DEFICIENCY: ICD-10-CM

## 2021-06-09 DIAGNOSIS — Z00.00 ENCOUNTER FOR PREVENTIVE HEALTH EXAMINATION: Primary | ICD-10-CM

## 2021-06-09 DIAGNOSIS — N18.30 STAGE 3 CHRONIC KIDNEY DISEASE, UNSPECIFIED WHETHER STAGE 3A OR 3B CKD: ICD-10-CM

## 2021-06-09 DIAGNOSIS — I25.10 CORONARY ARTERY DISEASE INVOLVING NATIVE CORONARY ARTERY OF NATIVE HEART WITHOUT ANGINA PECTORIS: ICD-10-CM

## 2021-06-09 DIAGNOSIS — H40.1290 LOW TENSION GLAUCOMA, UNSPECIFIED GLAUCOMA STAGE, UNSPECIFIED LATERALITY: ICD-10-CM

## 2021-06-09 DIAGNOSIS — I77.1 TORTUOUS AORTA: ICD-10-CM

## 2021-06-09 DIAGNOSIS — E66.3 OVERWEIGHT (BMI 25.0-29.9): ICD-10-CM

## 2021-06-09 DIAGNOSIS — H91.93 BILATERAL HEARING LOSS, UNSPECIFIED HEARING LOSS TYPE: ICD-10-CM

## 2021-06-09 DIAGNOSIS — D63.8 ANEMIA OF CHRONIC DISEASE: ICD-10-CM

## 2021-06-09 PROBLEM — Z71.89 ADVANCE CARE PLANNING: Status: RESOLVED | Noted: 2019-11-19 | Resolved: 2021-06-09

## 2021-06-09 PROBLEM — I25.2 HISTORY OF NON-ST ELEVATION MYOCARDIAL INFARCTION (NSTEMI): Status: ACTIVE | Noted: 2019-03-03

## 2021-06-09 PROBLEM — I24.9 ACUTE CORONARY SYNDROME: Status: RESOLVED | Noted: 2019-03-01 | Resolved: 2021-06-09

## 2021-06-09 PROCEDURE — 99999 PR PBB SHADOW E&M-EST. PATIENT-LVL V: ICD-10-PCS | Mod: PBBFAC,,, | Performed by: NURSE PRACTITIONER

## 2021-06-09 PROCEDURE — G0439 PPPS, SUBSEQ VISIT: HCPCS | Mod: ,,, | Performed by: NURSE PRACTITIONER

## 2021-06-09 PROCEDURE — G0439 PR MEDICARE ANNUAL WELLNESS SUBSEQUENT VISIT: ICD-10-PCS | Mod: ,,, | Performed by: NURSE PRACTITIONER

## 2021-06-09 PROCEDURE — 99999 PR PBB SHADOW E&M-EST. PATIENT-LVL V: CPT | Mod: PBBFAC,,, | Performed by: NURSE PRACTITIONER

## 2021-06-09 PROCEDURE — 99215 OFFICE O/P EST HI 40 MIN: CPT | Mod: PBBFAC,PO | Performed by: NURSE PRACTITIONER

## 2021-06-14 ENCOUNTER — OFFICE VISIT (OUTPATIENT)
Dept: FAMILY MEDICINE | Facility: CLINIC | Age: 81
End: 2021-06-14
Payer: MEDICARE

## 2021-06-14 VITALS
DIASTOLIC BLOOD PRESSURE: 72 MMHG | BODY MASS INDEX: 27.87 KG/M2 | SYSTOLIC BLOOD PRESSURE: 138 MMHG | WEIGHT: 183.88 LBS | OXYGEN SATURATION: 97 % | HEART RATE: 60 BPM | HEIGHT: 68 IN

## 2021-06-14 DIAGNOSIS — Z87.891 FORMER SMOKER: ICD-10-CM

## 2021-06-14 DIAGNOSIS — M25.552 HIP PAIN, BILATERAL: ICD-10-CM

## 2021-06-14 DIAGNOSIS — N18.32 STAGE 3B CHRONIC KIDNEY DISEASE: ICD-10-CM

## 2021-06-14 DIAGNOSIS — I10 ESSENTIAL HYPERTENSION: Primary | ICD-10-CM

## 2021-06-14 DIAGNOSIS — I70.0 ABDOMINAL AORTIC ATHEROSCLEROSIS: ICD-10-CM

## 2021-06-14 DIAGNOSIS — M25.551 HIP PAIN, BILATERAL: ICD-10-CM

## 2021-06-14 PROCEDURE — 99214 OFFICE O/P EST MOD 30 MIN: CPT | Mod: S$PBB,,, | Performed by: FAMILY MEDICINE

## 2021-06-14 PROCEDURE — 99999 PR PBB SHADOW E&M-EST. PATIENT-LVL IV: CPT | Mod: PBBFAC,,, | Performed by: FAMILY MEDICINE

## 2021-06-14 PROCEDURE — 99999 PR PBB SHADOW E&M-EST. PATIENT-LVL IV: ICD-10-PCS | Mod: PBBFAC,,, | Performed by: FAMILY MEDICINE

## 2021-06-14 PROCEDURE — 99214 OFFICE O/P EST MOD 30 MIN: CPT | Mod: PBBFAC,PO | Performed by: FAMILY MEDICINE

## 2021-06-14 PROCEDURE — 99214 PR OFFICE/OUTPT VISIT, EST, LEVL IV, 30-39 MIN: ICD-10-PCS | Mod: S$PBB,,, | Performed by: FAMILY MEDICINE

## 2021-06-14 RX ORDER — LOSARTAN POTASSIUM 25 MG/1
25 TABLET ORAL DAILY
Qty: 90 TABLET | Refills: 3 | Status: SHIPPED | OUTPATIENT
Start: 2021-06-14 | End: 2022-06-10

## 2021-06-14 RX ORDER — CELECOXIB 200 MG/1
200 CAPSULE ORAL DAILY PRN
Qty: 30 CAPSULE | Refills: 0 | Status: SHIPPED | OUTPATIENT
Start: 2021-06-14 | End: 2021-07-11

## 2021-06-15 ENCOUNTER — LAB VISIT (OUTPATIENT)
Dept: LAB | Facility: HOSPITAL | Age: 81
End: 2021-06-15
Attending: FAMILY MEDICINE
Payer: MEDICARE

## 2021-06-15 DIAGNOSIS — N18.32 STAGE 3B CHRONIC KIDNEY DISEASE: ICD-10-CM

## 2021-06-15 DIAGNOSIS — I70.0 ABDOMINAL AORTIC ATHEROSCLEROSIS: ICD-10-CM

## 2021-06-15 DIAGNOSIS — I10 ESSENTIAL HYPERTENSION: ICD-10-CM

## 2021-06-15 LAB
ALBUMIN SERPL BCP-MCNC: 3.5 G/DL (ref 3.5–5.2)
ALP SERPL-CCNC: 79 U/L (ref 55–135)
ALT SERPL W/O P-5'-P-CCNC: 12 U/L (ref 10–44)
ANION GAP SERPL CALC-SCNC: 11 MMOL/L (ref 8–16)
AST SERPL-CCNC: 12 U/L (ref 10–40)
BASOPHILS # BLD AUTO: 0.05 K/UL (ref 0–0.2)
BASOPHILS NFR BLD: 1.2 % (ref 0–1.9)
BILIRUB SERPL-MCNC: 0.5 MG/DL (ref 0.1–1)
BUN SERPL-MCNC: 12 MG/DL (ref 8–23)
CALCIUM SERPL-MCNC: 8.9 MG/DL (ref 8.7–10.5)
CHLORIDE SERPL-SCNC: 108 MMOL/L (ref 95–110)
CHOLEST SERPL-MCNC: 104 MG/DL (ref 120–199)
CHOLEST/HDLC SERPL: 3.9 {RATIO} (ref 2–5)
CO2 SERPL-SCNC: 19 MMOL/L (ref 23–29)
CREAT SERPL-MCNC: 1.7 MG/DL (ref 0.5–1.4)
DIFFERENTIAL METHOD: ABNORMAL
EOSINOPHIL # BLD AUTO: 0.3 K/UL (ref 0–0.5)
EOSINOPHIL NFR BLD: 6.1 % (ref 0–8)
ERYTHROCYTE [DISTWIDTH] IN BLOOD BY AUTOMATED COUNT: 14.1 % (ref 11.5–14.5)
EST. GFR  (AFRICAN AMERICAN): 42.8 ML/MIN/1.73 M^2
EST. GFR  (NON AFRICAN AMERICAN): 37 ML/MIN/1.73 M^2
GLUCOSE SERPL-MCNC: 211 MG/DL (ref 70–110)
HCT VFR BLD AUTO: 40 % (ref 40–54)
HDLC SERPL-MCNC: 27 MG/DL (ref 40–75)
HDLC SERPL: 26 % (ref 20–50)
HEMOCCULT STL QL IA: NEGATIVE
HGB BLD-MCNC: 13.1 G/DL (ref 14–18)
IMM GRANULOCYTES # BLD AUTO: 0.02 K/UL (ref 0–0.04)
IMM GRANULOCYTES NFR BLD AUTO: 0.5 % (ref 0–0.5)
LDLC SERPL CALC-MCNC: 45.2 MG/DL (ref 63–159)
LYMPHOCYTES # BLD AUTO: 1.1 K/UL (ref 1–4.8)
LYMPHOCYTES NFR BLD: 26.2 % (ref 18–48)
MCH RBC QN AUTO: 27.2 PG (ref 27–31)
MCHC RBC AUTO-ENTMCNC: 32.8 G/DL (ref 32–36)
MCV RBC AUTO: 83 FL (ref 82–98)
MONOCYTES # BLD AUTO: 0.3 K/UL (ref 0.3–1)
MONOCYTES NFR BLD: 8 % (ref 4–15)
NEUTROPHILS # BLD AUTO: 2.5 K/UL (ref 1.8–7.7)
NEUTROPHILS NFR BLD: 58 % (ref 38–73)
NONHDLC SERPL-MCNC: 77 MG/DL
NRBC BLD-RTO: 0 /100 WBC
PLATELET # BLD AUTO: 209 K/UL (ref 150–450)
PMV BLD AUTO: 11.5 FL (ref 9.2–12.9)
POTASSIUM SERPL-SCNC: 3.8 MMOL/L (ref 3.5–5.1)
PROT SERPL-MCNC: 6.3 G/DL (ref 6–8.4)
RBC # BLD AUTO: 4.82 M/UL (ref 4.6–6.2)
SODIUM SERPL-SCNC: 138 MMOL/L (ref 136–145)
TRIGL SERPL-MCNC: 159 MG/DL (ref 30–150)
WBC # BLD AUTO: 4.24 K/UL (ref 3.9–12.7)

## 2021-06-15 PROCEDURE — 80061 LIPID PANEL: CPT | Performed by: FAMILY MEDICINE

## 2021-06-15 PROCEDURE — 85025 COMPLETE CBC W/AUTO DIFF WBC: CPT | Performed by: FAMILY MEDICINE

## 2021-06-15 PROCEDURE — 36415 COLL VENOUS BLD VENIPUNCTURE: CPT | Mod: PO | Performed by: FAMILY MEDICINE

## 2021-06-15 PROCEDURE — 80053 COMPREHEN METABOLIC PANEL: CPT | Performed by: FAMILY MEDICINE

## 2021-06-20 ENCOUNTER — TELEPHONE (OUTPATIENT)
Dept: FAMILY MEDICINE | Facility: CLINIC | Age: 81
End: 2021-06-20

## 2021-06-20 DIAGNOSIS — E11.65 TYPE 2 DIABETES MELLITUS WITH HYPERGLYCEMIA, WITHOUT LONG-TERM CURRENT USE OF INSULIN: Primary | ICD-10-CM

## 2021-06-20 DIAGNOSIS — R54 FRAILTY: ICD-10-CM

## 2021-06-22 ENCOUNTER — TELEPHONE (OUTPATIENT)
Dept: ADMINISTRATIVE | Facility: OTHER | Age: 81
End: 2021-06-22

## 2021-06-22 ENCOUNTER — TELEPHONE (OUTPATIENT)
Dept: DIABETES | Facility: CLINIC | Age: 81
End: 2021-06-22

## 2021-06-23 ENCOUNTER — TELEPHONE (OUTPATIENT)
Dept: ADMINISTRATIVE | Facility: OTHER | Age: 81
End: 2021-06-23

## 2021-07-14 RX ORDER — NITROGLYCERIN 0.4 MG/1
0.4 TABLET SUBLINGUAL EVERY 5 MIN PRN
Qty: 30 TABLET | Refills: 2 | Status: SHIPPED | OUTPATIENT
Start: 2021-07-14 | End: 2022-10-07 | Stop reason: SDUPTHER

## 2021-07-23 ENCOUNTER — PATIENT OUTREACH (OUTPATIENT)
Dept: ADMINISTRATIVE | Facility: OTHER | Age: 81
End: 2021-07-23

## 2021-07-27 ENCOUNTER — OFFICE VISIT (OUTPATIENT)
Dept: OTOLARYNGOLOGY | Facility: CLINIC | Age: 81
End: 2021-07-27
Payer: MEDICARE

## 2021-07-27 ENCOUNTER — CLINICAL SUPPORT (OUTPATIENT)
Dept: OTOLARYNGOLOGY | Facility: CLINIC | Age: 81
End: 2021-07-27
Payer: MEDICARE

## 2021-07-27 VITALS
WEIGHT: 184.19 LBS | HEIGHT: 68 IN | HEART RATE: 58 BPM | BODY MASS INDEX: 27.92 KG/M2 | DIASTOLIC BLOOD PRESSURE: 69 MMHG | SYSTOLIC BLOOD PRESSURE: 152 MMHG

## 2021-07-27 DIAGNOSIS — M27.0 TORUS PALATINUS: Chronic | ICD-10-CM

## 2021-07-27 DIAGNOSIS — H91.93 BILATERAL HEARING LOSS, UNSPECIFIED HEARING LOSS TYPE: ICD-10-CM

## 2021-07-27 DIAGNOSIS — H90.3 SENSORINEURAL HEARING LOSS, BILATERAL: Primary | ICD-10-CM

## 2021-07-27 DIAGNOSIS — H93.13 TINNITUS OF BOTH EARS: Primary | Chronic | ICD-10-CM

## 2021-07-27 DIAGNOSIS — H90.3 SENSORINEURAL HEARING LOSS (SNHL) OF BOTH EARS: Chronic | ICD-10-CM

## 2021-07-27 DIAGNOSIS — J31.0 CHRONIC ATROPHIC RHINITIS: ICD-10-CM

## 2021-07-27 PROCEDURE — 99999 PR PBB SHADOW E&M-EST. PATIENT-LVL I: CPT | Mod: PBBFAC,,, | Performed by: PHYSICIAN ASSISTANT

## 2021-07-27 PROCEDURE — 99999 PR PBB SHADOW E&M-EST. PATIENT-LVL IV: CPT | Mod: PBBFAC,,, | Performed by: OTOLARYNGOLOGY

## 2021-07-27 PROCEDURE — 99999 PR PBB SHADOW E&M-EST. PATIENT-LVL I: ICD-10-PCS | Mod: PBBFAC,,, | Performed by: PHYSICIAN ASSISTANT

## 2021-07-27 PROCEDURE — 99211 OFF/OP EST MAY X REQ PHY/QHP: CPT | Mod: PBBFAC,PN | Performed by: PHYSICIAN ASSISTANT

## 2021-07-27 PROCEDURE — 99214 OFFICE O/P EST MOD 30 MIN: CPT | Mod: PBBFAC,27,PN | Performed by: OTOLARYNGOLOGY

## 2021-07-27 PROCEDURE — 92567 TYMPANOMETRY: CPT | Mod: PBBFAC,PN | Performed by: PHYSICIAN ASSISTANT

## 2021-07-27 PROCEDURE — 92557 COMPREHENSIVE HEARING TEST: CPT | Mod: PBBFAC,PN | Performed by: PHYSICIAN ASSISTANT

## 2021-07-27 PROCEDURE — 99204 PR OFFICE/OUTPT VISIT, NEW, LEVL IV, 45-59 MIN: ICD-10-PCS | Mod: S$PBB,,, | Performed by: OTOLARYNGOLOGY

## 2021-07-27 PROCEDURE — 99999 PR PBB SHADOW E&M-EST. PATIENT-LVL IV: ICD-10-PCS | Mod: PBBFAC,,, | Performed by: OTOLARYNGOLOGY

## 2021-07-27 PROCEDURE — 99204 OFFICE O/P NEW MOD 45 MIN: CPT | Mod: S$PBB,,, | Performed by: OTOLARYNGOLOGY

## 2021-07-30 ENCOUNTER — HOSPITAL ENCOUNTER (EMERGENCY)
Facility: HOSPITAL | Age: 81
Discharge: HOME | End: 2021-07-30
Attending: EMERGENCY MEDICINE
Payer: MEDICARE

## 2021-07-30 VITALS
TEMPERATURE: 98 F | WEIGHT: 170 LBS | RESPIRATION RATE: 20 BRPM | OXYGEN SATURATION: 99 % | SYSTOLIC BLOOD PRESSURE: 142 MMHG | HEART RATE: 62 BPM | DIASTOLIC BLOOD PRESSURE: 63 MMHG | HEIGHT: 69 IN | BODY MASS INDEX: 25.18 KG/M2

## 2021-07-30 DIAGNOSIS — R07.9 CHEST PAIN: ICD-10-CM

## 2021-07-30 DIAGNOSIS — D64.9 ANEMIA, UNSPECIFIED TYPE: Primary | ICD-10-CM

## 2021-07-30 DIAGNOSIS — N28.9 RENAL INSUFFICIENCY: ICD-10-CM

## 2021-07-30 LAB
ALBUMIN SERPL BCP-MCNC: 3.5 G/DL (ref 3.5–5.2)
ALP SERPL-CCNC: 64 U/L (ref 55–135)
ALT SERPL W/O P-5'-P-CCNC: 11 U/L (ref 10–44)
ANION GAP SERPL CALC-SCNC: 11 MMOL/L (ref 8–16)
AST SERPL-CCNC: 8 U/L (ref 10–40)
BASOPHILS # BLD AUTO: 0.03 K/UL (ref 0–0.2)
BASOPHILS NFR BLD: 0.4 % (ref 0–1.9)
BILIRUB SERPL-MCNC: 0.6 MG/DL (ref 0.1–1)
BNP SERPL-MCNC: 86 PG/ML (ref 0–99)
BUN SERPL-MCNC: 69 MG/DL (ref 8–23)
CALCIUM SERPL-MCNC: 9.5 MG/DL (ref 8.7–10.5)
CHLORIDE SERPL-SCNC: 114 MMOL/L (ref 95–110)
CO2 SERPL-SCNC: 19 MMOL/L (ref 23–29)
CREAT SERPL-MCNC: 1.7 MG/DL (ref 0.5–1.4)
DIFFERENTIAL METHOD: ABNORMAL
EOSINOPHIL # BLD AUTO: 0.1 K/UL (ref 0–0.5)
EOSINOPHIL NFR BLD: 0.7 % (ref 0–8)
ERYTHROCYTE [DISTWIDTH] IN BLOOD BY AUTOMATED COUNT: 14.6 % (ref 11.5–14.5)
EST. GFR  (AFRICAN AMERICAN): 43 ML/MIN/1.73 M^2
EST. GFR  (NON AFRICAN AMERICAN): 37 ML/MIN/1.73 M^2
GLUCOSE SERPL-MCNC: 138 MG/DL (ref 70–110)
HCT VFR BLD AUTO: 29.8 % (ref 40–54)
HGB BLD-MCNC: 9.6 G/DL (ref 14–18)
IMM GRANULOCYTES # BLD AUTO: 0.05 K/UL (ref 0–0.04)
IMM GRANULOCYTES NFR BLD AUTO: 0.6 % (ref 0–0.5)
LYMPHOCYTES # BLD AUTO: 1.2 K/UL (ref 1–4.8)
LYMPHOCYTES NFR BLD: 15 % (ref 18–48)
MCH RBC QN AUTO: 27.5 PG (ref 27–31)
MCHC RBC AUTO-ENTMCNC: 32.2 G/DL (ref 32–36)
MCV RBC AUTO: 85 FL (ref 82–98)
MONOCYTES # BLD AUTO: 0.4 K/UL (ref 0.3–1)
MONOCYTES NFR BLD: 4.8 % (ref 4–15)
NEUTROPHILS # BLD AUTO: 6.5 K/UL (ref 1.8–7.7)
NEUTROPHILS NFR BLD: 78.5 % (ref 38–73)
NRBC BLD-RTO: 0 /100 WBC
PLATELET # BLD AUTO: 186 K/UL (ref 150–450)
PMV BLD AUTO: 11.3 FL (ref 9.2–12.9)
POTASSIUM SERPL-SCNC: 4.1 MMOL/L (ref 3.5–5.1)
PROT SERPL-MCNC: 6.2 G/DL (ref 6–8.4)
RBC # BLD AUTO: 3.49 M/UL (ref 4.6–6.2)
SODIUM SERPL-SCNC: 144 MMOL/L (ref 136–145)
TROPONIN I SERPL DL<=0.01 NG/ML-MCNC: 0.02 NG/ML (ref 0–0.03)
WBC # BLD AUTO: 8.26 K/UL (ref 3.9–12.7)

## 2021-07-30 PROCEDURE — 93005 ELECTROCARDIOGRAM TRACING: CPT

## 2021-07-30 PROCEDURE — 93010 ELECTROCARDIOGRAM REPORT: CPT | Mod: ,,, | Performed by: INTERNAL MEDICINE

## 2021-07-30 PROCEDURE — 99285 EMERGENCY DEPT VISIT HI MDM: CPT | Mod: 25

## 2021-07-30 PROCEDURE — 84484 ASSAY OF TROPONIN QUANT: CPT | Performed by: NURSE PRACTITIONER

## 2021-07-30 PROCEDURE — 85025 COMPLETE CBC W/AUTO DIFF WBC: CPT | Performed by: NURSE PRACTITIONER

## 2021-07-30 PROCEDURE — 25000003 PHARM REV CODE 250: Performed by: NURSE PRACTITIONER

## 2021-07-30 PROCEDURE — 83880 ASSAY OF NATRIURETIC PEPTIDE: CPT | Performed by: NURSE PRACTITIONER

## 2021-07-30 PROCEDURE — 80053 COMPREHEN METABOLIC PANEL: CPT | Performed by: NURSE PRACTITIONER

## 2021-07-30 PROCEDURE — 93010 EKG 12-LEAD: ICD-10-PCS | Mod: ,,, | Performed by: INTERNAL MEDICINE

## 2021-07-30 RX ORDER — FERROUS SULFATE 325(65) MG
325 TABLET ORAL DAILY
Qty: 30 TABLET | Refills: 0 | Status: SHIPPED | OUTPATIENT
Start: 2021-07-30 | End: 2022-08-03

## 2021-07-30 RX ORDER — MECLIZINE HYDROCHLORIDE 25 MG/1
25 TABLET ORAL
Status: COMPLETED | OUTPATIENT
Start: 2021-07-30 | End: 2021-07-30

## 2021-07-30 RX ADMIN — MECLIZINE HYDROCHLORIDE 25 MG: 25 TABLET ORAL at 01:07

## 2021-08-17 ENCOUNTER — TELEPHONE (OUTPATIENT)
Dept: GASTROENTEROLOGY | Facility: CLINIC | Age: 81
End: 2021-08-17

## 2021-08-17 ENCOUNTER — OFFICE VISIT (OUTPATIENT)
Dept: GASTROENTEROLOGY | Facility: CLINIC | Age: 81
End: 2021-08-17
Payer: MEDICARE

## 2021-08-17 VITALS — WEIGHT: 183.19 LBS | BODY MASS INDEX: 27.76 KG/M2 | HEIGHT: 68 IN

## 2021-08-17 DIAGNOSIS — Z12.11 SCREENING FOR COLON CANCER: ICD-10-CM

## 2021-08-17 DIAGNOSIS — D64.9 ANEMIA, UNSPECIFIED TYPE: Primary | ICD-10-CM

## 2021-08-17 PROCEDURE — 99213 OFFICE O/P EST LOW 20 MIN: CPT | Mod: PBBFAC,PO | Performed by: NURSE PRACTITIONER

## 2021-08-17 PROCEDURE — 99215 PR OFFICE/OUTPT VISIT, EST, LEVL V, 40-54 MIN: ICD-10-PCS | Mod: S$PBB,,, | Performed by: NURSE PRACTITIONER

## 2021-08-17 PROCEDURE — 99999 PR PBB SHADOW E&M-EST. PATIENT-LVL III: CPT | Mod: PBBFAC,,, | Performed by: NURSE PRACTITIONER

## 2021-08-17 PROCEDURE — 99999 PR PBB SHADOW E&M-EST. PATIENT-LVL III: ICD-10-PCS | Mod: PBBFAC,,, | Performed by: NURSE PRACTITIONER

## 2021-08-17 PROCEDURE — 99215 OFFICE O/P EST HI 40 MIN: CPT | Mod: S$PBB,,, | Performed by: NURSE PRACTITIONER

## 2021-08-18 ENCOUNTER — TELEPHONE (OUTPATIENT)
Dept: GASTROENTEROLOGY | Facility: CLINIC | Age: 81
End: 2021-08-18

## 2021-08-23 ENCOUNTER — TELEPHONE (OUTPATIENT)
Dept: ADMINISTRATIVE | Facility: HOSPITAL | Age: 81
End: 2021-08-23

## 2021-08-27 ENCOUNTER — PATIENT OUTREACH (OUTPATIENT)
Dept: ADMINISTRATIVE | Facility: OTHER | Age: 81
End: 2021-08-27

## 2021-09-13 ENCOUNTER — LAB VISIT (OUTPATIENT)
Dept: FAMILY MEDICINE | Facility: CLINIC | Age: 81
End: 2021-09-13
Payer: MEDICARE

## 2021-09-13 DIAGNOSIS — Z12.11 SCREENING FOR COLON CANCER: ICD-10-CM

## 2021-09-13 DIAGNOSIS — D64.9 ANEMIA, UNSPECIFIED TYPE: ICD-10-CM

## 2021-09-13 PROCEDURE — U0003 INFECTIOUS AGENT DETECTION BY NUCLEIC ACID (DNA OR RNA); SEVERE ACUTE RESPIRATORY SYNDROME CORONAVIRUS 2 (SARS-COV-2) (CORONAVIRUS DISEASE [COVID-19]), AMPLIFIED PROBE TECHNIQUE, MAKING USE OF HIGH THROUGHPUT TECHNOLOGIES AS DESCRIBED BY CMS-2020-01-R: HCPCS | Performed by: NURSE PRACTITIONER

## 2021-09-13 PROCEDURE — U0005 INFEC AGEN DETEC AMPLI PROBE: HCPCS | Performed by: NURSE PRACTITIONER

## 2021-09-14 ENCOUNTER — TELEPHONE (OUTPATIENT)
Dept: ENDOSCOPY | Facility: HOSPITAL | Age: 81
End: 2021-09-14

## 2021-09-14 LAB
SARS-COV-2 RNA RESP QL NAA+PROBE: NOT DETECTED
SARS-COV-2- CYCLE NUMBER: NORMAL

## 2021-09-15 ENCOUNTER — TELEPHONE (OUTPATIENT)
Dept: ENDOSCOPY | Facility: HOSPITAL | Age: 81
End: 2021-09-15

## 2021-09-16 ENCOUNTER — HOSPITAL ENCOUNTER (OUTPATIENT)
Facility: HOSPITAL | Age: 81
Discharge: HOME | End: 2021-09-16
Attending: INTERNAL MEDICINE | Admitting: INTERNAL MEDICINE
Payer: MEDICARE

## 2021-09-16 ENCOUNTER — ANESTHESIA (OUTPATIENT)
Dept: ENDOSCOPY | Facility: HOSPITAL | Age: 81
End: 2021-09-16
Payer: MEDICARE

## 2021-09-16 ENCOUNTER — ANESTHESIA EVENT (OUTPATIENT)
Dept: ENDOSCOPY | Facility: HOSPITAL | Age: 81
End: 2021-09-16
Payer: MEDICARE

## 2021-09-16 VITALS
OXYGEN SATURATION: 100 % | RESPIRATION RATE: 17 BRPM | HEIGHT: 68 IN | BODY MASS INDEX: 27.74 KG/M2 | DIASTOLIC BLOOD PRESSURE: 70 MMHG | HEART RATE: 58 BPM | SYSTOLIC BLOOD PRESSURE: 137 MMHG | WEIGHT: 183 LBS | TEMPERATURE: 98 F

## 2021-09-16 DIAGNOSIS — D64.9 ANEMIA: ICD-10-CM

## 2021-09-16 PROCEDURE — 43270 PR EGD, FLEX, W/ABLATION, TUMOR/POLYP/LESION(S), W/ DILATION: ICD-10-PCS | Mod: ,,, | Performed by: INTERNAL MEDICINE

## 2021-09-16 PROCEDURE — 88342 IMHCHEM/IMCYTCHM 1ST ANTB: CPT | Mod: 26,,, | Performed by: PATHOLOGY

## 2021-09-16 PROCEDURE — 43239 EGD BIOPSY SINGLE/MULTIPLE: CPT | Mod: 59,,, | Performed by: INTERNAL MEDICINE

## 2021-09-16 PROCEDURE — 27202087 HC PROBE, APC: Performed by: INTERNAL MEDICINE

## 2021-09-16 PROCEDURE — 43255 EGD CONTROL BLEEDING ANY: CPT | Performed by: INTERNAL MEDICINE

## 2021-09-16 PROCEDURE — 25000003 PHARM REV CODE 250: Performed by: NURSE ANESTHETIST, CERTIFIED REGISTERED

## 2021-09-16 PROCEDURE — G0121 COLON CA SCRN NOT HI RSK IND: HCPCS | Performed by: INTERNAL MEDICINE

## 2021-09-16 PROCEDURE — 27201012 HC FORCEPS, HOT/COLD, DISP: Performed by: INTERNAL MEDICINE

## 2021-09-16 PROCEDURE — 37000008 HC ANESTHESIA 1ST 15 MINUTES: Performed by: INTERNAL MEDICINE

## 2021-09-16 PROCEDURE — 88342 CHG IMMUNOCYTOCHEMISTRY: ICD-10-PCS | Mod: 26,,, | Performed by: PATHOLOGY

## 2021-09-16 PROCEDURE — 45378 DIAGNOSTIC COLONOSCOPY: CPT | Performed by: INTERNAL MEDICINE

## 2021-09-16 PROCEDURE — 88305 TISSUE EXAM BY PATHOLOGIST: CPT | Mod: 26,,, | Performed by: PATHOLOGY

## 2021-09-16 PROCEDURE — 88342 IMHCHEM/IMCYTCHM 1ST ANTB: CPT | Performed by: PATHOLOGY

## 2021-09-16 PROCEDURE — 88305 TISSUE EXAM BY PATHOLOGIST: CPT | Performed by: PATHOLOGY

## 2021-09-16 PROCEDURE — 88305 TISSUE EXAM BY PATHOLOGIST: ICD-10-PCS | Mod: 26,,, | Performed by: PATHOLOGY

## 2021-09-16 PROCEDURE — 43239 EGD BIOPSY SINGLE/MULTIPLE: CPT | Performed by: INTERNAL MEDICINE

## 2021-09-16 PROCEDURE — 43270 EGD LESION ABLATION: CPT | Mod: ,,, | Performed by: INTERNAL MEDICINE

## 2021-09-16 PROCEDURE — 63600175 PHARM REV CODE 636 W HCPCS: Performed by: NURSE ANESTHETIST, CERTIFIED REGISTERED

## 2021-09-16 PROCEDURE — 37000009 HC ANESTHESIA EA ADD 15 MINS: Performed by: INTERNAL MEDICINE

## 2021-09-16 PROCEDURE — 25000003 PHARM REV CODE 250: Performed by: INTERNAL MEDICINE

## 2021-09-16 PROCEDURE — 45378 PR COLONOSCOPY,DIAGNOSTIC: ICD-10-PCS | Mod: ,,, | Performed by: INTERNAL MEDICINE

## 2021-09-16 PROCEDURE — 43239 PR EGD, FLEX, W/BIOPSY, SGL/MULTI: ICD-10-PCS | Mod: 59,,, | Performed by: INTERNAL MEDICINE

## 2021-09-16 PROCEDURE — 45378 DIAGNOSTIC COLONOSCOPY: CPT | Mod: ,,, | Performed by: INTERNAL MEDICINE

## 2021-09-16 RX ORDER — LIDOCAINE HYDROCHLORIDE 20 MG/ML
INJECTION, SOLUTION EPIDURAL; INFILTRATION; INTRACAUDAL; PERINEURAL
Status: DISCONTINUED | OUTPATIENT
Start: 2021-09-16 | End: 2021-09-16

## 2021-09-16 RX ORDER — PROPOFOL 10 MG/ML
VIAL (ML) INTRAVENOUS
Status: DISCONTINUED | OUTPATIENT
Start: 2021-09-16 | End: 2021-09-16

## 2021-09-16 RX ORDER — PROPOFOL 10 MG/ML
VIAL (ML) INTRAVENOUS CONTINUOUS PRN
Status: DISCONTINUED | OUTPATIENT
Start: 2021-09-16 | End: 2021-09-16

## 2021-09-16 RX ORDER — SODIUM CHLORIDE 0.9 % (FLUSH) 0.9 %
10 SYRINGE (ML) INJECTION
Status: DISCONTINUED | OUTPATIENT
Start: 2021-09-16 | End: 2021-09-16 | Stop reason: HOSPADM

## 2021-09-16 RX ORDER — SODIUM CHLORIDE 9 MG/ML
INJECTION, SOLUTION INTRAVENOUS CONTINUOUS PRN
Status: DISCONTINUED | OUTPATIENT
Start: 2021-09-16 | End: 2021-09-16

## 2021-09-16 RX ORDER — SODIUM CHLORIDE 9 MG/ML
INJECTION, SOLUTION INTRAVENOUS CONTINUOUS
Status: DISCONTINUED | OUTPATIENT
Start: 2021-09-16 | End: 2021-09-16 | Stop reason: HOSPADM

## 2021-09-16 RX ADMIN — LIDOCAINE HYDROCHLORIDE 80 MG: 20 INJECTION, SOLUTION EPIDURAL; INFILTRATION; INTRACAUDAL; PERINEURAL at 09:09

## 2021-09-16 RX ADMIN — PROPOFOL 150 MCG/KG/MIN: 10 INJECTION, EMULSION INTRAVENOUS at 09:09

## 2021-09-16 RX ADMIN — PROPOFOL 80 MG: 10 INJECTION, EMULSION INTRAVENOUS at 09:09

## 2021-09-16 RX ADMIN — SODIUM CHLORIDE: 0.9 INJECTION, SOLUTION INTRAVENOUS at 09:09

## 2021-09-16 RX ADMIN — SODIUM CHLORIDE: 0.9 INJECTION, SOLUTION INTRAVENOUS at 08:09

## 2021-09-16 RX ADMIN — GLYCOPYRROLATE 0.2 MG: 0.2 INJECTION, SOLUTION INTRAMUSCULAR; INTRAVITREAL at 09:09

## 2021-09-17 ENCOUNTER — TELEPHONE (OUTPATIENT)
Dept: ENDOSCOPY | Facility: HOSPITAL | Age: 81
End: 2021-09-17

## 2021-09-22 ENCOUNTER — TELEPHONE (OUTPATIENT)
Dept: GASTROENTEROLOGY | Facility: CLINIC | Age: 81
End: 2021-09-22

## 2021-09-22 LAB
FINAL PATHOLOGIC DIAGNOSIS: NORMAL
GROSS: NORMAL
Lab: NORMAL
MICROSCOPIC EXAM: NORMAL

## 2021-09-22 RX ORDER — AMOXICILLIN 500 MG/1
1000 TABLET, FILM COATED ORAL EVERY 12 HOURS
Qty: 56 TABLET | Refills: 0 | Status: SHIPPED | OUTPATIENT
Start: 2021-09-22 | End: 2021-10-06

## 2021-09-22 RX ORDER — CLARITHROMYCIN 500 MG/1
500 TABLET, FILM COATED ORAL EVERY 12 HOURS
Qty: 28 TABLET | Refills: 0 | Status: SHIPPED | OUTPATIENT
Start: 2021-09-22 | End: 2021-10-06

## 2021-09-22 RX ORDER — PANTOPRAZOLE SODIUM 40 MG/1
40 TABLET, DELAYED RELEASE ORAL 2 TIMES DAILY
Qty: 28 TABLET | Refills: 0 | Status: SHIPPED | OUTPATIENT
Start: 2021-09-22 | End: 2022-08-03

## 2021-09-23 ENCOUNTER — TELEPHONE (OUTPATIENT)
Dept: GASTROENTEROLOGY | Facility: CLINIC | Age: 81
End: 2021-09-23

## 2021-09-24 ENCOUNTER — TELEPHONE (OUTPATIENT)
Dept: GASTROENTEROLOGY | Facility: CLINIC | Age: 81
End: 2021-09-24

## 2021-09-29 DIAGNOSIS — M25.552 HIP PAIN, BILATERAL: ICD-10-CM

## 2021-09-29 DIAGNOSIS — M25.551 HIP PAIN, BILATERAL: ICD-10-CM

## 2021-09-29 RX ORDER — CELECOXIB 200 MG/1
CAPSULE ORAL
Qty: 30 CAPSULE | Refills: 0 | Status: SHIPPED | OUTPATIENT
Start: 2021-09-29 | End: 2021-11-06

## 2021-12-03 ENCOUNTER — IMMUNIZATION (OUTPATIENT)
Dept: INTERNAL MEDICINE | Facility: CLINIC | Age: 81
End: 2021-12-03
Payer: MEDICARE

## 2021-12-03 DIAGNOSIS — Z23 NEED FOR VACCINATION: Primary | ICD-10-CM

## 2021-12-03 PROCEDURE — 0064A COVID-19, MRNA, LNP-S, PF, 100 MCG/0.25 ML DOSE VACCINE (MODERNA BOOSTER): CPT | Mod: PBBFAC,CV19

## 2022-01-20 RX ORDER — TAMSULOSIN HYDROCHLORIDE 0.4 MG/1
CAPSULE ORAL
Qty: 90 CAPSULE | Refills: 0 | Status: SHIPPED | OUTPATIENT
Start: 2022-01-20 | End: 2022-04-26

## 2022-01-26 ENCOUNTER — OFFICE VISIT (OUTPATIENT)
Dept: FAMILY MEDICINE | Facility: CLINIC | Age: 82
End: 2022-01-26
Payer: MEDICARE

## 2022-01-26 VITALS
OXYGEN SATURATION: 97 % | WEIGHT: 197.75 LBS | DIASTOLIC BLOOD PRESSURE: 86 MMHG | HEIGHT: 68 IN | SYSTOLIC BLOOD PRESSURE: 138 MMHG | HEART RATE: 80 BPM | BODY MASS INDEX: 29.97 KG/M2

## 2022-01-26 DIAGNOSIS — I10 ESSENTIAL HYPERTENSION: Primary | ICD-10-CM

## 2022-01-26 DIAGNOSIS — E78.2 MIXED HYPERLIPIDEMIA: ICD-10-CM

## 2022-01-26 DIAGNOSIS — I70.0 ABDOMINAL AORTIC ATHEROSCLEROSIS: ICD-10-CM

## 2022-01-26 DIAGNOSIS — N18.32 STAGE 3B CHRONIC KIDNEY DISEASE: ICD-10-CM

## 2022-01-26 DIAGNOSIS — Z23 NEEDS FLU SHOT: ICD-10-CM

## 2022-01-26 DIAGNOSIS — J44.9 CHRONIC OBSTRUCTIVE PULMONARY DISEASE, UNSPECIFIED COPD TYPE: ICD-10-CM

## 2022-01-26 DIAGNOSIS — M15.9 PRIMARY OSTEOARTHRITIS INVOLVING MULTIPLE JOINTS: ICD-10-CM

## 2022-01-26 PROCEDURE — 99213 OFFICE O/P EST LOW 20 MIN: CPT | Mod: PBBFAC,PO,25 | Performed by: FAMILY MEDICINE

## 2022-01-26 PROCEDURE — 99999 PR PBB SHADOW E&M-EST. PATIENT-LVL III: ICD-10-PCS | Mod: PBBFAC,,, | Performed by: FAMILY MEDICINE

## 2022-01-26 PROCEDURE — G0008 ADMIN INFLUENZA VIRUS VAC: HCPCS | Mod: PBBFAC,PO

## 2022-01-26 PROCEDURE — 99999 PR PBB SHADOW E&M-EST. PATIENT-LVL III: CPT | Mod: PBBFAC,,, | Performed by: FAMILY MEDICINE

## 2022-01-26 PROCEDURE — 99214 PR OFFICE/OUTPT VISIT, EST, LEVL IV, 30-39 MIN: ICD-10-PCS | Mod: S$PBB,,, | Performed by: FAMILY MEDICINE

## 2022-01-26 PROCEDURE — 99214 OFFICE O/P EST MOD 30 MIN: CPT | Mod: S$PBB,,, | Performed by: FAMILY MEDICINE

## 2022-01-26 RX ORDER — DICLOFENAC SODIUM 50 MG/1
50 TABLET, DELAYED RELEASE ORAL 2 TIMES DAILY PRN
Qty: 30 TABLET | Refills: 0 | Status: SHIPPED | OUTPATIENT
Start: 2022-01-26 | End: 2022-08-15 | Stop reason: ALTCHOICE

## 2022-01-26 NOTE — PROGRESS NOTES
Subjective:       Patient ID: Laura Harris is a 81 y.o. male.    Chief Complaint: Follow-up and Hypertension    81 years old who came to the clinic for his blood pressure checked.  Blood pressure today stable.  No chest pain, palpitation, orthopnea or PND.  He was previously with aortic atherosclerosis.  Patient with multiple joints pain.  The pain is 6/10 of intensity on and off aggravated with activity and better with rest.  No recent flare-ups of COPD.  Patient with decreased kidney function but stable in comparison with previous reports.    Review of Systems   Constitutional: Negative.    HENT: Negative.    Eyes: Negative.    Respiratory: Negative.    Cardiovascular: Negative.  Negative for chest pain, palpitations, leg swelling and claudication.   Gastrointestinal: Negative.    Genitourinary: Negative.    Musculoskeletal: Negative.    Integumentary:  Negative.   Neurological: Negative.    Psychiatric/Behavioral: Negative.          Objective:      Physical Exam  Vitals and nursing note reviewed.   Constitutional:       General: He is not in acute distress.     Appearance: He is well-developed. He is not diaphoretic.   HENT:      Head: Normocephalic and atraumatic.      Right Ear: External ear normal.      Left Ear: External ear normal.      Nose: Nose normal.      Mouth/Throat:      Pharynx: No oropharyngeal exudate.   Eyes:      General: No scleral icterus.        Right eye: No discharge.         Left eye: No discharge.      Conjunctiva/sclera: Conjunctivae normal.      Pupils: Pupils are equal, round, and reactive to light.   Neck:      Thyroid: No thyromegaly.      Vascular: No JVD.      Trachea: No tracheal deviation.   Cardiovascular:      Rate and Rhythm: Normal rate and regular rhythm.      Heart sounds: Normal heart sounds. No murmur heard.  No friction rub. No gallop.    Pulmonary:      Effort: Pulmonary effort is normal. No respiratory distress.      Breath sounds: Normal breath sounds. No stridor.  No wheezing or rales.   Chest:      Chest wall: No tenderness.   Abdominal:      General: Bowel sounds are normal. There is no distension.      Palpations: Abdomen is soft. There is no mass.      Tenderness: There is no abdominal tenderness. There is no guarding or rebound.   Musculoskeletal:         General: No tenderness. Normal range of motion.      Cervical back: Normal range of motion and neck supple.   Lymphadenopathy:      Cervical: No cervical adenopathy.   Skin:     General: Skin is warm and dry.      Coloration: Skin is not pale.      Findings: No erythema or rash.   Neurological:      Mental Status: He is alert and oriented to person, place, and time.      Cranial Nerves: No cranial nerve deficit.      Motor: No abnormal muscle tone.      Coordination: Coordination normal.      Deep Tendon Reflexes: Reflexes are normal and symmetric. Reflexes normal.   Psychiatric:         Behavior: Behavior normal.         Thought Content: Thought content normal.         Judgment: Judgment normal.         Assessment:       Problem List Items Addressed This Visit     CKD (chronic kidney disease) stage 3, GFR 30-59 ml/min    Hyperlipidemia    Relevant Orders    Comprehensive Metabolic Panel    Lipid Panel    Abdominal aortic atherosclerosis    Chronic obstructive pulmonary disease, unspecified COPD type      Other Visit Diagnoses     Essential hypertension    -  Primary    Relevant Orders    CBC Auto Differential    Comprehensive Metabolic Panel    Lipid Panel    Urinalysis    Needs flu shot        Relevant Orders    Influenza (FLUAD) - Quadrivalent (Adjuvanted) *Preferred* (65+) (PF) (Completed)    Primary osteoarthritis involving multiple joints        Relevant Medications    diclofenac (VOLTAREN) 50 MG EC tablet          Plan:         Laura was seen today for follow-up and hypertension.    Diagnoses and all orders for this visit:    Essential hypertension  -     CBC Auto Differential; Future  -     Comprehensive  Metabolic Panel; Future  -     Lipid Panel; Future  -     Urinalysis; Future    Mixed hyperlipidemia  -     Comprehensive Metabolic Panel; Future  -     Lipid Panel; Future    Needs flu shot  -     Cancel: Influenza (FLUAD) - Quadrivalent (Adjuvanted) *Preferred* (65+) (PF)  -     Influenza (FLUAD) - Quadrivalent (Adjuvanted) *Preferred* (65+) (PF)    Chronic obstructive pulmonary disease, unspecified COPD type    Abdominal aortic atherosclerosis    Stage 3b chronic kidney disease    Primary osteoarthritis involving multiple joints  -     diclofenac (VOLTAREN) 50 MG EC tablet; Take 1 tablet (50 mg total) by mouth 2 (two) times daily as needed (pain).    Continue monitoring blood pressure at home, low sodium diet.    Take Voltaren only as needed.

## 2022-01-27 ENCOUNTER — LAB VISIT (OUTPATIENT)
Dept: LAB | Facility: HOSPITAL | Age: 82
End: 2022-01-27
Attending: FAMILY MEDICINE
Payer: MEDICARE

## 2022-01-27 DIAGNOSIS — E78.2 MIXED HYPERLIPIDEMIA: ICD-10-CM

## 2022-01-27 DIAGNOSIS — I10 ESSENTIAL HYPERTENSION: ICD-10-CM

## 2022-01-27 LAB
ALBUMIN SERPL BCP-MCNC: 3.8 G/DL (ref 3.5–5.2)
ALP SERPL-CCNC: 81 U/L (ref 55–135)
ALT SERPL W/O P-5'-P-CCNC: 17 U/L (ref 10–44)
ANION GAP SERPL CALC-SCNC: 9 MMOL/L (ref 8–16)
AST SERPL-CCNC: 24 U/L (ref 10–40)
BASOPHILS # BLD AUTO: 0.04 K/UL (ref 0–0.2)
BASOPHILS NFR BLD: 0.7 % (ref 0–1.9)
BILIRUB SERPL-MCNC: 0.6 MG/DL (ref 0.1–1)
BUN SERPL-MCNC: 18 MG/DL (ref 8–23)
CALCIUM SERPL-MCNC: 9.1 MG/DL (ref 8.7–10.5)
CHLORIDE SERPL-SCNC: 107 MMOL/L (ref 95–110)
CHOLEST SERPL-MCNC: 145 MG/DL (ref 120–199)
CHOLEST/HDLC SERPL: 4.3 {RATIO} (ref 2–5)
CO2 SERPL-SCNC: 24 MMOL/L (ref 23–29)
CREAT SERPL-MCNC: 1.6 MG/DL (ref 0.5–1.4)
DIFFERENTIAL METHOD: ABNORMAL
EOSINOPHIL # BLD AUTO: 0.2 K/UL (ref 0–0.5)
EOSINOPHIL NFR BLD: 3.9 % (ref 0–8)
ERYTHROCYTE [DISTWIDTH] IN BLOOD BY AUTOMATED COUNT: 17.9 % (ref 11.5–14.5)
EST. GFR  (AFRICAN AMERICAN): 45.7 ML/MIN/1.73 M^2
EST. GFR  (NON AFRICAN AMERICAN): 39.5 ML/MIN/1.73 M^2
GLUCOSE SERPL-MCNC: 134 MG/DL (ref 70–110)
HCT VFR BLD AUTO: 40.7 % (ref 40–54)
HDLC SERPL-MCNC: 34 MG/DL (ref 40–75)
HDLC SERPL: 23.4 % (ref 20–50)
HGB BLD-MCNC: 12.3 G/DL (ref 14–18)
IMM GRANULOCYTES # BLD AUTO: 0.02 K/UL (ref 0–0.04)
IMM GRANULOCYTES NFR BLD AUTO: 0.4 % (ref 0–0.5)
LDLC SERPL CALC-MCNC: 61 MG/DL (ref 63–159)
LYMPHOCYTES # BLD AUTO: 1.3 K/UL (ref 1–4.8)
LYMPHOCYTES NFR BLD: 23.5 % (ref 18–48)
MCH RBC QN AUTO: 25.5 PG (ref 27–31)
MCHC RBC AUTO-ENTMCNC: 30.2 G/DL (ref 32–36)
MCV RBC AUTO: 84 FL (ref 82–98)
MONOCYTES # BLD AUTO: 0.4 K/UL (ref 0.3–1)
MONOCYTES NFR BLD: 6.6 % (ref 4–15)
NEUTROPHILS # BLD AUTO: 3.5 K/UL (ref 1.8–7.7)
NEUTROPHILS NFR BLD: 64.9 % (ref 38–73)
NONHDLC SERPL-MCNC: 111 MG/DL
NRBC BLD-RTO: 0 /100 WBC
PLATELET # BLD AUTO: 229 K/UL (ref 150–450)
PMV BLD AUTO: 11.3 FL (ref 9.2–12.9)
POTASSIUM SERPL-SCNC: 3.7 MMOL/L (ref 3.5–5.1)
PROT SERPL-MCNC: 6.8 G/DL (ref 6–8.4)
RBC # BLD AUTO: 4.83 M/UL (ref 4.6–6.2)
SODIUM SERPL-SCNC: 140 MMOL/L (ref 136–145)
TRIGL SERPL-MCNC: 250 MG/DL (ref 30–150)
WBC # BLD AUTO: 5.45 K/UL (ref 3.9–12.7)

## 2022-01-27 PROCEDURE — 85025 COMPLETE CBC W/AUTO DIFF WBC: CPT | Performed by: FAMILY MEDICINE

## 2022-01-27 PROCEDURE — 80053 COMPREHEN METABOLIC PANEL: CPT | Performed by: FAMILY MEDICINE

## 2022-01-27 PROCEDURE — 36415 COLL VENOUS BLD VENIPUNCTURE: CPT | Mod: PO | Performed by: FAMILY MEDICINE

## 2022-01-27 PROCEDURE — 80061 LIPID PANEL: CPT | Performed by: FAMILY MEDICINE

## 2022-02-01 ENCOUNTER — OFFICE VISIT (OUTPATIENT)
Dept: UROLOGY | Facility: CLINIC | Age: 82
End: 2022-02-01
Payer: MEDICARE

## 2022-02-01 DIAGNOSIS — Z85.51 HISTORY OF BLADDER CANCER: Primary | ICD-10-CM

## 2022-02-01 PROCEDURE — 99499 UNLISTED E&M SERVICE: CPT | Mod: S$PBB,,, | Performed by: UROLOGY

## 2022-02-01 PROCEDURE — 99499 NO LOS: ICD-10-PCS | Mod: S$PBB,,, | Performed by: UROLOGY

## 2022-02-02 ENCOUNTER — PROCEDURE VISIT (OUTPATIENT)
Dept: UROLOGY | Facility: CLINIC | Age: 82
End: 2022-02-02
Payer: MEDICARE

## 2022-02-02 VITALS
HEART RATE: 73 BPM | TEMPERATURE: 98 F | SYSTOLIC BLOOD PRESSURE: 176 MMHG | WEIGHT: 196.31 LBS | RESPIRATION RATE: 16 BRPM | BODY MASS INDEX: 29.75 KG/M2 | DIASTOLIC BLOOD PRESSURE: 82 MMHG | HEIGHT: 68 IN

## 2022-02-02 DIAGNOSIS — Z85.51 HISTORY OF BLADDER CANCER: Primary | ICD-10-CM

## 2022-02-02 PROCEDURE — 52000 CYSTOURETHROSCOPY: CPT | Mod: PBBFAC | Performed by: UROLOGY

## 2022-02-02 PROCEDURE — 52000 CYSTOSCOPY: ICD-10-PCS | Mod: S$PBB,,, | Performed by: UROLOGY

## 2022-02-02 RX ORDER — LIDOCAINE HYDROCHLORIDE 20 MG/ML
JELLY TOPICAL
Status: COMPLETED | OUTPATIENT
Start: 2022-02-02 | End: 2022-02-02

## 2022-02-02 RX ADMIN — LIDOCAINE HYDROCHLORIDE: 20 JELLY TOPICAL at 09:02

## 2022-02-02 NOTE — PROCEDURES
"Cystoscopy    Date/Time: 2/2/2022 9:15 AM  Performed by: Ciara Mayorga MD  Authorized by: Ciara Mayorga MD     Consent Done?:  Yes (Written)  Time out: Immediately prior to procedure a "time out" was called to verify the correct patient, procedure, equipment, support staff and site/side marked as required.    Indications: history bladder cancer    Preparation: Patient was prepped and draped in usual sterile fashion      Scope type:  Flexible cystoscope  Stent inserted: No    External exam normal: Yes    Digital exam performed: No    Urethra normal: Yes  Bladder neck normal: Bladder neck normal   Bladder normal: Yes      Patient tolerance:  Patient tolerated the procedure well with no immediate complications     ADAIR declined by patient. No family hx of prostate cancer. Intermittent urinary symptoms. On flomax. Stable. F/u 1 year in clinic.   No further cysto warranted.       "

## 2022-02-02 NOTE — PATIENT INSTRUCTIONS
What to Expect After a Cystoscopy  For the next 24-48 hours, you may feel a mild burning when you urinate. This burning is normal and expected. Drink plenty of water to dilute the urine to help relieve the burning sensation. You may also see a small amount of blood in your urine after the procedure.    Unless you are already taking antibiotics, you may be given an antibiotic after the test to prevent infection.    Signs and Symptoms to Report  Call the [facility] at [phone] if you develop any of the following:  · Fever of 101 degrees or higher  · Chills or persistent bleeding  · Inability to urinate

## 2022-02-16 ENCOUNTER — PATIENT MESSAGE (OUTPATIENT)
Dept: FAMILY MEDICINE | Facility: CLINIC | Age: 82
End: 2022-02-16
Payer: COMMERCIAL

## 2022-02-16 ENCOUNTER — PATIENT MESSAGE (OUTPATIENT)
Dept: CARDIOLOGY | Facility: CLINIC | Age: 82
End: 2022-02-16
Payer: COMMERCIAL

## 2022-02-17 RX ORDER — METOPROLOL SUCCINATE 25 MG/1
25 TABLET, EXTENDED RELEASE ORAL DAILY
Qty: 90 TABLET | Refills: 3 | Status: SHIPPED | OUTPATIENT
Start: 2022-02-17 | End: 2024-03-26

## 2022-03-17 ENCOUNTER — PES CALL (OUTPATIENT)
Dept: ADMINISTRATIVE | Facility: CLINIC | Age: 82
End: 2022-03-17
Payer: COMMERCIAL

## 2022-06-06 ENCOUNTER — HOSPITAL ENCOUNTER (EMERGENCY)
Facility: HOSPITAL | Age: 82
Discharge: HOME | End: 2022-06-06
Attending: EMERGENCY MEDICINE
Payer: MEDICARE

## 2022-06-06 VITALS
BODY MASS INDEX: 29.8 KG/M2 | TEMPERATURE: 98 F | OXYGEN SATURATION: 99 % | RESPIRATION RATE: 18 BRPM | SYSTOLIC BLOOD PRESSURE: 154 MMHG | DIASTOLIC BLOOD PRESSURE: 72 MMHG | WEIGHT: 196 LBS | HEART RATE: 64 BPM

## 2022-06-06 DIAGNOSIS — S40.021A ARM CONTUSION, RIGHT, INITIAL ENCOUNTER: Primary | ICD-10-CM
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
## 2022-06-06 PROCEDURE — 99283 EMERGENCY DEPT VISIT LOW MDM: CPT | Mod: 25

## 2022-06-06 RX ORDER — ACETAMINOPHEN 500 MG
500 TABLET ORAL EVERY 6 HOURS PRN
Qty: 30 TABLET | Refills: 0 | Status: SHIPPED | OUTPATIENT
Start: 2022-06-06

## 2022-06-06 NOTE — ED NOTES
Pt in room 8 from triage. Pt had a fall 2 weeks ago. No LOC. +hematome to right forearm that has not resolved since fall. No warmth or redness. +bruising to top of arm. Head to toe assessment completed, plan of care reviewed, call bell within reach.

## 2022-06-06 NOTE — DISCHARGE INSTRUCTIONS

## 2022-06-06 NOTE — ED PROVIDER NOTES
Encounter Date: 6/6/2022       History     Chief Complaint   Patient presents with    Arm Injury     Pt states he tripped and fell on right arm when taking out trash 2 weeks ago, + swelling noted to posterior right forearm, + 2 radial pulse, no deformity      81-year-old male presents to ED with concern of right-sided arm pain after fall that occurred 2 weeks ago.  Patient reports he was taking out his trash when he stumbled, causing contusion of right forearm.  No head injury.  No LOC.  Denies use of blood thinners.  He noticed no significant immediate complications but noticed localized swelling to contusion site later that day.  He has continued have swelling to location with minimal improvement in size.  He does admit that is pain has continued to improve.  No numbness or focal weakness.  No other reported injuries.  No other acute complaints at this time.    The history is provided by the patient.     Review of patient's allergies indicates:   Allergen Reactions    Ace inhibitors      Other reaction(s): Angioedema    Ultram [tramadol] Nausea Only and Other (See Comments)     Dizziness     Past Medical History:   Diagnosis Date    Anemia of chronic disease 11/19/2019    Arthritis     Cataract     Chronic obstructive pulmonary disease, unspecified COPD type 1/26/2022    CKD (chronic kidney disease) stage 3, GFR 30-59 ml/min 5/1/2014    Colon polyp 05/12/2015    Coronary artery disease involving native coronary artery of native heart without angina pectoris 10/7/2019    Hyperlipidemia     Hypertension     Low tension glaucoma      Past Surgical History:   Procedure Laterality Date    COLONOSCOPY N/A 9/16/2021    Procedure: COLONOSCOPY;  Surgeon: Kit Nicholson MD;  Location: [facility] ENDO;  Service: Endoscopy;  Laterality: N/A;    CORONARY STENT PLACEMENT N/A 3/4/2019    Procedure: INSERTION, STENT, CORONARY ARTERY;  Surgeon: Brennan Stein MD;  Location: [facility] CATH LAB/EP;  Service:  Cardiology;  Laterality: N/A;    CYSTOSCOPY      ESOPHAGOGASTRODUODENOSCOPY N/A 9/16/2021    Procedure: EGD (ESOPHAGOGASTRODUODENOSCOPY);  Surgeon: Kit Nicholson MD;  Location: [facility] ENDO;  Service: Endoscopy;  Laterality: N/A;    LEFT HEART CATHETERIZATION N/A 3/4/2019    Procedure: Left heart cath;  Surgeon: Brennan Stein MD;  Location: [facility] CATH LAB/EP;  Service: Cardiology;  Laterality: N/A;     Family History   Problem Relation Age of Onset    No Known Problems Sister     No Known Problems Brother     Cancer Brother         lung cancer, smoker    Kidney disease Sister     Melanoma Neg Hx      Social History     Tobacco Use    Smoking status: Former Smoker     Packs/day: 1.00     Years: 64.00     Pack years: 64.00     Types: Cigarettes     Start date: 1955     Quit date: 2019     Years since quitting: 3.4    Smokeless tobacco: Never Used   Substance Use Topics    Alcohol use: No    Drug use: No     Review of Systems   Constitutional: Negative for chills and fever.   Cardiovascular: Negative for chest pain.   Gastrointestinal: Negative for nausea and vomiting.   Musculoskeletal: Positive for myalgias. Negative for arthralgias, neck pain and neck stiffness.   Neurological: Negative for weakness, numbness and headaches.       Physical Exam     Initial Vitals [06/06/22 0909]   BP Pulse Resp Temp SpO2   (!) 180/84 77 20 98.6 °F (37 °C) 98 %      MAP       --         Physical Exam    Nursing note and vitals reviewed.  Constitutional: He appears well-developed and well-nourished. He is cooperative. He does not have a sickly appearance. He does not appear ill. No distress.   HENT:   Head: Normocephalic and atraumatic.   Eyes: EOM are normal.   Neck:   Normal range of motion.  Cardiovascular: Normal rate.   Pulmonary/Chest: Effort normal.   Musculoskeletal:         General: Tenderness present.        Arms:       Cervical back: Normal range of motion.      Comments: Right-sided mid forearm  tenderness over posterior aspect with small area of localized swelling, suspected due to hematoma.  No overlying skin changes, warmth, wounds or signs for infection.  Full ROM throughout right elbow and wrist with no discomfort.  Appropriate  strength.  Sensations intact throughout with appropriate radial pulse.     Neurological: He is alert. GCS eye subscore is 4. GCS verbal subscore is 5. GCS motor subscore is 6.   Psychiatric: He has a normal mood and affect. His speech is normal and behavior is normal.         ED Course   Procedures  Labs Reviewed - No data to display       Imaging Results          X-Ray Forearm Right (Final result)  Result time 06/06/22 10:23:33    Final result by Steven Quiñones MD (06/06/22 10:23:33)                 Impression:      No convincing evidence of acute fracture or dislocation.      Electronically signed by: Steven Quiñones  Date:    06/06/2022  Time:    10:23             Narrative:    EXAMINATION:  XR FOREARM RIGHT    CLINICAL HISTORY:  Unspecified fall, initial encounter    TECHNIQUE:  AP and lateral views of the right forearm were performed.    COMPARISON:  None    FINDINGS:  No definite evidence of acute fracture or dislocation.  No large elbow joint effusion.  Enthesopathic changes are noted at the olecranon at triceps insertion.  No definite radiopaque foreign body.                                 Medications - No data to display  Medical Decision Making:   Initial Assessment:   Patient presents with concern of right-sided arm pain after falls alternate contusion 2 weeks ago.  No numbness or focal weakness.  On exam, localized swelling noted near contusion site with no overlying skin changes, warmth or signs for infection.  Neurovascular intact.  Differential Diagnosis:   Strain, sprain, contusion, dislocation, fracture, hematoma  ED Management:  X-ray right forearm    Imaging right forearm showing no acute bony abnormalities.  Ace wrap applied in ED.  Will continue  with conservative care for patient's musculoskeletal contusion.  Encouraged Tylenol as needed, ice, activities and movements as tolerated, close PCP follow-up.  ED return precautions discussed.  Patient states his understanding and agrees with plan.                      Clinical Impression:   Final diagnoses:  [W19.XXXA] Fall  [S40.021A] Arm contusion, right, initial encounter (Primary)          ED Disposition Condition    Discharge Stable        ED Prescriptions     Medication Sig Dispense Start Date End Date Auth. Provider    acetaminophen (TYLENOL) 500 MG tablet Take 1 tablet (500 mg total) by mouth every 6 (six) hours as needed for Pain. 30 tablet 6/6/2022  Eddy Mijares PA-C        Follow-up Information     Follow up With Specialties Details Why Contact Info    Riley Villalobos MD Family Medicine   [address] [facility] [id number] [phone]             Eddy Mijares PA-C  06/06/22 2514

## 2022-06-06 NOTE — ED NOTES
Ace wrap applied, pt tolerated well.  Pt given discharge and follow up instructions. Pt verbalized understanding and ambulated out of ER in stable condition with steady gait.

## 2022-06-09 DIAGNOSIS — I10 ESSENTIAL HYPERTENSION: ICD-10-CM

## 2022-06-09 NOTE — TELEPHONE ENCOUNTER
Unable to retrieve patient chart and identify care gaps.  [facility] Embedded Care Gaps. Reference number: [id number]. 6/09/2022   5:07:06 PM CDT

## 2022-06-10 RX ORDER — LOSARTAN POTASSIUM 25 MG/1
TABLET ORAL
Qty: 90 TABLET | Refills: 3 | Status: SHIPPED | OUTPATIENT
Start: 2022-06-10 | End: 2022-08-03

## 2022-06-10 NOTE — TELEPHONE ENCOUNTER
Refill Routing Note   Medication(s) are not appropriate for processing by [facility] for the following reason(s):      - Required laboratory values are abnormal  - Required vitals are abnormal  - Patient has been seen in the ED/Hospital since the last PCP visit    [facility] action(s):  Defer          Medication reconciliation completed: No     Appointments  past 12m or future 3m with PCP    Date Provider   Last Visit   1/26/2022 Riley Villalobos MD   Next Visit   7/27/2022 Riley Villalobos MD   ED visits in past 90 days: 1        Note composed:8:41 AM 06/10/2022

## 2022-07-28 ENCOUNTER — HOSPITAL ENCOUNTER (EMERGENCY)
Facility: HOSPITAL | Age: 82
Discharge: HOME | End: 2022-07-28
Attending: EMERGENCY MEDICINE
Payer: MEDICARE

## 2022-07-28 VITALS
WEIGHT: 190 LBS | DIASTOLIC BLOOD PRESSURE: 77 MMHG | HEIGHT: 68 IN | SYSTOLIC BLOOD PRESSURE: 166 MMHG | TEMPERATURE: 98 F | HEART RATE: 64 BPM | RESPIRATION RATE: 18 BRPM | OXYGEN SATURATION: 98 % | BODY MASS INDEX: 28.79 KG/M2

## 2022-07-28 DIAGNOSIS — S91.332A PUNCTURE WOUND OF PLANTAR ASPECT OF LEFT FOOT, INITIAL ENCOUNTER: Primary | ICD-10-CM

## 2022-07-28 PROCEDURE — 90471 IMMUNIZATION ADMIN: CPT | Performed by: EMERGENCY MEDICINE

## 2022-07-28 PROCEDURE — 63600175 PHARM REV CODE 636 W HCPCS: Performed by: EMERGENCY MEDICINE

## 2022-07-28 PROCEDURE — 99284 EMERGENCY DEPT VISIT MOD MDM: CPT | Mod: 25

## 2022-07-28 PROCEDURE — 90715 TDAP VACCINE 7 YRS/> IM: CPT | Performed by: EMERGENCY MEDICINE

## 2022-07-28 RX ORDER — CIPROFLOXACIN 500 MG/1
500 TABLET ORAL 2 TIMES DAILY
Qty: 20 TABLET | Refills: 0 | Status: SHIPPED | OUTPATIENT
Start: 2022-07-28 | End: 2022-08-07

## 2022-07-28 RX ORDER — CEPHALEXIN 500 MG/1
500 CAPSULE ORAL 3 TIMES DAILY
Qty: 30 CAPSULE | Refills: 0 | Status: SHIPPED | OUTPATIENT
Start: 2022-07-28 | End: 2022-08-15 | Stop reason: ALTCHOICE

## 2022-07-28 RX ADMIN — TETANUS TOXOID, REDUCED DIPHTHERIA TOXOID AND ACELLULAR PERTUSSIS VACCINE, ADSORBED 0.5 ML: 5; 2.5; 8; 8; 2.5 SUSPENSION INTRAMUSCULAR at 08:07

## 2022-07-28 NOTE — ED PROVIDER NOTES
Encounter Date: 7/28/2022    SCRIBE #1 NOTE: I, Ten Hussein, am scribing for, and in the presence of, Ruy Noe.       History     Chief Complaint   Patient presents with    Foot Injury     C/o stepping on a nail yesterday, pain and localized swelling to site on bottom of left foot, unknown last tetanus shot     Laura Harris is a 81 y.o. male who  has a past medical history of Anemia of chronic disease (11/19/2019), Arthritis, Cataract, Chronic obstructive pulmonary disease, unspecified COPD type (1/26/2022), CKD (chronic kidney disease) stage 3, GFR 30-59 ml/min (5/1/2014), Colon polyp (05/12/2015), Coronary artery disease involving native coronary artery of native heart without angina pectoris (10/7/2019), Hyperlipidemia, Hypertension, and Low tension glaucoma.    The patient presents to the ED due to foot pain.   Patient reports that he stepped on a nail yesterday and now has L foot pain. He believes the nail punctured into his foot. He is unsure if the nail was vladislav or not. Denies up to date tetanus shot. Denies history of diabetes.      The history is provided by the patient.     Review of patient's allergies indicates:   Allergen Reactions    Ace inhibitors      Other reaction(s): Angioedema    Ultram [tramadol] Nausea Only and Other (See Comments)     Dizziness     Past Medical History:   Diagnosis Date    Anemia of chronic disease 11/19/2019    Arthritis     Cataract     Chronic obstructive pulmonary disease, unspecified COPD type 1/26/2022    CKD (chronic kidney disease) stage 3, GFR 30-59 ml/min 5/1/2014    Colon polyp 05/12/2015    Coronary artery disease involving native coronary artery of native heart without angina pectoris 10/7/2019    Hyperlipidemia     Hypertension     Low tension glaucoma      Past Surgical History:   Procedure Laterality Date    COLONOSCOPY N/A 9/16/2021    Procedure: COLONOSCOPY;  Surgeon: Kit iNcholson MD;  Location: [facility];  Service:  Endoscopy;  Laterality: N/A;    CORONARY STENT PLACEMENT N/A 3/4/2019    Procedure: INSERTION, STENT, CORONARY ARTERY;  Surgeon: Brennan Stein MD;  Location: [facility] CATH LAB/EP;  Service: Cardiology;  Laterality: N/A;    CYSTOSCOPY      ESOPHAGOGASTRODUODENOSCOPY N/A 9/16/2021    Procedure: EGD (ESOPHAGOGASTRODUODENOSCOPY);  Surgeon: Kit Nicholson MD;  Location: [facility] ENDO;  Service: Endoscopy;  Laterality: N/A;    LEFT HEART CATHETERIZATION N/A 3/4/2019    Procedure: Left heart cath;  Surgeon: Brennan Stein MD;  Location: [facility] CATH LAB/EP;  Service: Cardiology;  Laterality: N/A;     Family History   Problem Relation Age of Onset    No Known Problems Sister     No Known Problems Brother     Cancer Brother         lung cancer, smoker    Kidney disease Sister     Melanoma Neg Hx      Social History     Tobacco Use    Smoking status: Former Smoker     Packs/day: 1.00     Years: 64.00     Pack years: 64.00     Types: Cigarettes     Start date: 1955     Quit date: 2019     Years since quitting: 3.5    Smokeless tobacco: Never Used   Substance Use Topics    Alcohol use: No    Drug use: No     Review of Systems   Musculoskeletal:        Positive for foot pain.   All other systems reviewed and are negative.      Physical Exam     Initial Vitals [07/28/22 0729]   BP Pulse Resp Temp SpO2   (!) 166/77 64 18 98.4 °F (36.9 °C) 98 %      MAP       --         Physical Exam    Nursing note and vitals reviewed.  Constitutional: He appears well-developed and well-nourished. He is not diaphoretic. No distress.   HENT:   Head: Atraumatic.   Nose: Nose normal.   Eyes: Conjunctivae and EOM are normal.   Neck: Phonation normal. No tracheal deviation present.   Cardiovascular: Normal rate, regular rhythm and normal heart sounds.   Pulmonary/Chest: Breath sounds normal. No respiratory distress. He has no wheezes. He has no rales.   Musculoskeletal:         General: Normal range of motion.      Comments: There is  a puncture noted to the plantar aspect of the L foot, just proximal to the 3rd toe without current signs of infection.     Neurological: He is alert and oriented to person, place, and time.   Skin: Skin is warm and dry.   Psychiatric: He has a normal mood and affect. Thought content normal.         ED Course   Procedures  Labs Reviewed - No data to display       Imaging Results          X-Ray Foot Complete Left (Final result)  Result time 07/28/22 08:15:56    Final result by Sohan Dempsey MD (07/28/22 08:15:56)                 Impression:      No evidence of a fracture or dislocation of the left foot.    No radiopaque foreign body or soft tissue gas in the left foot.      Electronically signed by: Sohan Dempsey MD  Date:    07/28/2022  Time:    08:15             Narrative:    EXAMINATION:  XR FOOT COMPLETE 3 VIEW LEFT    CLINICAL HISTORY:  Other injury of unspecified body region, initial encounter    TECHNIQUE:  AP, lateral and oblique views of the left foot were performed.    COMPARISON:  None    FINDINGS:  The bone mineralization is within normal limits.  There is no cortical step-off.  There is no evidence of periostitis.  There is a normal apophysis at the base of the 5th metatarsal.    There is expected joint space narrowing.  There are no erosive changes.  Vascular calcifications are present.  No radiopaque foreign body is identified.  There is no soft tissue gas identified.    There is no evidence of a fracture or dislocation of the left foot.                                 Medications   Tdap (BOOSTRIX) vaccine injection 0.5 mL (has no administration in time range)     Medical Decision Making:   Initial Assessment:   The patient presents to the ED due to foot pain.  Patient stepped on a nail while wearing a rubber soled shoe.  Differential Diagnosis:   Differential Diagnosis includes, but is not limited to:  Fracture, dislocation, compartment syndrome, nerve injury/palsy, vascular injury, DVT, rhabdomyolysis,  hemarthrosis, septic joint, cellulitis, bursitis, muscle strain, ligament tear/sprain, laceration, foreign body, abrasion, soft tissue contusion, osteoarthritis.  Clinical Tests:   Radiological Study: Reviewed and Ordered  ED Management:  Patient was given a tetanus shot here in the ED.  X-ray shows no evidence of fracture or foreign body.  Patient will be placed on Keflex and Cipro.  He will follow-up with his primary doctor for recheck but most importantly return to the ED for any developing signs of infection.                      Clinical Impression:   Final diagnoses:  [S91.332A] Puncture wound of plantar aspect of left foot, initial encounter (Primary)          ED Disposition Condition    Discharge Stable        ED Prescriptions     Medication Sig Dispense Start Date End Date Auth. Provider    cephALEXin (KEFLEX) 500 MG capsule Take 1 capsule (500 mg total) by mouth 3 (three) times daily. 30 capsule 7/28/2022  Ruy Rivas MD    ciprofloxacin HCl (CIPRO) 500 MG tablet Take 1 tablet (500 mg total) by mouth 2 (two) times daily. for 10 days 20 tablet 7/28/2022 8/7/2022 Ruy Rivas MD        Follow-up Information     Follow up With Specialties Details Why Contact Info    Riley Villalobos MD Family Medicine Schedule an appointment as soon as possible for a visit   [address] [facility] [id number] [phone]      [place] - Emergency Dept Emergency Medicine  If symptoms worsen [address] [facility] [address] [phone]           I, Dr. Ruy Rivas, personally performed the services described in this documentation. All medical record entries made by the scribe were at my direction and in my presence. I have reviewed the chart and agree that the record reflects my personal performance and is accurate and complete. Ruy Rivas MD.  8:47 AM 07/28/2022       Ruy Rivas MD  07/28/22 7949

## 2022-08-03 ENCOUNTER — TELEPHONE (OUTPATIENT)
Dept: OPHTHALMOLOGY | Facility: CLINIC | Age: 82
End: 2022-08-03
Payer: MEDICARE

## 2022-08-03 ENCOUNTER — OFFICE VISIT (OUTPATIENT)
Dept: FAMILY MEDICINE | Facility: CLINIC | Age: 82
End: 2022-08-03
Payer: MEDICARE

## 2022-08-03 VITALS
DIASTOLIC BLOOD PRESSURE: 70 MMHG | BODY MASS INDEX: 28.94 KG/M2 | SYSTOLIC BLOOD PRESSURE: 166 MMHG | WEIGHT: 190.94 LBS | HEART RATE: 76 BPM | HEIGHT: 68 IN | OXYGEN SATURATION: 97 %

## 2022-08-03 DIAGNOSIS — E11.65 TYPE 2 DIABETES MELLITUS WITH HYPERGLYCEMIA, WITHOUT LONG-TERM CURRENT USE OF INSULIN: ICD-10-CM

## 2022-08-03 DIAGNOSIS — E66.3 OVERWEIGHT (BMI 25.0-29.9): ICD-10-CM

## 2022-08-03 DIAGNOSIS — H40.1232 LOW-TENSION GLAUCOMA OF BOTH EYES, MODERATE STAGE: ICD-10-CM

## 2022-08-03 DIAGNOSIS — I10 ESSENTIAL HYPERTENSION: Primary | ICD-10-CM

## 2022-08-03 DIAGNOSIS — S91.332D PUNCTURE WOUND OF PLANTAR ASPECT OF LEFT FOOT, SUBSEQUENT ENCOUNTER: ICD-10-CM

## 2022-08-03 DIAGNOSIS — E78.2 MIXED HYPERLIPIDEMIA: ICD-10-CM

## 2022-08-03 DIAGNOSIS — I25.10 CORONARY ARTERY DISEASE INVOLVING NATIVE CORONARY ARTERY OF NATIVE HEART WITHOUT ANGINA PECTORIS: ICD-10-CM

## 2022-08-03 PROBLEM — H90.3 SENSORINEURAL HEARING LOSS, BILATERAL: Status: ACTIVE | Noted: 2022-08-03

## 2022-08-03 PROCEDURE — 99999 PR PBB SHADOW E&M-EST. PATIENT-LVL V: CPT | Mod: PBBFAC,,, | Performed by: NURSE PRACTITIONER

## 2022-08-03 PROCEDURE — 99215 PR OFFICE/OUTPT VISIT, EST, LEVL V, 40-54 MIN: ICD-10-PCS | Mod: S$PBB,,, | Performed by: NURSE PRACTITIONER

## 2022-08-03 PROCEDURE — 99999 PR PBB SHADOW E&M-EST. PATIENT-LVL V: ICD-10-PCS | Mod: PBBFAC,,, | Performed by: NURSE PRACTITIONER

## 2022-08-03 PROCEDURE — 99215 OFFICE O/P EST HI 40 MIN: CPT | Mod: S$PBB,,, | Performed by: NURSE PRACTITIONER

## 2022-08-03 PROCEDURE — 99215 OFFICE O/P EST HI 40 MIN: CPT | Mod: PBBFAC,PO | Performed by: NURSE PRACTITIONER

## 2022-08-03 RX ORDER — LOSARTAN POTASSIUM 25 MG/1
50 TABLET ORAL DAILY
Qty: 90 TABLET | Refills: 3
Start: 2022-08-03 | End: 2022-10-07 | Stop reason: SDUPTHER

## 2022-08-03 RX ORDER — ATORVASTATIN CALCIUM 80 MG/1
80 TABLET, FILM COATED ORAL DAILY
Qty: 90 TABLET | Refills: 3 | Status: SHIPPED | OUTPATIENT
Start: 2022-08-03 | End: 2023-08-02 | Stop reason: SDUPTHER

## 2022-08-03 NOTE — TELEPHONE ENCOUNTER
"----- Message from Sri Zacarias sent at 8/3/2022 11:30 AM CDT -----  Good morning  Patient with a Referral to Ophthalmology from Ms. Citlali Juarez NP. Diagnosis "Low-tension glaucoma of both eyes, moderate stage "  Patient ep with Dr Soliz, can you please contact patient to schedule.   Thank you    Sri"

## 2022-08-03 NOTE — PROGRESS NOTES
Subjective:       Patient ID: Laura Harris is a 81 y.o. male.    Chief Complaint: Follow-up, Hypertension, and Foot Pain    This is a pleasant 82 yo male patient of Dr. Dewey who is known to me. He presents today for a HTN follow-up. PMH includes    Patient Active Problem List:     Low tension glaucoma     Nuclear sclerosis - Both Eyes     Exodeviation     Ureteral stricture     CKD (chronic kidney disease) stage 3, GFR 30-59 ml/min     Hypertension     Carpal tunnel syndrome on both sides     Carpal tunnel syndrome of left wrist     History of non-ST elevation myocardial infarction (NSTEMI)     History of bladder cancer     Tortuous aorta     Coronary artery disease involving native coronary artery of native heart without angina pectoris     S/P left heart catheterization by percutaneous approach     Anemia of chronic disease     Absolute anemia     Vitamin D deficiency     Hyperlipidemia     PAD (peripheral artery disease)     Tobacco use disorder, moderate, in sustained remission     Abdominal aortic atherosclerosis     Overweight (BMI 25.0-29.9)     Frailty     Anemia     Chronic obstructive pulmonary disease, unspecified COPD type     Sensorineural hearing loss, bilateral    BP elevated today, even higher with recheck. Denies chest pain, SOB, dyspnea, palpitations, visual changes, HA, dizziness, or N/V/D. Compliant with current medication regimen. Currently taking losartan 25mg daily, amlodipine 5mg daily, and metoprolol 25 mg daily. Not limiting sodium in diet. Eats a variety of fruits/vegetables daily. Drinks about 2 bottles of water per day. Drinks 1 cup of coffee in the AM and drinks mostly iced tea throughout the day. Does not smoke or drink alcohol. 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 Last recorded A1C at 6.3 done over 10 years ago; will repeat with routine labs.     Went to ED on 7/28 after stepping on a nail with his left foot. Reviewed imaging which  appears to be normal. Given tetanus booster in ED and sent home with Cipro and Keflex which he continues to take. Still having pain to left foot with swelling. No redness or warmth to site. Placing foot in epsom salt baths with warm water. Denies drainage to site. Denies numbness/tingling/loss of sensation.     Review of Systems   Constitutional: Negative for chills and fever.   HENT: Positive for hearing loss. Negative for trouble swallowing.    Eyes: Negative for pain, redness and visual disturbance.   Respiratory: Negative for cough, chest tightness and shortness of breath.    Cardiovascular: Negative for chest pain, palpitations and leg swelling.   Gastrointestinal: Negative for nausea and vomiting.   Endocrine: Positive for polyuria. Negative for polydipsia and polyphagia.   Genitourinary: Positive for frequency.   Musculoskeletal: Positive for arthralgias (chronic). Negative for gait problem and leg pain.        Left foot pain   Integumentary:  Negative for color change.   Neurological: Negative for weakness, numbness, disturbances in coordination and coordination difficulties.         Objective:      Physical Exam  Vitals reviewed.   Constitutional:       General: He is awake. He is not in acute distress.     Appearance: Normal appearance. He is well-developed, well-groomed and overweight.   HENT:      Head: Normocephalic and atraumatic.      Right Ear: Tympanic membrane, ear canal and external ear normal. Decreased hearing noted.      Left Ear: Tympanic membrane, ear canal and external ear normal. Decreased hearing noted.      Ears:      Comments: Not wearing hearing aids     Mouth/Throat:      Lips: Pink.      Mouth: Mucous membranes are moist.      Pharynx: Uvula midline.      Comments: Overgrowth noted to palate  Eyes:      General: Lids are normal.         Right eye: No discharge.         Left eye: No discharge.      Pupils: Pupils are unequal.      Left eye: Pupil is not round.   Neck:      Vascular: No  carotid bruit.   Cardiovascular:      Rate and Rhythm: Normal rate and regular rhythm.      Pulses:           Radial pulses are 2+ on the right side and 2+ on the left side.      Heart sounds: Murmur heard.    Systolic murmur is present.  Pulmonary:      Effort: Pulmonary effort is normal. No respiratory distress.   Abdominal:      General: Bowel sounds are normal.      Palpations: Abdomen is soft.      Tenderness: There is no abdominal tenderness. There is no guarding or rebound.      Comments: Diastasis recti   Musculoskeletal:      Right lower leg: No edema.      Left lower leg: Edema present.        Feet:    Feet:      Left foot:      Skin integrity: No erythema or warmth.      Comments: Puncture wound noted to area marked in red  Lymphadenopathy:      Cervical: No cervical adenopathy.   Skin:     General: Skin is warm and dry.      Capillary Refill: Capillary refill takes less than 2 seconds.      Coloration: Skin is not pale.      Findings: No erythema.   Neurological:      Mental Status: He is alert and oriented to person, place, and time.      Coordination: Coordination normal.      Gait: Gait normal.   Psychiatric:         Attention and Perception: Attention normal.         Mood and Affect: Mood and affect normal.         Speech: Speech normal.         Behavior: Behavior normal. Behavior is cooperative.         Thought Content: Thought content normal.         Assessment:       Problem List Items Addressed This Visit        Ophtho    Low tension glaucoma    Relevant Orders    Ambulatory referral/consult to Ophthalmology       Cardiac/Vascular    Coronary artery disease involving native coronary artery of native heart without angina pectoris    Hyperlipidemia    Relevant Medications    atorvastatin (LIPITOR) 80 MG tablet    Other Relevant Orders    Lipid Panel       Endocrine    Overweight (BMI 25.0-29.9)      Other Visit Diagnoses     Essential hypertension    -  Primary    Relevant Medications    losartan  (COZAAR) 25 MG tablet    Other Relevant Orders    CBC Auto Differential    Comprehensive Metabolic Panel    TSH    Urinalysis    Type 2 diabetes mellitus with hyperglycemia, without long-term current use of insulin        Relevant Orders    Hemoglobin A1C    CBC Auto Differential    Comprehensive Metabolic Panel    Urinalysis    Puncture wound of plantar aspect of left foot, subsequent encounter              Plan:       Laura was seen today for follow-up, hypertension and foot pain.    Diagnoses and all orders for this visit:    Essential hypertension  -     CBC Auto Differential; Future  -     Comprehensive Metabolic Panel; Future  -     TSH; Future  -     Urinalysis; Future  -     losartan (COZAAR) 25 MG tablet; Take 2 tablets (50 mg total) by mouth once daily.    Type 2 diabetes mellitus with hyperglycemia, without long-term current use of insulin  -     Hemoglobin A1C; Future  -     CBC Auto Differential; Future  -     Comprehensive Metabolic Panel; Future  -     Urinalysis; Future    Mixed hyperlipidemia  -     Lipid Panel; Future  -     atorvastatin (LIPITOR) 80 MG tablet; Take 1 tablet (80 mg total) by mouth once daily.    Coronary artery disease involving native coronary artery of native heart without angina pectoris    Low-tension glaucoma of both eyes, moderate stage  -     Ambulatory referral/consult to Ophthalmology; Future    Puncture wound of plantar aspect of left foot, subsequent encounter    Overweight (BMI 25.0-29.9)        - Labs ordered today  - Increase losartan to 50mg daily, continue all other medications as ordered  - Complete abx treatments as ordered  - elevate LLE when sitting/lying down  - Encouraged patient to continue to eat a low salt/low fat diet and discussed importance of engaging in physical activity at least 5x/week for a minimum of 30 min/day  - Try drinking more water  - Will assist with scheduling COVID-19 booster today  - Follow up with Ophthalmology  - RTC in 2 weeks for  AWV with me, or sooner if needed          I spent a total of 40 minutes on the day of the visit.  This includes face to face time and non-face to face time preparing to see the patient (eg, review of tests), obtaining and/or reviewing separately obtained history, documenting clinical information in the electronic or other health record, independently interpreting results and communicating results to the patient/family/caregiver, or care coordinator.

## 2022-08-04 ENCOUNTER — TELEPHONE (OUTPATIENT)
Dept: INTERNAL MEDICINE | Facility: CLINIC | Age: 82
End: 2022-08-04
Payer: MEDICARE

## 2022-08-04 ENCOUNTER — LAB VISIT (OUTPATIENT)
Dept: LAB | Facility: HOSPITAL | Age: 82
End: 2022-08-04
Attending: NURSE PRACTITIONER
Payer: MEDICARE

## 2022-08-04 DIAGNOSIS — E11.65 TYPE 2 DIABETES MELLITUS WITH HYPERGLYCEMIA, WITHOUT LONG-TERM CURRENT USE OF INSULIN: ICD-10-CM

## 2022-08-04 DIAGNOSIS — E78.2 MIXED HYPERLIPIDEMIA: ICD-10-CM

## 2022-08-04 DIAGNOSIS — I10 ESSENTIAL HYPERTENSION: ICD-10-CM

## 2022-08-04 LAB
ALBUMIN SERPL BCP-MCNC: 3.7 G/DL (ref 3.5–5.2)
ALP SERPL-CCNC: 70 U/L (ref 55–135)
ALT SERPL W/O P-5'-P-CCNC: 13 U/L (ref 10–44)
ANION GAP SERPL CALC-SCNC: 10 MMOL/L (ref 8–16)
AST SERPL-CCNC: 15 U/L (ref 10–40)
BASOPHILS # BLD AUTO: 0.05 K/UL (ref 0–0.2)
BASOPHILS NFR BLD: 0.9 % (ref 0–1.9)
BILIRUB SERPL-MCNC: 0.4 MG/DL (ref 0.1–1)
BUN SERPL-MCNC: 15 MG/DL (ref 8–23)
CALCIUM SERPL-MCNC: 9 MG/DL (ref 8.7–10.5)
CHLORIDE SERPL-SCNC: 110 MMOL/L (ref 95–110)
CHOLEST SERPL-MCNC: 139 MG/DL (ref 120–199)
CHOLEST/HDLC SERPL: 4.2 {RATIO} (ref 2–5)
CO2 SERPL-SCNC: 23 MMOL/L (ref 23–29)
CREAT SERPL-MCNC: 1.6 MG/DL (ref 0.5–1.4)
DIFFERENTIAL METHOD: ABNORMAL
EOSINOPHIL # BLD AUTO: 0.4 K/UL (ref 0–0.5)
EOSINOPHIL NFR BLD: 7.5 % (ref 0–8)
ERYTHROCYTE [DISTWIDTH] IN BLOOD BY AUTOMATED COUNT: 14.5 % (ref 11.5–14.5)
EST. GFR  (NO RACE VARIABLE): 42.8 ML/MIN/1.73 M^2
ESTIMATED AVG GLUCOSE: 131 MG/DL (ref 68–131)
GLUCOSE SERPL-MCNC: 94 MG/DL (ref 70–110)
HBA1C MFR BLD: 6.2 % (ref 4–5.6)
HCT VFR BLD AUTO: 39.4 % (ref 40–54)
HDLC SERPL-MCNC: 33 MG/DL (ref 40–75)
HDLC SERPL: 23.7 % (ref 20–50)
HGB BLD-MCNC: 12.7 G/DL (ref 14–18)
IMM GRANULOCYTES # BLD AUTO: 0.01 K/UL (ref 0–0.04)
IMM GRANULOCYTES NFR BLD AUTO: 0.2 % (ref 0–0.5)
LDLC SERPL CALC-MCNC: 83.6 MG/DL (ref 63–159)
LYMPHOCYTES # BLD AUTO: 1.5 K/UL (ref 1–4.8)
LYMPHOCYTES NFR BLD: 28 % (ref 18–48)
MCH RBC QN AUTO: 26.2 PG (ref 27–31)
MCHC RBC AUTO-ENTMCNC: 32.2 G/DL (ref 32–36)
MCV RBC AUTO: 81 FL (ref 82–98)
MONOCYTES # BLD AUTO: 0.5 K/UL (ref 0.3–1)
MONOCYTES NFR BLD: 9.8 % (ref 4–15)
NEUTROPHILS # BLD AUTO: 3 K/UL (ref 1.8–7.7)
NEUTROPHILS NFR BLD: 53.6 % (ref 38–73)
NONHDLC SERPL-MCNC: 106 MG/DL
NRBC BLD-RTO: 0 /100 WBC
PLATELET # BLD AUTO: 221 K/UL (ref 150–450)
PMV BLD AUTO: 11.3 FL (ref 9.2–12.9)
POTASSIUM SERPL-SCNC: 4.7 MMOL/L (ref 3.5–5.1)
PROT SERPL-MCNC: 6.3 G/DL (ref 6–8.4)
RBC # BLD AUTO: 4.84 M/UL (ref 4.6–6.2)
SODIUM SERPL-SCNC: 143 MMOL/L (ref 136–145)
T4 FREE SERPL-MCNC: 0.82 NG/DL (ref 0.71–1.51)
TRIGL SERPL-MCNC: 112 MG/DL (ref 30–150)
TSH SERPL DL<=0.005 MIU/L-ACNC: 4.79 UIU/ML (ref 0.4–4)
WBC # BLD AUTO: 5.5 K/UL (ref 3.9–12.7)

## 2022-08-04 PROCEDURE — 83036 HEMOGLOBIN GLYCOSYLATED A1C: CPT | Performed by: NURSE PRACTITIONER

## 2022-08-04 PROCEDURE — 36415 COLL VENOUS BLD VENIPUNCTURE: CPT | Mod: PO | Performed by: NURSE PRACTITIONER

## 2022-08-04 PROCEDURE — 84439 ASSAY OF FREE THYROXINE: CPT | Performed by: NURSE PRACTITIONER

## 2022-08-04 PROCEDURE — 80053 COMPREHEN METABOLIC PANEL: CPT | Performed by: NURSE PRACTITIONER

## 2022-08-04 PROCEDURE — 84443 ASSAY THYROID STIM HORMONE: CPT | Performed by: NURSE PRACTITIONER

## 2022-08-04 PROCEDURE — 80061 LIPID PANEL: CPT | Performed by: NURSE PRACTITIONER

## 2022-08-04 PROCEDURE — 85025 COMPLETE CBC W/AUTO DIFF WBC: CPT | Performed by: NURSE PRACTITIONER

## 2022-08-05 ENCOUNTER — IMMUNIZATION (OUTPATIENT)
Dept: PHARMACY | Facility: CLINIC | Age: 82
End: 2022-08-05
Payer: MEDICARE

## 2022-08-05 DIAGNOSIS — Z23 NEED FOR VACCINATION: Primary | ICD-10-CM

## 2022-08-08 ENCOUNTER — TELEPHONE (OUTPATIENT)
Dept: FAMILY MEDICINE | Facility: CLINIC | Age: 82
End: 2022-08-08
Payer: MEDICARE

## 2022-08-08 NOTE — TELEPHONE ENCOUNTER
Pt was contacted and informed of his results. Pt verbalized understanding regarding what was spoken. Pt had no questions and/or concerns.

## 2022-08-08 NOTE — TELEPHONE ENCOUNTER
----- Message from Citlali Hope NP sent at 8/4/2022 12:48 PM CDT -----  Thyroid function is slightly decreased; will repeat this level at next appt  Liver function normal  Kidney function is slightly decreased, be sure to drink enough water, follow proper diet and limit use of medications like Advil/Motrin/Aleve  Normal cholesterol and triglyceride levels  A1C in prediabetes range at 6.2; try to avoid eating sweets and limit starchy foods like rice, bread, potatoes, pasta; try eating more fruits/vegetables, whole grains and incorporate more exercise to weekly routine  No infection noted in the blood

## 2022-08-12 ENCOUNTER — TELEPHONE (OUTPATIENT)
Dept: ADMINISTRATIVE | Facility: CLINIC | Age: 82
End: 2022-08-12
Payer: MEDICARE

## 2022-08-12 NOTE — TELEPHONE ENCOUNTER
Called pt, no answer; left message informing pt I was calling to remind pt of his in office EAWV on 8/15/22 and to return my call if he had any questions; left my name and number

## 2022-08-15 ENCOUNTER — OFFICE VISIT (OUTPATIENT)
Dept: FAMILY MEDICINE | Facility: CLINIC | Age: 82
End: 2022-08-15
Payer: MEDICARE

## 2022-08-15 VITALS
OXYGEN SATURATION: 98 % | HEIGHT: 68 IN | WEIGHT: 189.13 LBS | SYSTOLIC BLOOD PRESSURE: 124 MMHG | HEART RATE: 64 BPM | DIASTOLIC BLOOD PRESSURE: 58 MMHG | BODY MASS INDEX: 28.66 KG/M2

## 2022-08-15 DIAGNOSIS — N18.30 STAGE 3 CHRONIC KIDNEY DISEASE, UNSPECIFIED WHETHER STAGE 3A OR 3B CKD: ICD-10-CM

## 2022-08-15 DIAGNOSIS — J44.9 CHRONIC OBSTRUCTIVE PULMONARY DISEASE, UNSPECIFIED COPD TYPE: ICD-10-CM

## 2022-08-15 DIAGNOSIS — I25.10 CORONARY ARTERY DISEASE INVOLVING NATIVE CORONARY ARTERY OF NATIVE HEART WITHOUT ANGINA PECTORIS: ICD-10-CM

## 2022-08-15 DIAGNOSIS — I77.1 TORTUOUS AORTA: ICD-10-CM

## 2022-08-15 DIAGNOSIS — E11.65 TYPE 2 DIABETES MELLITUS WITH HYPERGLYCEMIA, WITHOUT LONG-TERM CURRENT USE OF INSULIN: ICD-10-CM

## 2022-08-15 DIAGNOSIS — I70.0 ABDOMINAL AORTIC ATHEROSCLEROSIS: ICD-10-CM

## 2022-08-15 DIAGNOSIS — S91.332D PUNCTURE WOUND OF PLANTAR ASPECT OF LEFT FOOT, SUBSEQUENT ENCOUNTER: ICD-10-CM

## 2022-08-15 DIAGNOSIS — D63.8 ANEMIA OF CHRONIC DISEASE: ICD-10-CM

## 2022-08-15 DIAGNOSIS — E66.3 OVERWEIGHT (BMI 25.0-29.9): ICD-10-CM

## 2022-08-15 DIAGNOSIS — M15.9 PRIMARY OSTEOARTHRITIS INVOLVING MULTIPLE JOINTS: ICD-10-CM

## 2022-08-15 DIAGNOSIS — I73.9 PAD (PERIPHERAL ARTERY DISEASE): ICD-10-CM

## 2022-08-15 DIAGNOSIS — H40.1232 LOW-TENSION GLAUCOMA OF BOTH EYES, MODERATE STAGE: ICD-10-CM

## 2022-08-15 DIAGNOSIS — I10 ESSENTIAL HYPERTENSION: ICD-10-CM

## 2022-08-15 DIAGNOSIS — E78.2 MIXED HYPERLIPIDEMIA: ICD-10-CM

## 2022-08-15 DIAGNOSIS — Z00.00 ENCOUNTER FOR PREVENTIVE HEALTH EXAMINATION: Primary | ICD-10-CM

## 2022-08-15 PROBLEM — D64.9 ANEMIA: Status: RESOLVED | Noted: 2021-09-16 | Resolved: 2022-08-15

## 2022-08-15 PROCEDURE — G0439 PPPS, SUBSEQ VISIT: HCPCS | Mod: ,,, | Performed by: NURSE PRACTITIONER

## 2022-08-15 PROCEDURE — 99999 PR PBB SHADOW E&M-EST. PATIENT-LVL III: ICD-10-PCS | Mod: PBBFAC,,, | Performed by: NURSE PRACTITIONER

## 2022-08-15 PROCEDURE — 99213 OFFICE O/P EST LOW 20 MIN: CPT | Mod: PBBFAC,PO | Performed by: NURSE PRACTITIONER

## 2022-08-15 PROCEDURE — G0439 PR MEDICARE ANNUAL WELLNESS SUBSEQUENT VISIT: ICD-10-PCS | Mod: ,,, | Performed by: NURSE PRACTITIONER

## 2022-08-15 PROCEDURE — 99999 PR PBB SHADOW E&M-EST. PATIENT-LVL III: CPT | Mod: PBBFAC,,, | Performed by: NURSE PRACTITIONER

## 2022-08-15 NOTE — PROGRESS NOTES
"  Laura Harris presented for a  Medicare AWV and comprehensive Health Risk Assessment today. The following components were reviewed and updated:    · Medical history  · Family History  · Social history  · Allergies and Current Medications  · Health Risk Assessment  · Health Maintenance  · Care Team         ** See Completed Assessments for Annual Wellness Visit within the encounter summary.**         The following assessments were completed:  · Living Situation  · CAGE  · Depression Screening  · Timed Get Up and Go  · Whisper Test  · Cognitive Function Screening      · Nutrition Screening  · ADL Screening  · PAQ Screening        Vitals:    08/15/22 1032   BP: (!) 124/58   BP Location: Right arm   Patient Position: Sitting   BP Method: Large (Manual)   Pulse: 64   SpO2: 98%   Weight: 85.8 kg (189 lb 2.5 oz)   Height: 5' 8" (1.727 m)     Body mass index is 28.76 kg/m².     Physical Exam  Vitals reviewed.   Constitutional:       General: He is awake. He is not in acute distress.     Appearance: Normal appearance. He is well-developed, well-groomed and overweight.   HENT:      Head: Normocephalic.   Cardiovascular:      Rate and Rhythm: Normal rate.   Pulmonary:      Effort: Pulmonary effort is normal. No respiratory distress.   Skin:     General: Skin is warm and dry.      Coloration: Skin is not pale.   Neurological:      Mental Status: He is alert and oriented to person, place, and time.   Psychiatric:         Attention and Perception: Attention normal.         Mood and Affect: Mood and affect normal.         Speech: Speech normal.         Behavior: Behavior normal. Behavior is cooperative.         Cognition and Memory: Cognition normal.             Diagnoses and health risks identified today and associated recommendations/orders:    1. Encounter for preventive health examination    2. Type 2 diabetes mellitus with hyperglycemia, without long-term current use of insulin  Chronic; stable. Diet-controlled. Follow up " with PCP.    3. Essential hypertension  Chronic; stable on medication. Follow up with PCP.    4. Abdominal aortic atherosclerosis  Chronic; stable on medication. As seen on previous imaging. Follow up with PCP.    5. Tortuous aorta  Chronic; stable. As seen on previous imaging. Follow up with PCP.    6. Coronary artery disease involving native coronary artery of native heart without angina pectoris  Chronic; stable on medication. Follow up with PCP.    7. PAD (peripheral artery disease)  Chronic; stable on medication. Follow up with PCP.    8. Stage 3 chronic kidney disease, unspecified whether stage 3a or 3b CKD  Chronic; stable on medication. Follow up with PCP.    9. Chronic obstructive pulmonary disease, unspecified COPD type  Chronic; stable. Follow up with PCP.    10. Mixed hyperlipidemia  Chronic; stable on medication. Follow up with PCP.    11. Low-tension glaucoma of both eyes, moderate stage  Chronic; stable. Followed by Ophthalmology.    12. Anemia of chronic disease  Chronic; stable. Follow up with PCP.    13. Puncture wound of plantar aspect of left foot, sequela  Stepped on a nail; completed abx treatment. Swelling has improved. Pain persists but has significantly improved. Follow up with PCP.    14. Primary osteoarthritis involving multiple joints  Chronic; stable on medication. Follow up with PCP.    15. Overweight (BMI 25.0-29.9)  Continue to eat a low salt/low fat ADA diet and discussed importance of engaging in physical activity at least 5x/week for a minimum of 30 min/day.      Provided Bertell with a 5-10 year written screening schedule and personal prevention plan. Recommendations were developed using the USPSTF age appropriate recommendations. Education, counseling, and referrals were provided as needed. After Visit Summary printed and given to patient which includes a list of additional screenings/tests needed.    Follow up for your next annual wellness visit.    Citlali Hope NP    I  offered to discuss advanced care planning, including how to pick a person who would make decisions for you if you were unable to make them for yourself, called a health care power of , and what kind of decisions you might make such as use of life sustaining treatments such as ventilators and tube feeding when faced with a life limiting illness recorded on a living will that they will need to know. (How you want to be cared for as you near the end of your natural life)     X  Patient has advanced directives on file, which we reviewed, and they do not wish to make changes.

## 2022-08-15 NOTE — PATIENT INSTRUCTIONS
Counseling and Referral of Other Preventative  (Italic type indicates deductible and co-insurance are waived)    Patient Name: Laura Harris  Today's Date: 8/15/2022    Health Maintenance       Date Due Completion Date    Shingles Vaccine (1 of 2) Never done ---    Influenza Vaccine (1) 09/01/2022 1/26/2022    Aspirin/Antiplatelet Therapy 03/24/2023 3/24/2022    Colonoscopy 09/16/2026 9/16/2021    Lipid Panel 08/04/2027 8/4/2022    TETANUS VACCINE 07/28/2032 7/28/2022        No orders of the defined types were placed in this encounter.    The following information is provided to all patients.  This information is to help you find resources for any of the problems found today that may be affecting your health:                Living healthy guide: www.[facility].[place].gov      Understanding Diabetes: www.diabetes.org      Eating healthy: www.cdc.gov/healthyweight      [facility] home safety checklist: www.cdc.gov/steadi/patient.html      Agency on Aging: www.goea.[place].gov      Alcoholics anonymous (AA): www.aa.org      Physical Activity: www.adelso.nih.gov/qk6leyx      Tobacco use: www.quitwithusla.org

## 2022-10-07 DIAGNOSIS — I10 ESSENTIAL HYPERTENSION: ICD-10-CM

## 2022-10-07 NOTE — TELEPHONE ENCOUNTER
No new care gaps identified.  [facility] Embedded Care Gaps. Reference number: [id number]. 10/07/2022   3:23:28 PM CDT

## 2022-10-10 RX ORDER — NITROGLYCERIN 0.4 MG/1
0.4 TABLET SUBLINGUAL EVERY 5 MIN PRN
Qty: 30 TABLET | Refills: 2 | Status: SHIPPED | OUTPATIENT
Start: 2022-10-10 | End: 2023-10-30

## 2022-10-10 RX ORDER — LOSARTAN POTASSIUM 25 MG/1
50 TABLET ORAL DAILY
Qty: 90 TABLET | Refills: 3 | Status: SHIPPED | OUTPATIENT
Start: 2022-10-10 | End: 2022-11-10 | Stop reason: SDUPTHER

## 2022-11-10 ENCOUNTER — TELEPHONE (OUTPATIENT)
Dept: FAMILY MEDICINE | Facility: CLINIC | Age: 82
End: 2022-11-10

## 2022-11-10 ENCOUNTER — OFFICE VISIT (OUTPATIENT)
Dept: FAMILY MEDICINE | Facility: CLINIC | Age: 82
End: 2022-11-10
Payer: MEDICARE

## 2022-11-10 VITALS
OXYGEN SATURATION: 99 % | DIASTOLIC BLOOD PRESSURE: 70 MMHG | SYSTOLIC BLOOD PRESSURE: 170 MMHG | HEIGHT: 68 IN | WEIGHT: 188.94 LBS | BODY MASS INDEX: 28.63 KG/M2 | HEART RATE: 50 BPM

## 2022-11-10 DIAGNOSIS — N18.30 STAGE 3 CHRONIC KIDNEY DISEASE, UNSPECIFIED WHETHER STAGE 3A OR 3B CKD: ICD-10-CM

## 2022-11-10 DIAGNOSIS — E11.65 TYPE 2 DIABETES MELLITUS WITH HYPERGLYCEMIA, WITHOUT LONG-TERM CURRENT USE OF INSULIN: ICD-10-CM

## 2022-11-10 DIAGNOSIS — R06.02 SOB (SHORTNESS OF BREATH): ICD-10-CM

## 2022-11-10 DIAGNOSIS — I10 ESSENTIAL HYPERTENSION: Primary | ICD-10-CM

## 2022-11-10 DIAGNOSIS — I10 ESSENTIAL HYPERTENSION: ICD-10-CM

## 2022-11-10 PROCEDURE — 99215 OFFICE O/P EST HI 40 MIN: CPT | Mod: PBBFAC,PO | Performed by: FAMILY MEDICINE

## 2022-11-10 PROCEDURE — 99999 PR PBB SHADOW E&M-EST. PATIENT-LVL V: CPT | Mod: PBBFAC,,, | Performed by: FAMILY MEDICINE

## 2022-11-10 PROCEDURE — 99215 OFFICE O/P EST HI 40 MIN: CPT | Mod: S$PBB,,, | Performed by: FAMILY MEDICINE

## 2022-11-10 PROCEDURE — 99999 PR PBB SHADOW E&M-EST. PATIENT-LVL V: ICD-10-PCS | Mod: PBBFAC,,, | Performed by: FAMILY MEDICINE

## 2022-11-10 PROCEDURE — 99215 PR OFFICE/OUTPT VISIT, EST, LEVL V, 40-54 MIN: ICD-10-PCS | Mod: S$PBB,,, | Performed by: FAMILY MEDICINE

## 2022-11-10 RX ORDER — LOSARTAN POTASSIUM 50 MG/1
50 TABLET ORAL DAILY
Qty: 90 TABLET | Refills: 3 | Status: SHIPPED | OUTPATIENT
Start: 2022-11-10 | End: 2023-03-22 | Stop reason: SDUPTHER

## 2022-11-10 NOTE — TELEPHONE ENCOUNTER
Pt phoned states when he was here this am, dr lanza told him he was going to increase the doseage of his amlodipine , pt needs new rx phoned in of new doseage

## 2022-11-10 NOTE — PROGRESS NOTES
Subjective:       Patient ID: Laura Harris is a 82 y.o. male.    Chief Complaint: Hypertension    82 years old male came to the clinic for blood pressure check.  Blood pressure today was elevated.  He is not sure that he was taking amlodipine.  No chest pain, palpitation, orthopnea or PND.  Patient reports episodic shortness of breath.  Patient with decreased kidney function but stable in comparison with previous reports.    Hypertension  Associated symptoms include shortness of breath. Pertinent negatives include no chest pain or palpitations.   Review of Systems   Constitutional: Negative.    HENT: Negative.     Eyes: Negative.    Respiratory:  Positive for shortness of breath.    Cardiovascular: Negative.  Negative for chest pain, palpitations, leg swelling and claudication.   Gastrointestinal: Negative.    Genitourinary: Negative.    Musculoskeletal: Negative.    Integumentary:  Negative.   Neurological: Negative.    Psychiatric/Behavioral: Negative.         Objective:      Physical Exam  Vitals and nursing note reviewed.   Constitutional:       General: He is not in acute distress.     Appearance: He is well-developed. He is not diaphoretic.   HENT:      Head: Normocephalic and atraumatic.      Right Ear: External ear normal.      Left Ear: External ear normal.      Nose: Nose normal.      Mouth/Throat:      Pharynx: No oropharyngeal exudate.   Eyes:      General: No scleral icterus.        Right eye: No discharge.         Left eye: No discharge.      Conjunctiva/sclera: Conjunctivae normal.      Pupils: Pupils are equal, round, and reactive to light.   Neck:      Thyroid: No thyromegaly.      Vascular: No JVD.      Trachea: No tracheal deviation.   Cardiovascular:      Rate and Rhythm: Normal rate and regular rhythm.      Heart sounds: Normal heart sounds. No murmur heard.    No friction rub. No gallop.   Pulmonary:      Effort: Pulmonary effort is normal. No respiratory distress.      Breath sounds:  Normal breath sounds. No stridor. No wheezing or rales.   Chest:      Chest wall: No tenderness.   Abdominal:      General: Bowel sounds are normal. There is no distension.      Palpations: Abdomen is soft. There is no mass.      Tenderness: There is no abdominal tenderness. There is no guarding or rebound.   Musculoskeletal:         General: No tenderness. Normal range of motion.      Cervical back: Normal range of motion and neck supple.   Lymphadenopathy:      Cervical: No cervical adenopathy.   Skin:     General: Skin is warm and dry.      Coloration: Skin is not pale.      Findings: No erythema or rash.   Neurological:      Mental Status: He is alert and oriented to person, place, and time.      Cranial Nerves: No cranial nerve deficit.      Motor: No abnormal muscle tone.      Coordination: Coordination normal.      Deep Tendon Reflexes: Reflexes are normal and symmetric. Reflexes normal.   Psychiatric:         Behavior: Behavior normal.         Thought Content: Thought content normal.         Judgment: Judgment normal.       Assessment:       Problem List Items Addressed This Visit       CKD (chronic kidney disease) stage 3, GFR 30-59 ml/min    Relevant Orders    CBC Auto Differential    Comprehensive Metabolic Panel     Other Visit Diagnoses       Essential hypertension    -  Primary    Relevant Medications    losartan (COZAAR) 50 MG tablet    Other Relevant Orders    CBC Auto Differential    Comprehensive Metabolic Panel    Lipid Panel    TSH    Type 2 diabetes mellitus with hyperglycemia, without long-term current use of insulin        Relevant Orders    Hemoglobin A1C    Microalbumin/Creatinine Ratio, Urine    SOB (shortness of breath)        Relevant Orders    Ambulatory referral/consult to Cardiology              Plan:           Laura was seen today for hypertension.    Diagnoses and all orders for this visit:    Essential hypertension  -     losartan (COZAAR) 50 MG tablet; Take 1 tablet (50 mg total)  by mouth once daily.  -     CBC Auto Differential; Future  -     Comprehensive Metabolic Panel; Future  -     Lipid Panel; Future  -     TSH; Future    Type 2 diabetes mellitus with hyperglycemia, without long-term current use of insulin  -     Hemoglobin A1C; Future  -     Microalbumin/Creatinine Ratio, Urine; Future    Stage 3 chronic kidney disease, unspecified whether stage 3a or 3b CKD  -     CBC Auto Differential; Future  -     Comprehensive Metabolic Panel; Future    SOB (shortness of breath)  -     Ambulatory referral/consult to Cardiology; Future       Continue monitoring blood pressure at home, low sodium diet.   Continue monitoring blood sugar at home,ADA diet.

## 2022-11-11 ENCOUNTER — TELEPHONE (OUTPATIENT)
Dept: FAMILY MEDICINE | Facility: CLINIC | Age: 82
End: 2022-11-11
Payer: MEDICARE

## 2022-11-11 DIAGNOSIS — I10 ESSENTIAL HYPERTENSION: Primary | ICD-10-CM

## 2022-11-11 RX ORDER — AMLODIPINE BESYLATE 10 MG/1
10 TABLET ORAL DAILY
Qty: 90 TABLET | Refills: 3 | Status: SHIPPED | OUTPATIENT
Start: 2022-11-11 | End: 2024-01-02

## 2022-11-12 ENCOUNTER — LAB VISIT (OUTPATIENT)
Dept: LAB | Facility: HOSPITAL | Age: 82
End: 2022-11-12
Attending: FAMILY MEDICINE
Payer: MEDICARE

## 2022-11-12 DIAGNOSIS — E11.65 TYPE 2 DIABETES MELLITUS WITH HYPERGLYCEMIA, WITHOUT LONG-TERM CURRENT USE OF INSULIN: ICD-10-CM

## 2022-11-12 LAB
ALBUMIN/CREAT UR: 24.6 UG/MG (ref 0–30)
CREAT UR-MCNC: 61 MG/DL (ref 23–375)
MICROALBUMIN UR DL<=1MG/L-MCNC: 15 UG/ML

## 2022-11-12 PROCEDURE — 82570 ASSAY OF URINE CREATININE: CPT | Performed by: FAMILY MEDICINE

## 2022-11-12 PROCEDURE — 82043 UR ALBUMIN QUANTITATIVE: CPT | Performed by: FAMILY MEDICINE

## 2022-12-09 ENCOUNTER — HOSPITAL ENCOUNTER (EMERGENCY)
Facility: HOSPITAL | Age: 82
Discharge: HOME | End: 2022-12-09
Attending: STUDENT IN AN ORGANIZED HEALTH CARE EDUCATION/TRAINING PROGRAM
Payer: MEDICARE

## 2022-12-09 VITALS
WEIGHT: 190 LBS | DIASTOLIC BLOOD PRESSURE: 78 MMHG | RESPIRATION RATE: 20 BRPM | HEART RATE: 75 BPM | TEMPERATURE: 98 F | OXYGEN SATURATION: 99 % | SYSTOLIC BLOOD PRESSURE: 171 MMHG | BODY MASS INDEX: 28.89 KG/M2

## 2022-12-09 DIAGNOSIS — S16.1XXA STRAIN OF NECK MUSCLE, INITIAL ENCOUNTER: ICD-10-CM

## 2022-12-09 DIAGNOSIS — L21.9 SEBORRHEIC DERMATITIS OF SCALP: Primary | ICD-10-CM

## 2022-12-09 PROCEDURE — 99283 EMERGENCY DEPT VISIT LOW MDM: CPT

## 2022-12-09 RX ORDER — KETOCONAZOLE 20 MG/ML
SHAMPOO, SUSPENSION TOPICAL
Qty: 120 ML | Refills: 0 | Status: SHIPPED | OUTPATIENT
Start: 2022-12-12

## 2022-12-09 RX ORDER — ACETAMINOPHEN 500 MG
1000 TABLET ORAL
Status: DISCONTINUED | OUTPATIENT
Start: 2022-12-09 | End: 2022-12-09 | Stop reason: HOSPADM

## 2022-12-09 RX ORDER — LIDOCAINE 50 MG/G
1 PATCH TOPICAL DAILY
Qty: 30 PATCH | Refills: 0 | Status: SHIPPED | OUTPATIENT
Start: 2022-12-09 | End: 2022-12-20

## 2022-12-09 RX ORDER — LIDOCAINE 50 MG/G
1 PATCH TOPICAL
Status: DISCONTINUED | OUTPATIENT
Start: 2022-12-09 | End: 2022-12-09 | Stop reason: HOSPADM

## 2022-12-09 NOTE — ED PROVIDER NOTES
"Encounter Date: 12/9/2022    SCRIBE #1 NOTE: I, Dariana Flower, am scribing for, and in the presence of,  Luz Elena Singleton DO.     History     Chief Complaint   Patient presents with    Neck Pain     X2 weeks, on/off, and not responding to OTC meds. 6/10 pain. No distress.     Rash     To posterior/occipital region; x2 weeks.      aLura Harris is a 82 y.o. male who  has a past medical history of Anemia of chronic disease (11/19/2019), Arthritis, Cataract, Chronic obstructive pulmonary disease, unspecified COPD type (1/26/2022), CKD (chronic kidney disease) stage 3, GFR 30-59 ml/min (5/1/2014), Colon polyp (05/12/2015), Coronary artery disease involving native coronary artery of native heart without angina pectoris (10/7/2019), Hyperlipidemia, Hypertension, and Low tension glaucoma.     The patient presents to the ED due to Neck Pain and Rash.   Patient reports intermittent neck pain that started started one month ago, located to left upper trapezius muscle. Described as a "cramp". Patient reports taking aspirin two days ago, but has had no relief. He denies associated symptoms of radiating pain down his arm and tingling to his fingers. Patient also reports a rash at the base of his neck. He describes it as dry and states that it itches. Patient reports he has tried neosporin and topical ointments and has moderate relief but symptoms return. He is unsure of any recent hair loss to affected area.           Review of patient's allergies indicates:   Allergen Reactions    Ace inhibitors      Other reaction(s): Angioedema    Ultram [tramadol] Nausea Only and Other (See Comments)     Dizziness     Past Medical History:   Diagnosis Date    Anemia of chronic disease 11/19/2019    Arthritis     Cataract     Chronic obstructive pulmonary disease, unspecified COPD type 1/26/2022    CKD (chronic kidney disease) stage 3, GFR 30-59 ml/min 5/1/2014    Colon polyp 05/12/2015    Coronary artery disease involving native " coronary artery of native heart without angina pectoris 10/7/2019    Hyperlipidemia     Hypertension     Low tension glaucoma      Past Surgical History:   Procedure Laterality Date    COLONOSCOPY N/A 9/16/2021    Procedure: COLONOSCOPY;  Surgeon: Kit Nicholson MD;  Location: [facility] ENDO;  Service: Endoscopy;  Laterality: N/A;    CORONARY STENT PLACEMENT N/A 3/4/2019    Procedure: INSERTION, STENT, CORONARY ARTERY;  Surgeon: Brennan Stein MD;  Location: [facility] CATH LAB/EP;  Service: Cardiology;  Laterality: N/A;    CYSTOSCOPY      ESOPHAGOGASTRODUODENOSCOPY N/A 9/16/2021    Procedure: EGD (ESOPHAGOGASTRODUODENOSCOPY);  Surgeon: Kit Nicholson MD;  Location: [facility] ENDO;  Service: Endoscopy;  Laterality: N/A;    LEFT HEART CATHETERIZATION N/A 3/4/2019    Procedure: Left heart cath;  Surgeon: Brennan Stein MD;  Location: [facility] CATH LAB/EP;  Service: Cardiology;  Laterality: N/A;     Family History   Problem Relation Age of Onset    No Known Problems Sister     No Known Problems Brother     Cancer Brother         lung cancer, smoker    Kidney disease Sister     Melanoma Neg Hx      Social History     Tobacco Use    Smoking status: Former     Packs/day: 1.00     Years: 64.00     Pack years: 64.00     Types: Cigarettes     Start date: 1955     Quit date: 2019     Years since quitting: 3.9    Smokeless tobacco: Never   Substance Use Topics    Alcohol use: No    Drug use: No     Review of Systems   Constitutional:  Negative for fever.   Cardiovascular:  Negative for chest pain.   Gastrointestinal:  Negative for nausea.   Genitourinary:  Negative for dysuria.   Musculoskeletal:  Positive for neck pain. Negative for back pain.   Skin:  Positive for rash.   Neurological:  Negative for weakness and numbness.   Hematological:  Does not bruise/bleed easily.     Physical Exam     Initial Vitals   BP Pulse Resp Temp SpO2   12/09/22 0847 12/09/22 0846 12/09/22 0846 12/09/22 0846 12/09/22 0846   (!) 171/78 75  20 98.4 °F (36.9 °C) 99 %      MAP       --                Physical Exam    Nursing note and vitals reviewed.  Constitutional: He appears well-developed and well-nourished.   HENT:   Head: Normocephalic and atraumatic.   Eyes: EOM are normal. Pupils are equal, round, and reactive to light.   Neck: Neck supple.   Normal range of motion.  Cardiovascular:  Normal rate and regular rhythm.           Pulmonary/Chest: Breath sounds normal. No respiratory distress.   Abdominal: Abdomen is soft. There is no abdominal tenderness.   Musculoskeletal:         General: Normal range of motion.      Cervical back: Normal range of motion and neck supple.      Comments: Point tenderness with palpation to left trapezius with palpable spasms.      Neurological: He is alert and oriented to person, place, and time.   Skin: Skin is warm and dry.   Scaling, flaking skin to the posterior occiput at the hairline with some hyperpigmentation. No erythema, warm. May involve hairloss to the area, though unclear as it is at the base of his neck.    Psychiatric: He has a normal mood and affect.       ED Course   Procedures  Labs Reviewed - No data to display       Imaging Results    None          Medications - No data to display  Medical Decision Making:   ED Management:  81 yo M with 1 month of intermittent L upper shoulder pain with reproducible TTP on my exam. States does improve with antiinflammatory medications, has not taken any today. Low suspicion for underlying cardiac or vascular etiology at this time. Plan for supportive care. Rash most consistent with seborrheic dermatitis, though psoriasis is also a consideration. Ketoconazole shampoo provided. Advised close PCP f/u and reasons to return discussed.                         Clinical Impression:   Final diagnoses:  [L21.9] Seborrheic dermatitis of scalp (Primary)  [S16.1XXA] Strain of neck muscle, initial encounter        ED Disposition Condition    Discharge Stable          ED  Prescriptions       Medication Sig Dispense Start Date End Date Auth. Provider    ketoconazole (NIZORAL) 2 % shampoo Apply topically twice a week. 120 mL 12/12/2022 -- Luz Elena Singleton DO    LIDOcaine (LIDODERM) 5 % Place 1 patch onto the skin once daily. Remove & Discard patch within 12 hours or as directed by MD 30 patch 12/9/2022 -- Luz Elena Singleton DO          Follow-up Information       Follow up With Specialties Details Why Contact Info    Riley Villalobos MD Family Medicine Schedule an appointment as soon as possible for a visit in 1 week  [address] [facility] [address]  [phone]          SCRIBE #1 NOTE: I, Dariana Flower, am scribing for, and in the presence of, Dr. Singleton.       Luz Elena Singleton DO  12/12/22 1646

## 2022-12-13 ENCOUNTER — CLINICAL SUPPORT (OUTPATIENT)
Dept: OPHTHALMOLOGY | Facility: CLINIC | Age: 82
End: 2022-12-13
Payer: MEDICARE

## 2022-12-13 ENCOUNTER — OFFICE VISIT (OUTPATIENT)
Dept: OPHTHALMOLOGY | Facility: CLINIC | Age: 82
End: 2022-12-13
Payer: MEDICARE

## 2022-12-13 DIAGNOSIS — H25.13 NUCLEAR SCLEROSIS OF BOTH EYES: ICD-10-CM

## 2022-12-13 DIAGNOSIS — H50.10 EXODEVIATION: ICD-10-CM

## 2022-12-13 DIAGNOSIS — H40.1232 LOW-TENSION GLAUCOMA OF BOTH EYES, MODERATE STAGE: Primary | ICD-10-CM

## 2022-12-13 PROCEDURE — 99214 OFFICE O/P EST MOD 30 MIN: CPT | Mod: S$PBB,,, | Performed by: OPHTHALMOLOGY

## 2022-12-13 PROCEDURE — 99214 PR OFFICE/OUTPT VISIT, EST, LEVL IV, 30-39 MIN: ICD-10-PCS | Mod: S$PBB,,, | Performed by: OPHTHALMOLOGY

## 2022-12-13 PROCEDURE — 92133 POSTERIOR SEGMENT OCT OPTIC NERVE(OCULAR COHERENCE TOMOGRAPHY) - OU - BOTH EYES: ICD-10-PCS | Mod: 26,S$PBB,, | Performed by: OPHTHALMOLOGY

## 2022-12-13 PROCEDURE — 99999 PR PBB SHADOW E&M-EST. PATIENT-LVL III: CPT | Mod: PBBFAC,,, | Performed by: OPHTHALMOLOGY

## 2022-12-13 PROCEDURE — 92083 HUMPHREY VISUAL FIELD - OU - BOTH EYES: ICD-10-PCS | Mod: 26,S$PBB,, | Performed by: OPHTHALMOLOGY

## 2022-12-13 PROCEDURE — 92083 EXTENDED VISUAL FIELD XM: CPT | Mod: PBBFAC | Performed by: OPHTHALMOLOGY

## 2022-12-13 PROCEDURE — 99213 OFFICE O/P EST LOW 20 MIN: CPT | Mod: PBBFAC | Performed by: OPHTHALMOLOGY

## 2022-12-13 PROCEDURE — 92133 CPTRZD OPH DX IMG PST SGM ON: CPT | Mod: PBBFAC | Performed by: OPHTHALMOLOGY

## 2022-12-13 PROCEDURE — 99999 PR PBB SHADOW E&M-EST. PATIENT-LVL III: ICD-10-PCS | Mod: PBBFAC,,, | Performed by: OPHTHALMOLOGY

## 2022-12-13 RX ORDER — LATANOPROST 50 UG/ML
1 SOLUTION/ DROPS OPHTHALMIC NIGHTLY
COMMUNITY
End: 2022-12-13 | Stop reason: SDUPTHER

## 2022-12-13 RX ORDER — LATANOPROST 50 UG/ML
1 SOLUTION/ DROPS OPHTHALMIC NIGHTLY
Qty: 2.5 ML | Refills: 6 | Status: SHIPPED | OUTPATIENT
Start: 2022-12-13 | End: 2023-05-12 | Stop reason: SDUPTHER

## 2022-12-13 NOTE — PROGRESS NOTES
Hvf done ou. Rel/fix/coop. Good ou./chart checked for latex allergy.-[facility]
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
## 2022-12-20 ENCOUNTER — OFFICE VISIT (OUTPATIENT)
Dept: FAMILY MEDICINE | Facility: CLINIC | Age: 82
End: 2022-12-20
Payer: MEDICARE

## 2022-12-20 VITALS
BODY MASS INDEX: 29.17 KG/M2 | HEART RATE: 38 BPM | HEIGHT: 68 IN | DIASTOLIC BLOOD PRESSURE: 70 MMHG | OXYGEN SATURATION: 98 % | SYSTOLIC BLOOD PRESSURE: 130 MMHG | WEIGHT: 192.44 LBS

## 2022-12-20 DIAGNOSIS — M43.6 NS (NECK STIFFNESS): Primary | ICD-10-CM

## 2022-12-20 DIAGNOSIS — I10 ESSENTIAL HYPERTENSION: ICD-10-CM

## 2022-12-20 DIAGNOSIS — Z23 NEEDS FLU SHOT: ICD-10-CM

## 2022-12-20 PROCEDURE — 90694 VACC AIIV4 NO PRSRV 0.5ML IM: CPT | Mod: PBBFAC,PO

## 2022-12-20 PROCEDURE — 99215 PR OFFICE/OUTPT VISIT, EST, LEVL V, 40-54 MIN: ICD-10-PCS | Mod: S$PBB,,, | Performed by: FAMILY MEDICINE

## 2022-12-20 PROCEDURE — 99999 PR PBB SHADOW E&M-EST. PATIENT-LVL IV: CPT | Mod: PBBFAC,,, | Performed by: FAMILY MEDICINE

## 2022-12-20 PROCEDURE — 99215 OFFICE O/P EST HI 40 MIN: CPT | Mod: S$PBB,,, | Performed by: FAMILY MEDICINE

## 2022-12-20 PROCEDURE — 99999 PR PBB SHADOW E&M-EST. PATIENT-LVL IV: ICD-10-PCS | Mod: PBBFAC,,, | Performed by: FAMILY MEDICINE

## 2022-12-20 PROCEDURE — G0008 ADMIN INFLUENZA VIRUS VAC: HCPCS | Mod: PBBFAC,PO

## 2022-12-20 PROCEDURE — 99214 OFFICE O/P EST MOD 30 MIN: CPT | Mod: PBBFAC,PO | Performed by: FAMILY MEDICINE

## 2022-12-20 RX ORDER — TIZANIDINE 2 MG/1
4 TABLET ORAL EVERY 8 HOURS PRN
Qty: 30 TABLET | Refills: 0 | Status: SHIPPED | OUTPATIENT
Start: 2022-12-20 | End: 2022-12-30

## 2022-12-20 RX ORDER — PREDNISONE 5 MG/1
5 TABLET ORAL 2 TIMES DAILY
Qty: 10 TABLET | Refills: 0 | Status: SHIPPED | OUTPATIENT
Start: 2022-12-20 | End: 2022-12-25

## 2022-12-20 NOTE — PROGRESS NOTES
Subjective:       Patient ID: Laura Harris is a 82 y.o. male.    Chief Complaint: No chief complaint on file.    82 years old male came to the clinic with neck stiffness for the last couple of months.  Patient was evaluated at the emergency room and treated with lidocaine patches.  Patient was not able to afford the lidocaine.  Patient with limitation with movement.  Blood pressure today was stable.  No chest pain, palpitation, orthopnea or PND.  He is due for his flu shot.    Review of Systems   Constitutional: Negative.    HENT: Negative.     Eyes: Negative.    Respiratory: Negative.     Cardiovascular: Negative.  Negative for chest pain, palpitations, leg swelling and claudication.   Gastrointestinal: Negative.    Genitourinary: Negative.    Musculoskeletal: Negative.    Integumentary:  Negative.   Neurological: Negative.    Psychiatric/Behavioral: Negative.         Objective:      Physical Exam  Vitals and nursing note reviewed.   Constitutional:       General: He is not in acute distress.     Appearance: He is well-developed. He is not diaphoretic.   HENT:      Head: Normocephalic and atraumatic.      Right Ear: External ear normal.      Left Ear: External ear normal.      Nose: Nose normal.      Mouth/Throat:      Pharynx: No oropharyngeal exudate.   Eyes:      General: No scleral icterus.        Right eye: No discharge.         Left eye: No discharge.      Conjunctiva/sclera: Conjunctivae normal.      Pupils: Pupils are equal, round, and reactive to light.   Neck:      Thyroid: No thyromegaly.      Vascular: No JVD.      Trachea: No tracheal deviation.   Cardiovascular:      Rate and Rhythm: Normal rate and regular rhythm.      Heart sounds: Normal heart sounds. No murmur heard.    No friction rub. No gallop.   Pulmonary:      Effort: Pulmonary effort is normal. No respiratory distress.      Breath sounds: Normal breath sounds. No stridor. No wheezing or rales.   Chest:      Chest wall: No tenderness.    Abdominal:      General: Bowel sounds are normal. There is no distension.      Palpations: Abdomen is soft. There is no mass.      Tenderness: There is no abdominal tenderness. There is no guarding or rebound.   Musculoskeletal:         General: No tenderness.      Cervical back: Rigidity and torticollis present. No pain with movement, spinous process tenderness or muscular tenderness. Decreased range of motion.   Lymphadenopathy:      Cervical: No cervical adenopathy.   Skin:     General: Skin is warm and dry.      Coloration: Skin is not pale.      Findings: No erythema or rash.   Neurological:      Mental Status: He is alert and oriented to person, place, and time.      Cranial Nerves: No cranial nerve deficit.      Motor: No abnormal muscle tone.      Coordination: Coordination normal.      Deep Tendon Reflexes: Reflexes are normal and symmetric. Reflexes normal.   Psychiatric:         Behavior: Behavior normal.         Thought Content: Thought content normal.         Judgment: Judgment normal.       Assessment:       Problem List Items Addressed This Visit    None  Visit Diagnoses       NS (neck stiffness)    -  Primary    Relevant Medications    predniSONE (DELTASONE) 5 MG tablet    tiZANidine (ZANAFLEX) 2 MG tablet    Other Relevant Orders    X-Ray Cervical Spine AP And Lateral    Essential hypertension        Relevant Orders    CBC Auto Differential    Comprehensive Metabolic Panel    Lipid Panel    Urinalysis    Needs flu shot        Relevant Orders    Influenza (FLUAD) - Quadrivalent (Adjuvanted) *Preferred* (65+) (PF)              Plan:           Diagnoses and all orders for this visit:    NS (neck stiffness)  -     X-Ray Cervical Spine AP And Lateral; Future  -     predniSONE (DELTASONE) 5 MG tablet; Take 1 tablet (5 mg total) by mouth 2 (two) times daily. for 5 days  -     tiZANidine (ZANAFLEX) 2 MG tablet; Take 2 tablets (4 mg total) by mouth every 8 (eight) hours as needed (SPASM).    Essential  hypertension  -     CBC Auto Differential; Future  -     Comprehensive Metabolic Panel; Future  -     Lipid Panel; Future  -     Urinalysis; Future    Needs flu shot  -     Influenza (FLUAD) - Quadrivalent (Adjuvanted) *Preferred* (65+) (PF)       Continue monitoring blood pressure at home, low sodium diet.

## 2022-12-21 ENCOUNTER — HOSPITAL ENCOUNTER (OUTPATIENT)
Dept: RADIOLOGY | Facility: HOSPITAL | Age: 82
Discharge: HOME | End: 2022-12-21
Attending: FAMILY MEDICINE
Payer: MEDICARE

## 2022-12-21 DIAGNOSIS — M43.6 NS (NECK STIFFNESS): ICD-10-CM

## 2022-12-21 PROCEDURE — 72040 X-RAY EXAM NECK SPINE 2-3 VW: CPT | Mod: TC,FY,PO

## 2022-12-21 PROCEDURE — 72040 X-RAY EXAM NECK SPINE 2-3 VW: CPT | Mod: 26,,, | Performed by: INTERNAL MEDICINE

## 2022-12-21 PROCEDURE — 72040 XR CERVICAL SPINE AP LATERAL: ICD-10-PCS | Mod: 26,,, | Performed by: INTERNAL MEDICINE

## 2023-01-02 ENCOUNTER — HOSPITAL ENCOUNTER (EMERGENCY)
Facility: HOSPITAL | Age: 83
Discharge: HOME | End: 2023-01-02
Attending: EMERGENCY MEDICINE
Payer: MEDICARE

## 2023-01-02 VITALS
DIASTOLIC BLOOD PRESSURE: 79 MMHG | OXYGEN SATURATION: 99 % | WEIGHT: 190 LBS | RESPIRATION RATE: 18 BRPM | BODY MASS INDEX: 28.89 KG/M2 | HEART RATE: 83 BPM | SYSTOLIC BLOOD PRESSURE: 169 MMHG | TEMPERATURE: 98 F

## 2023-01-02 DIAGNOSIS — M17.11 PRIMARY OSTEOARTHRITIS OF RIGHT KNEE: ICD-10-CM

## 2023-01-02 DIAGNOSIS — M25.561 RIGHT KNEE PAIN: ICD-10-CM

## 2023-01-02 DIAGNOSIS — S80.01XA CONTUSION OF RIGHT KNEE, INITIAL ENCOUNTER: Primary | ICD-10-CM

## 2023-01-02 PROCEDURE — 25000003 PHARM REV CODE 250: Performed by: EMERGENCY MEDICINE

## 2023-01-02 PROCEDURE — 99283 EMERGENCY DEPT VISIT LOW MDM: CPT

## 2023-01-02 RX ORDER — ACETAMINOPHEN 325 MG/1
650 TABLET ORAL
Status: COMPLETED | OUTPATIENT
Start: 2023-01-02 | End: 2023-01-02

## 2023-01-02 RX ADMIN — ACETAMINOPHEN 650 MG: 325 TABLET ORAL at 08:01

## 2023-01-02 NOTE — ED PROVIDER NOTES
Encounter Date: 1/2/2023       History     Chief Complaint   Patient presents with    Knee Pain     Pt c/o right knee pain after a fall on Friday. Pt missed a step and fell onto both knees but only has pain in the right. Pt able to ambulate into triage without difficulty, no obvious deformity.      Laura Harris is a 82 y.o. male who  has a past medical history of Anemia of chronic disease (11/19/2019), Arthritis, Cataract, Chronic obstructive pulmonary disease, unspecified COPD type (1/26/2022), CKD (chronic kidney disease) stage 3, GFR 30-59 ml/min (5/1/2014), Colon polyp (05/12/2015), Coronary artery disease involving native coronary artery of native heart without angina pectoris (10/7/2019), Hyperlipidemia, Hypertension, and Low tension glaucoma.    The patient presents to the ED due to R knee pain.   Patient reports symptoms started a few days ago after a fall on Friday. He was walking and tripped, falling forward onto his R knee.  He has history of rheumatoid arthritis and endorses chronic pain to bilateral hips and knees, but states the R knee is more swollen after the fall.  He denies any head impact, LOC, or additional extremity injury.  He had some leftover pain medication prescribed by his PCP for a different issue that he took last night. No medications taken this morning.  He has not applied ice or any other interventions for his pain.   No other complaints or concerns.     Review of patient's allergies indicates:   Allergen Reactions    Ace inhibitors      Other reaction(s): Angioedema    Tizanidine Hives    Ultram [tramadol] Nausea Only and Other (See Comments)     Dizziness     Past Medical History:   Diagnosis Date    Anemia of chronic disease 11/19/2019    Arthritis     Cataract     Chronic obstructive pulmonary disease, unspecified COPD type 1/26/2022    CKD (chronic kidney disease) stage 3, GFR 30-59 ml/min 5/1/2014    Colon polyp 05/12/2015    Coronary artery disease involving native coronary  artery of native heart without angina pectoris 10/7/2019    Hyperlipidemia     Hypertension     Low tension glaucoma      Past Surgical History:   Procedure Laterality Date    COLONOSCOPY N/A 2021    Procedure: COLONOSCOPY;  Surgeon: Kit Nicholson MD;  Location: [facility] ENDO;  Service: Endoscopy;  Laterality: N/A;    CORONARY STENT PLACEMENT N/A 3/4/2019    Procedure: INSERTION, STENT, CORONARY ARTERY;  Surgeon: Brennan Stein MD;  Location: [facility] CATH LAB/EP;  Service: Cardiology;  Laterality: N/A;    CYSTOSCOPY      ESOPHAGOGASTRODUODENOSCOPY N/A 2021    Procedure: EGD (ESOPHAGOGASTRODUODENOSCOPY);  Surgeon: Kit Nicholson MD;  Location: [facility] ENDO;  Service: Endoscopy;  Laterality: N/A;    LEFT HEART CATHETERIZATION N/A 3/4/2019    Procedure: Left heart cath;  Surgeon: Brennan Stein MD;  Location: [facility] CATH LAB/EP;  Service: Cardiology;  Laterality: N/A;     Family History   Problem Relation Age of Onset    No Known Problems Sister     No Known Problems Brother     Cancer Brother         lung cancer, smoker    Kidney disease Sister     Melanoma Neg Hx      Social History     Tobacco Use    Smoking status: Former     Packs/day: 1.00     Years: 64.00     Pack years: 64.00     Types: Cigarettes     Start date:      Quit date:      Years since quittin.0    Smokeless tobacco: Never   Substance Use Topics    Alcohol use: No    Drug use: No     Review of Systems   Constitutional:  Negative for chills and fever.   HENT:  Negative for sore throat.    Respiratory:  Negative for shortness of breath.    Cardiovascular:  Positive for leg swelling (chronic). Negative for chest pain.   Gastrointestinal:  Negative for constipation, diarrhea, nausea and vomiting.   Genitourinary:  Negative for dysuria, frequency and urgency.   Musculoskeletal:  Positive for arthralgias and myalgias. Negative for back pain.   Skin:  Negative for rash and wound.   Neurological:  Negative for weakness.    Hematological:  Does not bruise/bleed easily.   Psychiatric/Behavioral:  Negative for agitation, behavioral problems and confusion.      Physical Exam     Initial Vitals [23 0745]   BP Pulse Resp Temp SpO2   (!) 169/79 83 18 98.1 °F (36.7 °C) 99 %      MAP       --         Physical Exam    Nursing note and vitals reviewed.  Constitutional: He appears well-developed and well-nourished. He is not diaphoretic. No distress.   HENT:   Head: Normocephalic and atraumatic.   Mouth/Throat: Oropharynx is clear and moist.   Eyes: EOM are normal. Pupils are equal, round, and reactive to light.   Neck: No tracheal deviation present.   Cardiovascular:  Normal rate, regular rhythm, normal heart sounds and intact distal pulses.           Pulmonary/Chest: Breath sounds normal. No stridor. No respiratory distress.   Abdominal: Abdomen is soft. He exhibits no distension and no mass. There is no abdominal tenderness.   Musculoskeletal:         General: Normal range of motion.      Right knee: Swelling and bony tenderness (anterior) present. No deformity, effusion, erythema or lacerations. Normal range of motion. Tenderness present over the patellar tendon. Normal pulse.      Left knee: No swelling, deformity, effusion, erythema, lacerations or bony tenderness. Normal range of motion. No tenderness. Normal pulse.      Right lower le+ Pitting Edema present.      Left lower le+ Pitting Edema present.     Neurological: He is alert and oriented to person, place, and time. No cranial nerve deficit or sensory deficit.   Skin: Skin is warm and dry. Capillary refill takes less than 2 seconds. No rash noted.   Psychiatric: He has a normal mood and affect. His behavior is normal. Thought content normal.       ED Course   Procedures  Labs Reviewed - No data to display       Imaging Results              X-Ray Knee 3 View Right (Final result)  Result time 23 08:56:25      Final result by Stella Howard MD (23  08:56:25)                   Impression:      Degenerative changes      Electronically signed by: Stella Howard MD  Date:    01/02/2023  Time:    08:56               Narrative:    EXAMINATION:  XR KNEE 3 VIEW RIGHT    CLINICAL HISTORY:  Pain in right knee    TECHNIQUE:  AP, lateral, and Merchant views of the right knee were performed.    COMPARISON:  None    FINDINGS:  Osseous structures are intact without fracture or osseous destruction.  There is no significant osteophytic spurring, only mild.  Some narrowing of the medial compartment is present.  No joint effusion.  There are vascular calcifications within the soft tissues.                                       Medications   acetaminophen tablet 650 mg (650 mg Oral Given 1/2/23 0813)     Medical Decision Making:   History:   Old Medical Records: I decided to obtain old medical records.  Old Records Summarized: records from clinic visits and other records.       <> Summary of Records: Seen by PCP 12/20 for neck stiffness, HTN.  Seen in ED 12/9 for seborrheic dermatitis.   Initial Assessment:   83 yo M with R knee pain after fall yesterday.  Presentation complicated by co-morbidities including obesity, HTN, chronic arthritis, chronic BLE edema.   HTN present on arrival, patient asymptomatic, will continue to monitor.   No deformity noted, no systemic symptoms, no additional areas of pain or injury, no indication for labs or additional workup.   Will obtain x-rays of R knee and reassess.   Differential Diagnosis:   Differential Diagnosis includes, but is not limited to:  Fracture, dislocation, compartment syndrome, nerve injury/palsy, vascular injury, DVT, rhabdomyolysis, hemarthrosis, septic joint, cellulitis, bursitis, muscle strain, ligament tear/sprain, laceration, foreign body, abrasion, soft tissue contusion, osteoarthritis.      Clinical Tests:   Radiological Study: Ordered and Reviewed  ED Management:  X-ray without fracture or dislocation, chronic  degenerative changes noted.    Patient informed of findings and reassured. Recommend close PCP f/u, RICE precautions given.    On re-evaluation, the patient's status has improved.  After complete ED evaluation, clinical impression is most consistent with R knee pain, arthritis.  PCP follow-up within 2-3 days was recommended.    After taking into careful account the patient's history, physical exam findings, as well as empirical and objective data obtained throughout ED workup, I feel no emergent medical condition has been identified. No further evaluation or admission was felt to be required, and the patient is stable for discharge from the ED. The patient and any additional family present were updated with test results, overall clinical impression, and recommended further plan of care, including discharge instructions as provided and outpatient follow-up for continued evaluation and management as needed. All questions were answered. The patient expressed understanding and agreed with current plan for discharge and follow-up plan of care. Strict ED return precautions were provided, including return/worsening of current symptoms, new symptoms, or any other concerns.                            Clinical Impression:   Final diagnoses:  [M25.561] Right knee pain  [S80.01XA] Contusion of right knee, initial encounter (Primary)  [M17.11] Primary osteoarthritis of right knee          ED Disposition Condition    Discharge Stable          ED Prescriptions    None       Follow-up Information       Follow up With Specialties Details Why Contact Info    Riley Villalobos MD Family Medicine Schedule an appointment as soon as possible for a visit   [address] [facility] [id number] [phone]               Dread Charles MD  01/02/23 0934

## 2023-01-02 NOTE — DISCHARGE INSTRUCTIONS
Thank you for choosing [facility]!     Our goal in the Emergency Department is to always provide outstanding medical care. You may receive a survey by mail or e-mail in the next week regarding your experience today. We would greatly appreciate you completing and returning the survey. Your feedback provides us with a way to recognize our staff who provide very good care, and it helps us learn how to improve when your experience was below our aspiration of excellence.      It is important to remember that some problems are difficult to diagnose and may not be found during your first visit. Be sure to follow up with your primary care doctor and review any labs/imaging that was performed during your visit with them. If you do not have a primary care doctor, you may contact the one listed on your discharge paperwork, or you may also call the [facility] Appointment Desk at [phone] to schedule an appointment.     All medications may potentially have side effects and it is impossible to predict which medications may give you side effects. If you feel that you are having a negative effect of any medication you should immediately stop taking them and seek medical attention.  Do not drive or make any important decisions for 24 hours if you have received any pain medications, sedatives or mood altering drugs during your ER visit.    We appreciate you trusting us with your medical care. We will be happy to take care of you for all of your future medical needs. You may return to the ER at any time for any new/concerning symptoms, worsening condition, or failure to improve. We hope you feel better soon.     Sincerely,    Dread Charles Jr., MD  Board-Certified Emergency Medicine Physician  [facility]

## 2023-01-10 ENCOUNTER — OFFICE VISIT (OUTPATIENT)
Dept: OPHTHALMOLOGY | Facility: CLINIC | Age: 83
End: 2023-01-10
Payer: MEDICARE

## 2023-01-10 DIAGNOSIS — H40.1233 LOW-TENSION GLAUCOMA OF BOTH EYES, SEVERE STAGE: Primary | ICD-10-CM

## 2023-01-10 DIAGNOSIS — H25.13 NUCLEAR SCLEROSIS OF BOTH EYES: ICD-10-CM

## 2023-01-10 DIAGNOSIS — Z91.199 PATIENT NONADHERENCE: ICD-10-CM

## 2023-01-10 PROCEDURE — 65855 ARGON TRABECULOPLASTY - OD - RIGHT EYE: ICD-10-PCS | Mod: S$PBB,RT,, | Performed by: OPHTHALMOLOGY

## 2023-01-10 PROCEDURE — 99999 PR PBB SHADOW E&M-EST. PATIENT-LVL III: ICD-10-PCS | Mod: PBBFAC,,, | Performed by: OPHTHALMOLOGY

## 2023-01-10 PROCEDURE — 99999 PR PBB SHADOW E&M-EST. PATIENT-LVL III: CPT | Mod: PBBFAC,,, | Performed by: OPHTHALMOLOGY

## 2023-01-10 PROCEDURE — 99214 PR OFFICE/OUTPT VISIT, EST, LEVL IV, 30-39 MIN: ICD-10-PCS | Mod: 25,S$PBB,, | Performed by: OPHTHALMOLOGY

## 2023-01-10 PROCEDURE — 99213 OFFICE O/P EST LOW 20 MIN: CPT | Mod: PBBFAC | Performed by: OPHTHALMOLOGY

## 2023-01-10 PROCEDURE — 99214 OFFICE O/P EST MOD 30 MIN: CPT | Mod: 25,S$PBB,, | Performed by: OPHTHALMOLOGY

## 2023-01-10 PROCEDURE — 65855 TRABECULOPLASTY LASER SURG: CPT | Mod: PBBFAC,RT | Performed by: OPHTHALMOLOGY
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
## 2023-01-27 DIAGNOSIS — M79.89 LEG SWELLING: ICD-10-CM

## 2023-01-27 DIAGNOSIS — M79.605 LEG PAIN, BILATERAL: ICD-10-CM

## 2023-01-27 DIAGNOSIS — I25.10 CORONARY ARTERY DISEASE INVOLVING NATIVE CORONARY ARTERY OF NATIVE HEART WITHOUT ANGINA PECTORIS: ICD-10-CM

## 2023-01-27 DIAGNOSIS — M79.604 LEG PAIN, BILATERAL: ICD-10-CM

## 2023-01-27 DIAGNOSIS — R07.9 CHEST PAIN, UNSPECIFIED TYPE: Primary | ICD-10-CM

## 2023-01-30 ENCOUNTER — TELEPHONE (OUTPATIENT)
Dept: ADMINISTRATIVE | Facility: OTHER | Age: 83
End: 2023-01-30
Payer: MEDICARE

## 2023-01-30 NOTE — PROGRESS NOTES
Subjective:   Patient ID:  Laura Harris is a 82 y.o. male who presents for follow up of Coronary Artery Disease, Hyperlipidemia, Hypertension, and Shortness of Breath      HPI:     Laura Harris 82 y.o. male is here follow up and feeling well without any new complaints. He has occasional dyspnea but not lifestyle limiting. He was admitted for NSTEMI that required LCX PCI. He feels great. He quit smoking. He has HTN, HLP, and PAD with olaf IIa. No ulcers. No cardiovascular limitations.           3/2019 PCI of NSTEMI    PCI with RAFAELA (2.75 x 18) to mid LCX for NSTEMI  Normal EF  Grade I diastolic dysfunction  Mild AS wth MG 14 mmHg      US 11/2019     High grade L SFA disease       Echo 2/2023    The left ventricle is normal in size with concentric remodeling and normal systolic function.  The estimated ejection fraction is 55%.  Grade I left ventricular diastolic dysfunction.  Normal right ventricular size with normal right ventricular systolic function.  Severe left atrial enlargement.  There is moderate aortic valve stenosis.  Aortic valve area is 1.18 cm2; peak velocity is 4.82 m/s; mean gradient is 53 mmHg.  The estimated PA systolic pressure is 55 mmHg.  Normal central venous pressure (3 mmHg).        Patient Active Problem List    Diagnosis Date Noted    Patient nonadherence 01/10/2023    Sensorineural hearing loss, bilateral 08/03/2022    Chronic obstructive pulmonary disease, unspecified COPD type 01/26/2022    Frailty 06/20/2021    Abdominal aortic atherosclerosis 06/09/2021     As seen on US imaging on 10/13/2014      Overweight (BMI 25.0-29.9) 06/09/2021    Hyperlipidemia 01/16/2020    PAD (peripheral artery disease) 01/16/2020    Tobacco use disorder, moderate, in sustained remission 01/16/2020    Vitamin D deficiency 11/27/2019    Anemia of chronic disease 11/19/2019    Absolute anemia 11/19/2019    History of bladder cancer 10/07/2019    Tortuous aorta 10/07/2019    Coronary artery disease  involving native coronary artery of native heart without angina pectoris 10/07/2019         3/2019 PCI of NSTEMI    PCI with ARFAELA (2.75 x 18) to mid LCX for NSTEMI  Normal EF  Grade I diastolic dysfunction  Mild AS wth MG 14 mmHg        S/P left heart catheterization by percutaneous approach 10/07/2019    History of non-ST elevation myocardial infarction (NSTEMI) 2019    Carpal tunnel syndrome of left wrist 2016    Carpal tunnel syndrome on both sides 2015    CKD (chronic kidney disease) stage 3, GFR 30-59 ml/min 2014    Hypertension 2014    Ureteral stricture 2013    Low tension glaucoma 2012    Nuclear sclerosis - Both Eyes 2012    Exodeviation 2012     Alternating             Right Arm BP - Sitting: 160/60  Left Arm BP - Sittin/60        LABS    LAST HbA1c  Lab Results   Component Value Date    HGBA1C 6.3 (H) 2022       Lipid panel  Lab Results   Component Value Date    CHOL 136 2022    CHOL 139 2022    CHOL 145 2022     Lab Results   Component Value Date    HDL 36 (L) 2022    HDL 33 (L) 2022    HDL 34 (L) 2022     Lab Results   Component Value Date    LDLCALC 78.2 2022    LDLCALC 83.6 2022    LDLCALC 61.0 (L) 2022     Lab Results   Component Value Date    TRIG 109 2022    TRIG 112 2022    TRIG 250 (H) 2022     Lab Results   Component Value Date    CHOLHDL 26.5 2022    CHOLHDL 23.7 2022    CHOLHDL 23.4 2022            Review of Systems   Constitutional: Negative for diaphoresis, night sweats, weight gain and weight loss.   HENT:  Negative for congestion.    Eyes:  Negative for blurred vision, discharge and double vision.   Cardiovascular:  Positive for claudication. Negative for chest pain, cyanosis, dyspnea on exertion, irregular heartbeat, leg swelling, near-syncope, orthopnea, palpitations, paroxysmal nocturnal dyspnea and syncope.   Respiratory:  Negative  for cough, shortness of breath and wheezing.    Endocrine: Negative for cold intolerance, heat intolerance and polyphagia.   Hematologic/Lymphatic: Negative for adenopathy and bleeding problem. Does not bruise/bleed easily.   Skin:  Negative for dry skin and nail changes.   Musculoskeletal:  Negative for arthritis, back pain, falls, joint pain, myalgias and neck pain.   Gastrointestinal:  Negative for bloating, abdominal pain, change in bowel habit and constipation.   Genitourinary:  Negative for bladder incontinence, dysuria, flank pain, genital sores and missed menses.   Neurological:  Negative for aphonia, brief paralysis, difficulty with concentration, dizziness and weakness.   Psychiatric/Behavioral:  Negative for altered mental status and memory loss. The patient does not have insomnia.    Allergic/Immunologic: Negative for environmental allergies.     Objective:   Physical Exam  Constitutional:       Appearance: He is well-developed.      Interventions: He is not intubated.  HENT:      Head: Normocephalic and atraumatic.      Right Ear: External ear normal.      Left Ear: External ear normal.   Eyes:      General: No scleral icterus.        Right eye: No discharge.         Left eye: No discharge.      Conjunctiva/sclera: Conjunctivae normal.      Pupils: Pupils are equal, round, and reactive to light.   Neck:      Thyroid: No thyromegaly.      Vascular: Normal carotid pulses. No carotid bruit, hepatojugular reflux or JVD.      Trachea: No tracheal deviation.   Cardiovascular:      Rate and Rhythm: Normal rate and regular rhythm. No extrasystoles are present.     Chest Wall: PMI is not displaced.      Pulses:           Carotid pulses are 2+ on the right side and 2+ on the left side.       Radial pulses are 2+ on the right side and 2+ on the left side.        Femoral pulses are 2+ on the right side and 2+ on the left side.       Popliteal pulses are 1+ on the right side and 1+ on the left side.        Dorsalis  pedis pulses are 0 on the right side and 0 on the left side.        Posterior tibial pulses are 0 on the right side and 0 on the left side.      Heart sounds: S1 normal and S2 normal. Heart sounds not distant. No midsystolic click. Murmur heard.   Systolic murmur is present with a grade of 2/6 at the upper right sternal border.     No friction rub. No gallop. No S3 sounds.      Comments:     Monophasic L PT and DP doppler signals      Biphasic R DP and PT doppler signals      Pulmonary:      Effort: Pulmonary effort is normal. No tachypnea, bradypnea, accessory muscle usage or respiratory distress. He is not intubated.      Breath sounds: Normal breath sounds. No stridor. No decreased breath sounds, wheezing or rales.   Chest:      Chest wall: No tenderness.   Abdominal:      General: There is no distension or abdominal bruit.      Palpations: There is no mass or pulsatile mass.      Tenderness: There is no abdominal tenderness. There is no guarding or rebound.   Musculoskeletal:         General: No tenderness. Normal range of motion.      Cervical back: Normal range of motion and neck supple.   Lymphadenopathy:      Cervical: No cervical adenopathy.   Skin:     General: Skin is warm.      Coloration: Skin is not pale.      Findings: No erythema or rash.      Comments:     No ulcers         Neurological:      Mental Status: He is alert and oriented to person, place, and time.      Cranial Nerves: No cranial nerve deficit.      Coordination: Coordination normal.      Deep Tendon Reflexes: Reflexes are normal and symmetric.   Psychiatric:         Behavior: Behavior normal.         Thought Content: Thought content normal.         Judgment: Judgment normal.       Assessment:     1. Coronary artery disease involving native coronary artery of native heart without angina pectoris    2. Chronic obstructive pulmonary disease, unspecified COPD type    3. Hypertension, unspecified type    4. History of non-ST elevation  myocardial infarction (NSTEMI)    5. Tortuous aorta    6. Mixed hyperlipidemia    7. PAD (peripheral artery disease)    8. Abdominal aortic atherosclerosis    9. History of bladder cancer    10. Anemia of chronic disease    11. Tobacco use disorder, moderate, in sustained remission    12. SOB (shortness of breath)        Plan:       Review echo  If AS is severe   Referral to valve clinic  Review RON  Virtual visit after tests        DAPT with aspirin + plavix  Statin therapy  BP target < 130/80  Will switch to crestor with a LDL goal <70      PAD walking program   Pletal 50 mg po bid for PAD        Establish care with podiatry  Proper foot care      Continue with current medical plan and lifestyle changes.  Return sooner for concerns or questions. If symptoms persist go to the ED  I have reviewed all pertinent data on this patient       I have reviewed the patient's medical history in detail and updated the computerized patient record.    No orders of the defined types were placed in this encounter.      Follow up as scheduled. Return sooner for concerns or questions            He expressed verbal understanding and agreed with the plan        Patient's Medications   New Prescriptions    No medications on file   Previous Medications    ACETAMINOPHEN (TYLENOL) 500 MG TABLET    Take 1 tablet (500 mg total) by mouth every 6 (six) hours as needed for Pain.    AMLODIPINE (NORVASC) 10 MG TABLET    Take 1 tablet (10 mg total) by mouth once daily.    ATORVASTATIN (LIPITOR) 80 MG TABLET    Take 1 tablet (80 mg total) by mouth once daily.    CLOPIDOGREL (PLAVIX) 75 MG TABLET    TAKE 1 TABLET BY MOUTH DAILY    KETOCONAZOLE (NIZORAL) 2 % SHAMPOO    Apply topically twice a week.    KRILL-OM-3-DHA-EPA-PHOSPHO--42-21-50 MG CAP    Take 1 capsule by mouth once daily.    LATANOPROST 0.005 % OPHTHALMIC SOLUTION    Place 1 drop into both eyes every evening.    LOSARTAN (COZAAR) 50 MG TABLET    Take 1 tablet (50 mg total) by mouth  once daily.    METOPROLOL SUCCINATE (TOPROL-XL) 25 MG 24 HR TABLET    Take 1 tablet (25 mg total) by mouth once daily.    NITROGLYCERIN (NITROSTAT) 0.4 MG SL TABLET    Place 1 tablet (0.4 mg total) under the tongue every 5 (five) minutes as needed for Chest pain.    TAMSULOSIN (FLOMAX) 0.4 MG CAP    TAKE 1 CAPSULE(0.4 MG) BY MOUTH AFTER DINNER   Modified Medications    No medications on file   Discontinued Medications    No medications on file

## 2023-02-01 ENCOUNTER — HOSPITAL ENCOUNTER (OUTPATIENT)
Dept: CARDIOLOGY | Facility: HOSPITAL | Age: 83
Discharge: HOME | End: 2023-02-01
Attending: INTERNAL MEDICINE
Payer: MEDICARE

## 2023-02-01 ENCOUNTER — OFFICE VISIT (OUTPATIENT)
Dept: CARDIOLOGY | Facility: CLINIC | Age: 83
End: 2023-02-01
Payer: MEDICARE

## 2023-02-01 ENCOUNTER — CLINICAL SUPPORT (OUTPATIENT)
Dept: LAB | Facility: HOSPITAL | Age: 83
End: 2023-02-01
Attending: INTERNAL MEDICINE
Payer: MEDICARE

## 2023-02-01 VITALS
DIASTOLIC BLOOD PRESSURE: 60 MMHG | HEART RATE: 43 BPM | WEIGHT: 189 LBS | BODY MASS INDEX: 28.64 KG/M2 | HEIGHT: 68 IN | SYSTOLIC BLOOD PRESSURE: 160 MMHG

## 2023-02-01 VITALS — HEIGHT: 68 IN | WEIGHT: 190 LBS | BODY MASS INDEX: 28.79 KG/M2

## 2023-02-01 DIAGNOSIS — J44.9 CHRONIC OBSTRUCTIVE PULMONARY DISEASE, UNSPECIFIED COPD TYPE: ICD-10-CM

## 2023-02-01 DIAGNOSIS — R07.9 CHEST PAIN, UNSPECIFIED TYPE: ICD-10-CM

## 2023-02-01 DIAGNOSIS — Z85.51 HISTORY OF BLADDER CANCER: ICD-10-CM

## 2023-02-01 DIAGNOSIS — I25.10 CORONARY ARTERY DISEASE INVOLVING NATIVE CORONARY ARTERY OF NATIVE HEART WITHOUT ANGINA PECTORIS: ICD-10-CM

## 2023-02-01 DIAGNOSIS — I70.0 ABDOMINAL AORTIC ATHEROSCLEROSIS: ICD-10-CM

## 2023-02-01 DIAGNOSIS — I73.9 PAD (PERIPHERAL ARTERY DISEASE): ICD-10-CM

## 2023-02-01 DIAGNOSIS — I77.1 TORTUOUS AORTA: ICD-10-CM

## 2023-02-01 DIAGNOSIS — I25.10 CORONARY ARTERY DISEASE INVOLVING NATIVE CORONARY ARTERY OF NATIVE HEART WITHOUT ANGINA PECTORIS: Primary | ICD-10-CM

## 2023-02-01 DIAGNOSIS — D63.8 ANEMIA OF CHRONIC DISEASE: ICD-10-CM

## 2023-02-01 DIAGNOSIS — I25.2 HISTORY OF NON-ST ELEVATION MYOCARDIAL INFARCTION (NSTEMI): ICD-10-CM

## 2023-02-01 DIAGNOSIS — I10 HYPERTENSION, UNSPECIFIED TYPE: ICD-10-CM

## 2023-02-01 DIAGNOSIS — E78.2 MIXED HYPERLIPIDEMIA: ICD-10-CM

## 2023-02-01 DIAGNOSIS — R06.02 SOB (SHORTNESS OF BREATH): ICD-10-CM

## 2023-02-01 DIAGNOSIS — F17.201 TOBACCO USE DISORDER, MODERATE, IN SUSTAINED REMISSION: ICD-10-CM

## 2023-02-01 LAB
AORTIC ROOT ANNULUS: 2.8 CM
AORTIC VALVE CUSP SEPERATION: 1.07 CM
AV INDEX (PROSTH): 0.37
AV MEAN GRADIENT: 53 MMHG
AV PEAK GRADIENT: 93 MMHG
AV REGURGITATION PRESSURE HALF TIME: 582.38 MS
AV VALVE AREA: 1.18 CM2
AV VELOCITY RATIO: 0.36
BSA FOR ECHO PROCEDURE: 2.03 M2
CV ECHO LV RWT: 0.45 CM
DOP CALC AO PEAK VEL: 4.82 M/S
DOP CALC AO VTI: 95.4 CM
DOP CALC LVOT AREA: 3.1 CM2
DOP CALC LVOT DIAMETER: 2 CM
DOP CALC LVOT PEAK VEL: 1.74 M/S
DOP CALC LVOT STROKE VOLUME: 112.1 CM3
DOP CALC MV VTI: 51.3 CM
DOP CALCLVOT PEAK VEL VTI: 35.7 CM
E WAVE DECELERATION TIME: 212.41 MSEC
E/A RATIO: 0.77
ECHO LV POSTERIOR WALL: 1 CM (ref 0.6–1.1)
EJECTION FRACTION: 55 %
FRACTIONAL SHORTENING: 50 % (ref 28–44)
INTERVENTRICULAR SEPTUM: 1.05 CM (ref 0.6–1.1)
IVRT: 74.22 MSEC
LA MAJOR: 7.2 CM
LA MINOR: 6.2 CM
LA WIDTH: 4.3 CM
LEFT ATRIUM SIZE: 4.2 CM
LEFT ATRIUM VOLUME INDEX MOD: 28.1 ML/M2
LEFT ATRIUM VOLUME INDEX: 51.1 ML/M2
LEFT ATRIUM VOLUME MOD: 56.2 CM3
LEFT ATRIUM VOLUME: 102.28 CM3
LEFT INTERNAL DIMENSION IN SYSTOLE: 2.2 CM (ref 2.1–4)
LEFT VENTRICLE DIASTOLIC VOLUME INDEX: 51.69 ML/M2
LEFT VENTRICLE DIASTOLIC VOLUME: 103.37 ML
LEFT VENTRICLE MASS INDEX: 76 G/M2
LEFT VENTRICLE SYSTOLIC VOLUME INDEX: 17.7 ML/M2
LEFT VENTRICLE SYSTOLIC VOLUME: 35.39 ML
LEFT VENTRICULAR INTERNAL DIMENSION IN DIASTOLE: 4.4 CM (ref 3.5–6)
LEFT VENTRICULAR MASS: 152.98 G
LV LATERAL E/E' RATIO: 35.67 M/S
LVOT MG: 5.78 MMHG
LVOT MV: 1.08 CM/S
MV MEAN GRADIENT: 2 MMHG
MV PEAK A VEL: 1.39 M/S
MV PEAK E VEL: 1.07 M/S
MV PEAK GRADIENT: 8 MMHG
MV STENOSIS PRESSURE HALF TIME: 61.6 MS
MV VALVE AREA BY CONTINUITY EQUATION: 2.19 CM2
MV VALVE AREA P 1/2 METHOD: 3.57 CM2
PISA AR MAX VEL: 3.51 M/S
PISA MRMAX VEL: 5.49 M/S
PISA TR MAX VEL: 3.61 M/S
PV MV: 1.42 M/S
PV PEAK VELOCITY: 2.12 CM/S
RA MAJOR: 5.7 CM
RA PRESSURE: 3 MMHG
RA WIDTH: 3.4 CM
RIGHT VENTRICULAR END-DIASTOLIC DIMENSION: 2.38 CM
RIGHT VENTRICULAR LENGTH IN DIASTOLE (APICAL 4-CHAMBER VIEW): 5 CM
TDI LATERAL: 0.03 M/S
TR MAX PG: 52 MMHG
TRICUSPID ANNULAR PLANE SYSTOLIC EXCURSION: 1.7 CM
TV REST PULMONARY ARTERY PRESSURE: 55 MMHG

## 2023-02-01 PROCEDURE — 93005 ELECTROCARDIOGRAM TRACING: CPT

## 2023-02-01 PROCEDURE — 93010 ELECTROCARDIOGRAM REPORT: CPT | Mod: ,,, | Performed by: INTERNAL MEDICINE

## 2023-02-01 PROCEDURE — 93306 ECHO (CUPID ONLY): ICD-10-PCS | Mod: 26,,, | Performed by: INTERNAL MEDICINE

## 2023-02-01 PROCEDURE — 99215 OFFICE O/P EST HI 40 MIN: CPT | Mod: S$PBB,25,, | Performed by: INTERNAL MEDICINE

## 2023-02-01 PROCEDURE — 93010 EKG 12-LEAD: ICD-10-PCS | Mod: ,,, | Performed by: INTERNAL MEDICINE

## 2023-02-01 PROCEDURE — 99215 PR OFFICE/OUTPT VISIT, EST, LEVL V, 40-54 MIN: ICD-10-PCS | Mod: S$PBB,25,, | Performed by: INTERNAL MEDICINE

## 2023-02-01 PROCEDURE — 99999 PR PBB SHADOW E&M-EST. PATIENT-LVL IV: CPT | Mod: PBBFAC,,, | Performed by: INTERNAL MEDICINE

## 2023-02-01 PROCEDURE — 93306 TTE W/DOPPLER COMPLETE: CPT | Mod: 26,,, | Performed by: INTERNAL MEDICINE

## 2023-02-01 PROCEDURE — 99214 OFFICE O/P EST MOD 30 MIN: CPT | Mod: PBBFAC,25,PO | Performed by: INTERNAL MEDICINE

## 2023-02-01 PROCEDURE — 93306 TTE W/DOPPLER COMPLETE: CPT

## 2023-02-01 PROCEDURE — 99999 PR PBB SHADOW E&M-EST. PATIENT-LVL IV: ICD-10-PCS | Mod: PBBFAC,,, | Performed by: INTERNAL MEDICINE

## 2023-02-03 ENCOUNTER — HOSPITAL ENCOUNTER (OUTPATIENT)
Dept: CARDIOLOGY | Facility: HOSPITAL | Age: 83
Discharge: HOME | End: 2023-02-03
Attending: INTERNAL MEDICINE
Payer: MEDICARE

## 2023-02-03 DIAGNOSIS — M79.604 LEG PAIN, BILATERAL: ICD-10-CM

## 2023-02-03 DIAGNOSIS — M79.605 LEG PAIN, BILATERAL: ICD-10-CM

## 2023-02-03 DIAGNOSIS — M79.89 LEG SWELLING: ICD-10-CM

## 2023-02-03 PROCEDURE — 93922 UPR/L XTREMITY ART 2 LEVELS: CPT

## 2023-02-03 PROCEDURE — 93922 ANKLE BRACHIAL INDICES (ABI): ICD-10-PCS | Mod: 26,,, | Performed by: INTERNAL MEDICINE

## 2023-02-03 PROCEDURE — 93922 UPR/L XTREMITY ART 2 LEVELS: CPT | Mod: 26,,, | Performed by: INTERNAL MEDICINE

## 2023-02-05 LAB
LEFT ABI: 0.59
LEFT ARM BP: 150 MMHG
LEFT DORSALIS PEDIS: 81 MMHG
LEFT POSTERIOR TIBIAL: 98 MMHG
RIGHT ABI: 0.92
RIGHT ARM BP: 165 MMHG
RIGHT DORSALIS PEDIS: 129 MMHG
RIGHT POSTERIOR TIBIAL: 151 MMHG

## 2023-02-08 ENCOUNTER — OFFICE VISIT (OUTPATIENT)
Dept: CARDIOLOGY | Facility: CLINIC | Age: 83
End: 2023-02-08
Payer: MEDICARE

## 2023-02-08 DIAGNOSIS — F17.201 TOBACCO USE DISORDER, MODERATE, IN SUSTAINED REMISSION: ICD-10-CM

## 2023-02-08 DIAGNOSIS — I73.9 PAD (PERIPHERAL ARTERY DISEASE): ICD-10-CM

## 2023-02-08 DIAGNOSIS — I70.0 ABDOMINAL AORTIC ATHEROSCLEROSIS: ICD-10-CM

## 2023-02-08 DIAGNOSIS — I77.1 TORTUOUS AORTA: ICD-10-CM

## 2023-02-08 DIAGNOSIS — I25.2 HISTORY OF NON-ST ELEVATION MYOCARDIAL INFARCTION (NSTEMI): ICD-10-CM

## 2023-02-08 DIAGNOSIS — I10 HYPERTENSION, UNSPECIFIED TYPE: ICD-10-CM

## 2023-02-08 DIAGNOSIS — I25.10 CORONARY ARTERY DISEASE INVOLVING NATIVE CORONARY ARTERY OF NATIVE HEART WITHOUT ANGINA PECTORIS: ICD-10-CM

## 2023-02-08 DIAGNOSIS — I35.0 AORTIC STENOSIS, MODERATE: Primary | ICD-10-CM

## 2023-02-08 RX ORDER — DIPHENHYDRAMINE HCL 25 MG
50 CAPSULE ORAL ONCE
Status: CANCELLED | OUTPATIENT
Start: 2023-02-08 | End: 2023-02-08

## 2023-02-08 RX ORDER — SODIUM CHLORIDE 9 MG/ML
INJECTION, SOLUTION INTRAVENOUS CONTINUOUS
Status: CANCELLED | OUTPATIENT
Start: 2023-02-08 | End: 2023-02-08

## 2023-02-08 NOTE — PROGRESS NOTES
Established Patient - Audio Only Telehealth Visit     The patient location is: home   The chief complaint leading to consultation is: test results  Visit type: Virtual visit with audio only (telephone)  Total time spent with patient: 30       The reason for the audio only service rather than synchronous audio and video virtual visit was related to technical difficulties or patient preference/necessity.     Each patient to whom I provide medical services by telemedicine is:  (1) informed of the relationship between the physician and patient and the respective role of any other health care provider with respect to management of the patient; and (2) notified that they may decline to receive medical services by telemedicine and may withdraw from such care at any time. Patient verbally consented to receive this service via voice-only telephone call.       HPI:     Laura Harris 82 y.o. male is here follow up and feeling well without any new complaints. He has occasional dyspnea but not lifestyle limiting. He was admitted for NSTEMI that required LCX PCI. He feels great. He quit smoking. He has HTN, HLP, and PAD with olaf IIa. No ulcers. No cardiovascular limitations.               3/2019 PCI of NSTEMI     PCI with RAFAELA (2.75 x 18) to mid LCX for NSTEMI  Normal EF  Grade I diastolic dysfunction  Mild AS wth MG 14 mmHg        US 11/2019               High grade L SFA disease                 Echo 2/2023     The left ventricle is normal in size with concentric remodeling and normal systolic function.  The estimated ejection fraction is 55%.  Grade I left ventricular diastolic dysfunction.  Normal right ventricular size with normal right ventricular systolic function.  Severe left atrial enlargement.  There is moderate aortic valve stenosis.  Aortic valve area is 1.18 cm2; peak velocity is 4.82 m/s; mean gradient is 53 mmHg.  The estimated PA systolic pressure is 55 mmHg.  Normal central venous pressure (3  mmHg).      RON 2/2023: R 0.92 and L 0.59      Assessment and plan:       Patient Active Problem List    Diagnosis Date Noted    Aortic stenosis, moderate 02/08/2023    Patient nonadherence 01/10/2023    Sensorineural hearing loss, bilateral 08/03/2022    Chronic obstructive pulmonary disease, unspecified COPD type 01/26/2022    Frailty 06/20/2021    Abdominal aortic atherosclerosis 06/09/2021     As seen on US imaging on 10/13/2014      Overweight (BMI 25.0-29.9) 06/09/2021    Hyperlipidemia 01/16/2020    PAD (peripheral artery disease) 01/16/2020    Tobacco use disorder, moderate, in sustained remission 01/16/2020    Vitamin D deficiency 11/27/2019    Anemia of chronic disease 11/19/2019    Absolute anemia 11/19/2019    History of bladder cancer 10/07/2019    Tortuous aorta 10/07/2019    Coronary artery disease involving native coronary artery of native heart without angina pectoris 10/07/2019         3/2019 PCI of NSTEMI    PCI with RAFAELA (2.75 x 18) to mid LCX for NSTEMI  Normal EF  Grade I diastolic dysfunction  Mild AS wth MG 14 mmHg        S/P left heart catheterization by percutaneous approach 10/07/2019    History of non-ST elevation myocardial infarction (NSTEMI) 03/03/2019    Carpal tunnel syndrome of left wrist 04/12/2016    Carpal tunnel syndrome on both sides 04/16/2015    CKD (chronic kidney disease) stage 3, GFR 30-59 ml/min 05/01/2014    Hypertension 05/01/2014    Ureteral stricture 03/04/2013    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    Orders Placed This Encounter   Procedures    Case Request-Cath Lab: Left heart cath     Standing Status:   Standing     Number of  Occurrences:   1     Order Specific Question:   CPT Code:     Answer:   AL CATH PLACE/CORON ANGIO, IMG SUPER/INTERP, BYPASS ANGIO,W L HRT VENTRIC [46349]     Order Specific Question:   CPT Code:     Answer:   AL  US GUIDE, VASCULAR ACCESS [74188]     Order Specific Question:   Medical Necessity:     Answer:   Medically Urgent [101]     Order Specific Question:   Is an on-site pathologist required for this procedure?     Answer:   N/A       Follow up as scheduled. Return sooner for concerns or questions             This service was not originating from a related E/M service provided within the previous 7 days nor will  to an E/M service or procedure within the next 24 hours or my soonest available appointment.  Prevailing standard of care was able to be met in this audio-only visit.

## 2023-02-08 NOTE — H&P (VIEW-ONLY)
Established Patient - Audio Only Telehealth Visit     The patient location is: home   The chief complaint leading to consultation is: test results  Visit type: Virtual visit with audio only (telephone)  Total time spent with patient: 30       The reason for the audio only service rather than synchronous audio and video virtual visit was related to technical difficulties or patient preference/necessity.     Each patient to whom I provide medical services by telemedicine is:  (1) informed of the relationship between the physician and patient and the respective role of any other health care provider with respect to management of the patient; and (2) notified that they may decline to receive medical services by telemedicine and may withdraw from such care at any time. Patient verbally consented to receive this service via voice-only telephone call.       HPI:     Laura Harris 82 y.o. male is here follow up and feeling well without any new complaints. He has occasional dyspnea but not lifestyle limiting. He was admitted for NSTEMI that required LCX PCI. He feels great. He quit smoking. He has HTN, HLP, and PAD with olaf IIa. No ulcers. No cardiovascular limitations.               3/2019 PCI of NSTEMI     PCI with RAFAELA (2.75 x 18) to mid LCX for NSTEMI  Normal EF  Grade I diastolic dysfunction  Mild AS wth MG 14 mmHg        US 11/2019               High grade L SFA disease                 Echo 2/2023     The left ventricle is normal in size with concentric remodeling and normal systolic function.  The estimated ejection fraction is 55%.  Grade I left ventricular diastolic dysfunction.  Normal right ventricular size with normal right ventricular systolic function.  Severe left atrial enlargement.  There is moderate aortic valve stenosis.  Aortic valve area is 1.18 cm2; peak velocity is 4.82 m/s; mean gradient is 53 mmHg.  The estimated PA systolic pressure is 55 mmHg.  Normal central venous pressure (3  mmHg).      RON 2/2023: R 0.92 and L 0.59      Assessment and plan:       Patient Active Problem List    Diagnosis Date Noted    Aortic stenosis, moderate 02/08/2023    Patient nonadherence 01/10/2023    Sensorineural hearing loss, bilateral 08/03/2022    Chronic obstructive pulmonary disease, unspecified COPD type 01/26/2022    Frailty 06/20/2021    Abdominal aortic atherosclerosis 06/09/2021     As seen on US imaging on 10/13/2014      Overweight (BMI 25.0-29.9) 06/09/2021    Hyperlipidemia 01/16/2020    PAD (peripheral artery disease) 01/16/2020    Tobacco use disorder, moderate, in sustained remission 01/16/2020    Vitamin D deficiency 11/27/2019    Anemia of chronic disease 11/19/2019    Absolute anemia 11/19/2019    History of bladder cancer 10/07/2019    Tortuous aorta 10/07/2019    Coronary artery disease involving native coronary artery of native heart without angina pectoris 10/07/2019         3/2019 PCI of NSTEMI    PCI with RAFAELA (2.75 x 18) to mid LCX for NSTEMI  Normal EF  Grade I diastolic dysfunction  Mild AS wth MG 14 mmHg        S/P left heart catheterization by percutaneous approach 10/07/2019    History of non-ST elevation myocardial infarction (NSTEMI) 03/03/2019    Carpal tunnel syndrome of left wrist 04/12/2016    Carpal tunnel syndrome on both sides 04/16/2015    CKD (chronic kidney disease) stage 3, GFR 30-59 ml/min 05/01/2014    Hypertension 05/01/2014    Ureteral stricture 03/04/2013    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    Orders Placed This Encounter   Procedures    Case Request-Cath Lab: Left heart cath     Standing Status:   Standing     Number of  Occurrences:   1     Order Specific Question:   CPT Code:     Answer:   NE CATH PLACE/CORON ANGIO, IMG SUPER/INTERP, BYPASS ANGIO,W L HRT VENTRIC [42441]     Order Specific Question:   CPT Code:     Answer:   NE  US GUIDE, VASCULAR ACCESS [94282]     Order Specific Question:   Medical Necessity:     Answer:   Medically Urgent [101]     Order Specific Question:   Is an on-site pathologist required for this procedure?     Answer:   N/A       Follow up as scheduled. Return sooner for concerns or questions             This service was not originating from a related E/M service provided within the previous 7 days nor will  to an E/M service or procedure within the next 24 hours or my soonest available appointment.  Prevailing standard of care was able to be met in this audio-only visit.

## 2023-02-22 ENCOUNTER — LAB VISIT (OUTPATIENT)
Dept: LAB | Facility: HOSPITAL | Age: 83
End: 2023-02-22
Attending: INTERNAL MEDICINE
Payer: MEDICARE

## 2023-02-22 DIAGNOSIS — Z01.818 PREOP TESTING: ICD-10-CM

## 2023-02-22 LAB
ANION GAP SERPL CALC-SCNC: 10 MMOL/L (ref 8–16)
BASOPHILS # BLD AUTO: 0.05 K/UL (ref 0–0.2)
BASOPHILS NFR BLD: 0.9 % (ref 0–1.9)
BUN SERPL-MCNC: 24 MG/DL (ref 8–23)
CALCIUM SERPL-MCNC: 9.6 MG/DL (ref 8.7–10.5)
CHLORIDE SERPL-SCNC: 109 MMOL/L (ref 95–110)
CO2 SERPL-SCNC: 23 MMOL/L (ref 23–29)
CREAT SERPL-MCNC: 1.7 MG/DL (ref 0.5–1.4)
DIFFERENTIAL METHOD: ABNORMAL
EOSINOPHIL # BLD AUTO: 0.3 K/UL (ref 0–0.5)
EOSINOPHIL NFR BLD: 4.8 % (ref 0–8)
ERYTHROCYTE [DISTWIDTH] IN BLOOD BY AUTOMATED COUNT: 14.6 % (ref 11.5–14.5)
EST. GFR  (NO RACE VARIABLE): 40 ML/MIN/1.73 M^2
GLUCOSE SERPL-MCNC: 100 MG/DL (ref 70–110)
HCT VFR BLD AUTO: 42.4 % (ref 40–54)
HGB BLD-MCNC: 13.9 G/DL (ref 14–18)
IMM GRANULOCYTES # BLD AUTO: 0.05 K/UL (ref 0–0.04)
IMM GRANULOCYTES NFR BLD AUTO: 0.9 % (ref 0–0.5)
LYMPHOCYTES # BLD AUTO: 1.4 K/UL (ref 1–4.8)
LYMPHOCYTES NFR BLD: 25.2 % (ref 18–48)
MCH RBC QN AUTO: 26.5 PG (ref 27–31)
MCHC RBC AUTO-ENTMCNC: 32.8 G/DL (ref 32–36)
MCV RBC AUTO: 81 FL (ref 82–98)
MONOCYTES # BLD AUTO: 0.6 K/UL (ref 0.3–1)
MONOCYTES NFR BLD: 11 % (ref 4–15)
NEUTROPHILS # BLD AUTO: 3.1 K/UL (ref 1.8–7.7)
NEUTROPHILS NFR BLD: 57.2 % (ref 38–73)
NRBC BLD-RTO: 0 /100 WBC
PLATELET # BLD AUTO: 204 K/UL (ref 150–450)
PMV BLD AUTO: 11.3 FL (ref 9.2–12.9)
POTASSIUM SERPL-SCNC: 4.2 MMOL/L (ref 3.5–5.1)
RBC # BLD AUTO: 5.25 M/UL (ref 4.6–6.2)
SODIUM SERPL-SCNC: 142 MMOL/L (ref 136–145)
WBC # BLD AUTO: 5.44 K/UL (ref 3.9–12.7)

## 2023-02-22 PROCEDURE — 36415 COLL VENOUS BLD VENIPUNCTURE: CPT | Performed by: INTERNAL MEDICINE

## 2023-02-22 PROCEDURE — 85025 COMPLETE CBC W/AUTO DIFF WBC: CPT | Performed by: INTERNAL MEDICINE

## 2023-02-22 PROCEDURE — 80048 BASIC METABOLIC PNL TOTAL CA: CPT | Performed by: INTERNAL MEDICINE

## 2023-02-24 ENCOUNTER — TELEPHONE (OUTPATIENT)
Dept: FAMILY MEDICINE | Facility: CLINIC | Age: 83
End: 2023-02-24
Payer: MEDICARE

## 2023-02-24 ENCOUNTER — TELEPHONE (OUTPATIENT)
Dept: CARDIOLOGY | Facility: HOSPITAL | Age: 83
End: 2023-02-24
Payer: MEDICARE

## 2023-02-24 ENCOUNTER — HOSPITAL ENCOUNTER (OUTPATIENT)
Facility: HOSPITAL | Age: 83
Discharge: HOME | End: 2023-02-24
Attending: INTERNAL MEDICINE | Admitting: INTERNAL MEDICINE
Payer: MEDICARE

## 2023-02-24 VITALS
HEIGHT: 68 IN | BODY MASS INDEX: 28.79 KG/M2 | HEART RATE: 70 BPM | OXYGEN SATURATION: 99 % | TEMPERATURE: 97 F | WEIGHT: 190 LBS | DIASTOLIC BLOOD PRESSURE: 77 MMHG | RESPIRATION RATE: 22 BRPM | SYSTOLIC BLOOD PRESSURE: 172 MMHG

## 2023-02-24 DIAGNOSIS — I25.10 CAD (CORONARY ARTERY DISEASE): ICD-10-CM

## 2023-02-24 DIAGNOSIS — Z01.818 PREOP TESTING: Primary | ICD-10-CM

## 2023-02-24 DIAGNOSIS — I35.0 AORTIC STENOSIS, MODERATE: ICD-10-CM

## 2023-02-24 DIAGNOSIS — I35.0 NONRHEUMATIC AORTIC VALVE STENOSIS: Primary | ICD-10-CM

## 2023-02-24 PROCEDURE — 93454 CORONARY ARTERY ANGIO S&I: CPT | Performed by: INTERNAL MEDICINE

## 2023-02-24 PROCEDURE — 25000003 PHARM REV CODE 250: Performed by: INTERNAL MEDICINE

## 2023-02-24 PROCEDURE — 93010 EKG 12-LEAD: ICD-10-PCS | Mod: ,,, | Performed by: INTERNAL MEDICINE

## 2023-02-24 PROCEDURE — 25500020 PHARM REV CODE 255: Performed by: INTERNAL MEDICINE

## 2023-02-24 PROCEDURE — 93454 CORONARY ARTERY ANGIO S&I: CPT | Mod: 26,,, | Performed by: INTERNAL MEDICINE

## 2023-02-24 PROCEDURE — 93010 ELECTROCARDIOGRAM REPORT: CPT | Mod: ,,, | Performed by: INTERNAL MEDICINE

## 2023-02-24 PROCEDURE — 63600175 PHARM REV CODE 636 W HCPCS: Performed by: INTERNAL MEDICINE

## 2023-02-24 PROCEDURE — 93005 ELECTROCARDIOGRAM TRACING: CPT

## 2023-02-24 PROCEDURE — C1887 CATHETER, GUIDING: HCPCS | Performed by: INTERNAL MEDICINE

## 2023-02-24 PROCEDURE — C1769 GUIDE WIRE: HCPCS | Performed by: INTERNAL MEDICINE

## 2023-02-24 PROCEDURE — 93454 PR CATH PLACE/CORONARY ANGIO, IMG SUPER/INTERP: ICD-10-PCS | Mod: 26,,, | Performed by: INTERNAL MEDICINE

## 2023-02-24 PROCEDURE — C1894 INTRO/SHEATH, NON-LASER: HCPCS | Performed by: INTERNAL MEDICINE

## 2023-02-24 RX ORDER — ACETAMINOPHEN 325 MG/1
650 TABLET ORAL EVERY 4 HOURS PRN
Status: DISCONTINUED | OUTPATIENT
Start: 2023-02-24 | End: 2023-02-24 | Stop reason: HOSPADM

## 2023-02-24 RX ORDER — FENTANYL CITRATE 50 UG/ML
INJECTION, SOLUTION INTRAMUSCULAR; INTRAVENOUS
Status: DISCONTINUED | OUTPATIENT
Start: 2023-02-24 | End: 2023-02-24 | Stop reason: HOSPADM

## 2023-02-24 RX ORDER — HEPARIN SODIUM 200 [USP'U]/100ML
INJECTION, SOLUTION INTRAVENOUS
Status: DISCONTINUED | OUTPATIENT
Start: 2023-02-24 | End: 2023-02-24 | Stop reason: HOSPADM

## 2023-02-24 RX ORDER — IODIXANOL 320 MG/ML
INJECTION, SOLUTION INTRAVASCULAR
Status: DISCONTINUED | OUTPATIENT
Start: 2023-02-24 | End: 2023-02-24 | Stop reason: HOSPADM

## 2023-02-24 RX ORDER — VERAPAMIL HYDROCHLORIDE 2.5 MG/ML
INJECTION, SOLUTION INTRAVENOUS
Status: DISCONTINUED | OUTPATIENT
Start: 2023-02-24 | End: 2023-02-24 | Stop reason: HOSPADM

## 2023-02-24 RX ORDER — LIDOCAINE HYDROCHLORIDE 10 MG/ML
INJECTION, SOLUTION EPIDURAL; INFILTRATION; INTRACAUDAL; PERINEURAL
Status: DISCONTINUED | OUTPATIENT
Start: 2023-02-24 | End: 2023-02-24 | Stop reason: HOSPADM

## 2023-02-24 RX ORDER — MIDAZOLAM HYDROCHLORIDE 1 MG/ML
INJECTION, SOLUTION INTRAMUSCULAR; INTRAVENOUS
Status: DISCONTINUED | OUTPATIENT
Start: 2023-02-24 | End: 2023-02-24 | Stop reason: HOSPADM

## 2023-02-24 RX ORDER — ONDANSETRON 4 MG/1
8 TABLET, ORALLY DISINTEGRATING ORAL EVERY 8 HOURS PRN
Status: DISCONTINUED | OUTPATIENT
Start: 2023-02-24 | End: 2023-02-24 | Stop reason: HOSPADM

## 2023-02-24 RX ORDER — SODIUM CHLORIDE 9 MG/ML
INJECTION, SOLUTION INTRAVENOUS CONTINUOUS
Status: ACTIVE | OUTPATIENT
Start: 2023-02-24 | End: 2023-02-24

## 2023-02-24 RX ORDER — HEPARIN SODIUM 1000 [USP'U]/ML
INJECTION, SOLUTION INTRAVENOUS; SUBCUTANEOUS
Status: DISCONTINUED | OUTPATIENT
Start: 2023-02-24 | End: 2023-02-24 | Stop reason: HOSPADM

## 2023-02-24 RX ADMIN — SODIUM CHLORIDE 100 ML/HR: 0.9 INJECTION, SOLUTION INTRAVENOUS at 10:02

## 2023-02-24 NOTE — Clinical Note
The catheter was inserted into the ostium   left main. An angiography was performed of the left coronary arteries. Multiple views were taken. The angiography was performed via hand injection with 30 mL of contrast.

## 2023-02-24 NOTE — NURSING
Patient cleared for discharge and assisted to stand to void and dress; belongings at bedside; pt in no distress and has no complaints; meal at bedside; discharge instructions at bedside; pt wearing his glasses; right radial site soft and drsg cdi and no bleeding nor hematoma

## 2023-02-24 NOTE — NURSING
Discharge instructions at bedside and reviewed with pt; pt tolerated po juice and declines a meal at present; home care reviewed and questions answered; belongings at bedside; pt has glasses on

## 2023-02-24 NOTE — INTERVAL H&P NOTE
The patient has been examined and the H&P has been reviewed:    I concur with the findings and no changes have occurred since H&P was written.    Procedure risks, benefits and alternative options discussed and understood by patient/family.    Assessment and plan:             Patient Active Problem List     Diagnosis Date Noted    Aortic stenosis, moderate 02/08/2023    Patient nonadherence 01/10/2023    Sensorineural hearing loss, bilateral 08/03/2022    Chronic obstructive pulmonary disease, unspecified COPD type 01/26/2022    Frailty 06/20/2021    Abdominal aortic atherosclerosis 06/09/2021       As seen on US imaging on 10/13/2014       Overweight (BMI 25.0-29.9) 06/09/2021    Hyperlipidemia 01/16/2020    PAD (peripheral artery disease) 01/16/2020    Tobacco use disorder, moderate, in sustained remission 01/16/2020    Vitamin D deficiency 11/27/2019    Anemia of chronic disease 11/19/2019    Absolute anemia 11/19/2019    History of bladder cancer 10/07/2019    Tortuous aorta 10/07/2019    Coronary artery disease involving native coronary artery of native heart without angina pectoris 10/07/2019      Plan:  -will proceed with angiogram  -- +/- PCI, patient is a RAFAELA candidate  - Anti-platelet Therapy: Plavix  - Access: radial right  - Access closure: TBD  - Creatinine/CrCl:   CrCl cannot be calculated (Unknown ideal weight.).  - Allergies:   - No shellfish / Iodine allergy  - No Latex allergy   - No Aspirin allergy    - No history of HIT  - Pre-Hydration: NS 3cc/kg x 1 hour   - Pre-Op Med: Bendaryl 50mg pO     - All patient's questions were answered.  -The risks, benefits and alternatives of the procedure were explained to the patient.   -The risks of coronary angiography include but are not limited to: bleeding, infection, heart rhythm abnormalities, allergic reactions, kidney injury and potential need for dialysis, stroke and death.   - Should stenting be indicated, the patient has agreed to dual anti-platelet  therapy for 1-consecutive year with a drug-eluting stent and a minimum of 1-month with the use of a bare metal stent  - Additionally, pt is aware that non-compliance is likely to result in stent clotting with heart attack, heart failure, and/or death  -The risks of moderate sedation include hypotension, respiratory depression, arrhythmias, bronchospasm, and death.   - Informed consent was obtained and the  patient is agreeable to proceed with the procedure.    Signed:  Kit Stevens MD  Cardiovascular Fellow  [facility]

## 2023-02-24 NOTE — DISCHARGE SUMMARY
[facility] - Cath Lab (Hospital)  Discharge Note  Short Stay    Procedure(s) (LRB):  Left heart cath (Left)      OUTCOME: Patient tolerated treatment/procedure well without complication and is now ready for discharge.    DISPOSITION: Home or Self Care    FINAL DIAGNOSIS:  <principal problem not specified>    FOLLOWUP: In clinic    DISCHARGE INSTRUCTIONS:    Discharge Procedure Orders   CBC W/ AUTO DIFFERENTIAL   Standing Status: Future Number of Occurrences: 1 Standing Exp. Date: 04/20/24   Order Comments: Preop     BASIC METABOLIC PANEL   Standing Status: Future Number of Occurrences: 1 Standing Exp. Date: 04/20/24   Order Comments: Preop        S/p LHC      Patent LM   Luminal irregularities in LAD, D1, LCX, OM1, and OM2   Small non dominant RCA      Plan       Referral to valve for clinic management of aortic stenosis         Full report to follow                  Current Discharge Medication List        CONTINUE these medications which have NOT CHANGED    Details   amLODIPine (NORVASC) 10 MG tablet Take 1 tablet (10 mg total) by mouth once daily.  Qty: 90 tablet, Refills: 3    Comments: .  Associated Diagnoses: Essential hypertension      atorvastatin (LIPITOR) 80 MG tablet Take 1 tablet (80 mg total) by mouth once daily.  Qty: 90 tablet, Refills: 3    Associated Diagnoses: Mixed hyperlipidemia      krill-om-3-dha-epa-phospho-ast 850-21-51-50 mg Cap Take 1 capsule by mouth once daily.      latanoprost 0.005 % ophthalmic solution Place 1 drop into both eyes every evening.  Qty: 2.5 mL, Refills: 6    Associated Diagnoses: Low-tension glaucoma of both eyes, moderate stage      losartan (COZAAR) 50 MG tablet Take 1 tablet (50 mg total) by mouth once daily.  Qty: 90 tablet, Refills: 3    Comments: .  Associated Diagnoses: Essential hypertension      metoprolol succinate (TOPROL-XL) 25 MG 24 hr tablet Take 1 tablet (25 mg total) by mouth once daily.  Qty: 90 tablet, Refills: 3    Comments: .      tamsulosin (FLOMAX) 0.4  mg Cap TAKE 1 CAPSULE(0.4 MG) BY MOUTH AFTER DINNER  Qty: 90 capsule, Refills: 0      acetaminophen (TYLENOL) 500 MG tablet Take 1 tablet (500 mg total) by mouth every 6 (six) hours as needed for Pain.  Qty: 30 tablet, Refills: 0      clopidogreL (PLAVIX) 75 mg tablet TAKE 1 TABLET BY MOUTH DAILY  Qty: 90 tablet, Refills: 3      ketoconazole (NIZORAL) 2 % shampoo Apply topically twice a week.  Qty: 120 mL, Refills: 0      nitroGLYCERIN (NITROSTAT) 0.4 MG SL tablet Place 1 tablet (0.4 mg total) under the tongue every 5 (five) minutes as needed for Chest pain.  Qty: 30 tablet, Refills: 2              TIME SPENT ON DISCHARGE: 35 minutes

## 2023-02-24 NOTE — Clinical Note
The catheter was repositioned into the ostium   right coronary artery. An angiography was performed of the right coronary arteries. Multiple views were taken. The angiography was performed via hand injection with 20 mL of contrast.

## 2023-02-24 NOTE — PLAN OF CARE
2 cc of air removed from right radial vasc band. No hematoma noted. Will continue to monitor. Family member called and updated per phone---spoke to Rebeka---will  pt when discharged; pt resting quietly in bed; Belongings at bedside

## 2023-02-24 NOTE — NURSING
Patient escorted to pov to family member--- Robinson grimes and able to ambulate to [facility]---and transported by [facility]; pt has belongings and has glasses in place and gray colored carry bag

## 2023-02-24 NOTE — PLAN OF CARE
Patient to cath recovery per stretcher parker Roman and bedside report received; Patient drowsy and in no distress and has no complaints; pt connected to monitor and in [place] and has a hx of this and freq pvc s; skin wm dry color pk; right radial access site w/ vasc band and no bleeding nor swelling; ns infusing in hand off at 150 ml per hr and on pump; belongings at bedside; pt wearing his glasses

## 2023-02-24 NOTE — Clinical Note
50 ml of contrast were injected throughout the case. 100 mL of contrast was the total wasted during the case. 150 mL was the total amount used during the case.

## 2023-02-24 NOTE — DISCHARGE INSTRUCTIONS
Discharge Instructions:    Do not drive a car, operate heavy equipment, care for a young child, etc for the next 12-24 hours.   Avoid drinking alcohol for 24 hours.  Do not make any important decisions for 24 hours.    Drink fluids to keep hydrated. Resume your usual diet as tolerated.     Rest for today then activity as tolerated.   Do not lift anything over 5 pounds for the first 3 days after procedure.    Remove dressing tomorrow then may shower with warm soapy water. Do not scrub site. Pat dry.   May apply bandaid for 2 days.  No tub baths.  Do not submerge wound in water for 3 days.     Call MD for any unrelieved pain, excessive nausea or vomiting, redness around site, bleeding, or pus or foul smelling drainage, or any other questions or concerns.    Go to the ER for any difficulty breathing or chest pain.      If site swells or bleeds, hold direct pressure to area for 10 full minutes.   If site continues to bleed, continue to hold pressure to site and have someone bring you to the ER. Understanding Transradial Cardiac Catheterization    Cardiac catheterization (cardiac cath) is a common, non-surgical procedure. During the procedure, your doctor will insert a long, thin tube (catheter) into an artery and move it up into your heart. Transradial means the catheter is inserted into an artery in the wrist (the radial artery). This procedure can be used to diagnose and treat certain heart problems.  Why do I need a transradial cardiac cath?  You may need a cardiac cath if signs indicate a problem with your heart. These may include:  Symptoms of chest pain, tightness, or heaviness (known as angina). This is a common symptom of blocked heart arteries, known as coronary artery disease.  Symptoms of weakness, dizziness, trouble breathing, or swollen legs or feet. These may be symptoms of a problem with a heart valve or the heart muscle.  Other test results show heart problems. Tests may include stress tests, heart  scans, and echocardiography.  During a cardiac cath, your doctor can see the condition of the coronary arteries and heart valves. He or she can also check how well the heart pumps and the flow of blood through the heart. Your doctor can also measure pressures and take blood samples. And, if needed, he or she can open blocked arteries. This can help reduce symptoms of angina.  Cardiac cath is often done using a catheter inserted into an artery in the groin. During transradial cardiac cath, the catheter is inserted into an artery in the wrist. This can mean less bleeding and a faster recovery. Some people may have blockages in the groin arteries as well as in the heart arteries, making it difficult to reach the heart. The transradial approach can be used to get around this problem.   What happens during a transradial cardiac cath?  The procedure is done in the hospital or a surgery center. First, an IV line is put in your arm or hand to deliver fluids and medicines. You will likely be given medicine to relax you and make you drowsy. When the procedure begins:  You lie on an X-ray table.  The skin over the insertion site in your wrist is numbed.  The doctor makes a tiny puncture or incision into the artery in the wrist. He or she then inserts a catheter and threads it through the blood vessel into your heart.    The doctor may inject a contrast fluid through the catheter into the arteries. This fluid makes the arteries show up better on X-rays.  Tests may be done to check the condition of your heart and arteries. If needed, the doctor can clear blockages in the arteries or do other repairs.  When the doctor is finished, he or she will remove the catheter and put direct pressure on the site to prevent bleeding.  You will stay for a time to recover, and then go home.  What are the risks of transradial cardiac cath?  These include:  Bleeding, bruising, infection, or blood clots  Damage to the radial artery that may cause  injury to the hand  Allergic reaction to the contrast fluid  Abnormal heartbeat (arrhythmia)  Damage to blood vessels or tissues  Kidney damage or failure  The need for emergency heart surgery  Heart attack, stroke, or death  Date Last Reviewed: 5/1/2016 © 2000-2017 [id number] [address] [place] [id number]. All rights reserved. This information is not intended as a substitute for professional medical care. Always follow your healthcare professional's instructions.

## 2023-02-24 NOTE — BRIEF OP NOTE
S/p LHC      Patent LM   Luminal irregularities in LAD, D1, LCX, OM1, and OM2   Patent LCX stent    Small non dominant RCA      Plan       Referral to valve for clinic management of aortic stenosis         Full report to follow

## 2023-02-27 DIAGNOSIS — I25.10 CORONARY ARTERY DISEASE INVOLVING NATIVE CORONARY ARTERY OF NATIVE HEART WITHOUT ANGINA PECTORIS: Primary | ICD-10-CM

## 2023-02-27 DIAGNOSIS — I35.0 AORTIC STENOSIS, MODERATE: ICD-10-CM

## 2023-03-02 ENCOUNTER — TELEPHONE (OUTPATIENT)
Dept: CARDIOLOGY | Facility: CLINIC | Age: 83
End: 2023-03-02
Payer: MEDICARE

## 2023-03-02 DIAGNOSIS — I35.0 AORTIC STENOSIS, MODERATE: Primary | ICD-10-CM

## 2023-03-02 NOTE — TELEPHONE ENCOUNTER
Patient referred by Dr. Pinto for TAVR eval.  No answer on patient's phone 2/27 or 3/2.  Left VM with details and call back number to Dr. Edouard.

## 2023-03-22 ENCOUNTER — LAB VISIT (OUTPATIENT)
Dept: LAB | Facility: HOSPITAL | Age: 83
End: 2023-03-22
Attending: INTERNAL MEDICINE
Payer: MEDICARE

## 2023-03-22 ENCOUNTER — OFFICE VISIT (OUTPATIENT)
Dept: CARDIOLOGY | Facility: CLINIC | Age: 83
End: 2023-03-22
Payer: MEDICARE

## 2023-03-22 VITALS
HEIGHT: 69 IN | WEIGHT: 191.38 LBS | SYSTOLIC BLOOD PRESSURE: 190 MMHG | HEART RATE: 37 BPM | BODY MASS INDEX: 28.35 KG/M2 | DIASTOLIC BLOOD PRESSURE: 81 MMHG | OXYGEN SATURATION: 100 %

## 2023-03-22 DIAGNOSIS — I35.0 NONRHEUMATIC AORTIC VALVE STENOSIS: ICD-10-CM

## 2023-03-22 DIAGNOSIS — I10 ESSENTIAL HYPERTENSION: ICD-10-CM

## 2023-03-22 DIAGNOSIS — I35.0 AORTIC STENOSIS, MODERATE: ICD-10-CM

## 2023-03-22 DIAGNOSIS — I25.10 CORONARY ARTERY DISEASE INVOLVING NATIVE CORONARY ARTERY OF NATIVE HEART WITHOUT ANGINA PECTORIS: ICD-10-CM

## 2023-03-22 DIAGNOSIS — I35.0 SEVERE AORTIC STENOSIS: Primary | ICD-10-CM

## 2023-03-22 LAB
ALBUMIN SERPL BCP-MCNC: 3.8 G/DL (ref 3.5–5.2)
ANION GAP SERPL CALC-SCNC: 8 MMOL/L (ref 8–16)
BUN SERPL-MCNC: 29 MG/DL (ref 8–23)
CALCIUM SERPL-MCNC: 9.3 MG/DL (ref 8.7–10.5)
CHLORIDE SERPL-SCNC: 110 MMOL/L (ref 95–110)
CO2 SERPL-SCNC: 26 MMOL/L (ref 23–29)
CREAT SERPL-MCNC: 1.8 MG/DL (ref 0.5–1.4)
ERYTHROCYTE [DISTWIDTH] IN BLOOD BY AUTOMATED COUNT: 14.9 % (ref 11.5–14.5)
EST. GFR  (NO RACE VARIABLE): 36.9 ML/MIN/1.73 M^2
GLUCOSE SERPL-MCNC: 105 MG/DL (ref 70–110)
HCT VFR BLD AUTO: 44.2 % (ref 40–54)
HGB BLD-MCNC: 14.3 G/DL (ref 14–18)
MCH RBC QN AUTO: 26.9 PG (ref 27–31)
MCHC RBC AUTO-ENTMCNC: 32.4 G/DL (ref 32–36)
MCV RBC AUTO: 83 FL (ref 82–98)
PLATELET # BLD AUTO: 206 K/UL (ref 150–450)
PMV BLD AUTO: 11.4 FL (ref 9.2–12.9)
POTASSIUM SERPL-SCNC: 4.6 MMOL/L (ref 3.5–5.1)
RBC # BLD AUTO: 5.32 M/UL (ref 4.6–6.2)
SODIUM SERPL-SCNC: 144 MMOL/L (ref 136–145)
WBC # BLD AUTO: 5.96 K/UL (ref 3.9–12.7)

## 2023-03-22 PROCEDURE — 99999 PR PBB SHADOW E&M-EST. PATIENT-LVL IV: ICD-10-PCS | Mod: PBBFAC,,, | Performed by: INTERNAL MEDICINE

## 2023-03-22 PROCEDURE — 85027 COMPLETE CBC AUTOMATED: CPT | Performed by: INTERNAL MEDICINE

## 2023-03-22 PROCEDURE — 99214 OFFICE O/P EST MOD 30 MIN: CPT | Mod: PBBFAC | Performed by: INTERNAL MEDICINE

## 2023-03-22 PROCEDURE — 99214 PR OFFICE/OUTPT VISIT, EST, LEVL IV, 30-39 MIN: ICD-10-PCS | Mod: S$PBB,,, | Performed by: INTERNAL MEDICINE

## 2023-03-22 PROCEDURE — 99999 PR PBB SHADOW E&M-EST. PATIENT-LVL IV: CPT | Mod: PBBFAC,,, | Performed by: INTERNAL MEDICINE

## 2023-03-22 PROCEDURE — 99214 OFFICE O/P EST MOD 30 MIN: CPT | Mod: S$PBB,,, | Performed by: INTERNAL MEDICINE

## 2023-03-22 PROCEDURE — 80048 BASIC METABOLIC PNL TOTAL CA: CPT | Performed by: INTERNAL MEDICINE

## 2023-03-22 PROCEDURE — 82040 ASSAY OF SERUM ALBUMIN: CPT | Performed by: INTERNAL MEDICINE

## 2023-03-22 PROCEDURE — 36415 COLL VENOUS BLD VENIPUNCTURE: CPT | Performed by: INTERNAL MEDICINE

## 2023-03-22 RX ORDER — LOSARTAN POTASSIUM 50 MG/1
50 TABLET ORAL DAILY
Qty: 90 TABLET | Refills: 3 | OUTPATIENT
Start: 2023-03-22 | End: 2023-03-29 | Stop reason: SDUPTHER

## 2023-03-22 NOTE — PROGRESS NOTES
Interventional Cardiology Clinic Note    Primary Cardiologist: Bob Pinto MD    Subjective:   Patient ID:  Laura Harris is a 82 y.o. male referred by Dr Pinto for evaluation of severe AS (NYHA Class II sx). He does get dyspnea when he walks a mile or so. He also gets tiredness in his legs when walking. He doesn't feel as though the dyspnea limits his lifestyle. He doesn't exercise but works around the house, eg installing a water heater and putting up a fence. He denies any chest pain, leg swelling, orthopnea, PND. He feels great. He quit smoking. He has HTN, HLP, and PAD with olaf IIa. No ulcers. He even will go up on his roof to fix a leak if need be.    The patient has undergone the following TAVR work-up:   ECHO (Date 2/1/23): ALVERTO= 1.18 cm2, MG= 53 mmHg, Peak Brooks= 4.82 m/s, EF= 55%.  LHC (Date 2/24/23): MLi in left system and patent stent in Cx, nondominant RCA   STS: pending   Frailty: pending  Iliacs are >  on R and >  on L   LVOT area by CTA: pending  Incidental findings on CT: pending  CT Surgery risk assessment: pending  KCCQ and 5M Walks: pending  Rhythm issues: frequent PVCs  PFTs:   Comorbidities: CAD s/p RAFAELA Cx, HTN, HLD, CKD3b      Review of Systems   Constitutional: Negative for diaphoresis and weight gain.   HENT: Negative.     Eyes: Negative.    Cardiovascular:  Positive for dyspnea on exertion. Negative for chest pain, claudication, irregular heartbeat, leg swelling, orthopnea, palpitations and paroxysmal nocturnal dyspnea.   Respiratory:  Negative for shortness of breath.    Musculoskeletal:  Positive for muscle weakness.   All other systems reviewed and are negative.     History:     Past Medical History:   Diagnosis Date    Anemia of chronic disease 11/19/2019    Arthritis     Cataract     Chronic obstructive pulmonary disease, unspecified COPD type 1/26/2022    CKD (chronic kidney disease) stage 3, GFR 30-59 ml/min 5/1/2014    Colon polyp 05/12/2015    Coronary artery disease  involving native coronary artery of native heart without angina pectoris 10/7/2019    Hyperlipidemia     Hypertension     Low tension glaucoma      Past Surgical History:   Procedure Laterality Date    COLONOSCOPY N/A 2021    Procedure: COLONOSCOPY;  Surgeon: Kit Nicholson MD;  Location: [facility] ENDO;  Service: Endoscopy;  Laterality: N/A;    CORONARY STENT PLACEMENT N/A 3/4/2019    Procedure: INSERTION, STENT, CORONARY ARTERY;  Surgeon: Brennan Stein MD;  Location: [facility] CATH LAB/EP;  Service: Cardiology;  Laterality: N/A;    CYSTOSCOPY      ESOPHAGOGASTRODUODENOSCOPY N/A 2021    Procedure: EGD (ESOPHAGOGASTRODUODENOSCOPY);  Surgeon: Kit Nicholson MD;  Location: [facility] ENDO;  Service: Endoscopy;  Laterality: N/A;    LEFT HEART CATHETERIZATION N/A 3/4/2019    Procedure: Left heart cath;  Surgeon: Brennan Stein MD;  Location: [facility] CATH LAB/EP;  Service: Cardiology;  Laterality: N/A;    LEFT HEART CATHETERIZATION Left 2023    Procedure: Left heart cath;  Surgeon: Bob Pinto MD;  Location: [facility] CATH LAB/EP;  Service: Cardiology;  Laterality: Left;     Social History     Socioeconomic History    Marital status: Single   Tobacco Use    Smoking status: Former     Packs/day: 1.00     Years: 64.00     Pack years: 64.00     Types: Cigarettes     Start date:      Quit date:      Years since quittin.2    Smokeless tobacco: Never   Substance and Sexual Activity    Alcohol use: No    Drug use: No    Sexual activity: Not Currently     Partners: Female     Social Determinants of Health     Financial Resource Strain: Low Risk     Difficulty of Paying Living Expenses: Not hard at all   Food Insecurity: No Food Insecurity    Worried About Running Out of Food in the Last Year: Never true    Ran Out of Food in the Last Year: Never true   Transportation Needs: No Transportation Needs    Lack of Transportation (Medical): No    Lack of Transportation (Non-Medical): No   Physical  Activity: Insufficiently Active    Days of Exercise per Week: 1 day    Minutes of Exercise per Session: 120 min   Stress: No Stress Concern Present    Feeling of Stress : Not at all   Social Connections: Moderately Isolated    Frequency of Communication with Friends and Family: Three times a week    Frequency of Social Gatherings with Friends and Family: More than three times a week    Attends Buddhism Services: More than 4 times per year    Active Member of Clubs or Organizations: No    Attends Club or Organization Meetings: Never    Marital Status: Never    Housing Stability: Low Risk     Unable to Pay for Housing in the Last Year: No    Number of Places Lived in the Last Year: 1    Unstable Housing in the Last Year: No     Family History   Problem Relation Age of Onset    No Known Problems Sister     No Known Problems Brother     Cancer Brother         lung cancer, smoker    Kidney disease Sister     Melanoma Neg Hx       Review of patient's allergies indicates:   Allergen Reactions    Ace inhibitors      Other reaction(s): Angioedema    Tizanidine Hives    Ultram [tramadol] Nausea Only and Other (See Comments)     Dizziness     has a current medication list which includes the following prescription(s): acetaminophen, amlodipine, atorvastatin, clopidogrel, ketoconazole, krill-om-3-dha-epa-phospho-ast, latanoprost, metoprolol succinate, tamsulosin, losartan, and nitroglycerin.     Meds:     Review of patient's allergies indicates:   Allergen Reactions    Ace inhibitors      Other reaction(s): Angioedema    Tizanidine Hives    Ultram [tramadol] Nausea Only and Other (See Comments)     Dizziness       Current Outpatient Medications:     acetaminophen (TYLENOL) 500 MG tablet, Take 1 tablet (500 mg total) by mouth every 6 (six) hours as needed for Pain., Disp: 30 tablet, Rfl: 0    amLODIPine (NORVASC) 10 MG tablet, Take 1 tablet (10 mg total) by mouth once daily., Disp: 90 tablet, Rfl: 3    atorvastatin  (LIPITOR) 80 MG tablet, Take 1 tablet (80 mg total) by mouth once daily., Disp: 90 tablet, Rfl: 3    clopidogreL (PLAVIX) 75 mg tablet, TAKE 1 TABLET BY MOUTH DAILY, Disp: 90 tablet, Rfl: 3    ketoconazole (NIZORAL) 2 % shampoo, Apply topically twice a week., Disp: 120 mL, Rfl: 0    krill-om-3-dha-epa-phospho-ast 860-68-15-50 mg Cap, Take 1 capsule by mouth once daily., Disp: , Rfl:     latanoprost 0.005 % ophthalmic solution, Place 1 drop into both eyes every evening., Disp: 2.5 mL, Rfl: 6    metoprolol succinate (TOPROL-XL) 25 MG 24 hr tablet, Take 1 tablet (25 mg total) by mouth once daily., Disp: 90 tablet, Rfl: 3    tamsulosin (FLOMAX) 0.4 mg Cap, TAKE 1 CAPSULE(0.4 MG) BY MOUTH AFTER DINNER (Patient taking differently: Every other day), Disp: 90 capsule, Rfl: 0    losartan (COZAAR) 50 MG tablet, Take 1 tablet (50 mg total) by mouth once daily. (Patient not taking: Reported on 3/22/2023), Disp: 90 tablet, Rfl: 3    nitroGLYCERIN (NITROSTAT) 0.4 MG SL tablet, Place 1 tablet (0.4 mg total) under the tongue every 5 (five) minutes as needed for Chest pain. (Patient not taking: Reported on 3/22/2023), Disp: 30 tablet, Rfl: 2    Objective:   There were no vitals taken for this visit.  Physical Exam  Vitals reviewed.   Constitutional:       General: He is not in acute distress.     Appearance: Normal appearance.   HENT:      Head: Normocephalic and atraumatic.   Eyes:      Conjunctiva/sclera: Conjunctivae normal.      Pupils: Pupils are equal, round, and reactive to light.   Cardiovascular:      Rate and Rhythm: Normal rate and regular rhythm.      Pulses: Normal pulses.      Heart sounds: Murmur (systolic ejection murmur) heard.   Pulmonary:      Effort: Pulmonary effort is normal.      Breath sounds: Normal breath sounds.   Musculoskeletal:         General: Normal range of motion.      Cervical back: Normal range of motion and neck supple.   Skin:     General: Skin is warm and dry.      Capillary Refill: Capillary  refill takes less than 2 seconds.   Neurological:      Mental Status: He is alert and oriented to person, place, and time.       Labs:     Lab Results   Component Value Date     03/22/2023    K 4.6 03/22/2023     03/22/2023    CO2 26 03/22/2023    BUN 29 (H) 03/22/2023    CREATININE 1.8 (H) 03/22/2023    ANIONGAP 8 03/22/2023     Lab Results   Component Value Date    HGBA1C 6.3 (H) 11/12/2022     Lab Results   Component Value Date    BNP 86 07/30/2021     (H) 11/11/2020    BNP 29 10/08/2019       Lab Results   Component Value Date    WBC 5.96 03/22/2023    HGB 14.3 03/22/2023    HCT 44.2 03/22/2023     03/22/2023    GRAN 3.1 02/22/2023    GRAN 57.2 02/22/2023     Lab Results   Component Value Date    CHOL 136 11/12/2022    HDL 36 (L) 11/12/2022    LDLCALC 78.2 11/12/2022    TRIG 109 11/12/2022       Lab Results   Component Value Date     03/22/2023    K 4.6 03/22/2023     03/22/2023    CO2 26 03/22/2023    BUN 29 (H) 03/22/2023    CREATININE 1.8 (H) 03/22/2023    ANIONGAP 8 03/22/2023     Lab Results   Component Value Date    HGBA1C 6.3 (H) 11/12/2022     Lab Results   Component Value Date    BNP 86 07/30/2021     (H) 11/11/2020    BNP 29 10/08/2019    Lab Results   Component Value Date    WBC 5.96 03/22/2023    HGB 14.3 03/22/2023    HCT 44.2 03/22/2023     03/22/2023    GRAN 3.1 02/22/2023    GRAN 57.2 02/22/2023     Lab Results   Component Value Date    CHOL 136 11/12/2022    HDL 36 (L) 11/12/2022    LDLCALC 78.2 11/12/2022    TRIG 109 11/12/2022            Cardiovascular Imaging:     Echo:   EF   Date Value Ref Range Status   02/01/2023 55 % Final       Indications & Education:     Symptoms: Exertional dyspnea and fatigue NYHA Class: II    Education: Discussed disease progression of aortic stenosis, valve replacement therapies including TAVR, mini AVR, and SAVR. Risks and benefits were discussed including stroke, renal insufficiency, bleeding, infection,  pacemaker need, emergency open heart surgery, and even death.  TAVR testing work-up, procedure, and post procedure care was also discussed with the patient and family. Educational packet and contact information provided.    Assessment & Plan:       1. Severe aortic stenosis    2. Nonrheumatic aortic valve stenosis        Laura Harris has severe symptomatic AS by history, exam and non-invasive testing, and will derive symptom and mortality benefit from AVR.    In the PARTNER trials, TAVR offered similar all-cause mortality to SAVR in patients with severe AS who are at high risk for surgery (PARTNER A). In patients with severe AS at intermediate risk for surgery, TAVR offered similar all-cause mortality to SAVR, however with less mortality in cohorts who received TF-TAVR (PARTNER 2). In patients with severe AS who are at low-risk for surgery, TAVR using a balloon-expandable valve compared with surgery at 2 years yielded a 37% reduction in death, stroke or CV rehospitalization, but more death, stroke and increased valve thrombosis events occurred from 1 to 2 years among patients who underwent TAVR (PARTNER 3). Long-term data in low risk population is not available yet.    TAVR valves are commercially available in the [place] since 2011 ([place] since 2007), and so far concerns about valve durability are minimal. Despite the fact that longer follow up is not yet available, there is no reason to expect durability will be significantly inferior to surgically implanted bioprosthetic valves. It is important to note, however, that such data are mainly for inoperable and high risk surgical patients, with shorter follow up data on low risk surgical patients. Perioperative morbidity is lower with TAVR, except for the risk of pacemaker implantation which is around 10%.    Patient is at increased risk for SAVR. The patient will be a good candidate for TAVR if favorable anatomy on CT. Will plan for further work-up  - CT A/P TAVR  protocol    Discussed at length risks and benefits of both AVR and TAVR with patient and family. We will review all testing and present this patient at our weekly multi-disciplinary Heart Team meeting before a final recommendation is made.      Signed:  Robinson Bunn M.D., M.P.H.  Interventional Cardiology Fellow PGY-7  [facility]   03/22/2023

## 2023-03-29 DIAGNOSIS — I10 ESSENTIAL HYPERTENSION: ICD-10-CM

## 2023-03-29 RX ORDER — LOSARTAN POTASSIUM 50 MG/1
50 TABLET ORAL DAILY
Qty: 90 TABLET | Refills: 3 | Status: SHIPPED | OUTPATIENT
Start: 2023-03-29 | End: 2024-02-14 | Stop reason: SDUPTHER

## 2023-03-29 NOTE — TELEPHONE ENCOUNTER
Call patient regarding appt request. Pt stated he just wanted a refill on his meds. Patient verbalized understanding.

## 2023-04-04 ENCOUNTER — LAB VISIT (OUTPATIENT)
Dept: LAB | Facility: HOSPITAL | Age: 83
End: 2023-04-04
Attending: FAMILY MEDICINE
Payer: MEDICARE

## 2023-04-04 DIAGNOSIS — I10 ESSENTIAL HYPERTENSION: ICD-10-CM

## 2023-04-04 LAB
BILIRUB UR QL STRIP: NEGATIVE
CLARITY UR REFRACT.AUTO: CLEAR
COLOR UR AUTO: YELLOW
GLUCOSE UR QL STRIP: NEGATIVE
HGB UR QL STRIP: NEGATIVE
KETONES UR QL STRIP: NEGATIVE
LEUKOCYTE ESTERASE UR QL STRIP: NEGATIVE
NITRITE UR QL STRIP: NEGATIVE
PH UR STRIP: 6 [PH] (ref 5–8)
PROT UR QL STRIP: NEGATIVE
SP GR UR STRIP: 1.01 (ref 1–1.03)
URN SPEC COLLECT METH UR: NORMAL

## 2023-04-04 PROCEDURE — 81003 URINALYSIS AUTO W/O SCOPE: CPT | Performed by: FAMILY MEDICINE

## 2023-04-11 ENCOUNTER — OFFICE VISIT (OUTPATIENT)
Dept: FAMILY MEDICINE | Facility: CLINIC | Age: 83
End: 2023-04-11
Payer: MEDICARE

## 2023-04-11 VITALS
HEIGHT: 69 IN | OXYGEN SATURATION: 98 % | WEIGHT: 192.88 LBS | DIASTOLIC BLOOD PRESSURE: 74 MMHG | HEART RATE: 85 BPM | BODY MASS INDEX: 28.57 KG/M2 | SYSTOLIC BLOOD PRESSURE: 132 MMHG

## 2023-04-11 DIAGNOSIS — E78.2 MIXED HYPERLIPIDEMIA: ICD-10-CM

## 2023-04-11 DIAGNOSIS — D63.1 ANEMIA DUE TO STAGE 3B CHRONIC KIDNEY DISEASE: ICD-10-CM

## 2023-04-11 DIAGNOSIS — N18.32 ANEMIA DUE TO STAGE 3B CHRONIC KIDNEY DISEASE: ICD-10-CM

## 2023-04-11 DIAGNOSIS — I10 PRIMARY HYPERTENSION: Primary | ICD-10-CM

## 2023-04-11 DIAGNOSIS — M70.62 TROCHANTERIC BURSITIS OF LEFT HIP: ICD-10-CM

## 2023-04-11 DIAGNOSIS — N18.32 STAGE 3B CHRONIC KIDNEY DISEASE: ICD-10-CM

## 2023-04-11 PROCEDURE — 20610 DRAIN/INJ JOINT/BURSA W/O US: CPT | Mod: PBBFAC,PO | Performed by: FAMILY MEDICINE

## 2023-04-11 PROCEDURE — 99215 PR OFFICE/OUTPT VISIT, EST, LEVL V, 40-54 MIN: ICD-10-PCS | Mod: S$PBB,25,, | Performed by: FAMILY MEDICINE

## 2023-04-11 PROCEDURE — 99999 PR PBB SHADOW E&M-EST. PATIENT-LVL III: ICD-10-PCS | Mod: PBBFAC,,, | Performed by: FAMILY MEDICINE

## 2023-04-11 PROCEDURE — 99215 OFFICE O/P EST HI 40 MIN: CPT | Mod: S$PBB,25,, | Performed by: FAMILY MEDICINE

## 2023-04-11 PROCEDURE — 20610 LARGE JOINT ASPIRATION/INJECTION: L GREATER TROCHANTERIC BURSA: ICD-10-PCS | Mod: S$PBB,LT,, | Performed by: FAMILY MEDICINE

## 2023-04-11 PROCEDURE — 99213 OFFICE O/P EST LOW 20 MIN: CPT | Mod: PBBFAC,PO,25 | Performed by: FAMILY MEDICINE

## 2023-04-11 PROCEDURE — 99999 PR PBB SHADOW E&M-EST. PATIENT-LVL III: CPT | Mod: PBBFAC,,, | Performed by: FAMILY MEDICINE

## 2023-04-11 RX ADMIN — TRIAMCINOLONE HEXACETONIDE 40 MG: 40 INJECTION, SUSPENSION INTRA-ARTICULAR at 02:04

## 2023-04-11 NOTE — PROCEDURES
Large Joint Aspiration/Injection: L greater trochanteric bursa    Date/Time: 4/11/2023 2:00 PM  Performed by: Riley Villalobos MD  Authorized by: Riley Villalobos MD     Consent Done?:  Yes (Verbal)  Indications:  Pain  Site marked: the procedure site was marked    Prep: patient was prepped and draped in usual sterile fashion      Local anesthesia used?: Yes    Anesthesia:  Local infiltration  Local anesthetic:  Lidocaine 1% without epinephrine  Anesthetic total (ml):  9      Details:  Needle Size:  21 G  Ultrasonic Guidance for needle placement?: No    Approach:  Lateral  Location:  Hip  Site:  L greater trochanteric bursa  Medications:  40 mg triamcinolone hexacetonide 20 mg/mL  Patient tolerance:  Patient tolerated the procedure well with no immediate complications

## 2023-04-11 NOTE — PROGRESS NOTES
Subjective     Patient ID: Laura Harris is a 82 y.o. male.    Chief Complaint: Follow-up    82 years old male came to the clinic with left hip pain.  The pain is 6/10 of intensity on and off aggravated with activity and better with rest.  Patient with mild anemia and decreased kidney function but stable in comparison with previous reports.  Patient with low HDL.  Normal bad cholesterol.    Follow-up  Associated symptoms include arthralgias. Pertinent negatives include no chest pain.   Hypertension  Pertinent negatives include no chest pain or palpitations.   Hip Pain     Review of Systems   Constitutional: Negative.    HENT: Negative.     Eyes: Negative.    Respiratory: Negative.     Cardiovascular: Negative.  Negative for chest pain, palpitations, leg swelling and claudication.   Gastrointestinal: Negative.    Genitourinary: Negative.    Musculoskeletal:  Positive for arthralgias.   Integumentary:  Negative.   Neurological: Negative.    Psychiatric/Behavioral: Negative.          Objective     Physical Exam  Vitals and nursing note reviewed.   Constitutional:       General: He is not in acute distress.     Appearance: He is well-developed. He is not diaphoretic.   HENT:      Head: Normocephalic and atraumatic.      Right Ear: External ear normal.      Left Ear: External ear normal.      Nose: Nose normal.      Mouth/Throat:      Pharynx: No oropharyngeal exudate.   Eyes:      General: No scleral icterus.        Right eye: No discharge.         Left eye: No discharge.      Conjunctiva/sclera: Conjunctivae normal.      Pupils: Pupils are equal, round, and reactive to light.   Neck:      Thyroid: No thyromegaly.      Vascular: No JVD.      Trachea: No tracheal deviation.   Cardiovascular:      Rate and Rhythm: Normal rate and regular rhythm.      Heart sounds: Normal heart sounds. No murmur heard.    No friction rub. No gallop.   Pulmonary:      Effort: Pulmonary effort is normal. No respiratory distress.       Breath sounds: Normal breath sounds. No stridor. No wheezing or rales.   Chest:      Chest wall: No tenderness.   Abdominal:      General: Bowel sounds are normal. There is no distension.      Palpations: Abdomen is soft. There is no mass.      Tenderness: There is no abdominal tenderness. There is no guarding or rebound.   Musculoskeletal:         General: Normal range of motion.      Cervical back: Normal range of motion and neck supple.      Left hip: Tenderness present.   Lymphadenopathy:      Cervical: No cervical adenopathy.   Skin:     General: Skin is warm and dry.      Coloration: Skin is not pale.      Findings: No erythema or rash.   Neurological:      Mental Status: He is alert and oriented to person, place, and time.      Cranial Nerves: No cranial nerve deficit.      Motor: No abnormal muscle tone.      Coordination: Coordination abnormal.      Gait: Gait abnormal.      Deep Tendon Reflexes: Reflexes are normal and symmetric. Reflexes normal.   Psychiatric:         Behavior: Behavior normal.         Thought Content: Thought content normal.         Judgment: Judgment normal.          Assessment and Plan     Problem List Items Addressed This Visit       CKD (chronic kidney disease) stage 3, GFR 30-59 ml/min    Relevant Orders    Comprehensive Metabolic Panel    Hypertension - Primary    Relevant Orders    CBC Auto Differential    Comprehensive Metabolic Panel    Urinalysis    Lipid Panel    Absolute anemia    Relevant Orders    CBC Auto Differential    Hyperlipidemia    Relevant Orders    Comprehensive Metabolic Panel    Lipid Panel     Other Visit Diagnoses       BMI 28.0-28.9,adult        Relevant Orders    Lipid Panel    Trochanteric bursitis of left hip                 Laura was seen today for follow-up, hypertension and hip pain.    Diagnoses and all orders for this visit:    Primary hypertension  -     CBC Auto Differential; Future  -     Comprehensive Metabolic Panel; Future  -     Urinalysis;  Future  -     Lipid Panel; Future    BMI 28.0-28.9,adult  -     Lipid Panel; Future    Mixed hyperlipidemia  -     Comprehensive Metabolic Panel; Future  -     Lipid Panel; Future    Stage 3b chronic kidney disease  -     Comprehensive Metabolic Panel; Future    Anemia due to stage 3b chronic kidney disease  -     CBC Auto Differential; Future    Trochanteric bursitis of left hip  -     Cancel: Ambulatory referral/consult to Orthopedics; Future  -     Large Joint Aspiration/Injection: L greater trochanteric bursa  -     triamcinolone hexacetonide injection 40 mg

## 2023-05-12 ENCOUNTER — OFFICE VISIT (OUTPATIENT)
Dept: OPHTHALMOLOGY | Facility: CLINIC | Age: 83
End: 2023-05-12
Payer: MEDICARE

## 2023-05-12 DIAGNOSIS — H40.1233 LOW-TENSION GLAUCOMA OF BOTH EYES, SEVERE STAGE: Primary | ICD-10-CM

## 2023-05-12 DIAGNOSIS — H25.13 NUCLEAR SCLEROSIS OF BOTH EYES: ICD-10-CM

## 2023-05-12 DIAGNOSIS — H40.1232 LOW-TENSION GLAUCOMA OF BOTH EYES, MODERATE STAGE: ICD-10-CM

## 2023-05-12 PROCEDURE — 99999 PR PBB SHADOW E&M-EST. PATIENT-LVL III: ICD-10-PCS | Mod: PBBFAC,,, | Performed by: OPHTHALMOLOGY

## 2023-05-12 PROCEDURE — 99214 OFFICE O/P EST MOD 30 MIN: CPT | Mod: S$PBB,,, | Performed by: OPHTHALMOLOGY

## 2023-05-12 PROCEDURE — 99999 PR PBB SHADOW E&M-EST. PATIENT-LVL III: CPT | Mod: PBBFAC,,, | Performed by: OPHTHALMOLOGY

## 2023-05-12 PROCEDURE — 99213 OFFICE O/P EST LOW 20 MIN: CPT | Mod: PBBFAC | Performed by: OPHTHALMOLOGY

## 2023-05-12 PROCEDURE — 92020 PR SPECIAL EYE EVAL,GONIOSCOPY: ICD-10-PCS | Mod: S$PBB,,, | Performed by: OPHTHALMOLOGY

## 2023-05-12 PROCEDURE — 92020 GONIOSCOPY: CPT | Mod: S$PBB,,, | Performed by: OPHTHALMOLOGY

## 2023-05-12 PROCEDURE — 99214 PR OFFICE/OUTPT VISIT, EST, LEVL IV, 30-39 MIN: ICD-10-PCS | Mod: S$PBB,,, | Performed by: OPHTHALMOLOGY

## 2023-05-12 PROCEDURE — 92020 GONIOSCOPY: CPT | Mod: PBBFAC | Performed by: OPHTHALMOLOGY

## 2023-05-12 RX ORDER — LATANOPROST 50 UG/ML
1 SOLUTION/ DROPS OPHTHALMIC NIGHTLY
Qty: 2.5 ML | Refills: 6 | Status: SHIPPED | OUTPATIENT
Start: 2023-05-12 | End: 2023-11-16
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
## 2023-07-11 ENCOUNTER — PES CALL (OUTPATIENT)
Dept: ADMINISTRATIVE | Facility: CLINIC | Age: 83
End: 2023-07-11
Payer: MEDICARE

## 2023-07-19 DIAGNOSIS — R32 URINARY INCONTINENCE, UNSPECIFIED TYPE: Primary | ICD-10-CM

## 2023-07-19 NOTE — TELEPHONE ENCOUNTER
----- Message from Cal HUGHES Route sent at 7/19/2023 12:51 PM CDT -----  Regarding: Refill  Contact: [phone]  Rx Refill/Request Is this a Refill or New Rx:  Refill  T  Rx Name and Strength:  tamsulosin (FLOMAX) 0.4 mg Cap    Preferred Pharmacy with phone number:  [id number] [address] AT [facility] [address]  [facility] [address] [facility] [address]  Phone: [phone] Fax: [phone]     Communication Preference:[phone]    Additional Information: Pt says he has been out of medication for about a week.

## 2023-07-20 RX ORDER — TAMSULOSIN HYDROCHLORIDE 0.4 MG/1
0.4 CAPSULE ORAL DAILY
Qty: 30 CAPSULE | Refills: 0 | Status: SHIPPED | OUTPATIENT
Start: 2023-07-20 | End: 2023-10-11 | Stop reason: SDUPTHER

## 2023-08-02 DIAGNOSIS — E78.2 MIXED HYPERLIPIDEMIA: ICD-10-CM

## 2023-08-02 NOTE — TELEPHONE ENCOUNTER
No care due was identified.  [facility] Embedded Care Due Messages. Reference number: [id number].   8/02/2023 8:25:05 AM CDT

## 2023-08-02 NOTE — TELEPHONE ENCOUNTER
Refill Routing Note     Refill Routing Note   Medication(s) are not appropriate for processing by [facility] for the following reason(s):      No active prescription written by provider    [facility] action(s):  Defer Care Due:  None identified            Appointments  past 12m or future 3m with PCP    Date Provider   Last Visit   4/11/2023 Riley Villalobos MD   Next Visit   10/11/2023 Riley Villalobos MD   ED visits in past 90 days: 0        Note composed:10:23 AM 08/02/2023

## 2023-08-03 DIAGNOSIS — E78.2 MIXED HYPERLIPIDEMIA: ICD-10-CM

## 2023-08-03 RX ORDER — ATORVASTATIN CALCIUM 80 MG/1
80 TABLET, FILM COATED ORAL DAILY
Qty: 90 TABLET | Refills: 2 | Status: SHIPPED | OUTPATIENT
Start: 2023-08-03

## 2023-08-10 ENCOUNTER — TELEPHONE (OUTPATIENT)
Dept: UROLOGY | Facility: CLINIC | Age: 83
End: 2023-08-10
Payer: MEDICARE

## 2023-08-10 NOTE — TELEPHONE ENCOUNTER
Several messages were left on the patient's VM about his appt on aug 15. It will need to be reschedule. August was offered to the patient for the same. Pt was advised to call the office back if he would like to have that day and time.    rush Last

## 2023-08-11 ENCOUNTER — TELEPHONE (OUTPATIENT)
Dept: UROLOGY | Facility: CLINIC | Age: 83
End: 2023-08-11
Payer: MEDICARE

## 2023-08-11 RX ORDER — ATORVASTATIN CALCIUM 80 MG/1
80 TABLET, FILM COATED ORAL
Qty: 90 TABLET | Refills: 3 | OUTPATIENT
Start: 2023-08-11

## 2023-08-11 NOTE — TELEPHONE ENCOUNTER
Refill Decision Note   Laura Harris  is requesting a refill authorization.  Brief Assessment and Rationale for Refill:  Quick Discontinue     Medication Therapy Plan:  Receipt confirmed by pharmacy (8/3/2023  8:36 AM CDT)      Comments:     Note composed:9:59 AM 08/11/2023

## 2023-08-11 NOTE — TELEPHONE ENCOUNTER
No care due was identified.  [facility] Embedded Care Due Messages. Reference number: [id number].   8/11/2023 9:52:39 AM CDT

## 2023-08-11 NOTE — TELEPHONE ENCOUNTER
A message was left on the patient's VM. Appt for August 15, 2023 has been cancelled due to the provider not being in. Patient was offered August 18 but he has not returned any of the department's phone calls. Several messages were left on his VM.    TANJA Last

## 2023-08-15 ENCOUNTER — HOSPITAL ENCOUNTER (EMERGENCY)
Facility: HOSPITAL | Age: 83
Discharge: HOME | End: 2023-08-15
Attending: EMERGENCY MEDICINE
Payer: MEDICARE

## 2023-08-15 VITALS
RESPIRATION RATE: 18 BRPM | DIASTOLIC BLOOD PRESSURE: 79 MMHG | SYSTOLIC BLOOD PRESSURE: 178 MMHG | TEMPERATURE: 98 F | OXYGEN SATURATION: 98 % | HEART RATE: 53 BPM

## 2023-08-15 DIAGNOSIS — M21.612 BUNION OF GREAT TOE OF LEFT FOOT: Primary | ICD-10-CM

## 2023-08-15 DIAGNOSIS — L60.0 INCURVED TOENAIL: ICD-10-CM

## 2023-08-15 PROCEDURE — 99283 EMERGENCY DEPT VISIT LOW MDM: CPT

## 2023-08-15 PROCEDURE — 25000003 PHARM REV CODE 250

## 2023-08-15 RX ORDER — ACETAMINOPHEN 325 MG/1
650 TABLET ORAL
Status: COMPLETED | OUTPATIENT
Start: 2023-08-15 | End: 2023-08-15

## 2023-08-15 RX ORDER — DICLOFENAC SODIUM 10 MG/G
2 GEL TOPICAL DAILY
Qty: 20 G | Refills: 0 | Status: SHIPPED | OUTPATIENT
Start: 2023-08-15

## 2023-08-15 RX ADMIN — ACETAMINOPHEN 650 MG: 325 TABLET ORAL at 12:08

## 2023-08-15 NOTE — ED NOTES
C/o bilateral toe pain, worsening x2 days. Pt. Has bunions bilaterally and reports walking is causing pressure to his toes. He has been unable to cut toe nails and feels like great toe nails are becoming ingrown. There is mild redness and pain to palpation of bunion on (L) foot. There is no redness, swelling around nail beds.

## 2023-08-15 NOTE — ED PROVIDER NOTES
Encounter Date: 8/15/2023       History     Chief Complaint   Patient presents with    Toe Pain     C/o of bilateral great toe pain that radiates to bunions. Pain x multiple days but increased last two days. Pt ambulates with slow but steady gait.      79-year-old male presents to the ED with bilateral bunions.  Patient states the bunions have been there for a long time and normally do not cause any issues.  Yesterday he started experiencing pain and discomfort to left bunion, mild erythema and warmth.  No pain to right bunion.  Patient also has difficulty cutting his toenails and feels like he is developing ingrown nail.  No history of gout.  Patient ambulates with slow but steady gait.  He applied topical pain relief to the bunion which did help some. No injury or trauma.     The history is provided by the patient.     Review of patient's allergies indicates:   Allergen Reactions    Ace inhibitors      Other reaction(s): Angioedema    Tizanidine Hives    Ultram [tramadol] Nausea Only and Other (See Comments)     Dizziness     Past Medical History:   Diagnosis Date    Anemia of chronic disease 11/19/2019    Arthritis     Cataract     Chronic obstructive pulmonary disease, unspecified COPD type 1/26/2022    CKD (chronic kidney disease) stage 3, GFR 30-59 ml/min 5/1/2014    Colon polyp 05/12/2015    Coronary artery disease involving native coronary artery of native heart without angina pectoris 10/7/2019    Hyperlipidemia     Hypertension     Low tension glaucoma      Past Surgical History:   Procedure Laterality Date    COLONOSCOPY N/A 9/16/2021    Procedure: COLONOSCOPY;  Surgeon: Kit Nicholson MD;  Location: [facility] ENDO;  Service: Endoscopy;  Laterality: N/A;    CORONARY STENT PLACEMENT N/A 3/4/2019    Procedure: INSERTION, STENT, CORONARY ARTERY;  Surgeon: Brennan Stein MD;  Location: [facility] CATH LAB/EP;  Service: Cardiology;  Laterality: N/A;    CYSTOSCOPY      ESOPHAGOGASTRODUODENOSCOPY N/A 9/16/2021     Procedure: EGD (ESOPHAGOGASTRODUODENOSCOPY);  Surgeon: Kit Nicholson MD;  Location: [facility] ENDO;  Service: Endoscopy;  Laterality: N/A;    LEFT HEART CATHETERIZATION N/A 3/4/2019    Procedure: Left heart cath;  Surgeon: Brennan Stein MD;  Location: [facility] CATH LAB/EP;  Service: Cardiology;  Laterality: N/A;    LEFT HEART CATHETERIZATION Left 2023    Procedure: Left heart cath;  Surgeon: Bob Pinto MD;  Location: [facility] CATH LAB/EP;  Service: Cardiology;  Laterality: Left;     Family History   Problem Relation Age of Onset    No Known Problems Sister     No Known Problems Brother     Cancer Brother         lung cancer, smoker    Kidney disease Sister     Melanoma Neg Hx      Social History     Tobacco Use    Smoking status: Former     Current packs/day: 0.00     Average packs/day: 1 pack/day for 64.0 years (64.0 ttl pk-yrs)     Types: Cigarettes     Start date:      Quit date:      Years since quittin.6    Smokeless tobacco: Never   Substance Use Topics    Alcohol use: No    Drug use: No     Review of Systems   Constitutional:  Negative for chills and fever.   Musculoskeletal:  Positive for joint swelling.   Skin:  Positive for color change.   Psychiatric/Behavioral:  Negative for agitation and confusion.        Physical Exam     Initial Vitals [08/15/23 1012]   BP Pulse Resp Temp SpO2   (!) 177/81 80 19 98.2 °F (36.8 °C) 100 %      MAP       --         Physical Exam    Nursing note and vitals reviewed.  Constitutional: He appears well-developed and well-nourished. He is not diaphoretic.  Non-toxic appearance. No distress.   HENT:   Head: Normocephalic and atraumatic.   Right Ear: External ear normal.   Left Ear: External ear normal.   Eyes: EOM are normal.   Neck: Neck supple.   Normal range of motion.  Cardiovascular:  Normal rate.           Pulmonary/Chest: No respiratory distress.   Abdominal: He exhibits no distension.   Musculoskeletal:         General: Normal range of motion.       Cervical back: Normal range of motion and neck supple.      Right foot: Bunion present. Normal pulse.      Left foot: Bunion (mild warmth, erythema. Tenderness) present. Normal pulse.      Comments: Diffuse onychomycosis to bilateral nails.  No paronychia is noted.  Toenail to bilateral great toe growing into the skin of the toe, at risk for ingrown toenail.    Left bunion with mild warmth and erythema. Not consistent with cellulitis. Mild tenderness. Reported pain with ambulation.     Neurological: He is alert and oriented to person, place, and time. GCS score is 15. GCS eye subscore is 4. GCS verbal subscore is 5. GCS motor subscore is 6.   Skin: Skin is dry.   Psychiatric: He has a normal mood and affect. His behavior is normal. Judgment and thought content normal.         ED Course   Procedures  Labs Reviewed - No data to display       Imaging Results              X-Ray Foot Complete Left (Final result)  Result time 08/15/23 12:02:45      Final result by Semaj Nation Jr., MD (08/15/23 12:02:45)                          Final result by Semaj Nation Jr., MD (08/15/23 12:02:43)                   Impression:      See above      Electronically signed by: Semaj Bae Jr  Date:    08/15/2023  Time:    12:02               Narrative:    EXAMINATION:  XR FOOT COMPLETE 3 VIEW LEFT    CLINICAL HISTORY:  Bunion of left foot    TECHNIQUE:  XR FOOT COMPLETE 3 VIEW LEFT    COMPARISON:  07/28/2022    FINDINGS:  Hallux valgus with associated secondary osteoarthritic change and cystic changes within the medial 1st metatarsal head redemonstrated, progressed some since the prior exam.  Overlying soft tissue swelling is also more pronounced.  Joint spaces are otherwise preserved.  Atherosclerotic vascular calcifications present and there is no evidence of fracture.                                       Medications   acetaminophen tablet 650 mg (650 mg Oral Given 8/15/23 1223)     Medical Decision Making:    Differential Diagnosis:   Differential Diagnosis includes, but is not limited to:  Fracture, dislocation, compartment syndrome, nerve injury/palsy, vascular injury, DVT, rhabdomyolysis, hemarthrosis, septic joint, cellulitis, bursitis, muscle strain, ligament tear/sprain, laceration, foreign body, abrasion, soft tissue contusion, osteoarthritis.      ED Management:  X-ray of left foot showing hallux valgus with osteoarthritis and cystic changes. Patient has no known history of gout.  1st MTP joint to left foot is mildly warm and erythematous. Could be gout, but avoiding NSAIDs in patient with CKD. Will sent voltaren to pharmacy. Tylenol prn. Lower suspicion for cellulitis or septic joint.  Patient has appropriate ROM of joint. No exquisite tenderness on exam.  Attempted to obtain new clippers to trim bilateral great toenails, but was unable to get clippers.  Patient has diffuse onychomycosis of bilateral toenails, but no evidence of infection/paronychia.  Patient will be referred to Podiatry for bunion and nail care.   Patient hypertensive, reports compliance with HTN medications. No symptoms. Follow up with PCP.    Discussed care patient with Dr. Soriano who agrees with assessment and plan.             ED Course as of 08/15/23 1253   Tue Aug 15, 2023   1217 X-Ray Foot Complete Left  Hallux valgus with associated secondary osteoarthritic change and cystic changes within the medial 1st metatarsal head redemonstrated, progressed some since the prior exam.  Overlying soft tissue swelling is also more pronounced.  [CS]      ED Course User Index  [CS] Joanna Diaz PA-C                 Clinical Impression:   Final diagnoses:  [M21.612] Bunion of great toe of left foot (Primary)  [L60.0] Incurved toenail        ED Disposition Condition    Discharge Stable          ED Prescriptions       Medication Sig Dispense Start Date End Date Auth. Provider    diclofenac sodium (VOLTAREN) 1 % Gel Apply 2 g topically once daily.  20 g 8/15/2023 -- Joanna Diaz PA-C          Follow-up Information       Follow up With Specialties Details Why Contact Info    Kel Rush, DPM Podiatry, Wound Care Schedule an appointment as soon as possible for a visit   [address] [facility]  [address] [place] [address]  [phone]               Joanna Diaz, GER  08/15/23 1252       Joanna Diaz PA-C  08/15/23 1253

## 2023-08-15 NOTE — DISCHARGE INSTRUCTIONS
You can take Tylenol for pain.  You can also use bunion pads from [employer] to reduce pain. Do not wear tight fitting shoes.  I have placed a referral to Podiatry for follow-up care.  They will contact you an appointment time.  It was nice meeting you, and I hope you feel better soon. You may return to the ER at any time for any new or concerning symptoms, worsening condition, or failure to improve.    Our goal in the ER is to always give you outstanding care and exceptional service. You may receive a survey by mail or email in the next week about your experience in our ED. We would greatly appreciate you completing and returning the survey. Your feedback provides us with a way to recognize our staff who give very good care and it helps us learn how to improve when your experience was below our aspiration of excellence.     Sincerely,     Joanna Diaz PA-C  Emergency Room Physician Assistant  [facility]

## 2023-08-29 ENCOUNTER — OFFICE VISIT (OUTPATIENT)
Dept: PODIATRY | Facility: CLINIC | Age: 83
End: 2023-08-29
Payer: MEDICARE

## 2023-08-29 VITALS — WEIGHT: 187.38 LBS | BODY MASS INDEX: 30.11 KG/M2 | HEIGHT: 66 IN

## 2023-08-29 DIAGNOSIS — I73.9 PAD (PERIPHERAL ARTERY DISEASE): Primary | ICD-10-CM

## 2023-08-29 DIAGNOSIS — B35.1 ONYCHOMYCOSIS DUE TO DERMATOPHYTE: ICD-10-CM

## 2023-08-29 DIAGNOSIS — N18.32 STAGE 3B CHRONIC KIDNEY DISEASE: ICD-10-CM

## 2023-08-29 PROCEDURE — 99203 PR OFFICE/OUTPT VISIT, NEW, LEVL III, 30-44 MIN: ICD-10-PCS | Mod: S$PBB,25,, | Performed by: PODIATRIST

## 2023-08-29 PROCEDURE — 11721 ROUTINE FOOT CARE: ICD-10-PCS | Mod: S$PBB,,, | Performed by: PODIATRIST

## 2023-08-29 PROCEDURE — 99999 PR PBB SHADOW E&M-EST. PATIENT-LVL III: ICD-10-PCS | Mod: PBBFAC,,, | Performed by: PODIATRIST

## 2023-08-29 PROCEDURE — 99213 OFFICE O/P EST LOW 20 MIN: CPT | Mod: PBBFAC,25,PO | Performed by: PODIATRIST

## 2023-08-29 PROCEDURE — 99203 OFFICE O/P NEW LOW 30 MIN: CPT | Mod: S$PBB,25,, | Performed by: PODIATRIST

## 2023-08-29 PROCEDURE — 11721 DEBRIDE NAIL 6 OR MORE: CPT | Mod: PBBFAC,PO | Performed by: PODIATRIST

## 2023-08-29 PROCEDURE — 99999 PR PBB SHADOW E&M-EST. PATIENT-LVL III: CPT | Mod: PBBFAC,,, | Performed by: PODIATRIST

## 2023-08-29 NOTE — PROGRESS NOTES
"[facility] - PODIATRY  [address], [address]  [place] [address]  Dept: [phone]  Dept Fax: [phone]    Issa Gallo Jr., DPM     Assessment:   MDM    Coding  1. PAD (peripheral artery disease)  US Lower Extremity Arteries Bilateral    Routine Foot Care      2. Onychomycosis due to dermatophyte  Ambulatory referral/consult to Podiatry    Routine Foot Care      3. Stage 3b chronic kidney disease            Plan:     Routine Foot Care    Date/Time: 8/29/2023 11:08 AM    Performed by: Issa Gallo Jr., DPM  Authorized by: Issa Gallo Jr., DPM    Time out: Immediately prior to procedure a "time out" was called to verify the correct patient, procedure, equipment, support staff and site/side marked as required.    Consent Done?:  Yes (Verbal)  Hyperkeratotic Skin Lesions?: No      Nail Care Type:  Debride  Location(s): All  (Left 1st Toe, Left 3rd Toe, Left 2nd Toe, Left 4th Toe, Left 5th Toe, Right 1st Toe, Right 2nd Toe, Right 3rd Toe, Right 4th Toe and Right 5th Toe)  Patient tolerance:  Patient tolerated the procedure well with no immediate complications    1. PAD (peripheral artery disease)  -     US Lower Extremity Arteries Bilateral; Future; Expected date: 08/29/2023  -     Routine Foot Care    2. Onychomycosis due to dermatophyte  -     Ambulatory referral/consult to Podiatry  -     Routine Foot Care    3. Stage 3b chronic kidney disease      Laura was seen today for ingrown toenail.    Diagnoses and all orders for this visit:    PAD (peripheral artery disease)  -     US Lower Extremity Arteries Bilateral; Future  -     Routine Foot Care    Onychomycosis due to dermatophyte  -     Ambulatory referral/consult to Podiatry  -     Routine Foot Care    Stage 3b chronic kidney disease        -pt seen, evaluated, and managed  -dx discussed in detail. All questions/concerns addressed  -all tx options discussed. All alternatives, risks, benefits of all txs " discussed  -comprehensive diabetic foot exam with risk assessment performed today  -the patient was educated about the diagnosis  -We discussed conservative care options possible including but not limited to shoe wear and/or padding, bracing/strapping, at home ROM, formal PT, medical therapy, injection therapy  - art US ordered on way out--> will review at nxt visit  -implemented icing/stretching regimen  -offloading pads dispensed  -Shoe inspection. PAD Foot Education. Patient reminded of the importance of good nutrition and blood sugar control to help prevent podiatric complications of PAD. Patient instructed on proper foot hygeine. We discussed wearing proper shoe gear, daily foot inspections, never walking without protective shoe gear, never putting sharp instruments to feet.  -rxs dispensed: none  -referrals: none  -WB: wbat        Follow up if symptoms worsen or fail to improve.    Subjective:      Patient ID: Laura Harris is a 82 y.o. male.    Chief Complaint:   Chief Complaint   Patient presents with    Ingrown Toenail     Bilateral great toe        Laura Harris presents to the clinic upon referral from Dr. Diaz  for evaluation and treatment of diabetic feet. Patient relates no major problem with feet. Only complaints today consist of painful nails.      HPI    Last Podiatry Enc: Visit date not found  Last Enc w/ Me: Visit date not found    Outside reports reviewed: historical medical records.  Family hx: as below  Past Medical History:   Diagnosis Date    Anemia of chronic disease 11/19/2019    Arthritis     Cataract     Chronic obstructive pulmonary disease, unspecified COPD type 1/26/2022    CKD (chronic kidney disease) stage 3, GFR 30-59 ml/min 5/1/2014    Colon polyp 05/12/2015    Coronary artery disease involving native coronary artery of native heart without angina pectoris 10/7/2019    Hyperlipidemia     Hypertension     Low tension glaucoma      Past Surgical History:   Procedure  Laterality Date    COLONOSCOPY N/A 9/16/2021    Procedure: COLONOSCOPY;  Surgeon: Kit Nicholson MD;  Location: [facility] ENDO;  Service: Endoscopy;  Laterality: N/A;    CORONARY STENT PLACEMENT N/A 3/4/2019    Procedure: INSERTION, STENT, CORONARY ARTERY;  Surgeon: Brennan Stein MD;  Location: [facility] CATH LAB/EP;  Service: Cardiology;  Laterality: N/A;    CYSTOSCOPY      ESOPHAGOGASTRODUODENOSCOPY N/A 9/16/2021    Procedure: EGD (ESOPHAGOGASTRODUODENOSCOPY);  Surgeon: Kit Nicholson MD;  Location: [facility] ENDO;  Service: Endoscopy;  Laterality: N/A;    LEFT HEART CATHETERIZATION N/A 3/4/2019    Procedure: Left heart cath;  Surgeon: Brennan Stein MD;  Location: [facility] CATH LAB/EP;  Service: Cardiology;  Laterality: N/A;    LEFT HEART CATHETERIZATION Left 2/24/2023    Procedure: Left heart cath;  Surgeon: Bob Pinto MD;  Location: [facility] CATH LAB/EP;  Service: Cardiology;  Laterality: Left;     Family History   Problem Relation Age of Onset    No Known Problems Sister     No Known Problems Brother     Cancer Brother         lung cancer, smoker    Kidney disease Sister     Melanoma Neg Hx      Current Outpatient Medications   Medication Sig Dispense Refill    acetaminophen (TYLENOL) 500 MG tablet Take 1 tablet (500 mg total) by mouth every 6 (six) hours as needed for Pain. 30 tablet 0    amLODIPine (NORVASC) 10 MG tablet Take 1 tablet (10 mg total) by mouth once daily. 90 tablet 3    atorvastatin (LIPITOR) 80 MG tablet Take 1 tablet (80 mg total) by mouth once daily. 90 tablet 2    clopidogreL (PLAVIX) 75 mg tablet TAKE 1 TABLET BY MOUTH DAILY 90 tablet 3    diclofenac sodium (VOLTAREN) 1 % Gel Apply 2 g topically once daily. 20 g 0    ketoconazole (NIZORAL) 2 % shampoo Apply topically twice a week. 120 mL 0    krill-om-3-dha-epa-phospho-ast 626-15-75-50 mg Cap Take 1 capsule by mouth once daily.      latanoprost 0.005 % ophthalmic solution Place 1 drop into both eyes every evening. 2.5 mL 6     losartan (COZAAR) 50 MG tablet Take 1 tablet (50 mg total) by mouth once daily. 90 tablet 3    metoprolol succinate (TOPROL-XL) 25 MG 24 hr tablet Take 1 tablet (25 mg total) by mouth once daily. 90 tablet 3    nitroGLYCERIN (NITROSTAT) 0.4 MG SL tablet Place 1 tablet (0.4 mg total) under the tongue every 5 (five) minutes as needed for Chest pain. 30 tablet 2    tamsulosin (FLOMAX) 0.4 mg Cap Take 1 capsule (0.4 mg total) by mouth once daily. TAKE 1 CAPSULE(0.4 MG) BY MOUTH AFTER DINNER  Strength: 0.4 mg 30 capsule 0     No current facility-administered medications for this visit.     Review of patient's allergies indicates:   Allergen Reactions    Ace inhibitors      Other reaction(s): Angioedema    Tizanidine Hives    Ultram [tramadol] Nausea Only and Other (See Comments)     Dizziness     Social History     Socioeconomic History    Marital status: Single   Tobacco Use    Smoking status: Former     Current packs/day: 0.00     Average packs/day: 1 pack/day for 64.0 years (64.0 ttl pk-yrs)     Types: Cigarettes     Start date:      Quit date: 2019     Years since quittin.6    Smokeless tobacco: Never   Substance and Sexual Activity    Alcohol use: No    Drug use: No    Sexual activity: Not Currently     Partners: Female     Social Determinants of Health     Financial Resource Strain: Low Risk  (8/15/2022)    Overall Financial Resource Strain (CARDIA)     Difficulty of Paying Living Expenses: Not hard at all   Food Insecurity: No Food Insecurity (8/15/2022)    Hunger Vital Sign     Worried About Running Out of Food in the Last Year: Never true     Ran Out of Food in the Last Year: Never true   Transportation Needs: No Transportation Needs (8/15/2022)    PRAPARE - Transportation     Lack of Transportation (Medical): No     Lack of Transportation (Non-Medical): No   Physical Activity: Insufficiently Active (8/15/2022)    Exercise Vital Sign     Days of Exercise per Week: 1 day     Minutes of Exercise per  Session: 120 min   Stress: No Stress Concern Present (8/15/2022)    [employer] [employer] - Occupational Stress Questionnaire     Feeling of Stress : Not at all   Social Connections: Moderately Isolated (8/15/2022)    Social Connection and Isolation Panel [NHANES]     Frequency of Communication with Friends and Family: Three times a week     Frequency of Social Gatherings with Friends and Family: More than three times a week     Attends Anabaptist Services: More than 4 times per year     Active Member of Clubs or Organizations: No     Attends Club or Organization Meetings: Never     Marital Status: Never    Housing Stability: Low Risk  (8/15/2022)    Housing Stability Vital Sign     Unable to Pay for Housing in the Last Year: No     Number of Places Lived in the Last Year: 1     Unstable Housing in the Last Year: No       ROS    REVIEW OF SYSTEMS: Negative as documented below as well as positive findings in bold.       Constitutional  Respiratory  Gastrointestinal  Skin   - Fever - Cough - Heartburn - Rash   - Chills - Spit blood - Nausea - Itching   - Weight Loss - Shortness of breath - Vomiting - Nail pain   - Malaise/Fatigue - Wheezing - Abdominal Pain  Wound/Ulcer   - Weight Gain   - Blood in Stool  Poor wound healing       - Diarrhea          Cardiovascular  Genitourinary  Neurological  HEENT   - Chest Pain - Dysuria - Burning Sensation of feet - Headache   - Palpitations - Hematuria - Tingling / Paresthesia - Congestion   - Pain at night in legs - Flank Pain - Dizziness - Sore Throat   - Cramping   - Tremor - Blurred Vision   - Leg Swelling   - Sensory Change - Double Vision   - Dizzy when standing   - Speech Change - Eye Redness       - Focal Weakness - Dry Eyes       - Loss of Consciousness          Endocrine  Musculoskeletal  Psychiatric   - Cold intolerance - Muscle Pain - Depression   - Heat intolerance - Neck Pain - Insomnia   - Anemia - Joint Pain - Memory Loss   -  Easy  "bruising, bleeding - Heel pain - Anxiety      Toe Pain        Leg/Ankle/Foot Pain         Objective:     Ht 5' 6" (1.676 m)   Wt 85 kg (187 lb 6.3 oz)   BMI 30.25 kg/m²   Vitals:    08/29/23 1036   Weight: 85 kg (187 lb 6.3 oz)   Height: 5' 6" (1.676 m)   PainSc: 0-No pain   PainLoc: Foot       Physical Exam    General Appearance:   Patient appears well developed, well nourished  Patient appears stated age    Psychiatric:   Patient is oriented to time, place, and person.  Patient has appropriate mood and affect    Neck:  Trachea Midline  No visible masses    Respiratory/Ears:  No distress or labored breathing.  Able to differentiate between normal talking voice and whisper.  Able to follow commands    Eyes:  Visual Acuity intact  Lids and conjunctivae normal. No discoloration noted.    Physical Exam  Ortho Exam  Ortho/SPM Exam  Foot Exam  Foot/Ankle Musculoskeletal Exam    B/l LE exam con't:  V:  DP 0/4, PT 1/4   CRT< 3s to all digits tested   Tibial and popliteal lymph nodes are w/o abnormality    edema present bilaterally, varicosities absent bilaterally    N:  Patient displays normal ankle reflexes   SILT in SP/DP/T/Jy[place]na/Saph distributions    Ortho: +Motor EHL/FHL/TA/GA   no TTP of foot or ankles   Compartments soft/compressible. No pain on passive stretch of big toe. No calf  Pain.   observed lateral deviation of the hallux with bunion deformity present b/l    Derm:  skin intact, skin warm and dry, skin without ulcers or lesions, skin without induration, nails normal, no erythema and no ecchymosis    Imaging / Labs:    Hemoglobin A1C   Date Value Ref Range Status   11/12/2022 6.3 (H) 4.0 - 5.6 % Final     Comment:     ADA Screening Guidelines:  5.7-6.4%  Consistent with prediabetes  >or=6.5%  Consistent with diabetes    High levels of fetal hemoglobin interfere with the HbA1C  assay. Heterozygous hemoglobin variants (HbS, HgC, etc)do  not significantly interfere with this assay.   However, presence of multiple " variants may affect accuracy.     08/04/2022 6.2 (H) 4.0 - 5.6 % Final     Comment:     ADA Screening Guidelines:  5.7-6.4%  Consistent with prediabetes  >or=6.5%  Consistent with diabetes    High levels of fetal hemoglobin interfere with the HbA1C  assay. Heterozygous hemoglobin variants (HbS, HgC, etc)do  not significantly interfere with this assay.   However, presence of multiple variants may affect accuracy.     04/10/2010 6.3 (H) 4.0 - 6.2 % Final       X-Ray Foot Complete Left    Result Date: 8/15/2023  EXAMINATION: XR FOOT COMPLETE 3 VIEW LEFT CLINICAL HISTORY: Bunion of left foot TECHNIQUE: XR FOOT COMPLETE 3 VIEW LEFT COMPARISON: 07/28/2022 FINDINGS: Hallux valgus with associated secondary osteoarthritic change and cystic changes within the medial 1st metatarsal head redemonstrated, progressed some since the prior exam.  Overlying soft tissue swelling is also more pronounced.  Joint spaces are otherwise preserved.  Atherosclerotic vascular calcifications present and there is no evidence of fracture.     See above Electronically signed by: Semaj Bae Jr Date:    08/15/2023 Time:    12:02        Note: This was dictated using a computer transcription program. Although proofread, it may contain computer transcription errors and phonetic errors. Other human proofreading errors may also exist. Corrections may be performed at a later time. Please contact us for any clarification if needed.    Issa Gallo DPM  [facility]

## 2023-08-29 NOTE — PATIENT INSTRUCTIONS
Peripheral Arterial Disease (PAD)    What Is Peripheral Arterial Disease?    Commonly referred to as poor circulation, Peripheral Arterial Disease (PAD) is the restriction of blood flow in the arteries of the leg. When arteries become narrowed by plaque (the accumulation of cholesterol and other materials on the walls of the arteries), the oxygen-rich blood flowing through the arteries cannot reach the legs and feet.    The presence of PAD may be an indication of more widespread arterial disease in the body that can affect the brain, causing stroke, or the heart, causing a heart attack.    Signs & Symptoms  Most people have no symptoms during the early stages of PAD. Often, by the time symptoms are noticed, the arteries are already significantly blocked.    Common symptoms of PAD include:    Leg pain (cramping) that occurs while walking (intermittent claudication)  Leg pain (cramping) that occurs while lying down (rest pain)  Leg numbness or weakness  Cold legs or feet  Sores that will not heal on toes, feet or legs  A change in leg color  Loss of hair on the feet and legs  Changes in toenail color and thickness     If any of these symptoms are present, it is important to discuss them with a foot and ankle surgeon. Left untreated, PAD can lead to debilitating and limb-threatening consequences.    PAD Risk Factors  Because only half of those with PAD actually experience symptoms, it is important that people with known risk factors be screened or tested for PAD.    The risk factors include:    Being over age 50  Smoking (currently or previously)  Diabetes  High blood pressure  High cholesterol  Personal or family history of PAD, heart disease, heart attack or stroke  Sedentary lifestyle (infrequent or no exercise)     PAD Diagnosis  To diagnose PAD, the foot and ankle surgeon obtains a comprehensive medical history of the patient. The surgeon performs a lower extremity physical examination that includes evaluation of  pulses, skin condition and foot deformities to determine the patients risk for PAD. If risk factors are present, the foot and ankle surgeon may order further tests.    Several noninvasive tests are available to assess PAD. The ankle-brachial index (RON) is a simple test in which blood pressure is measured and compared at the arm and ankle levels. An abnormal RON is a reliable indicator of underlying PAD and may prompt the foot and ankle surgeon to refer the patient to a vascular specialist for additional testing and treatment as necessary.    General Treatment of PAD  Treatment for PAD involves lifestyle changes, medication and, in some cases, surgery.    Lifestyle changes. These include smoking cessation, regular exercise and a heart-healthy diet.  Medications. Medicines may be used to improve blood flow, help prevent blood clots or control blood pressure, cholesterol and blood glucose levels.  Surgery. In some patients, small incision (endovascular) procedures or open (bypass) surgery of the leg are needed to improve blood flow.     PAD & Foot Problems  Simple foot deformities (hammertoes, bunions, bony prominences) or dermatologic conditions, such as ingrown or thickened fungal nails, often become more serious concerns when PAD is present. Because the legs and feet of someone with PAD do not have normal blood flow--and because blood is necessary for healing--seemingly small problems, such as cuts, blisters or sores, can result in serious complications.    Having both diabetes and PAD further increases the potential for foot complications. People living with diabetes often have neuropathy (nerve damage that can cause numbness in the feet), so they do not feel pain when foot problems occur. When neuropathy occurs in people with PAD, ulcers can develop over foot deformities and may never heal. For this reason, PAD and diabetes are common causes of foot or leg amputations in the [place].    Once detected, PAD  may be corrected or at least improved. The foot and ankle surgeon can then correct the underlying foot deformity to prevent future problems should the circulation become seriously restricted again.    Avoiding PAD Complications  Getting regular foot exams--as well as seeking immediate help when you notice changes in the feet--can keep small problems from worsening. PAD requires ongoing attention.    To avoid complications, people with this disease should follow these precautions:    Wash your feet daily. Use warm (not hot) water and a mild soap. Dry your feet--including between the toes--gently and well.  Keep the skin soft. For dry skin, apply a thin coat of lotion that does not contain alcohol. Apply over the top and bottom of your feet but not between the toes.  Trim toenails straight across and file the edges. Keep edges rounded to avoid ingrown toenails, which can cause infections.  Always wear shoes and socks. To avoid cuts and abrasions, never go barefoot--even indoors.  Choose the right shoes and socks. When buying new shoes, have an expert make sure they fit well. At first, wear them for just a few hours daily to help prevent blisters and examine the feet afterward to check for areas of irritation. Wear seamless socks to avoid getting sores.  Check your feet every day. Check all over for sores, cuts, bruises, breaks in the skin, rashes, corns, calluses, blisters, red spots, swelling, ingrown toenails, toenail infections or pain.  Call your foot and ankle surgeon. If you develop any of the above problems, seek professional help immediately. Do not try to take care of cuts, sores or infections yourself.

## 2023-09-28 ENCOUNTER — HOSPITAL ENCOUNTER (OUTPATIENT)
Dept: RADIOLOGY | Facility: HOSPITAL | Age: 83
Discharge: HOME | End: 2023-09-28
Attending: PODIATRIST
Payer: MEDICARE

## 2023-09-28 DIAGNOSIS — I73.9 PAD (PERIPHERAL ARTERY DISEASE): ICD-10-CM

## 2023-09-28 PROCEDURE — 93925 US LOWER EXTREMITY ARTERIES BILATERAL: ICD-10-PCS | Mod: 26,,, | Performed by: RADIOLOGY

## 2023-09-28 PROCEDURE — 93925 LOWER EXTREMITY STUDY: CPT | Mod: TC

## 2023-09-28 PROCEDURE — 93925 LOWER EXTREMITY STUDY: CPT | Mod: 26,,, | Performed by: RADIOLOGY

## 2023-09-29 DIAGNOSIS — I73.9 PAD (PERIPHERAL ARTERY DISEASE): Primary | ICD-10-CM

## 2023-09-29 NOTE — PROGRESS NOTES
This patient had an abnormal arterial ultrasound result. Please call to inform them that we are sending a referral to intervention cardiology for an evaluation.     Order placed in epic.    RM

## 2023-10-03 ENCOUNTER — LAB VISIT (OUTPATIENT)
Dept: LAB | Facility: HOSPITAL | Age: 83
End: 2023-10-03
Attending: FAMILY MEDICINE
Payer: MEDICARE

## 2023-10-03 DIAGNOSIS — N18.32 STAGE 3B CHRONIC KIDNEY DISEASE: ICD-10-CM

## 2023-10-03 DIAGNOSIS — N18.32 ANEMIA DUE TO STAGE 3B CHRONIC KIDNEY DISEASE: ICD-10-CM

## 2023-10-03 DIAGNOSIS — D63.1 ANEMIA DUE TO STAGE 3B CHRONIC KIDNEY DISEASE: ICD-10-CM

## 2023-10-03 DIAGNOSIS — E78.2 MIXED HYPERLIPIDEMIA: ICD-10-CM

## 2023-10-03 DIAGNOSIS — I10 PRIMARY HYPERTENSION: ICD-10-CM

## 2023-10-03 LAB
ALBUMIN SERPL BCP-MCNC: 3.8 G/DL (ref 3.5–5.2)
ALP SERPL-CCNC: 79 U/L (ref 55–135)
ALT SERPL W/O P-5'-P-CCNC: 17 U/L (ref 10–44)
ANION GAP SERPL CALC-SCNC: 11 MMOL/L (ref 8–16)
AST SERPL-CCNC: 13 U/L (ref 10–40)
BASOPHILS # BLD AUTO: 0.05 K/UL (ref 0–0.2)
BASOPHILS NFR BLD: 1 % (ref 0–1.9)
BILIRUB SERPL-MCNC: 0.4 MG/DL (ref 0.1–1)
BUN SERPL-MCNC: 16 MG/DL (ref 8–23)
CALCIUM SERPL-MCNC: 9.4 MG/DL (ref 8.7–10.5)
CHLORIDE SERPL-SCNC: 108 MMOL/L (ref 95–110)
CHOLEST SERPL-MCNC: 133 MG/DL (ref 120–199)
CHOLEST/HDLC SERPL: 4.4 {RATIO} (ref 2–5)
CO2 SERPL-SCNC: 20 MMOL/L (ref 23–29)
CREAT SERPL-MCNC: 1.6 MG/DL (ref 0.5–1.4)
DIFFERENTIAL METHOD: ABNORMAL
EOSINOPHIL # BLD AUTO: 0.3 K/UL (ref 0–0.5)
EOSINOPHIL NFR BLD: 5.7 % (ref 0–8)
ERYTHROCYTE [DISTWIDTH] IN BLOOD BY AUTOMATED COUNT: 13.8 % (ref 11.5–14.5)
EST. GFR  (NO RACE VARIABLE): 42.5 ML/MIN/1.73 M^2
GLUCOSE SERPL-MCNC: 176 MG/DL (ref 70–110)
HCT VFR BLD AUTO: 41.6 % (ref 40–54)
HDLC SERPL-MCNC: 30 MG/DL (ref 40–75)
HDLC SERPL: 22.6 % (ref 20–50)
HGB BLD-MCNC: 13.2 G/DL (ref 14–18)
IMM GRANULOCYTES # BLD AUTO: 0.01 K/UL (ref 0–0.04)
IMM GRANULOCYTES NFR BLD AUTO: 0.2 % (ref 0–0.5)
LDLC SERPL CALC-MCNC: 68.2 MG/DL (ref 63–159)
LYMPHOCYTES # BLD AUTO: 1.4 K/UL (ref 1–4.8)
LYMPHOCYTES NFR BLD: 28.7 % (ref 18–48)
MCH RBC QN AUTO: 26.8 PG (ref 27–31)
MCHC RBC AUTO-ENTMCNC: 31.7 G/DL (ref 32–36)
MCV RBC AUTO: 84 FL (ref 82–98)
MONOCYTES # BLD AUTO: 0.4 K/UL (ref 0.3–1)
MONOCYTES NFR BLD: 7.1 % (ref 4–15)
NEUTROPHILS # BLD AUTO: 2.8 K/UL (ref 1.8–7.7)
NEUTROPHILS NFR BLD: 57.3 % (ref 38–73)
NONHDLC SERPL-MCNC: 103 MG/DL
NRBC BLD-RTO: 0 /100 WBC
PLATELET # BLD AUTO: 243 K/UL (ref 150–450)
PMV BLD AUTO: 10.7 FL (ref 9.2–12.9)
POTASSIUM SERPL-SCNC: 4 MMOL/L (ref 3.5–5.1)
PROT SERPL-MCNC: 6.8 G/DL (ref 6–8.4)
RBC # BLD AUTO: 4.93 M/UL (ref 4.6–6.2)
SODIUM SERPL-SCNC: 139 MMOL/L (ref 136–145)
TRIGL SERPL-MCNC: 174 MG/DL (ref 30–150)
WBC # BLD AUTO: 4.95 K/UL (ref 3.9–12.7)

## 2023-10-03 PROCEDURE — 80061 LIPID PANEL: CPT | Performed by: FAMILY MEDICINE

## 2023-10-03 PROCEDURE — 85025 COMPLETE CBC W/AUTO DIFF WBC: CPT | Performed by: FAMILY MEDICINE

## 2023-10-03 PROCEDURE — 36415 COLL VENOUS BLD VENIPUNCTURE: CPT | Mod: PO | Performed by: FAMILY MEDICINE

## 2023-10-03 PROCEDURE — 80053 COMPREHEN METABOLIC PANEL: CPT | Performed by: FAMILY MEDICINE

## 2023-10-04 ENCOUNTER — TELEPHONE (OUTPATIENT)
Dept: FAMILY MEDICINE | Facility: CLINIC | Age: 83
End: 2023-10-04
Payer: MEDICARE

## 2023-10-04 NOTE — TELEPHONE ENCOUNTER
----- Message from Riley Villalobos MD sent at 10/4/2023  7:24 AM CDT -----  Please notify the patient.   Normal total cholesterol.  Elevated triglycerides.  Avoid refined sugars.  Omega 3 acids recommended twice a day to improve triglycerides.     Low good cholesterol or HDL.  Diet and physical activity to improve cholesterol.    Decreased kidney function but stable in comparison with previous reports .  Increase fluid intake.  Avoid over-the-counter medicines like naproxen or ibuprofen.     Elevated blood sugar.    Mild Anemia but stable in comparison with previous reports.  Anemia probably secondary to decreased kidney function.

## 2023-10-04 NOTE — PROGRESS NOTES
Please notify the patient.   Normal total cholesterol.  Elevated triglycerides.  Avoid refined sugars.  Omega 3 acids recommended twice a day to improve triglycerides.     Low good cholesterol or HDL.  Diet and physical activity to improve cholesterol.    Decreased kidney function but stable in comparison with previous reports .  Increase fluid intake.  Avoid over-the-counter medicines like naproxen or ibuprofen.     Elevated blood sugar.    Mild Anemia but stable in comparison with previous reports.  Anemia probably secondary to decreased kidney function.

## 2023-10-11 ENCOUNTER — LAB VISIT (OUTPATIENT)
Dept: LAB | Facility: HOSPITAL | Age: 83
End: 2023-10-11
Attending: FAMILY MEDICINE
Payer: MEDICARE

## 2023-10-11 ENCOUNTER — OFFICE VISIT (OUTPATIENT)
Dept: FAMILY MEDICINE | Facility: CLINIC | Age: 83
End: 2023-10-11
Payer: MEDICARE

## 2023-10-11 VITALS
OXYGEN SATURATION: 100 % | HEIGHT: 66 IN | BODY MASS INDEX: 30.29 KG/M2 | WEIGHT: 188.5 LBS | HEART RATE: 65 BPM | DIASTOLIC BLOOD PRESSURE: 88 MMHG | SYSTOLIC BLOOD PRESSURE: 142 MMHG

## 2023-10-11 DIAGNOSIS — R32 URINARY INCONTINENCE, UNSPECIFIED TYPE: ICD-10-CM

## 2023-10-11 DIAGNOSIS — D63.8 CHRONIC DISEASE ANEMIA: ICD-10-CM

## 2023-10-11 DIAGNOSIS — E11.65 TYPE 2 DIABETES MELLITUS WITH HYPERGLYCEMIA, WITHOUT LONG-TERM CURRENT USE OF INSULIN: ICD-10-CM

## 2023-10-11 DIAGNOSIS — Z23 NEEDS FLU SHOT: ICD-10-CM

## 2023-10-11 DIAGNOSIS — I73.9 PAD (PERIPHERAL ARTERY DISEASE): Primary | ICD-10-CM

## 2023-10-11 LAB
ESTIMATED AVG GLUCOSE: 128 MG/DL (ref 68–131)
HBA1C MFR BLD: 6.1 % (ref 4–5.6)

## 2023-10-11 PROCEDURE — 99214 OFFICE O/P EST MOD 30 MIN: CPT | Mod: PBBFAC,PO,25 | Performed by: FAMILY MEDICINE

## 2023-10-11 PROCEDURE — 99999PBSHW FLU VACCINE - QUADRIVALENT - ADJUVANTED: Mod: PBBFAC,,,

## 2023-10-11 PROCEDURE — 99215 OFFICE O/P EST HI 40 MIN: CPT | Mod: S$PBB,,, | Performed by: FAMILY MEDICINE

## 2023-10-11 PROCEDURE — G0008 ADMIN INFLUENZA VIRUS VAC: HCPCS | Mod: PBBFAC,PO

## 2023-10-11 PROCEDURE — 83036 HEMOGLOBIN GLYCOSYLATED A1C: CPT | Performed by: FAMILY MEDICINE

## 2023-10-11 PROCEDURE — 99999 PR PBB SHADOW E&M-EST. PATIENT-LVL IV: CPT | Mod: PBBFAC,,, | Performed by: FAMILY MEDICINE

## 2023-10-11 PROCEDURE — 36415 COLL VENOUS BLD VENIPUNCTURE: CPT | Mod: PO | Performed by: FAMILY MEDICINE

## 2023-10-11 PROCEDURE — 99215 PR OFFICE/OUTPT VISIT, EST, LEVL V, 40-54 MIN: ICD-10-PCS | Mod: S$PBB,,, | Performed by: FAMILY MEDICINE

## 2023-10-11 PROCEDURE — 99999PBSHW FLU VACCINE - QUADRIVALENT - ADJUVANTED: ICD-10-PCS | Mod: PBBFAC,,,

## 2023-10-11 PROCEDURE — 99999 PR PBB SHADOW E&M-EST. PATIENT-LVL IV: ICD-10-PCS | Mod: PBBFAC,,, | Performed by: FAMILY MEDICINE

## 2023-10-11 RX ORDER — TAMSULOSIN HYDROCHLORIDE 0.4 MG/1
0.4 CAPSULE ORAL DAILY
Qty: 90 CAPSULE | Refills: 3 | Status: SHIPPED | OUTPATIENT
Start: 2023-10-11 | End: 2023-10-12 | Stop reason: SDUPTHER

## 2023-10-11 RX ORDER — TAMSULOSIN HYDROCHLORIDE 0.4 MG/1
0.4 CAPSULE ORAL DAILY
Qty: 90 CAPSULE | Refills: 3 | Status: SHIPPED | OUTPATIENT
Start: 2023-10-11 | End: 2023-10-11 | Stop reason: SDUPTHER

## 2023-10-11 NOTE — PROGRESS NOTES
Subjective     Patient ID: Laura Harris is a 82 y.o. male.    Chief Complaint: Follow-up    82 years old male came to the clinic for follow-up for peripheral arterial disease.  Patient with possible blockage over the left leg.  Patient reports urinary incontinence sometimes requesting Flomax to help with the symptoms.  Last A1c at the level of prediabetes.  No polyuria, polydipsia or polyphagia.  Patient with mild anemia but stable in comparison with previous reports.    Follow-up  Pertinent negatives include no chest pain.     Review of Systems   Constitutional: Negative.    HENT: Negative.     Eyes: Negative.    Respiratory: Negative.     Cardiovascular: Negative.  Positive for claudication. Negative for chest pain, palpitations and leg swelling.   Gastrointestinal: Negative.    Endocrine: Negative for polydipsia, polyphagia and polyuria.   Genitourinary: Negative.    Musculoskeletal: Negative.    Integumentary:  Negative.   Neurological: Negative.    Psychiatric/Behavioral: Negative.            Objective     Physical Exam  Vitals and nursing note reviewed.   Constitutional:       General: He is not in acute distress.     Appearance: He is well-developed. He is not diaphoretic.   HENT:      Head: Normocephalic and atraumatic.      Right Ear: External ear normal.      Left Ear: External ear normal.      Nose: Nose normal.      Mouth/Throat:      Pharynx: No oropharyngeal exudate.   Eyes:      General: No scleral icterus.        Right eye: No discharge.         Left eye: No discharge.      Conjunctiva/sclera: Conjunctivae normal.      Pupils: Pupils are equal, round, and reactive to light.   Neck:      Thyroid: No thyromegaly.      Vascular: No JVD.      Trachea: No tracheal deviation.   Cardiovascular:      Rate and Rhythm: Normal rate and regular rhythm.      Heart sounds: Normal heart sounds. No murmur heard.     No friction rub. No gallop.   Pulmonary:      Effort: Pulmonary effort is normal. No respiratory  distress.      Breath sounds: Normal breath sounds. No stridor. No wheezing or rales.   Chest:      Chest wall: No tenderness.   Abdominal:      General: Bowel sounds are normal. There is no distension.      Palpations: Abdomen is soft. There is no mass.      Tenderness: There is no abdominal tenderness. There is no guarding or rebound.   Musculoskeletal:         General: No tenderness. Normal range of motion.      Cervical back: Normal range of motion and neck supple.   Lymphadenopathy:      Cervical: No cervical adenopathy.   Skin:     General: Skin is warm and dry.      Coloration: Skin is not pale.      Findings: No erythema or rash.   Neurological:      Mental Status: He is alert and oriented to person, place, and time.      Cranial Nerves: No cranial nerve deficit.      Motor: No abnormal muscle tone.      Coordination: Coordination normal.      Deep Tendon Reflexes: Reflexes are normal and symmetric. Reflexes normal.   Psychiatric:         Behavior: Behavior normal.         Thought Content: Thought content normal.         Judgment: Judgment normal.            Assessment and Plan     1. PAD (peripheral artery disease)  -     Ambulatory referral/consult to Vascular Medicine; Future; Expected date: 10/18/2023  -     Lipid Panel; Future; Expected date: 10/11/2023    2. Needs flu shot  -     Influenza (FLUAD) - Quadrivalent (Adjuvanted) *Preferred* (65+) (PF)    3. Type 2 diabetes mellitus with hyperglycemia, without long-term current use of insulin  -     Hemoglobin A1C; Future; Expected date: 10/11/2023  -     Microalbumin/Creatinine Ratio, Urine; Future; Expected date: 10/11/2023  -     Lipid Panel; Future; Expected date: 10/11/2023  -     Hemoglobin A1C; Future; Expected date: 10/11/2023  -     Comprehensive Metabolic Panel; Future; Expected date: 10/11/2023  -     CBC Auto Differential; Future; Expected date: 10/11/2023    4. Urinary incontinence, unspecified type  -     tamsulosin (FLOMAX) 0.4 mg Cap; Take 1  capsule (0.4 mg total) by mouth once daily. TAKE 1 CAPSULE(0.4 MG) BY MOUTH AFTER DINNER  Strength: 0.4 mg  Dispense: 90 capsule; Refill: 3    5. Chronic disease anemia  -     CBC Auto Differential; Future; Expected date: 10/11/2023        Continue monitoring blood sugar at home,ADA diet.          Follow up in about 6 months (around 4/11/2024), or if symptoms worsen or fail to improve.

## 2023-10-12 DIAGNOSIS — R32 URINARY INCONTINENCE, UNSPECIFIED TYPE: ICD-10-CM

## 2023-10-12 RX ORDER — TAMSULOSIN HYDROCHLORIDE 0.4 MG/1
CAPSULE ORAL
Qty: 90 CAPSULE | Refills: 3 | Status: SHIPPED | OUTPATIENT
Start: 2023-10-12

## 2023-10-12 NOTE — TELEPHONE ENCOUNTER
No care due was identified.  [facility] Embedded Care Due Messages. Reference number: [id number].   10/12/2023 1:18:51 PM CDT

## 2023-10-12 NOTE — TELEPHONE ENCOUNTER
Refill Decision Note   Laura Harris  is requesting a refill authorization.  Brief Assessment and Rationale for Refill:  Approve     Medication Therapy Plan:  PRINT....NO RECEIPT CONFIRMED BY PHARMACY 10/10/23 FOR 12 MONTHS; RESENDING 12 MONTHS      Comments:     Note composed:1:59 PM 10/12/2023             Appointments     Last Visit   10/11/2023 Riley Villalobos MD   Next Visit   Visit date not found Riley Villalobos MD

## 2023-10-13 ENCOUNTER — OFFICE VISIT (OUTPATIENT)
Dept: CARDIOLOGY | Facility: CLINIC | Age: 83
End: 2023-10-13
Payer: MEDICARE

## 2023-10-13 VITALS
WEIGHT: 187.63 LBS | BODY MASS INDEX: 30.16 KG/M2 | HEIGHT: 66 IN | HEART RATE: 49 BPM | DIASTOLIC BLOOD PRESSURE: 73 MMHG | SYSTOLIC BLOOD PRESSURE: 144 MMHG

## 2023-10-13 DIAGNOSIS — I73.9 PAD (PERIPHERAL ARTERY DISEASE): Primary | ICD-10-CM

## 2023-10-13 DIAGNOSIS — I70.0 ABDOMINAL AORTIC ATHEROSCLEROSIS: ICD-10-CM

## 2023-10-13 DIAGNOSIS — I35.0 AORTIC STENOSIS, SEVERE: ICD-10-CM

## 2023-10-13 DIAGNOSIS — I10 PRIMARY HYPERTENSION: ICD-10-CM

## 2023-10-13 DIAGNOSIS — E78.2 MIXED HYPERLIPIDEMIA: ICD-10-CM

## 2023-10-13 DIAGNOSIS — I25.10 CORONARY ARTERY DISEASE INVOLVING NATIVE CORONARY ARTERY OF NATIVE HEART WITHOUT ANGINA PECTORIS: ICD-10-CM

## 2023-10-13 PROBLEM — I25.2 HISTORY OF NON-ST ELEVATION MYOCARDIAL INFARCTION (NSTEMI): Status: RESOLVED | Noted: 2019-03-03 | Resolved: 2023-10-13

## 2023-10-13 PROCEDURE — 99215 PR OFFICE/OUTPT VISIT, EST, LEVL V, 40-54 MIN: ICD-10-PCS | Mod: S$PBB,,, | Performed by: INTERNAL MEDICINE

## 2023-10-13 PROCEDURE — 99215 OFFICE O/P EST HI 40 MIN: CPT | Mod: S$PBB,,, | Performed by: INTERNAL MEDICINE

## 2023-10-13 PROCEDURE — 99213 OFFICE O/P EST LOW 20 MIN: CPT | Mod: PBBFAC | Performed by: INTERNAL MEDICINE

## 2023-10-13 PROCEDURE — 99999 PR PBB SHADOW E&M-EST. PATIENT-LVL III: ICD-10-PCS | Mod: PBBFAC,,, | Performed by: INTERNAL MEDICINE

## 2023-10-13 PROCEDURE — 99999 PR PBB SHADOW E&M-EST. PATIENT-LVL III: CPT | Mod: PBBFAC,,, | Performed by: INTERNAL MEDICINE

## 2023-10-13 NOTE — PROGRESS NOTES
HISTORY:    82-year-old female with a history of CAD s/p PCI '19, severe aortic stenosis, hypertension, hyperlipidemia, PAD presenting for initial evaluation by me.    Patient follows in valve clinic with Dr. Edouard and has evaluation coming up at the end of this month for potential TAVR.    Patient referred to me for PAD management. Patient denies any LE claudication.     No CP, SOB, or CARTER at this time.     Activity levels mild to moderate. Does struggle with left hip pain. Completes ADLs and does tasks around the house without issue. Involved in the community.     Tolerates clopidogrel 75 x 1, losartan 50 x 1, amlodipine 10 x 1, metoprolol 25 x 1, atorvastatin 80 x 1.    PHYSICAL EXAM:    Vitals:    10/13/23 1116   BP: (!) 144/73   Pulse: (!) 49       NAD, A+Ox3.  No jvd, no bruit.  RRR nml s1,s2. 4/6 AS murmur  CTA B no wheezes or crackles.  No edema. B DP/PT present on Doppler.     LABS/STUDIES (imaging reviewed during clinic visit):    October 2023 hemoglobin 13.2 with an MCV of 84.  Creatinine 1.6 with a BUN of 16.  Albumin 3.8.  LDL 68 and HDL 30.  Triglycerides 174.  A1c 6.1.  TSH normal.    EKG February 2023 sinus rhythm with no Q-waves.  PVCs.    TTE February 2023 normal LV size with concentric remodeling and an EF of 55%.  Grade 1 diastology.  Severe aortic stenosis with a peak velocity of 4.82 m/sec.  CVP 3.    Cardiac catheterization February 2023 patent left main and left circ stent.  Luminal irregularities of the LAD system.  Small non dominant RCA.    RON February 2023 0.92/0.59.  Arterial duplex September 2023 suggests a left mid SFA occlusion.      ASSESSMENT & PLAN:    1. PAD (peripheral artery disease)    2. Abdominal aortic atherosclerosis    3. Aortic stenosis, severe    4. Coronary artery disease involving native coronary artery of native heart without angina pectoris    5. Mixed hyperlipidemia    6. Primary hypertension              Pt with known PAD and LLE SFA stenosis. Asymptomatic.  Continue with med management/RF modification.    SIHD s/p LCX PCI '19. Asymptomatic with patent cors on early '23 cath. On clopidogrel 75x1.     Bps controlled on losartan 50 x 1, amlodipine 10 x 1, metoprolol 25 x 1.    LDL at goal on atorvastatin 80 x 1    Severe, asymptomatic AS. Per valve clinic- has follow-up later this month.     Follow up in about 6 months (around 4/13/2024).      Ese Lucas MD

## 2023-10-23 ENCOUNTER — PATIENT MESSAGE (OUTPATIENT)
Dept: ADMINISTRATIVE | Facility: HOSPITAL | Age: 83
End: 2023-10-23
Payer: MEDICARE

## 2023-10-26 ENCOUNTER — PATIENT MESSAGE (OUTPATIENT)
Dept: ADMINISTRATIVE | Facility: HOSPITAL | Age: 83
End: 2023-10-26
Payer: MEDICARE

## 2023-10-30 ENCOUNTER — OFFICE VISIT (OUTPATIENT)
Dept: CARDIOLOGY | Facility: CLINIC | Age: 83
End: 2023-10-30
Payer: MEDICARE

## 2023-10-30 ENCOUNTER — HOSPITAL ENCOUNTER (OUTPATIENT)
Dept: CARDIOLOGY | Facility: HOSPITAL | Age: 83
Discharge: HOME | End: 2023-10-30
Attending: PHYSICIAN ASSISTANT
Payer: MEDICARE

## 2023-10-30 VITALS
WEIGHT: 190.69 LBS | BODY MASS INDEX: 30.16 KG/M2 | HEART RATE: 82 BPM | SYSTOLIC BLOOD PRESSURE: 173 MMHG | OXYGEN SATURATION: 99 % | WEIGHT: 187.63 LBS | HEART RATE: 44 BPM | DIASTOLIC BLOOD PRESSURE: 85 MMHG | HEIGHT: 66 IN | BODY MASS INDEX: 28.24 KG/M2 | DIASTOLIC BLOOD PRESSURE: 85 MMHG | SYSTOLIC BLOOD PRESSURE: 165 MMHG | HEIGHT: 69 IN

## 2023-10-30 DIAGNOSIS — I73.9 PAD (PERIPHERAL ARTERY DISEASE): ICD-10-CM

## 2023-10-30 DIAGNOSIS — I35.0 AORTIC STENOSIS, SEVERE: ICD-10-CM

## 2023-10-30 DIAGNOSIS — I10 PRIMARY HYPERTENSION: ICD-10-CM

## 2023-10-30 DIAGNOSIS — I35.0 NONRHEUMATIC AORTIC VALVE STENOSIS: ICD-10-CM

## 2023-10-30 DIAGNOSIS — E78.2 MIXED HYPERLIPIDEMIA: ICD-10-CM

## 2023-10-30 LAB
ASCENDING AORTA: 3.35 CM
AV INDEX (PROSTH): 0.29
AV MEAN GRADIENT: 40 MMHG
AV PEAK GRADIENT: 71 MMHG
AV VALVE AREA BY VELOCITY RATIO: 0.87 CM²
AV VALVE AREA: 0.92 CM²
AV VELOCITY RATIO: 0.28
BSA FOR ECHO PROCEDURE: 1.99 M2
CV ECHO LV RWT: 0.55 CM
DOP CALC AO PEAK VEL: 4.2 M/S
DOP CALC AO VTI: 84.7 CM
DOP CALC LVOT AREA: 3.1 CM2
DOP CALC LVOT DIAMETER: 2 CM
DOP CALC LVOT PEAK VEL: 1.16 M/S
DOP CALC LVOT STROKE VOLUME: 77.56 CM3
DOP CALCLVOT PEAK VEL VTI: 24.7 CM
E WAVE DECELERATION TIME: 305.38 MSEC
E/A RATIO: 0.6
E/E' RATIO: 20.5 M/S
ECHO LV POSTERIOR WALL: 1.1 CM (ref 0.6–1.1)
FRACTIONAL SHORTENING: 30 % (ref 28–44)
INTERVENTRICULAR SEPTUM: 1.13 CM (ref 0.6–1.1)
IVRT: 122.74 MSEC
LA MAJOR: 6.05 CM
LA MINOR: 5.62 CM
LA WIDTH: 4.16 CM
LEFT ATRIUM SIZE: 3.47 CM
LEFT ATRIUM VOLUME INDEX: 36.7 ML/M2
LEFT ATRIUM VOLUME: 71.5 CM3
LEFT INTERNAL DIMENSION IN SYSTOLE: 2.81 CM (ref 2.1–4)
LEFT VENTRICLE DIASTOLIC VOLUME INDEX: 35.86 ML/M2
LEFT VENTRICLE DIASTOLIC VOLUME: 69.92 ML
LEFT VENTRICLE MASS INDEX: 76 G/M2
LEFT VENTRICLE SYSTOLIC VOLUME INDEX: 15.3 ML/M2
LEFT VENTRICLE SYSTOLIC VOLUME: 29.87 ML
LEFT VENTRICULAR INTERNAL DIMENSION IN DIASTOLE: 4 CM (ref 3.5–6)
LEFT VENTRICULAR MASS: 148.53 G
LV LATERAL E/E' RATIO: 20.5 M/S
LV SEPTAL E/E' RATIO: 20.5 M/S
MV A" WAVE DURATION": 9.42 MSEC
MV PEAK A VEL: 1.37 M/S
MV PEAK E VEL: 0.82 M/S
MV STENOSIS PRESSURE HALF TIME: 88.56 MS
MV VALVE AREA P 1/2 METHOD: 2.48 CM2
PISA MRMAX VEL: 0.06 M/S
PISA TR MAX VEL: 3.2 M/S
PULM VEIN S/D RATIO: 2.53
PV PEAK D VEL: 0.32 M/S
PV PEAK S VEL: 0.81 M/S
RA MAJOR: 4.74 CM
RA PRESSURE ESTIMATED: 3 MMHG
RA WIDTH: 3.04 CM
RIGHT VENTRICULAR END-DIASTOLIC DIMENSION: 3.38 CM
RV TB RVSP: 6 MMHG
SINUS: 3.59 CM
STJ: 2.93 CM
TDI LATERAL: 0.04 M/S
TDI SEPTAL: 0.04 M/S
TDI: 0.04 M/S
TR MAX PG: 41 MMHG
TRICUSPID ANNULAR PLANE SYSTOLIC EXCURSION: 3.37 CM
TV REST PULMONARY ARTERY PRESSURE: 44 MMHG
Z-SCORE OF LEFT VENTRICULAR DIMENSION IN END DIASTOLE: -3.29
Z-SCORE OF LEFT VENTRICULAR DIMENSION IN END SYSTOLE: -1.55

## 2023-10-30 PROCEDURE — 99999 PR PBB SHADOW E&M-EST. PATIENT-LVL III: ICD-10-PCS | Mod: PBBFAC,,, | Performed by: INTERNAL MEDICINE

## 2023-10-30 PROCEDURE — 93306 TTE W/DOPPLER COMPLETE: CPT

## 2023-10-30 PROCEDURE — 93306 ECHO (CUPID ONLY): ICD-10-PCS | Mod: 26,,, | Performed by: INTERNAL MEDICINE

## 2023-10-30 PROCEDURE — 93306 TTE W/DOPPLER COMPLETE: CPT | Mod: 26,,, | Performed by: INTERNAL MEDICINE

## 2023-10-30 PROCEDURE — 99999 PR PBB SHADOW E&M-EST. PATIENT-LVL III: CPT | Mod: PBBFAC,,, | Performed by: INTERNAL MEDICINE

## 2023-10-30 PROCEDURE — 99214 OFFICE O/P EST MOD 30 MIN: CPT | Mod: S$PBB,,, | Performed by: INTERNAL MEDICINE

## 2023-10-30 PROCEDURE — 99213 OFFICE O/P EST LOW 20 MIN: CPT | Mod: PBBFAC,25 | Performed by: INTERNAL MEDICINE

## 2023-10-30 PROCEDURE — 99214 PR OFFICE/OUTPT VISIT, EST, LEVL IV, 30-39 MIN: ICD-10-PCS | Mod: S$PBB,,, | Performed by: INTERNAL MEDICINE

## 2023-10-30 NOTE — PROGRESS NOTES
Interventional Cardiology Clinic Note    Primary Cardiologist: Bob Pinto MD    Subjective:   Patient ID:  Laura Harris is a 83 y.o. male referred by Dr Pinto for evaluation of severe AS (NYHA Class I sx). He He denies dyspnea on exertion. He reports tiredness in his legs when walking. He doesn't feel as though his symptoms limit his lifestyle. He doesn't exercise but works around the house, eg installing a water heater and putting up a fence. He denies any chest pain, leg swelling, orthopnea, PND. He feels great. He quit smoking. He has HTN, HLP, and PAD with olaf IIa. No ulcers. He even will go up on his roof to fix a leak if need be.      Review of Systems   Constitutional: Negative for diaphoresis and weight gain.   HENT: Negative.     Eyes: Negative.    Cardiovascular:  Negative for chest pain, claudication, dyspnea on exertion, irregular heartbeat, leg swelling, orthopnea, palpitations and paroxysmal nocturnal dyspnea.   Respiratory:  Negative for shortness of breath.    Musculoskeletal:  Positive for muscle weakness.   All other systems reviewed and are negative.       History:     Past Medical History:   Diagnosis Date    Anemia of chronic disease 11/19/2019    Arthritis     Cataract     Chronic obstructive pulmonary disease, unspecified COPD type 1/26/2022    CKD (chronic kidney disease) stage 3, GFR 30-59 ml/min 5/1/2014    Colon polyp 05/12/2015    Coronary artery disease involving native coronary artery of native heart without angina pectoris 10/7/2019    Hyperlipidemia     Hypertension     Low tension glaucoma      Past Surgical History:   Procedure Laterality Date    COLONOSCOPY N/A 9/16/2021    Procedure: COLONOSCOPY;  Surgeon: Kit Nicholosn MD;  Location: [facility] ENDO;  Service: Endoscopy;  Laterality: N/A;    CORONARY STENT PLACEMENT N/A 3/4/2019    Procedure: INSERTION, STENT, CORONARY ARTERY;  Surgeon: Brennan Stein MD;  Location: [facility] CATH LAB/EP;  Service:  Cardiology;  Laterality: N/A;    CYSTOSCOPY      ESOPHAGOGASTRODUODENOSCOPY N/A 2021    Procedure: EGD (ESOPHAGOGASTRODUODENOSCOPY);  Surgeon: Kit Nicholson MD;  Location: [facility] ENDO;  Service: Endoscopy;  Laterality: N/A;    LEFT HEART CATHETERIZATION N/A 3/4/2019    Procedure: Left heart cath;  Surgeon: Brennan Stein MD;  Location: [facility] CATH LAB/EP;  Service: Cardiology;  Laterality: N/A;    LEFT HEART CATHETERIZATION Left 2023    Procedure: Left heart cath;  Surgeon: Bob Pinto MD;  Location: [facility] CATH LAB/EP;  Service: Cardiology;  Laterality: Left;     Social History     Socioeconomic History    Marital status: Single   Tobacco Use    Smoking status: Former     Current packs/day: 0.00     Average packs/day: 1 pack/day for 64.0 years (64.0 ttl pk-yrs)     Types: Cigarettes     Start date:      Quit date:      Years since quittin.8    Smokeless tobacco: Never   Substance and Sexual Activity    Alcohol use: No    Drug use: No    Sexual activity: Not Currently     Partners: Female     Social Determinants of Health     Financial Resource Strain: Low Risk  (8/15/2022)    Overall Financial Resource Strain (CARDIA)     Difficulty of Paying Living Expenses: Not hard at all   Food Insecurity: No Food Insecurity (8/15/2022)    Hunger Vital Sign     Worried About Running Out of Food in the Last Year: Never true     Ran Out of Food in the Last Year: Never true   Transportation Needs: No Transportation Needs (8/15/2022)    PRAPARE - Transportation     Lack of Transportation (Medical): No     Lack of Transportation (Non-Medical): No   Physical Activity: Insufficiently Active (8/15/2022)    Exercise Vital Sign     Days of Exercise per Week: 1 day     Minutes of Exercise per Session: 120 min   Stress: No Stress Concern Present (8/15/2022)    [employer] [place] [employer] - Occupational Stress Questionnaire     Feeling of Stress : Not at all   Social Connections: Moderately  Isolated (8/15/2022)    Social Connection and Isolation Panel [NHANES]     Frequency of Communication with Friends and Family: Three times a week     Frequency of Social Gatherings with Friends and Family: More than three times a week     Attends Mu-ism Services: More than 4 times per year     Active Member of Clubs or Organizations: No     Attends Club or Organization Meetings: Never     Marital Status: Never    Housing Stability: Low Risk  (8/15/2022)    Housing Stability Vital Sign     Unable to Pay for Housing in the Last Year: No     Number of Places Lived in the Last Year: 1     Unstable Housing in the Last Year: No     Family History   Problem Relation Age of Onset    No Known Problems Sister     No Known Problems Brother     Cancer Brother         lung cancer, smoker    Kidney disease Sister     Melanoma Neg Hx       Review of patient's allergies indicates:   Allergen Reactions    Ace inhibitors      Other reaction(s): Angioedema    Tizanidine Hives    Ultram [tramadol] Nausea Only and Other (See Comments)     Dizziness     has a current medication list which includes the following prescription(s): acetaminophen, amlodipine, atorvastatin, clopidogrel, krill-om-3-dha-epa-phospho-ast, latanoprost, losartan, metoprolol succinate, tamsulosin, diclofenac sodium, ketoconazole, and nitroglycerin.     Meds:     Review of patient's allergies indicates:   Allergen Reactions    Ace inhibitors      Other reaction(s): Angioedema    Tizanidine Hives    Ultram [tramadol] Nausea Only and Other (See Comments)     Dizziness       Current Outpatient Medications:     acetaminophen (TYLENOL) 500 MG tablet, Take 1 tablet (500 mg total) by mouth every 6 (six) hours as needed for Pain., Disp: 30 tablet, Rfl: 0    amLODIPine (NORVASC) 10 MG tablet, Take 1 tablet (10 mg total) by mouth once daily., Disp: 90 tablet, Rfl: 3    atorvastatin (LIPITOR) 80 MG tablet, Take 1 tablet (80 mg total) by mouth once daily., Disp: 90  "tablet, Rfl: 2    clopidogreL (PLAVIX) 75 mg tablet, TAKE 1 TABLET BY MOUTH DAILY, Disp: 90 tablet, Rfl: 3    krill-om-3-dha-epa-phospho-ast 223-39-05-50 mg Cap, Take 1 capsule by mouth once daily., Disp: , Rfl:     latanoprost 0.005 % ophthalmic solution, Place 1 drop into both eyes every evening., Disp: 2.5 mL, Rfl: 6    losartan (COZAAR) 50 MG tablet, Take 1 tablet (50 mg total) by mouth once daily., Disp: 90 tablet, Rfl: 3    metoprolol succinate (TOPROL-XL) 25 MG 24 hr tablet, Take 1 tablet (25 mg total) by mouth once daily., Disp: 90 tablet, Rfl: 3    tamsulosin (FLOMAX) 0.4 mg Cap, TAKE 1 CAPSULE(0.4 MG) BY MOUTH AFTER DINNER Strength: 0.4 mg, Disp: 90 capsule, Rfl: 3    diclofenac sodium (VOLTAREN) 1 % Gel, Apply 2 g topically once daily. (Patient not taking: Reported on 10/11/2023), Disp: 20 g, Rfl: 0    ketoconazole (NIZORAL) 2 % shampoo, Apply topically twice a week. (Patient not taking: Reported on 10/30/2023), Disp: 120 mL, Rfl: 0    nitroGLYCERIN (NITROSTAT) 0.4 MG SL tablet, Place 1 tablet (0.4 mg total) under the tongue every 5 (five) minutes as needed for Chest pain. (Patient not taking: Reported on 10/30/2023), Disp: 30 tablet, Rfl: 2    Objective:   BP (!) 165/85 (BP Location: Right arm, Patient Position: Sitting, BP Method: Large (Automatic))   Pulse (!) 44   Ht 5' 8.5" (1.74 m)   Wt 86.5 kg (190 lb 11.2 oz)   SpO2 99%   BMI 28.57 kg/m²         Cardiovascular Imaging:     Echo: Reviewed. Preserved EF, Severe AS.     Assessment & Plan:       Aortic stenosis, severe  Patient denies any sympotms of AS. He reports "tiredness" but denies dyspnea today. He is nervous about treatment options. He continues to have a normal lifestyle. I discussed with him the natural history of severe AS and treatment options. He wants to continue to wait as he does not feel like he has the described symptoms. Will see him every 6 months with an echo.     PAD (peripheral artery disease)  Sahil 2a symptoms. " Continue with walking program.     Hypertension  Continue current regime. Follow up with PCP    Hyperlipidemia  Continue high intensity statin therapy    Body mass index is 28.57 kg/m². Healthy lifestyle was discussed with the patient as well as the importance of physical activity to improve cardiovascular health.    Today's systolic blood pressure is 165. Therapeutic options to target SBP<140 mmHg have been described in this note.     The medication list was reviewed with the patient and inactive medications as well as duplicates were removed from the medical record.    RTC in 6 months with echo.     Ajay Edouard MD [facility]  Interventional Cardiology  Structural/Valvular heart disease  [phone]

## 2023-10-30 NOTE — ASSESSMENT & PLAN NOTE
"Patient denies any sympotms of AS. He reports "tiredness" but denies dyspnea today. He is nervous about treatment options. He continues to have a normal lifestyle. I discussed with him the natural history of severe AS and treatment options. He wants to continue to wait as he does not feel like he has the described symptoms. Will see him every 6 months with an echo.   "

## 2023-11-07 ENCOUNTER — CLINICAL SUPPORT (OUTPATIENT)
Dept: OPHTHALMOLOGY | Facility: CLINIC | Age: 83
End: 2023-11-07
Payer: MEDICARE

## 2023-11-07 ENCOUNTER — OFFICE VISIT (OUTPATIENT)
Dept: OPHTHALMOLOGY | Facility: CLINIC | Age: 83
End: 2023-11-07
Payer: MEDICARE

## 2023-11-07 DIAGNOSIS — H40.1113 PRIMARY OPEN ANGLE GLAUCOMA (POAG) OF RIGHT EYE, SEVERE STAGE: ICD-10-CM

## 2023-11-07 DIAGNOSIS — H40.1233 LOW-TENSION GLAUCOMA OF BOTH EYES, SEVERE STAGE: Primary | ICD-10-CM

## 2023-11-07 DIAGNOSIS — H40.1122 PRIMARY OPEN ANGLE GLAUCOMA (POAG) OF LEFT EYE, MODERATE STAGE: ICD-10-CM

## 2023-11-07 DIAGNOSIS — H25.13 NUCLEAR SCLEROTIC CATARACT OF BOTH EYES: ICD-10-CM

## 2023-11-07 PROCEDURE — 92133 CPTRZD OPH DX IMG PST SGM ON: CPT | Mod: PBBFAC | Performed by: OPHTHALMOLOGY

## 2023-11-07 PROCEDURE — 99214 PR OFFICE/OUTPT VISIT, EST, LEVL IV, 30-39 MIN: ICD-10-PCS | Mod: S$PBB,,, | Performed by: OPHTHALMOLOGY

## 2023-11-07 PROCEDURE — 92083 EXTENDED VISUAL FIELD XM: CPT | Mod: PBBFAC | Performed by: OPHTHALMOLOGY

## 2023-11-07 PROCEDURE — 92083 HUMPHREY VISUAL FIELD - OU - BOTH EYES: ICD-10-PCS | Mod: 26,S$PBB,, | Performed by: OPHTHALMOLOGY

## 2023-11-07 PROCEDURE — 99213 OFFICE O/P EST LOW 20 MIN: CPT | Mod: PBBFAC | Performed by: OPHTHALMOLOGY

## 2023-11-07 PROCEDURE — 99999 PR PBB SHADOW E&M-EST. PATIENT-LVL III: ICD-10-PCS | Mod: PBBFAC,,, | Performed by: OPHTHALMOLOGY

## 2023-11-07 PROCEDURE — 99214 OFFICE O/P EST MOD 30 MIN: CPT | Mod: S$PBB,,, | Performed by: OPHTHALMOLOGY

## 2023-11-07 PROCEDURE — 99999 PR PBB SHADOW E&M-EST. PATIENT-LVL III: CPT | Mod: PBBFAC,,, | Performed by: OPHTHALMOLOGY

## 2023-11-07 PROCEDURE — 92133 POSTERIOR SEGMENT OCT OPTIC NERVE(OCULAR COHERENCE TOMOGRAPHY) - OU - BOTH EYES: ICD-10-PCS | Mod: 26,S$PBB,, | Performed by: OPHTHALMOLOGY

## 2023-11-07 NOTE — PROGRESS NOTES
VISUAL FIELD TEST 24-2 SFASTER-OU-DONE/AD  OU-REL-FIX-COOP-GOOD/AD      PT HAS NO KNOWN ALLERGIES TO LATEX OR ADHESIVES./AD      MRX: OD +1.00 +1.25 X 173            OS +1.00 +1.50 X 8
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
## 2023-11-15 DIAGNOSIS — H40.1232 LOW-TENSION GLAUCOMA OF BOTH EYES, MODERATE STAGE: ICD-10-CM

## 2023-11-16 RX ORDER — LATANOPROST 50 UG/ML
1 SOLUTION/ DROPS OPHTHALMIC NIGHTLY
Qty: 2.5 ML | Refills: 6 | Status: SHIPPED | OUTPATIENT
Start: 2023-11-16

## 2023-12-29 DIAGNOSIS — I10 ESSENTIAL HYPERTENSION: ICD-10-CM

## 2023-12-29 NOTE — TELEPHONE ENCOUNTER
No care due was identified.  [facility] Embedded Care Due Messages. Reference number: [id number].   12/29/2023 5:12:34 PM CST

## 2023-12-31 NOTE — TELEPHONE ENCOUNTER
Refill Routing Note   Medication(s) are not appropriate for processing by [facility] for the following reason(s):        Required vitals abnormal    [facility] action(s):  Defer               Appointments  past 12m or future 3m with PCP    Date Provider   Last Visit   10/11/2023 Riley Villalobos MD   Next Visit   4/11/2024 Riley Villalobos MD   ED visits in past 90 days: 0        Note composed:9:02 PM 12/30/2023

## 2024-01-02 RX ORDER — AMLODIPINE BESYLATE 10 MG/1
10 TABLET ORAL
Qty: 90 TABLET | Refills: 0 | Status: SHIPPED | OUTPATIENT
Start: 2024-01-02

## 2024-02-05 ENCOUNTER — TELEPHONE (OUTPATIENT)
Dept: ADMINISTRATIVE | Facility: CLINIC | Age: 84
End: 2024-02-05
Payer: MEDICARE

## 2024-02-06 ENCOUNTER — OFFICE VISIT (OUTPATIENT)
Dept: FAMILY MEDICINE | Facility: CLINIC | Age: 84
End: 2024-02-06
Payer: MEDICARE

## 2024-02-06 VITALS
WEIGHT: 199.06 LBS | OXYGEN SATURATION: 99 % | SYSTOLIC BLOOD PRESSURE: 136 MMHG | RESPIRATION RATE: 18 BRPM | BODY MASS INDEX: 29.48 KG/M2 | HEIGHT: 69 IN | HEART RATE: 94 BPM | DIASTOLIC BLOOD PRESSURE: 66 MMHG

## 2024-02-06 DIAGNOSIS — E11.65 TYPE 2 DIABETES MELLITUS WITH HYPERGLYCEMIA, WITHOUT LONG-TERM CURRENT USE OF INSULIN: ICD-10-CM

## 2024-02-06 DIAGNOSIS — J44.9 CHRONIC OBSTRUCTIVE PULMONARY DISEASE, UNSPECIFIED COPD TYPE: ICD-10-CM

## 2024-02-06 DIAGNOSIS — E66.3 OVERWEIGHT (BMI 25.0-29.9): ICD-10-CM

## 2024-02-06 DIAGNOSIS — Z00.00 ENCOUNTER FOR PREVENTIVE HEALTH EXAMINATION: Primary | ICD-10-CM

## 2024-02-06 DIAGNOSIS — I70.0 ABDOMINAL AORTIC ATHEROSCLEROSIS: ICD-10-CM

## 2024-02-06 DIAGNOSIS — I73.9 PAD (PERIPHERAL ARTERY DISEASE): ICD-10-CM

## 2024-02-06 DIAGNOSIS — N18.32 STAGE 3B CHRONIC KIDNEY DISEASE: ICD-10-CM

## 2024-02-06 PROCEDURE — G0439 PPPS, SUBSEQ VISIT: HCPCS | Mod: ,,, | Performed by: NURSE PRACTITIONER

## 2024-02-06 PROCEDURE — 99214 OFFICE O/P EST MOD 30 MIN: CPT | Mod: PBBFAC,PO | Performed by: NURSE PRACTITIONER

## 2024-02-06 PROCEDURE — 99999 PR PBB SHADOW E&M-EST. PATIENT-LVL IV: CPT | Mod: PBBFAC,,, | Performed by: NURSE PRACTITIONER

## 2024-02-06 NOTE — PROGRESS NOTES
"  Laura Harris presented for a  Medicare AWV and comprehensive Health Risk Assessment today. The following components were reviewed and updated:    Medical history  Family History  Social history  Allergies and Current Medications  Health Risk Assessment  Health Maintenance  Care Team         ** See Completed Assessments for Annual Wellness Visit within the encounter summary.**         The following assessments were completed:  Living Situation  CAGE  Depression Screening  Timed Get Up and Go  Whisper Test  Cognitive Function Screening      Nutrition Screening  ADL Screening  PAQ Screening        Vitals:    02/06/24 0940 02/06/24 1009   BP: (!) 150/86 136/66   BP Location: Left arm Left arm   Patient Position: Sitting Sitting   Pulse: 94    Resp: 18    SpO2: 99%    Weight: 90.3 kg (199 lb 1.2 oz)    Height: 5' 8.5" (1.74 m)      Body mass index is 29.83 kg/m².    Physical Exam  Vitals reviewed.   Constitutional:       General: He is awake. He is not in acute distress.     Appearance: Normal appearance. He is well-developed and well-groomed.   HENT:      Head: Normocephalic.      Right Ear: External ear normal. Decreased hearing noted.      Left Ear: External ear normal. Decreased hearing noted.      Ears:      Comments: Hearing aids in place  Eyes:      General:         Right eye: No discharge.         Left eye: No discharge.      Comments: Wearing glasses   Cardiovascular:      Rate and Rhythm: Normal rate.   Pulmonary:      Effort: Pulmonary effort is normal. No respiratory distress.   Skin:     General: Skin is warm and dry.      Coloration: Skin is not pale.   Neurological:      Mental Status: He is alert and oriented to person, place, and time.      Coordination: Coordination normal.      Gait: Gait normal.   Psychiatric:         Attention and Perception: Attention normal.         Mood and Affect: Mood and affect normal.         Speech: Speech normal.         Behavior: Behavior normal. Behavior is " cooperative.         Thought Content: Thought content normal.             Diagnoses and health risks identified today and associated recommendations/orders:    1. Encounter for preventive health examination  Pain level assessed and reviewed. Not taking any opioids; at low risk for opioid use disorder. Follow up with PCP.    2. Chronic obstructive pulmonary disease, unspecified COPD type  Chronic; stable. Follow up with PCP.    3. Type 2 diabetes mellitus with hyperglycemia, without long-term current use of insulin  Chronic; stable. Diet-controlled. Follow up with PCP.    4. Abdominal aortic atherosclerosis  Chronic; stable on medication. Followed by Cardiology.    5. PAD (peripheral artery disease)  Chronic; stable on medication. Followed by Cardiology.    6. Stage 3b chronic kidney disease  Chronic; stable on medication. Follow up with PCP.    7. Overweight (BMI 25.0-29.9)  Eat a low salt/low fat diet and discussed importance of engaging in physical activity at least 5x/week for a minimum of 30 min/day.      Provided Bertell with a 5-10 year written screening schedule and personal prevention plan. Recommendations were developed using the USPSTF age appropriate recommendations. Education, counseling, and referrals were provided as needed. After Visit Summary printed and given to patient which includes a list of additional screenings/tests needed.    Follow up for your next annual wellness visit.    Citlali Hope NP      I offered to discuss advanced care planning, including how to pick a person who would make decisions for you if you were unable to make them for yourself, called a health care power of , and what kind of decisions you might make such as use of life sustaining treatments such as ventilators and tube feeding when faced with a life limiting illness recorded on a living will that they will need to know. (How you want to be cared for as you near the end of your natural life)     X  Patient  has advanced directives on file, which we reviewed, and they do not wish to make changes.

## 2024-02-06 NOTE — PATIENT INSTRUCTIONS
Counseling and Referral of Other Preventative  (Italic type indicates deductible and co-insurance are waived)    Patient Name: Laura Harris  Today's Date: 2/6/2024    Health Maintenance       Date Due Completion Date    RSV Vaccine (Age 60+ and Pregnant patients) (1 - 1-dose 60+ series) Never done ---    Shingles Vaccine (2 of 2) 05/30/2023 4/4/2023    COVID-19 Vaccine (6 - 2023-24 season) 09/01/2023 12/5/2022    Diabetes Urine Screening 11/12/2023 11/12/2022    Hemoglobin A1c 04/11/2024 10/11/2023    Lipid Panel 10/03/2024 10/3/2023    Eye Exam 11/07/2024 11/7/2023    Colonoscopy 09/16/2026 9/16/2021    TETANUS VACCINE 07/28/2032 7/28/2022        No orders of the defined types were placed in this encounter.      The following information is provided to all patients.  This information is to help you find resources for any of the problems found today that may be affecting your health:                  Living healthy guide: www.[facility].[place].gov      Understanding Diabetes: www.diabetes.org      Eating healthy: www.cdc.gov/healthyweight      CDC home safety checklist: www.cdc.gov/steadi/patient.html      Agency on Aging: www.goea.[place].gov      Alcoholics anonymous (AA): www.aa.org      Physical Activity: www.adelso.nih.gov/pn8okxk      Tobacco use: www.quitwithusla.org

## 2024-02-14 DIAGNOSIS — I10 ESSENTIAL HYPERTENSION: ICD-10-CM

## 2024-02-14 RX ORDER — LOSARTAN POTASSIUM 50 MG/1
50 TABLET ORAL DAILY
Qty: 90 TABLET | Refills: 3 | Status: SHIPPED | OUTPATIENT
Start: 2024-02-14 | End: 2025-02-13

## 2024-02-14 NOTE — TELEPHONE ENCOUNTER
No care due was identified.  [facility] Embedded Care Due Messages. Reference number: [id number].   2/14/2024 10:10:31 AM CST

## 2024-03-23 ENCOUNTER — PATIENT MESSAGE (OUTPATIENT)
Dept: FAMILY MEDICINE | Facility: CLINIC | Age: 84
End: 2024-03-23
Payer: MEDICARE

## 2024-03-26 RX ORDER — METOPROLOL SUCCINATE 25 MG/1
25 TABLET, EXTENDED RELEASE ORAL
Qty: 90 TABLET | Refills: 3 | Status: SHIPPED | OUTPATIENT
Start: 2024-03-26

## 2024-03-26 RX ORDER — CLOPIDOGREL BISULFATE 75 MG/1
75 TABLET ORAL DAILY
Qty: 90 TABLET | Refills: 3 | Status: SHIPPED | OUTPATIENT
Start: 2024-03-26

## 2024-03-26 NOTE — TELEPHONE ENCOUNTER
Spoke with patient informing her appointment will need to be rescheduled due to provider being out of office. Patient rescheduled 6/4.

## 2024-03-27 NOTE — TELEPHONE ENCOUNTER
Refill Routing Note   Medication(s) are not appropriate for processing by [facility] for the following reason(s):        No active prescription written by provider    [facility] action(s):  Defer               Appointments  past 12m or future 3m with PCP    Date Provider   Last Visit   10/11/2023 Riley Villalobos MD   Next Visit   6/4/2024 Riley Villalobos MD   ED visits in past 90 days: 0        Note composed:7:56 PM 03/26/2024

## 2024-03-27 NOTE — TELEPHONE ENCOUNTER
No care due was identified.  [facility] Embedded Care Due Messages. Reference number: [id number].   3/26/2024 7:52:34 PM CDT

## 2024-04-02 ENCOUNTER — LAB VISIT (OUTPATIENT)
Dept: LAB | Facility: HOSPITAL | Age: 84
End: 2024-04-02
Attending: FAMILY MEDICINE
Payer: MEDICARE

## 2024-04-02 DIAGNOSIS — E11.65 TYPE 2 DIABETES MELLITUS WITH HYPERGLYCEMIA, WITHOUT LONG-TERM CURRENT USE OF INSULIN: ICD-10-CM

## 2024-04-02 DIAGNOSIS — I73.9 PAD (PERIPHERAL ARTERY DISEASE): ICD-10-CM

## 2024-04-02 DIAGNOSIS — D63.8 CHRONIC DISEASE ANEMIA: ICD-10-CM

## 2024-04-02 LAB
ALBUMIN SERPL BCP-MCNC: 3.6 G/DL (ref 3.5–5.2)
ALP SERPL-CCNC: 81 U/L (ref 55–135)
ALT SERPL W/O P-5'-P-CCNC: 13 U/L (ref 10–44)
ANION GAP SERPL CALC-SCNC: 8 MMOL/L (ref 8–16)
AST SERPL-CCNC: 14 U/L (ref 10–40)
BASOPHILS # BLD AUTO: 0.09 K/UL (ref 0–0.2)
BASOPHILS NFR BLD: 1.5 % (ref 0–1.9)
BILIRUB SERPL-MCNC: 0.4 MG/DL (ref 0.1–1)
BUN SERPL-MCNC: 24 MG/DL (ref 8–23)
CALCIUM SERPL-MCNC: 9.3 MG/DL (ref 8.7–10.5)
CHLORIDE SERPL-SCNC: 110 MMOL/L (ref 95–110)
CHOLEST SERPL-MCNC: 116 MG/DL (ref 120–199)
CHOLEST/HDLC SERPL: 4 {RATIO} (ref 2–5)
CO2 SERPL-SCNC: 22 MMOL/L (ref 23–29)
CREAT SERPL-MCNC: 1.9 MG/DL (ref 0.5–1.4)
DIFFERENTIAL METHOD BLD: ABNORMAL
EOSINOPHIL # BLD AUTO: 0.5 K/UL (ref 0–0.5)
EOSINOPHIL NFR BLD: 8.4 % (ref 0–8)
ERYTHROCYTE [DISTWIDTH] IN BLOOD BY AUTOMATED COUNT: 14.4 % (ref 11.5–14.5)
EST. GFR  (NO RACE VARIABLE): 34.4 ML/MIN/1.73 M^2
ESTIMATED AVG GLUCOSE: 134 MG/DL (ref 68–131)
GLUCOSE SERPL-MCNC: 102 MG/DL (ref 70–110)
HBA1C MFR BLD: 6.3 % (ref 4–5.6)
HCT VFR BLD AUTO: 36.8 % (ref 40–54)
HDLC SERPL-MCNC: 29 MG/DL (ref 40–75)
HDLC SERPL: 25 % (ref 20–50)
HGB BLD-MCNC: 11.7 G/DL (ref 14–18)
IMM GRANULOCYTES # BLD AUTO: 0.03 K/UL (ref 0–0.04)
IMM GRANULOCYTES NFR BLD AUTO: 0.5 % (ref 0–0.5)
LDLC SERPL CALC-MCNC: 65.6 MG/DL (ref 63–159)
LYMPHOCYTES # BLD AUTO: 1.6 K/UL (ref 1–4.8)
LYMPHOCYTES NFR BLD: 26.1 % (ref 18–48)
MCH RBC QN AUTO: 26.3 PG (ref 27–31)
MCHC RBC AUTO-ENTMCNC: 31.8 G/DL (ref 32–36)
MCV RBC AUTO: 83 FL (ref 82–98)
MONOCYTES # BLD AUTO: 0.6 K/UL (ref 0.3–1)
MONOCYTES NFR BLD: 9.2 % (ref 4–15)
NEUTROPHILS # BLD AUTO: 3.3 K/UL (ref 1.8–7.7)
NEUTROPHILS NFR BLD: 54.3 % (ref 38–73)
NONHDLC SERPL-MCNC: 87 MG/DL
NRBC BLD-RTO: 0 /100 WBC
PLATELET # BLD AUTO: 221 K/UL (ref 150–450)
PMV BLD AUTO: 11 FL (ref 9.2–12.9)
POTASSIUM SERPL-SCNC: 4.8 MMOL/L (ref 3.5–5.1)
PROT SERPL-MCNC: 6.3 G/DL (ref 6–8.4)
RBC # BLD AUTO: 4.45 M/UL (ref 4.6–6.2)
SODIUM SERPL-SCNC: 140 MMOL/L (ref 136–145)
TRIGL SERPL-MCNC: 107 MG/DL (ref 30–150)
WBC # BLD AUTO: 6.1 K/UL (ref 3.9–12.7)

## 2024-04-02 PROCEDURE — 36415 COLL VENOUS BLD VENIPUNCTURE: CPT | Mod: PO | Performed by: FAMILY MEDICINE

## 2024-04-02 PROCEDURE — 85025 COMPLETE CBC W/AUTO DIFF WBC: CPT | Performed by: FAMILY MEDICINE

## 2024-04-02 PROCEDURE — 80053 COMPREHEN METABOLIC PANEL: CPT | Performed by: FAMILY MEDICINE

## 2024-04-02 PROCEDURE — 80061 LIPID PANEL: CPT | Performed by: FAMILY MEDICINE

## 2024-04-02 PROCEDURE — 83036 HEMOGLOBIN GLYCOSYLATED A1C: CPT | Performed by: FAMILY MEDICINE

## 2024-04-15 ENCOUNTER — OFFICE VISIT (OUTPATIENT)
Dept: CARDIOLOGY | Facility: CLINIC | Age: 84
End: 2024-04-15
Payer: MEDICARE

## 2024-04-15 VITALS
DIASTOLIC BLOOD PRESSURE: 75 MMHG | HEIGHT: 68 IN | BODY MASS INDEX: 29.34 KG/M2 | SYSTOLIC BLOOD PRESSURE: 147 MMHG | HEART RATE: 47 BPM | WEIGHT: 193.56 LBS

## 2024-04-15 DIAGNOSIS — I35.0 AORTIC STENOSIS, SEVERE: Primary | ICD-10-CM

## 2024-04-15 DIAGNOSIS — I10 PRIMARY HYPERTENSION: ICD-10-CM

## 2024-04-15 DIAGNOSIS — E78.2 MIXED HYPERLIPIDEMIA: ICD-10-CM

## 2024-04-15 DIAGNOSIS — I70.0 ABDOMINAL AORTIC ATHEROSCLEROSIS: ICD-10-CM

## 2024-04-15 DIAGNOSIS — I25.10 CORONARY ARTERY DISEASE INVOLVING NATIVE CORONARY ARTERY OF NATIVE HEART WITHOUT ANGINA PECTORIS: ICD-10-CM

## 2024-04-15 DIAGNOSIS — I73.9 PAD (PERIPHERAL ARTERY DISEASE): ICD-10-CM

## 2024-04-15 PROCEDURE — 99999 PR PBB SHADOW E&M-EST. PATIENT-LVL III: CPT | Mod: PBBFAC,,, | Performed by: INTERNAL MEDICINE

## 2024-04-15 PROCEDURE — 99214 OFFICE O/P EST MOD 30 MIN: CPT | Mod: S$PBB,,, | Performed by: INTERNAL MEDICINE

## 2024-04-15 PROCEDURE — 99213 OFFICE O/P EST LOW 20 MIN: CPT | Mod: PBBFAC | Performed by: INTERNAL MEDICINE

## 2024-04-15 NOTE — PROGRESS NOTES
HISTORY:    83-year-old female with a history of CAD s/p PCI '19, severe aortic stenosis, hypertension, hyperlipidemia, PAD presenting for 2nd visit with me.    Patient follows in valve clinic with Dr. Edouard. Patient referred to me for PAD management.     Patient denies any LE claudication.     No CP, SOB at rest. No edema or orthopnea. Some CARTER that is progressing.    Activity levels mild to moderate. Does struggle with left hip pain/sciatica. Completes ADLs and does tasks around the house without issue. Involved in the community.     Tolerates clopidogrel 75 x 1, losartan 50 x 1, metoprolol 25 x 1, atorvastatin 80 x 1.    PHYSICAL EXAM:    Vitals:    04/15/24 0938   BP: (!) 147/75   Pulse: (!) 47       NAD, A+Ox3.  No jvd, no bruit.  RRR nml s1,s2. 4/6 AS murmur  CTA B no wheezes or crackles.  No edema. B DP/PT present on Doppler.     LABS/STUDIES (imaging reviewed during clinic visit):    April 2024 hemoglobin 11.7/MCV 83.  Creatinine 1.9/BUN 24/GFR 34 (near baseline).  Albumin 3.6.  /HDL 29/LDL 65.  A1c 6.3.   EKG February 2023 sinus rhythm with no Q-waves.  PVCs.    TTE October 2023 normal LV size with concentric remodeling and an EF of 60-65%.  Grade 2 diastology.  Severe aortic stenosis with a peak velocity of 4.2 m/sec. 4.8 on previous TTE.  CVP 3.    Cardiac catheterization February 2023 patent left main and left circ stent.  Luminal irregularities of the LAD system.  Small non dominant RCA.    RON February 2023 0.92/0.59.  Arterial duplex September 2023 suggests a left mid SFA occlusion.      ASSESSMENT & PLAN:    1. Aortic stenosis, severe    2. Abdominal aortic atherosclerosis    3. Coronary artery disease involving native coronary artery of native heart without angina pectoris    4. Mixed hyperlipidemia    5. Primary hypertension    6. PAD (peripheral artery disease)          Orders Placed This Encounter    Echo        Pt with known PAD and LLE SFA stenosis. Asymptomatic. Continue with med  management/RF modification.    SIHD s/p LCX PCI '19. Asymptomatic with patent cors on early '23 cath. On clopidogrel 75x1.     Severe, AS. Previously asymptomatic. Now notes some CARTER at times. Will discuss w Dr. Edouard. Has close follow-up. No urgency. Repeat TTE. Will not need repeat cath most likely.     Bps controlled on losartan 50 x 1, metoprolol 25 x 1. Ran out of amlodipine. Okay to stay off of at this time.     LDL at goal on atorvastatin 80 x 1    Follow-up w me in 6 months.    Ese Lucas MD

## 2024-04-22 DIAGNOSIS — I35.0 AORTIC STENOSIS, SEVERE: Primary | ICD-10-CM

## 2024-05-05 DIAGNOSIS — E78.2 MIXED HYPERLIPIDEMIA: ICD-10-CM

## 2024-05-06 RX ORDER — ATORVASTATIN CALCIUM 80 MG/1
80 TABLET, FILM COATED ORAL
Qty: 90 TABLET | Refills: 2 | Status: SHIPPED | OUTPATIENT
Start: 2024-05-06

## 2024-05-06 NOTE — TELEPHONE ENCOUNTER
Refill Routing Note   Medication(s) are not appropriate for processing by [facility] for the following reason(s):        Non-participating provider    [facility] action(s):  Route               Appointments  past 12m or future 3m with PCP    Date Provider   Last Visit   2/6/2024 Citlali Hope NP   Next Visit   Visit date not found Citlali oHpe NP   ED visits in past 90 days: 0        Note composed:7:31 AM 05/06/2024

## 2024-05-06 NOTE — TELEPHONE ENCOUNTER
No care due was identified.  [facility] Embedded Care Due Messages. Reference number: [id number].   5/06/2024 7:05:59 AM CDT

## 2024-05-10 ENCOUNTER — HOSPITAL ENCOUNTER (EMERGENCY)
Facility: HOSPITAL | Age: 84
Discharge: HOME | End: 2024-05-10
Attending: EMERGENCY MEDICINE
Payer: MEDICARE

## 2024-05-10 VITALS
HEART RATE: 63 BPM | BODY MASS INDEX: 29.25 KG/M2 | WEIGHT: 193 LBS | DIASTOLIC BLOOD PRESSURE: 76 MMHG | TEMPERATURE: 98 F | SYSTOLIC BLOOD PRESSURE: 177 MMHG | RESPIRATION RATE: 16 BRPM | OXYGEN SATURATION: 98 % | HEIGHT: 68 IN

## 2024-05-10 DIAGNOSIS — M79.645 FINGER PAIN, LEFT: ICD-10-CM

## 2024-05-10 DIAGNOSIS — L03.012 PARONYCHIA OF FINGER OF LEFT HAND: Primary | ICD-10-CM

## 2024-05-10 PROCEDURE — 10060 I&D ABSCESS SIMPLE/SINGLE: CPT

## 2024-05-10 PROCEDURE — 99284 EMERGENCY DEPT VISIT MOD MDM: CPT | Mod: 25

## 2024-05-10 RX ORDER — BACITRACIN ZINC 500 UNIT/G
OINTMENT (GRAM) TOPICAL 2 TIMES DAILY
Qty: 30 G | Refills: 0 | Status: SHIPPED | OUTPATIENT
Start: 2024-05-10 | End: 2024-06-04

## 2024-05-10 RX ORDER — AMOXICILLIN AND CLAVULANATE POTASSIUM 875; 125 MG/1; MG/1
1 TABLET, FILM COATED ORAL 2 TIMES DAILY
Qty: 14 TABLET | Refills: 0 | Status: SHIPPED | OUTPATIENT
Start: 2024-05-10 | End: 2024-06-04

## 2024-05-10 NOTE — DISCHARGE INSTRUCTIONS
Please keep your wound clean and dry. Wash gently with soap and water and apply antibiotic ointment (bacitracin, neosporin, etc.) over the wound after washing. Watch for signs of infection including: increased\spreading redness, swelling, pus-like discharge, or a fever greater than 100.4F. If you experience any of these, please contact your primary care doctor or return to the emergency room for a wound check. You may take over the counter Tylenol as needed for pain.     Thank you for allowing me and my emergency team to take care of you here today! I hope you feel better soon. Please do not hesitate to return with any additional concerns that may arise from this or any new problem you encounter.    Our goal in the emergency department is to always give you outstanding care and exceptional service. If you receive a survey by mail or e-mail in the next week regarding your experience in our ED, we would greatly appreciate you completing it. Your feedback provides us with a way to recognize our staff who give very good care and it helps us learn how to improve when your experience was below the excellence we aspire to be!    Brook Juneau, PA-C [facility] [place]   Emergency Room Physician Assistant       No

## 2024-05-10 NOTE — ED PROVIDER NOTES
"Encounter Date: 5/10/2024       History     Chief Complaint   Patient presents with    Hand Pain     Patient reports intermittent left pointer finger pain and swelling for approx. 2 weeks. He reports squeezing finger approx 1.5 weeks ago and a white substance came out of finger but denies any other discharge from finger. Patient denies nausea, vomiting, or fever.       Patient is an 83-year-old male with a past medical history anemia, arthritis, COPD, CKD, CAD, hyperlipidemia, hypertension, glaucoma, and type 2 diabetes mellitus who presents to the emergency room for left pointer finger pain over the past 2 weeks. Patient states that pain is intermittent and throbbing. He "punctured and squeezed" finger about a week ago and "white came out." No injury or inciting event prior to onset of symptoms.  Patient denies fever, body aches, chills, headache, weakness, chest pain, shortness of breath, or others at this time. No swelling.  No treatment attempted prior to arrival.    The history is provided by the patient. No  was used.     Review of patient's allergies indicates:   Allergen Reactions    Ace inhibitors      Other reaction(s): Angioedema    Tizanidine Hives    Ultram [tramadol] Nausea Only and Other (See Comments)     Dizziness     Past Medical History:   Diagnosis Date    Anemia of chronic disease 11/19/2019    Arthritis     Cataract     Chronic obstructive pulmonary disease, unspecified COPD type 1/26/2022    CKD (chronic kidney disease) stage 3, GFR 30-59 ml/min 5/1/2014    Colon polyp 05/12/2015    Coronary artery disease involving native coronary artery of native heart without angina pectoris 10/7/2019    Hyperlipidemia     Hypertension     Low tension glaucoma     Type 2 diabetes mellitus with hyperglycemia, without long-term current use of insulin 2/6/2024     Past Surgical History:   Procedure Laterality Date    COLONOSCOPY N/A 9/16/2021    Procedure: COLONOSCOPY;  Surgeon: Kit" YOLIS Nicholson MD;  Location: [facility] ENDO;  Service: Endoscopy;  Laterality: N/A;    CORONARY STENT PLACEMENT N/A 3/4/2019    Procedure: INSERTION, STENT, CORONARY ARTERY;  Surgeon: Brennan Stein MD;  Location: [facility] CATH LAB/EP;  Service: Cardiology;  Laterality: N/A;    CYSTOSCOPY      ESOPHAGOGASTRODUODENOSCOPY N/A 2021    Procedure: EGD (ESOPHAGOGASTRODUODENOSCOPY);  Surgeon: Kit Nicholson MD;  Location: [facility] ENDO;  Service: Endoscopy;  Laterality: N/A;    LEFT HEART CATHETERIZATION N/A 3/4/2019    Procedure: Left heart cath;  Surgeon: Brennan Stein MD;  Location: [facility] CATH LAB/EP;  Service: Cardiology;  Laterality: N/A;    LEFT HEART CATHETERIZATION Left 2023    Procedure: Left heart cath;  Surgeon: Bob Pinto MD;  Location: [facility] CATH LAB/EP;  Service: Cardiology;  Laterality: Left;     Family History   Problem Relation Name Age of Onset    No Known Problems Sister 2     No Known Problems Brother 1     Cancer Brother 1         lung cancer, smoker    Kidney disease Sister 2     Melanoma Neg Hx       Social History     Tobacco Use    Smoking status: Former     Current packs/day: 0.00     Average packs/day: 1 pack/day for 64.0 years (64.0 ttl pk-yrs)     Types: Cigarettes     Start date:      Quit date: 2019     Years since quittin.3    Smokeless tobacco: Never   Substance Use Topics    Alcohol use: No    Drug use: No     Review of Systems   Constitutional:  Negative for chills, diaphoresis, fatigue and fever.   Respiratory:  Negative for cough and shortness of breath.    Cardiovascular:  Negative for chest pain and palpitations.   Gastrointestinal:  Negative for nausea and vomiting.   Musculoskeletal:  Positive for arthralgias (Left pointer finger near nail). Negative for joint swelling and myalgias.   Skin:  Negative for color change, rash and wound.   Neurological:  Negative for weakness and numbness.       Physical Exam     Initial Vitals [05/10/24 0815]   BP Pulse Resp  Temp SpO2   (!) 177/76 63 16 98.3 °F (36.8 °C) 97 %      MAP       --         Physical Exam    Nursing note and vitals reviewed.  Constitutional: He appears well-developed and well-nourished. He is not diaphoretic. No distress.   Patient well-appearing.  Awake and alert.  No acute distress.  Maintaining airway appropriately.  Speaking in complete sentences.   HENT:   Head: Normocephalic and atraumatic.   Right Ear: External ear normal.   Left Ear: External ear normal.   Eyes: Conjunctivae and EOM are normal. Pupils are equal, round, and reactive to light.   Neck: Neck supple.   Normal range of motion.  Pulmonary/Chest: No respiratory distress.   Musculoskeletal:         General: No edema. Normal range of motion.      Left hand: Tenderness present. Normal range of motion.        Hands:       Cervical back: Normal range of motion and neck supple.     Neurological: He is alert and oriented to person, place, and time. He has normal strength.   Skin: Skin is warm. Capillary refill takes less than 2 seconds.   Psychiatric: He has a normal mood and affect. His behavior is normal. Thought content normal.         ED Course   I & D - Incision and Drainage    Date/Time: 5/10/2024 8:52 AM  Location procedure was performed: [facility] EMERGENCY DEPARTMENT    Performed by: Danielle Pate PA-C  Authorized by: Kit Arellano DO  Assisting provider: Kit Arellano DO  Pre-operative diagnosis: Paronychia  Post-operative diagnosis: Paronychia  Consent Done: Yes  Consent: Verbal consent obtained.  Risks and benefits: risks, benefits and alternatives were discussed  Consent given by: patient  Imaging studies: imaging studies not available  Patient identity confirmed: verbally with patient  Indications for incision and drainage: Paronychia.  Body area: upper extremity  Location details: left index finger  Anesthesia: see MAR for details (None)    Patient sedated: no  Description of findings: Fluctuance to medial aspect of point  her finger   Scalpel size: 23 gauge needle.  Incision depth: dermal  Complexity: simple  Drainage: bloody  Drainage amount: scant  Wound treatment: incision and drainage  Technical procedures used: Incision and drainage  Significant surgical tasks conducted by the assistant(s): Done  Complications: No  Estimated blood loss (mL): 0  Specimens: No  Implants: No  Patient tolerance: Patient tolerated the procedure well with no immediate complications    Incision depth: dermal        Labs Reviewed - No data to display       Imaging Results    None          Medications - No data to display  Medical Decision Making  Patient presents to emergency room for left pointer finger pain and drainage.  Vital signs stable.  Physical exam as stated above.    Differential diagnosis includes but is not limited to paronychia, osteomyelitis, cellulitis, herpetic romel, felon, among others.  No redness, warmth, or swelling.  Low suspicion for cellulitis or osteomyelitis at this time.  Range of motion intact.  No concern for septic joint at this time.  This is not a felon.  Clinical presentation and physical exam most suggestive of paronychia.  Incision and drainage done in the emergency room with scant bloody drainage.  Place patient on Augmentin topical bacitracin.  Discussed wound care and conservative management such as warm compresses and finger soaks.    I see no indication of an emergent process beyond that addressed during our encounter. Patient stable for discharge at this time. I have counseled the patient regarding follow up with primary care physician and gave strict return precautions. I have discussed the final diagnosis and gave instructions regarding prescribed and over-the-counter analgesic medications such as Tylenol. Patient verbalized understanding and is agreeable.     Problems Addressed:  Finger pain, left: acute illness or injury  Paronychia of finger of left hand: acute illness or injury    Amount and/or  Complexity of Data Reviewed  Radiology:      Details: Consider ordering an x-ray.  However, patient without any injury or inciting event.  Low suspicion for fracture or dislocation at this time.  Low suspicion for osteomyelitis.    Risk  OTC drugs.  Prescription drug management.  Risk Details: Comorbidities taken into consideration during the patient's evaluation and treatment include anemia, arthritis, COPD, CKD, CAD, hyperlipidemia, hypertension, glaucoma, and type 2 diabetes mellitus.  Social determinants of health taken into consideration during development of our treatment plan include difficulty in obtaining follow-up, obtaining medications, health literacy, access to healthy options for preventative/conservative management, and/or support systems due to, but not limited to, transportation limitations, socioeconomic status, and environmental factors.                                       Clinical Impression:  Final diagnoses:  [L03.012] Paronychia of finger of left hand (Primary)  [M79.645] Finger pain, left          ED Disposition Condition    Discharge Stable          ED Prescriptions       Medication Sig Dispense Start Date End Date Auth. Provider    amoxicillin-clavulanate 875-125mg (AUGMENTIN) 875-125 mg per tablet Take 1 tablet by mouth 2 (two) times daily. 14 tablet 5/10/2024 -- Danielle Pate PA-C    bacitracin 500 unit/gram Oint Apply topically 2 (two) times daily. 30 g 5/10/2024 -- Danielle Pate PA-C          Follow-up Information       Follow up With Specialties Details Why Contact Info    Riley Villalobos MD Family Medicine Schedule an appointment as soon as possible for a visit  If symptoms worsen [address] [facility] [address]  [phone]               Danielle Pate PA-C  05/10/24 0863

## 2024-05-10 NOTE — ED NOTES
Pt states he has been having pain in his L pointer finger with an onset of 2 weeks ago. He also states there has been some drainage from his finger that he was able to squeeze out once about a week and a  half ago. Currently his finger is not hurting , denies tingling or numbness.

## 2024-05-13 ENCOUNTER — PATIENT OUTREACH (OUTPATIENT)
Dept: EMERGENCY MEDICINE | Facility: HOSPITAL | Age: 84
End: 2024-05-13
Payer: MEDICARE

## 2024-05-13 NOTE — PROGRESS NOTES
Patient was seen in the ED on 5/10/24 for paronychia of finger on left hand. Patient was contacted for post ED discharge navigation. They have an appointment scheduled with Dr. Riley Villalobos on 6/4/24 at 7:30. ED navigator will remind patient of appointment.

## 2024-06-03 ENCOUNTER — PATIENT OUTREACH (OUTPATIENT)
Dept: EMERGENCY MEDICINE | Facility: HOSPITAL | Age: 84
End: 2024-06-03
Payer: MEDICARE

## 2024-06-04 ENCOUNTER — PATIENT OUTREACH (OUTPATIENT)
Dept: EMERGENCY MEDICINE | Facility: HOSPITAL | Age: 84
End: 2024-06-04
Payer: MEDICARE

## 2024-06-04 ENCOUNTER — OFFICE VISIT (OUTPATIENT)
Dept: FAMILY MEDICINE | Facility: CLINIC | Age: 84
End: 2024-06-04
Payer: MEDICARE

## 2024-06-04 VITALS
WEIGHT: 187.63 LBS | DIASTOLIC BLOOD PRESSURE: 60 MMHG | HEART RATE: 55 BPM | SYSTOLIC BLOOD PRESSURE: 158 MMHG | HEIGHT: 68 IN | BODY MASS INDEX: 28.44 KG/M2 | OXYGEN SATURATION: 99 %

## 2024-06-04 DIAGNOSIS — H50.111 EXOTROPIA OF RIGHT EYE: Primary | ICD-10-CM

## 2024-06-04 DIAGNOSIS — I10 ESSENTIAL HYPERTENSION: ICD-10-CM

## 2024-06-04 DIAGNOSIS — E11.65 TYPE 2 DIABETES MELLITUS WITH HYPERGLYCEMIA, WITHOUT LONG-TERM CURRENT USE OF INSULIN: ICD-10-CM

## 2024-06-04 DIAGNOSIS — Z23 NEED FOR COVID-19 VACCINE: ICD-10-CM

## 2024-06-04 DIAGNOSIS — H40.9 GLAUCOMA OF BOTH EYES, UNSPECIFIED GLAUCOMA TYPE: ICD-10-CM

## 2024-06-04 DIAGNOSIS — N18.32 STAGE 3B CHRONIC KIDNEY DISEASE: ICD-10-CM

## 2024-06-04 DIAGNOSIS — D63.8 CHRONIC DISEASE ANEMIA: ICD-10-CM

## 2024-06-04 PROCEDURE — 99214 OFFICE O/P EST MOD 30 MIN: CPT | Mod: PBBFAC,PO | Performed by: FAMILY MEDICINE

## 2024-06-04 PROCEDURE — 91322 SARSCOV2 VAC 50 MCG/0.5ML IM: CPT | Mod: PBBFAC,PO

## 2024-06-04 PROCEDURE — 99215 OFFICE O/P EST HI 40 MIN: CPT | Mod: S$PBB,,, | Performed by: FAMILY MEDICINE

## 2024-06-04 PROCEDURE — 90480 ADMN SARSCOV2 VAC 1/ONLY CMP: CPT | Mod: PBBFAC,PO

## 2024-06-04 PROCEDURE — 99999PBSHW PR PBB SHADOW TECHNICAL ONLY FILED TO HB: Mod: PBBFAC,,,

## 2024-06-04 PROCEDURE — 99999 PR PBB SHADOW E&M-EST. PATIENT-LVL IV: CPT | Mod: PBBFAC,,, | Performed by: FAMILY MEDICINE

## 2024-06-04 RX ORDER — AMLODIPINE BESYLATE 5 MG/1
5 TABLET ORAL DAILY
Qty: 90 TABLET | Refills: 3 | Status: SHIPPED | OUTPATIENT
Start: 2024-06-04 | End: 2025-06-04

## 2024-06-04 RX ADMIN — COVID-19 VACCINE, MRNA 0.5 ML: 50 INJECTION, SUSPENSION INTRAMUSCULAR at 08:06

## 2024-06-04 NOTE — PROGRESS NOTES
Subjective     Patient ID: Laura Harris is a 83 y.o. male.    Chief Complaint: Hypertension and Hyperlipidemia    83 years old male came to the clinic with episodic right eye exotropia.  Patient is requesting a 2nd opinion with other ophthalmologist because of this problem.  Patient previously diagnosed with cataracts and glaucoma.  Patient with decreased kidney function but stable in comparison with previous reports.  Last H&H was low but stable in comparison with previous reports.  Patient due for COVID vaccination.    Hypertension  This is a chronic problem. The current episode started more than 1 year ago. The problem is unchanged. The problem is controlled. Pertinent negatives include no chest pain, orthopnea, palpitations or PND. There are no associated agents to hypertension. Risk factors for coronary artery disease include male gender, diabetes mellitus, smoking/tobacco exposure and sedentary lifestyle. Past treatments include beta blockers, angiotensin blockers and calcium channel blockers. The current treatment provides mild improvement. Compliance problems include exercise and diet.  Hypertensive end-organ damage includes kidney disease. Identifiable causes of hypertension include chronic renal disease. There is no history of a hypertension causing med or a thyroid problem.   Hyperlipidemia  This is a chronic problem. The problem is controlled. Recent lipid tests were reviewed and are normal. Exacerbating diseases include chronic renal disease and diabetes. He has no history of obesity or nephrotic syndrome. Factors aggravating his hyperlipidemia include beta blockers. Pertinent negatives include no chest pain or myalgias. Current antihyperlipidemic treatment includes statins. The current treatment provides significant improvement of lipids. Compliance problems include adherence to diet and adherence to exercise.  Risk factors for coronary artery disease include a sedentary lifestyle, male sex,  diabetes mellitus and hypertension.     Review of Systems   Constitutional: Negative.    HENT: Negative.     Eyes: Negative.    Respiratory: Negative.     Cardiovascular: Negative.  Negative for chest pain, palpitations, orthopnea, leg swelling, PND and claudication.   Gastrointestinal: Negative.    Endocrine: Negative for polydipsia, polyphagia and polyuria.   Genitourinary: Negative.    Musculoskeletal: Negative.  Negative for myalgias.   Integumentary:  Negative.   Neurological: Negative.    Psychiatric/Behavioral: Negative.            Objective     Physical Exam  Vitals and nursing note reviewed.   Constitutional:       General: He is not in acute distress.     Appearance: He is well-developed. He is not diaphoretic.   HENT:      Head: Normocephalic and atraumatic.      Right Ear: External ear normal.      Left Ear: External ear normal.      Nose: Nose normal.      Mouth/Throat:      Pharynx: No oropharyngeal exudate.   Eyes:      General: No scleral icterus.        Right eye: No discharge.         Left eye: No discharge.      Conjunctiva/sclera: Conjunctivae normal.      Pupils: Pupils are equal, round, and reactive to light.   Neck:      Thyroid: No thyromegaly.      Vascular: No JVD.      Trachea: No tracheal deviation.   Cardiovascular:      Rate and Rhythm: Normal rate and regular rhythm.      Heart sounds: Normal heart sounds. No murmur heard.     No friction rub. No gallop.   Pulmonary:      Effort: Pulmonary effort is normal. No respiratory distress.      Breath sounds: Normal breath sounds. No stridor. No wheezing or rales.   Chest:      Chest wall: No tenderness.   Abdominal:      General: Bowel sounds are normal. There is no distension.      Palpations: Abdomen is soft. There is no mass.      Tenderness: There is no abdominal tenderness. There is no guarding or rebound.   Musculoskeletal:         General: No tenderness. Normal range of motion.      Cervical back: Normal range of motion and neck  supple.   Lymphadenopathy:      Cervical: No cervical adenopathy.   Skin:     General: Skin is warm and dry.      Coloration: Skin is not pale.      Findings: No erythema or rash.   Neurological:      Mental Status: He is alert and oriented to person, place, and time.      Cranial Nerves: No cranial nerve deficit.      Motor: No abnormal muscle tone.      Coordination: Coordination normal.      Deep Tendon Reflexes: Reflexes are normal and symmetric. Reflexes normal.   Psychiatric:         Behavior: Behavior normal.         Thought Content: Thought content normal.         Judgment: Judgment normal.            Assessment and Plan     1. Exotropia of right eye  -     Ambulatory referral/consult to Ophthalmology; Future; Expected date: 06/11/2024    2. Type 2 diabetes mellitus with hyperglycemia, without long-term current use of insulin  -     Microalbumin/Creatinine Ratio, Urine; Future; Expected date: 06/04/2024  -     Lipid Panel; Future; Expected date: 06/04/2024  -     Hemoglobin A1C; Future; Expected date: 06/04/2024  -     Comprehensive Metabolic Panel; Future; Expected date: 06/04/2024    3. Glaucoma of both eyes, unspecified glaucoma type  -     Ambulatory referral/consult to Ophthalmology; Future; Expected date: 06/11/2024    4. Stage 3b chronic kidney disease  -     Comprehensive Metabolic Panel; Future; Expected date: 06/04/2024  -     CBC Auto Differential; Future; Expected date: 06/04/2024    5. Chronic disease anemia  -     CBC Auto Differential; Future; Expected date: 06/04/2024    6. Essential hypertension  -     Comprehensive Metabolic Panel; Future; Expected date: 06/04/2024  -     CBC Auto Differential; Future; Expected date: 06/04/2024  -     amLODIPine (NORVASC) 5 MG tablet; Take 1 tablet (5 mg total) by mouth once daily.  Dispense: 90 tablet; Refill: 3    7. Need for COVID-19 vaccine  -     sars-cov-2 (covid-19) (Spikevax (Moderna) (12yrs and up 2023)) 50 mcg/0.5 mL injection 0.5 mL      Continue  monitoring blood pressure at home, low sodium diet.   Continue monitoring blood sugar at home,ADA diet.            Follow up in about 6 months (around 12/4/2024), or if symptoms worsen or fail to improve.

## 2024-07-13 ENCOUNTER — HOSPITAL ENCOUNTER (EMERGENCY)
Facility: HOSPITAL | Age: 84
Discharge: HOME | End: 2024-07-13
Attending: EMERGENCY MEDICINE
Payer: MEDICARE

## 2024-07-13 VITALS
RESPIRATION RATE: 18 BRPM | WEIGHT: 190 LBS | DIASTOLIC BLOOD PRESSURE: 77 MMHG | HEART RATE: 62 BPM | HEIGHT: 68 IN | TEMPERATURE: 99 F | BODY MASS INDEX: 28.79 KG/M2 | OXYGEN SATURATION: 99 % | SYSTOLIC BLOOD PRESSURE: 163 MMHG

## 2024-07-13 DIAGNOSIS — M25.552 LEFT HIP PAIN: Primary | ICD-10-CM

## 2024-07-13 DIAGNOSIS — M70.62 GREATER TROCHANTERIC BURSITIS OF LEFT HIP: ICD-10-CM

## 2024-07-13 PROCEDURE — 99283 EMERGENCY DEPT VISIT LOW MDM: CPT

## 2024-07-13 RX ORDER — METHYLPREDNISOLONE 4 MG/1
TABLET ORAL
Qty: 21 EACH | Refills: 0 | Status: SHIPPED | OUTPATIENT
Start: 2024-07-13 | End: 2024-08-03

## 2024-07-13 NOTE — ED PROVIDER NOTES
Encounter Date: 7/13/2024       History     Chief Complaint   Patient presents with    Hip Pain     Pt arrives with complaints of left hip pain that has been present for 1 year now but has increased in severity. Denies any trauma to left hip. Denies any other pain and symptoms.      83-year-old male presents to ED with concern of left-sided lateral hip pain that he states he has had for over a year but pain symptoms have worsened over past few weeks.  Denies any recent injury or trauma.  Pain symptoms are worse with touch or lying on left side, nonradiating, severity 5/10.  He has taken home Tylenol but with minimal improvement.  No numbness or focal weakness.  No urinary or bowel complaints or incontinence.  No fevers, chills, flank or abdominal pain.  No other acute complaints at this time    The history is provided by the patient.     Review of patient's allergies indicates:   Allergen Reactions    Ace inhibitors      Other reaction(s): Angioedema    Tizanidine Hives    Ultram [tramadol] Nausea Only and Other (See Comments)     Dizziness     Past Medical History:   Diagnosis Date    Anemia of chronic disease 11/19/2019    Arthritis     Cataract     Chronic obstructive pulmonary disease, unspecified COPD type 1/26/2022    CKD (chronic kidney disease) stage 3, GFR 30-59 ml/min 5/1/2014    Colon polyp 05/12/2015    Coronary artery disease involving native coronary artery of native heart without angina pectoris 10/7/2019    Hyperlipidemia     Hypertension     Low tension glaucoma     Type 2 diabetes mellitus with hyperglycemia, without long-term current use of insulin 2/6/2024     Past Surgical History:   Procedure Laterality Date    COLONOSCOPY N/A 9/16/2021    Procedure: COLONOSCOPY;  Surgeon: Kit Nicholson MD;  Location: [facility];  Service: Endoscopy;  Laterality: N/A;    CORONARY STENT PLACEMENT N/A 3/4/2019    Procedure: INSERTION, STENT, CORONARY ARTERY;  Surgeon: Brennan Stein MD;  Location:  [facility] CATH LAB/EP;  Service: Cardiology;  Laterality: N/A;    CYSTOSCOPY      ESOPHAGOGASTRODUODENOSCOPY N/A 2021    Procedure: EGD (ESOPHAGOGASTRODUODENOSCOPY);  Surgeon: Kit Nicholson MD;  Location: [facility] ENDO;  Service: Endoscopy;  Laterality: N/A;    LEFT HEART CATHETERIZATION N/A 3/4/2019    Procedure: Left heart cath;  Surgeon: Brennan Stein MD;  Location: [facility] CATH LAB/EP;  Service: Cardiology;  Laterality: N/A;    LEFT HEART CATHETERIZATION Left 2023    Procedure: Left heart cath;  Surgeon: Bob Pinto MD;  Location: [facility] CATH LAB/EP;  Service: Cardiology;  Laterality: Left;     Family History   Problem Relation Name Age of Onset    No Known Problems Sister 2     No Known Problems Brother 1     Cancer Brother 1         lung cancer, smoker    Kidney disease Sister 2     Melanoma Neg Hx       Social History     Tobacco Use    Smoking status: Former     Current packs/day: 0.00     Average packs/day: 1 pack/day for 64.0 years (64.0 ttl pk-yrs)     Types: Cigarettes     Start date:      Quit date:      Years since quittin.5     Passive exposure: Never    Smokeless tobacco: Never   Substance Use Topics    Alcohol use: No    Drug use: No     Review of Systems   Constitutional:  Negative for chills and fever.   Gastrointestinal:  Negative for abdominal pain, diarrhea, nausea and vomiting.   Genitourinary:  Negative for dysuria and frequency.   Musculoskeletal:  Positive for arthralgias.   Skin:  Negative for wound.   Neurological:  Negative for weakness and numbness.       Physical Exam     Initial Vitals [24 1146]   BP Pulse Resp Temp SpO2   (!) 163/77 62 18 98.5 °F (36.9 °C) 99 %      MAP       --         Physical Exam    Vitals reviewed.  Constitutional: He appears well-developed and well-nourished. He is active. He does not have a sickly appearance. He does not appear ill. No distress.   HENT:   Head: Normocephalic and atraumatic.   Neck:   Normal range of  motion.  Abdominal: There is no abdominal tenderness.   Musculoskeletal:      Cervical back: Normal range of motion.      Comments: Reproducible tenderness over left lateral hip near greater trochanteric region.  No physical or palpable deformities.  Denying pain from internal/external rotation of left hip.  BLE sensations intact.  Strength equal bilaterally.  Ambulatory without assistance.  DP pulses noted with Doppler.     Neurological: He is alert. GCS eye subscore is 4. GCS verbal subscore is 5. GCS motor subscore is 6.   Skin: Skin is warm and dry.   Psychiatric: He has a normal mood and affect. His speech is normal and behavior is normal.         ED Course   Procedures  Labs Reviewed - No data to display       Imaging Results    None          Medications - No data to display  Medical Decision Making  Patient presents with concern of left hip pain that he states he has had for over a year but symptoms have worsened over past 2 weeks.  Afebrile.  Reproducible tenderness over greater trochanteric region with no physical or palpable deformities.  Neurovascularly intact.    DDx:  Including but not limited to strain, sprain, spasm, radiculopathy, neuropathy, sciatica, bursitis, less likely fracture, dislocation, cauda equina, septic joint    Lengthy discussion was made with patient.  Will avoid NSAIDs due to patient's past medical history.  Denies history of diabetes.  Will give prescription for Medrol Dosepak and refer him to Orthopedics for further evaluation of suspected greater trochanteric bursitis.  Encouraged stretches with activities and movements as tolerated, fall precautions, ED return precautions.  Patient states his understanding and agrees with plan.                                      Clinical Impression:  Final diagnoses:  [M25.552] Left hip pain (Primary)  [M70.62] Greater trochanteric bursitis of left hip          ED Disposition Condition    Discharge Stable          ED Prescriptions        Medication Sig Dispense Start Date End Date Auth. Provider    methylPREDNISolone (MEDROL DOSEPACK) 4 mg tablet use as directed 21 each 7/13/2024 8/3/2024 Eddy Mijares PA-C          Follow-up Information       Follow up With Specialties Details Why Contact Info    Riley Villalobos MD Family Medicine   [address] [facility] [address]  [phone]               Eddy Mijares PA-C  07/13/24 0151

## 2024-07-13 NOTE — ED NOTES
Pt c/o left hip pain worsening for last 2 weeks starting from left buttocks radiating down to LLE. Pt denies any recent trauma or injury.

## 2024-07-15 ENCOUNTER — PATIENT OUTREACH (OUTPATIENT)
Dept: EMERGENCY MEDICINE | Facility: HOSPITAL | Age: 84
End: 2024-07-15
Payer: MEDICARE

## 2024-07-16 NOTE — PATIENT INSTRUCTIONS
Silicone toe sleeve (reusable gel tubing) found at [facility]  
fair minus

## 2024-08-02 ENCOUNTER — PATIENT OUTREACH (OUTPATIENT)
Dept: ADMINISTRATIVE | Facility: HOSPITAL | Age: 84
End: 2024-08-02
Payer: MEDICARE

## 2024-09-04 ENCOUNTER — HOSPITAL ENCOUNTER (OUTPATIENT)
Dept: CARDIOLOGY | Facility: HOSPITAL | Age: 84
Discharge: HOME | End: 2024-09-04
Attending: INTERNAL MEDICINE
Payer: MEDICARE

## 2024-09-04 VITALS
HEIGHT: 68 IN | SYSTOLIC BLOOD PRESSURE: 158 MMHG | BODY MASS INDEX: 28.79 KG/M2 | WEIGHT: 190 LBS | HEART RATE: 85 BPM | DIASTOLIC BLOOD PRESSURE: 70 MMHG

## 2024-09-04 DIAGNOSIS — I35.0 AORTIC STENOSIS, SEVERE: ICD-10-CM

## 2024-09-04 PROCEDURE — 93306 TTE W/DOPPLER COMPLETE: CPT

## 2024-09-04 PROCEDURE — 93306 TTE W/DOPPLER COMPLETE: CPT | Mod: 26,,, | Performed by: INTERNAL MEDICINE

## 2024-09-05 LAB
ASCENDING AORTA: 3.39 CM
AV INDEX (PROSTH): 0.34
AV MEAN GRADIENT: 46 MMHG
AV PEAK GRADIENT: 84 MMHG
AV VALVE AREA BY VELOCITY RATIO: 1 CM²
AV VALVE AREA: 1.05 CM²
AV VELOCITY RATIO: 0.33
BSA FOR ECHO PROCEDURE: 2.03 M2
CV ECHO LV RWT: 0.43 CM
DOP CALC AO PEAK VEL: 4.57 M/S
DOP CALC AO VTI: 101.51 CM
DOP CALC LVOT AREA: 3.1 CM2
DOP CALC LVOT DIAMETER: 1.98 CM
DOP CALC LVOT PEAK VEL: 1.49 M/S
DOP CALC LVOT STROKE VOLUME: 106.76 CM3
DOP CALCLVOT PEAK VEL VTI: 34.69 CM
E WAVE DECELERATION TIME: 117.02 MSEC
E/A RATIO: 0.58
E/E' RATIO: 15 M/S
ECHO LV POSTERIOR WALL: 0.97 CM (ref 0.6–1.1)
FRACTIONAL SHORTENING: 32 % (ref 28–44)
HR MV ECHO: 85 BPM
INTERVENTRICULAR SEPTUM: 0.97 CM (ref 0.6–1.1)
LA MAJOR: 4.5 CM
LA MINOR: 4.92 CM
LA WIDTH: 3.73 CM
LEFT ATRIUM SIZE: 3.75 CM
LEFT ATRIUM VOLUME INDEX MOD: 22.4 ML/M2
LEFT ATRIUM VOLUME INDEX: 27.9 ML/M2
LEFT ATRIUM VOLUME MOD: 44.79 CM3
LEFT ATRIUM VOLUME: 55.89 CM3
LEFT INTERNAL DIMENSION IN SYSTOLE: 3.03 CM (ref 2.1–4)
LEFT VENTRICLE DIASTOLIC VOLUME INDEX: 45.67 ML/M2
LEFT VENTRICLE DIASTOLIC VOLUME: 91.33 ML
LEFT VENTRICLE MASS INDEX: 73 G/M2
LEFT VENTRICLE SYSTOLIC VOLUME INDEX: 17.9 ML/M2
LEFT VENTRICLE SYSTOLIC VOLUME: 35.89 ML
LEFT VENTRICULAR INTERNAL DIMENSION IN DIASTOLE: 4.48 CM (ref 3.5–6)
LEFT VENTRICULAR MASS: 145.95 G
LV LATERAL E/E' RATIO: 12.5 M/S
LV SEPTAL E/E' RATIO: 18.75 M/S
MV A" WAVE DURATION": 14.84 MSEC
MV PEAK A VEL: 1.3 M/S
MV PEAK E VEL: 0.75 M/S
MV STENOSIS PRESSURE HALF TIME: 33.94 MS
MV VALVE AREA P 1/2 METHOD: 6.48 CM2
PISA TR MAX VEL: 2.81 M/S
PULM VEIN S/D RATIO: 0.97
PV PEAK D VEL: 0.31 M/S
PV PEAK S VEL: 0.3 M/S
RA MAJOR: 4.25 CM
RA PRESSURE ESTIMATED: 3 MMHG
RA WIDTH: 2.97 CM
RIGHT VENTRICLE DIASTOLIC BASEL DIMENSION: 2.4 CM
RV TB RVSP: 6 MMHG
SINUS: 3.35 CM
STJ: 3.18 CM
TDI LATERAL: 0.06 M/S
TDI SEPTAL: 0.04 M/S
TDI: 0.05 M/S
TR MAX PG: 32 MMHG
TRICUSPID ANNULAR PLANE SYSTOLIC EXCURSION: 2.36 CM
TV REST PULMONARY ARTERY PRESSURE: 35 MMHG
Z-SCORE OF LEFT VENTRICULAR DIMENSION IN END DIASTOLE: -2.63
Z-SCORE OF LEFT VENTRICULAR DIMENSION IN END SYSTOLE: -1.32

## 2024-09-09 ENCOUNTER — OFFICE VISIT (OUTPATIENT)
Dept: OPHTHALMOLOGY | Facility: CLINIC | Age: 84
End: 2024-09-09
Payer: MEDICARE

## 2024-09-09 DIAGNOSIS — H50.111 EXOTROPIA OF RIGHT EYE: ICD-10-CM

## 2024-09-09 DIAGNOSIS — H40.1113 PRIMARY OPEN ANGLE GLAUCOMA (POAG) OF RIGHT EYE, SEVERE STAGE: ICD-10-CM

## 2024-09-09 DIAGNOSIS — H25.13 NUCLEAR SCLEROSIS OF BOTH EYES: ICD-10-CM

## 2024-09-09 DIAGNOSIS — H40.1233 LOW-TENSION GLAUCOMA OF BOTH EYES, SEVERE STAGE: Primary | ICD-10-CM

## 2024-09-09 DIAGNOSIS — H40.1122 PRIMARY OPEN ANGLE GLAUCOMA (POAG) OF LEFT EYE, MODERATE STAGE: ICD-10-CM

## 2024-09-09 DIAGNOSIS — H25.13 NUCLEAR SCLEROTIC CATARACT OF BOTH EYES: ICD-10-CM

## 2024-09-09 DIAGNOSIS — H40.9 GLAUCOMA OF BOTH EYES, UNSPECIFIED GLAUCOMA TYPE: ICD-10-CM

## 2024-09-09 PROCEDURE — 92133 CPTRZD OPH DX IMG PST SGM ON: CPT | Mod: PBBFAC | Performed by: OPHTHALMOLOGY

## 2024-09-09 PROCEDURE — 99999 PR PBB SHADOW E&M-EST. PATIENT-LVL III: CPT | Mod: PBBFAC,,, | Performed by: OPHTHALMOLOGY

## 2024-09-09 PROCEDURE — 99213 OFFICE O/P EST LOW 20 MIN: CPT | Mod: PBBFAC | Performed by: OPHTHALMOLOGY

## 2024-09-09 PROCEDURE — 99214 OFFICE O/P EST MOD 30 MIN: CPT | Mod: S$PBB,,, | Performed by: OPHTHALMOLOGY

## 2024-09-09 PROCEDURE — G2211 COMPLEX E/M VISIT ADD ON: HCPCS | Mod: S$PBB,,, | Performed by: OPHTHALMOLOGY
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
## 2024-09-19 DIAGNOSIS — R52 PAIN: Primary | ICD-10-CM

## 2024-09-20 ENCOUNTER — OFFICE VISIT (OUTPATIENT)
Dept: ORTHOPEDICS | Facility: CLINIC | Age: 84
End: 2024-09-20
Payer: MEDICARE

## 2024-09-20 ENCOUNTER — HOSPITAL ENCOUNTER (OUTPATIENT)
Dept: RADIOLOGY | Facility: HOSPITAL | Age: 84
Discharge: HOME | End: 2024-09-20
Payer: MEDICARE

## 2024-09-20 VITALS — HEIGHT: 68 IN | BODY MASS INDEX: 28.8 KG/M2 | WEIGHT: 190.06 LBS

## 2024-09-20 DIAGNOSIS — R52 PAIN: ICD-10-CM

## 2024-09-20 DIAGNOSIS — M70.62 GREATER TROCHANTERIC BURSITIS OF LEFT HIP: Primary | ICD-10-CM

## 2024-09-20 PROCEDURE — 73521 X-RAY EXAM HIPS BI 2 VIEWS: CPT | Mod: TC,PN

## 2024-09-20 PROCEDURE — 20610 DRAIN/INJ JOINT/BURSA W/O US: CPT | Mod: S$PBB,LT,,

## 2024-09-20 PROCEDURE — 73521 X-RAY EXAM HIPS BI 2 VIEWS: CPT | Mod: 26,,, | Performed by: INTERNAL MEDICINE

## 2024-09-20 PROCEDURE — 20610 DRAIN/INJ JOINT/BURSA W/O US: CPT | Mod: PBBFAC,PN,LT

## 2024-09-20 PROCEDURE — 99999PBSHW PR PBB SHADOW TECHNICAL ONLY FILED TO HB: Mod: PBBFAC,,,

## 2024-09-20 PROCEDURE — 99213 OFFICE O/P EST LOW 20 MIN: CPT | Mod: PBBFAC,25,PN

## 2024-09-20 PROCEDURE — 99999 PR PBB SHADOW E&M-EST. PATIENT-LVL III: CPT | Mod: PBBFAC,,,

## 2024-09-20 PROCEDURE — 99203 OFFICE O/P NEW LOW 30 MIN: CPT | Mod: S$PBB,25,,

## 2024-09-20 RX ADMIN — TRIAMCINOLONE ACETONIDE 40 MG: 40 INJECTION, SUSPENSION INTRA-ARTICULAR; INTRAMUSCULAR at 01:09

## 2024-09-20 NOTE — PROGRESS NOTES
SUBJECTIVE:      Chief Complaint: Pain of the Left Hip      History of Present Illness:  Patient is a 83 y.o. who presents today with complaints of lateral left hip pain.   Pain began 1year ago but recently worsened over the last 2-3 weeks. no history of trauma.  Pain is gradually worsening located to left lateral hip and described as aching and dull.  Denies groin pain. Denies numbness/tingling.  Symptoms are aggravated by  prolonged sitting and walking .  Symptoms are alleviated by rest and tylenol .  Attempted treatment(s) and/or interventions include Tylenol, medrol dose pack, CSI with PCP 7-8 months ago, voltaren gel without adequate response. Previous CSI with PCP helped.     Pain is 8 /10  today and 10 /10 at its worst.  Is affecting ADLs and limiting desired level of activity. Denies numbness and tingling  No saddle anesthesia or loss of bowel/bladder function    Mechanical symptoms: none  Subjective instability: (--)   Nocturnal symptoms: (+)    Recent ER visit for similar symptoms on 7/13/24 and was treated with medrol dose pack with minimal relief for greater trochanteric bursitis.     Patient previously treated by Dr. Wade in 2018 for sciatica as well as greater trochanteric bursitis     Smoking: No  DM: No  Occupation: retired      Past Medical History:   Diagnosis Date    Anemia of chronic disease 11/19/2019    Arthritis     Cataract     Chronic obstructive pulmonary disease, unspecified COPD type 1/26/2022    CKD (chronic kidney disease) stage 3, GFR 30-59 ml/min 5/1/2014    Colon polyp 05/12/2015    Coronary artery disease involving native coronary artery of native heart without angina pectoris 10/7/2019    Hyperlipidemia     Hypertension     Low tension glaucoma     Type 2 diabetes mellitus with hyperglycemia, without long-term current use of insulin 2/6/2024     Past Surgical History:   Procedure Laterality Date    COLONOSCOPY N/A 9/16/2021    Procedure: COLONOSCOPY;  Surgeon:  Kit Nicholson MD;  Location: [facility] ENDO;  Service: Endoscopy;  Laterality: N/A;    CORONARY STENT PLACEMENT N/A 3/4/2019    Procedure: INSERTION, STENT, CORONARY ARTERY;  Surgeon: Brennan Stein MD;  Location: [facility] CATH LAB/EP;  Service: Cardiology;  Laterality: N/A;    CYSTOSCOPY      ESOPHAGOGASTRODUODENOSCOPY N/A 9/16/2021    Procedure: EGD (ESOPHAGOGASTRODUODENOSCOPY);  Surgeon: Kit Nicholson MD;  Location: [facility] ENDO;  Service: Endoscopy;  Laterality: N/A;    LEFT HEART CATHETERIZATION N/A 3/4/2019    Procedure: Left heart cath;  Surgeon: Brennan Stein MD;  Location: [facility] CATH LAB/EP;  Service: Cardiology;  Laterality: N/A;    LEFT HEART CATHETERIZATION Left 2/24/2023    Procedure: Left heart cath;  Surgeon: Bob Pinto MD;  Location: [facility] CATH LAB/EP;  Service: Cardiology;  Laterality: Left;     Review of patient's allergies indicates:   Allergen Reactions    Ace inhibitors      Other reaction(s): Angioedema    Tizanidine Hives    Ultram [tramadol] Nausea Only and Other (See Comments)     Dizziness     Social History     Social History Narrative    Not on file     Family History   Problem Relation Name Age of Onset    No Known Problems Sister 2     No Known Problems Brother 1     Cancer Brother 1         lung cancer, smoker    Kidney disease Sister 2     Melanoma Neg Hx           Current Outpatient Medications:     acetaminophen (TYLENOL) 500 MG tablet, Take 1 tablet (500 mg total) by mouth every 6 (six) hours as needed for Pain., Disp: 30 tablet, Rfl: 0    amLODIPine (NORVASC) 5 MG tablet, Take 1 tablet (5 mg total) by mouth once daily., Disp: 90 tablet, Rfl: 3    atorvastatin (LIPITOR) 80 MG tablet, TAKE 1 TABLET(80 MG) BY MOUTH EVERY DAY, Disp: 90 tablet, Rfl: 2    clopidogreL (PLAVIX) 75 mg tablet, Take 1 tablet (75 mg total) by mouth once daily., Disp: 90 tablet, Rfl: 3    krill-om-3-dha-epa-phospho-ast 089-22-32-50 mg Cap, Take 1 capsule by mouth once daily., Disp: , Rfl:      latanoprost 0.005 % ophthalmic solution, INSTILL 1 DROP IN BOTH EYES EVERY EVENING, Disp: 2.5 mL, Rfl: 6    losartan (COZAAR) 50 MG tablet, Take 1 tablet (50 mg total) by mouth once daily., Disp: 90 tablet, Rfl: 3    metoprolol succinate (TOPROL-XL) 25 MG 24 hr tablet, TAKE 1 TABLET(25 MG) BY MOUTH EVERY DAY, Disp: 90 tablet, Rfl: 3    tamsulosin (FLOMAX) 0.4 mg Cap, TAKE 1 CAPSULE(0.4 MG) BY MOUTH AFTER DINNER Strength: 0.4 mg, Disp: 90 capsule, Rfl: 3    UNABLE TO FIND, Take 1 tablet by mouth Daily. Ruby Velazco, Disp: , Rfl:     Review of Systems   Musculoskeletal:  Positive for joint pain. Negative for back pain, falls and muscle weakness.   All other systems reviewed and are negative.      OBJECTIVE:      Vital Signs (Most Recent):  There were no vitals filed for this visit.  There is no height or weight on file to calculate BMI.      PHYSICAL EXAMINATION:  Vitals:  There were no vitals taken for this visit.   General: The patient is alert and oriented x 3.  Mood is pleasant.  Observation of ears, eyes and nose reveal no gross abnormalities.  No labored breathing observed.    left HIP / L SPINE EXAMINATION     OBSERVATION / INSPECTION  Gait:   Nonantalgic   Alignment:  Neutral   Scars:   None   Muscle atrophy: None   Effusion:  None   Warmth:  None   Discoloration:   None   Leg lengths:   Equal   Pelvis:    Level     TENDERNESS   Trochanteric bursa   +   Piriformis    -   SI joint    -  Psoas tendon   -  Rectus insertion  -  Adductor insertion  -  Pubic symphysis  -  Midline spine   -  Lubosacral    -    ROM: (* = pain)    Flexion:    120 degrees  External rotation: 40 degrees  Internal rotation: 30 degrees*  Abduction:  45 degrees  Adduction:   20 degrees    SPECIAL TESTS:  Pain w/ forced internal rotation (FADIR):  Negative   Pain w/ forced external rotation (JYOTI):  Negative   Log roll:       Negative   Snapping hip (internal):    Negative   Sit-up pain:      Negative   Resisted sit-up  pain:     Negative   Resisted sit-up with adductor contraction pain:  Negative   Step-down test:     Negative   Trendelenburg test:     Negative  Bridge test      Negative   SLR       Negative  Resisted SLR      Negative  Shopping cart sign     Negative    EXTREMITY NEURO-VASCULAR EXAMINATION:   Sensation:  Grossly intact to light touch all dermatomal regions.   Motor Function:  Fully intact motor function at hip, knee, foot and ankle    DTRs;  quadriceps and  achilles 2+.  No clonus and downgoing Babinski.    Vascular status:  DP and PT pulses 2+, brisk capillary refill, symmetric.    Skin:  intact, compartments soft.      Diagnostic Results:  Imaging - I independently viewed the patient's imaging as well as the radiology report.  Xrays of the patient's bilateral hips and pelvis demonstrate no evidence of any acute fractures or dislocations or significant degenerative changes.      ASSESSMENT/PLAN:      1. Greater trochanteric bursitis of left hip        83 y.o. y/o male with left greater trochanteric bursitis   Plan: The patient and I had a thorough discussion today.  We discussed the working diagnosis as well as several other potential alternative diagnoses.    We discussed conservative and surgical treatment options. Conservative options include rest, activity modifications, workplace modifications, po and topical analgesia (OTC and rx), formal or home PT, and others. Surgical treatment was also mentioned.    At this time, the patient would like to proceed with the following, which I agree with.    Pain Management: Continue OTC tylenol as needed    Therapy: Patient not interested in PT or HEP at this time   Procedures: left greater trochanteric bursitis CSI   Work status:[employer] with limitation secondary to pain  Follow up: in 3 month(s) with Imelda Sullivan PA-C    All of the patient's questions were answered and the patient will contact us if they have any questions or concerns in the interim.      Imelda Sullivan  GER  [facility]  Orthopedic Surgery

## 2024-09-21 RX ORDER — TRIAMCINOLONE ACETONIDE 40 MG/ML
40 INJECTION, SUSPENSION INTRA-ARTICULAR; INTRAMUSCULAR
Status: DISCONTINUED | OUTPATIENT
Start: 2024-09-20 | End: 2024-09-21 | Stop reason: HOSPADM

## 2024-09-21 NOTE — PROCEDURES
Large Joint Aspiration/Injection: L greater trochanteric bursa    Date/Time: 9/20/2024 1:30 PM    Performed by: Imelda Sullivan PA-C  Authorized by: Imelda Sullivan PA-C    Consent Done?:  Yes (Verbal)  Indications:  Pain  Site marked: the procedure site was marked    Timeout: prior to procedure the correct patient, procedure, and site was verified    Prep: patient was prepped and draped in usual sterile fashion      Details:  Needle Size:  22 G  Ultrasonic Guidance for needle placement?: No    Approach:  Lateral  Location:  Hip  Site:  L greater trochanteric bursa  Medications:  40 mg triamcinolone acetonide 40 mg/mL  Patient tolerance:  Patient tolerated the procedure well with no immediate complications

## 2024-10-09 ENCOUNTER — PATIENT MESSAGE (OUTPATIENT)
Dept: ADMINISTRATIVE | Facility: HOSPITAL | Age: 84
End: 2024-10-09
Payer: MEDICARE

## 2024-10-19 ENCOUNTER — HOSPITAL ENCOUNTER (EMERGENCY)
Facility: HOSPITAL | Age: 84
Discharge: HOME | End: 2024-10-19
Attending: EMERGENCY MEDICINE
Payer: MEDICARE

## 2024-10-19 VITALS
DIASTOLIC BLOOD PRESSURE: 95 MMHG | RESPIRATION RATE: 22 BRPM | WEIGHT: 195 LBS | SYSTOLIC BLOOD PRESSURE: 132 MMHG | HEIGHT: 68 IN | TEMPERATURE: 98 F | OXYGEN SATURATION: 97 % | HEART RATE: 65 BPM | BODY MASS INDEX: 29.55 KG/M2

## 2024-10-19 DIAGNOSIS — K43.9 VENTRAL HERNIA WITHOUT OBSTRUCTION OR GANGRENE: Primary | ICD-10-CM

## 2024-10-19 DIAGNOSIS — R59.0 HILAR LYMPHADENOPATHY: ICD-10-CM

## 2024-10-19 LAB
ALBUMIN SERPL BCP-MCNC: 3.5 G/DL (ref 3.5–5.2)
ALP SERPL-CCNC: 66 U/L (ref 40–150)
ALT SERPL W/O P-5'-P-CCNC: 13 U/L (ref 10–44)
ANION GAP SERPL CALC-SCNC: 11 MMOL/L (ref 8–16)
AST SERPL-CCNC: 16 U/L (ref 10–40)
BASOPHILS # BLD AUTO: 0.04 K/UL (ref 0–0.2)
BASOPHILS NFR BLD: 0.6 % (ref 0–1.9)
BILIRUB SERPL-MCNC: 0.7 MG/DL (ref 0.1–1)
BUN SERPL-MCNC: 25 MG/DL (ref 8–23)
CALCIUM SERPL-MCNC: 9.4 MG/DL (ref 8.7–10.5)
CHLORIDE SERPL-SCNC: 108 MMOL/L (ref 95–110)
CO2 SERPL-SCNC: 20 MMOL/L (ref 23–29)
CREAT SERPL-MCNC: 1.6 MG/DL (ref 0.5–1.4)
DIFFERENTIAL METHOD BLD: ABNORMAL
EOSINOPHIL # BLD AUTO: 0.2 K/UL (ref 0–0.5)
EOSINOPHIL NFR BLD: 2.9 % (ref 0–8)
ERYTHROCYTE [DISTWIDTH] IN BLOOD BY AUTOMATED COUNT: 15.2 % (ref 11.5–14.5)
EST. GFR  (NO RACE VARIABLE): 42 ML/MIN/1.73 M^2
GLUCOSE SERPL-MCNC: 82 MG/DL (ref 70–110)
HCT VFR BLD AUTO: 38.6 % (ref 40–54)
HGB BLD-MCNC: 12.5 G/DL (ref 14–18)
IMM GRANULOCYTES # BLD AUTO: 0.02 K/UL (ref 0–0.04)
IMM GRANULOCYTES NFR BLD AUTO: 0.3 % (ref 0–0.5)
LIPASE SERPL-CCNC: 76 U/L (ref 4–60)
LYMPHOCYTES # BLD AUTO: 1.1 K/UL (ref 1–4.8)
LYMPHOCYTES NFR BLD: 16.6 % (ref 18–48)
MCH RBC QN AUTO: 26.8 PG (ref 27–31)
MCHC RBC AUTO-ENTMCNC: 32.4 G/DL (ref 32–36)
MCV RBC AUTO: 83 FL (ref 82–98)
MONOCYTES # BLD AUTO: 0.7 K/UL (ref 0.3–1)
MONOCYTES NFR BLD: 11.1 % (ref 4–15)
NEUTROPHILS # BLD AUTO: 4.5 K/UL (ref 1.8–7.7)
NEUTROPHILS NFR BLD: 68.5 % (ref 38–73)
NRBC BLD-RTO: 0 /100 WBC
PLATELET # BLD AUTO: 167 K/UL (ref 150–450)
PMV BLD AUTO: 10.5 FL (ref 9.2–12.9)
POTASSIUM SERPL-SCNC: 4.4 MMOL/L (ref 3.5–5.1)
PROT SERPL-MCNC: 6.6 G/DL (ref 6–8.4)
RBC # BLD AUTO: 4.66 M/UL (ref 4.6–6.2)
SODIUM SERPL-SCNC: 139 MMOL/L (ref 136–145)
WBC # BLD AUTO: 6.57 K/UL (ref 3.9–12.7)

## 2024-10-19 PROCEDURE — 80053 COMPREHEN METABOLIC PANEL: CPT | Performed by: EMERGENCY MEDICINE

## 2024-10-19 PROCEDURE — 99285 EMERGENCY DEPT VISIT HI MDM: CPT | Mod: 25

## 2024-10-19 PROCEDURE — 83690 ASSAY OF LIPASE: CPT | Performed by: EMERGENCY MEDICINE

## 2024-10-19 PROCEDURE — 25500020 PHARM REV CODE 255: Performed by: EMERGENCY MEDICINE

## 2024-10-19 PROCEDURE — 85025 COMPLETE CBC W/AUTO DIFF WBC: CPT | Performed by: EMERGENCY MEDICINE

## 2024-10-19 RX ADMIN — IOHEXOL 100 ML: 350 INJECTION, SOLUTION INTRAVENOUS at 12:10

## 2024-10-19 NOTE — ED PROVIDER NOTES
Encounter Date: 10/19/2024       History     Chief Complaint   Patient presents with    Abdominal Pain     Patient reports to the ED complaining of right sided abdominal pain that started yesterday. Denies nausea, vomiting. Patient ambulatory to triage, appears uncomfortable.     83-year-old with a history of COPD, CKD, coronary artery disease, hypertension, diabetes presents with right-sided abdominal pain since yesterday.  Patient says pain started while working above his head.  No vomiting.  No fever.  Patient had last bowel movement this morning and was small.  Last bowel movement prior to that she was 2 days ago.  No history of abdominal surgeries.      Review of patient's allergies indicates:   Allergen Reactions    Ace inhibitors      Other reaction(s): Angioedema    Tizanidine Hives    Ultram [tramadol] Nausea Only and Other (See Comments)     Dizziness     Past Medical History:   Diagnosis Date    Anemia of chronic disease 11/19/2019    Arthritis     Cataract     Chronic obstructive pulmonary disease, unspecified COPD type 1/26/2022    CKD (chronic kidney disease) stage 3, GFR 30-59 ml/min 5/1/2014    Colon polyp 05/12/2015    Coronary artery disease involving native coronary artery of native heart without angina pectoris 10/7/2019    Hyperlipidemia     Hypertension     Low tension glaucoma     Type 2 diabetes mellitus with hyperglycemia, without long-term current use of insulin 2/6/2024     Past Surgical History:   Procedure Laterality Date    COLONOSCOPY N/A 9/16/2021    Procedure: COLONOSCOPY;  Surgeon: Kit Nicholson MD;  Location: [facility] ENDO;  Service: Endoscopy;  Laterality: N/A;    CORONARY STENT PLACEMENT N/A 3/4/2019    Procedure: INSERTION, STENT, CORONARY ARTERY;  Surgeon: Brennan Stein MD;  Location: [facility] CATH LAB/EP;  Service: Cardiology;  Laterality: N/A;    CYSTOSCOPY      ESOPHAGOGASTRODUODENOSCOPY N/A 9/16/2021    Procedure: EGD (ESOPHAGOGASTRODUODENOSCOPY);  Surgeon:  Kit Nicholson MD;  Location: [facility] ENDO;  Service: Endoscopy;  Laterality: N/A;    LEFT HEART CATHETERIZATION N/A 3/4/2019    Procedure: Left heart cath;  Surgeon: Brennan Stein MD;  Location: [facility] CATH LAB/EP;  Service: Cardiology;  Laterality: N/A;    LEFT HEART CATHETERIZATION Left 2023    Procedure: Left heart cath;  Surgeon: Bob Pinto MD;  Location: [facility] CATH LAB/EP;  Service: Cardiology;  Laterality: Left;     Family History   Problem Relation Name Age of Onset    No Known Problems Sister 2     No Known Problems Brother 1     Cancer Brother 1         lung cancer, smoker    Kidney disease Sister 2     Melanoma Neg Hx       Social History     Tobacco Use    Smoking status: Former     Current packs/day: 0.00     Average packs/day: 1 pack/day for 64.0 years (64.0 ttl pk-yrs)     Types: Cigarettes     Start date:      Quit date:      Years since quittin.8     Passive exposure: Never    Smokeless tobacco: Never   Substance Use Topics    Alcohol use: No    Drug use: No     Review of Systems   Constitutional:  Negative for appetite change.   Eyes:  Negative for pain.   Respiratory:  Negative for shortness of breath.    Cardiovascular:  Negative for chest pain.   Gastrointestinal:  Positive for abdominal pain. Negative for nausea and vomiting.   Genitourinary:  Negative for frequency.   Musculoskeletal:  Negative for arthralgias and neck pain.   Neurological:  Negative for headaches.   Psychiatric/Behavioral:  Negative for confusion.        Physical Exam     Initial Vitals [10/19/24 1100]   BP Pulse Resp Temp SpO2   (!) 173/81 70 18 98.4 °F (36.9 °C) 99 %      MAP       --         Physical Exam    Nursing note and vitals reviewed.  HENT:   Head: Atraumatic.   Eyes: Conjunctivae and EOM are normal.   Neck:   Normal range of motion.  Cardiovascular:      Exam reveals no gallop and no friction rub.       No murmur heard.  Pulmonary/Chest: Breath sounds normal. No respiratory distress.  He has no wheezes. He has no rales.   Abdominal: Abdomen is soft. Bowel sounds are normal. There is abdominal tenderness.   Large reducible ventral hernia   Musculoskeletal:         General: No edema. Normal range of motion.      Cervical back: Normal range of motion.     Neurological: He is alert and oriented to person, place, and time.   Skin: Skin is warm and dry.   Psychiatric: He has a normal mood and affect.         ED Course   Procedures  Labs Reviewed   LIPASE - Abnormal       Result Value    Lipase 76 (*)    COMPREHENSIVE METABOLIC PANEL - Abnormal    Sodium 139      Potassium 4.4      Chloride 108      CO2 20 (*)     Glucose 82      BUN 25 (*)     Creatinine 1.6 (*)     Calcium 9.4      Total Protein 6.6      Albumin 3.5      Total Bilirubin 0.7      Alkaline Phosphatase 66      AST 16      ALT 13      eGFR 42 (*)     Anion Gap 11     CBC W/ AUTO DIFFERENTIAL - Abnormal    WBC 6.57      RBC 4.66      Hemoglobin 12.5 (*)     Hematocrit 38.6 (*)     MCV 83      MCH 26.8 (*)     MCHC 32.4      RDW 15.2 (*)     Platelets 167      MPV 10.5      Immature Granulocytes 0.3      Gran # (ANC) 4.5      Immature Grans (Abs) 0.02      Lymph # 1.1      Mono # 0.7      Eos # 0.2      Baso # 0.04      nRBC 0      Gran % 68.5      Lymph % 16.6 (*)     Mono % 11.1      Eosinophil % 2.9      Basophil % 0.6      Differential Method Automated       EKG Readings: (Independently Interpreted)   Initial Reading: No STEMI. Rhythm: Normal Sinus Rhythm. Heart Rate: 67. Ectopy: PVCs. Conduction: Normal.       Imaging Results               CT Abdomen Pelvis With IV Contrast NO Oral Contrast (Final result)  Result time 10/19/24 12:56:24      Final result by Laya Booth MD (10/19/24 12:56:24)                   Impression:      In the abdomen and pelvis, no acute process seen.  Visualized appendix is normal.    Suspected right hilar lymph node enlargement on limited evaluation of the lung bases.  These are nonspecific and could be  reactive.  Recommended dedicated chest CT evaluation with IV contrast as an outpatient.    This report was flagged in Epic as abnormal.      Electronically signed by: Laya Booth  Date:    10/19/2024  Time:    12:56               Narrative:    EXAMINATION:  CT ABDOMEN PELVIS WITH IV CONTRAST    CLINICAL HISTORY:  RLQ abdominal pain (Age >= 14y);Bowel obstruction suspected;    TECHNIQUE:  Low dose axial images, sagittal and coronal reformations were obtained from the lung bases to the pubic symphysis following the IV administration of 100 mL of Omnipaque 350.    COMPARISON:  CT urogram 04/22/2015    FINDINGS:  The lung bases show no acute consolidation.  Subpleural reticular opacities can be seen with chronic interstitial lung disease.  A few prominent right hilar lymph nodes measure up to 0.9 cm in short axis dimension..    The liver is homogeneous.  Punctate hypodensities in the liver nonspecific and may reflect cysts.  Gallbladder is unremarkable.  Spleen and pancreas are unremarkable.    Adrenal glands are normal.  Kidneys concentrate contrast appropriately.  The urinary bladder is unremarkable.    Ectasia of the infrarenal abdominal aorta.  Diffuse calcified and noncalcified plaque..  No retroperitoneal or mesenteric lymph node enlargement seen.    Stomach and loops of bowel are normal caliber.  Visualized appendix is normal.  No significant free fluid in the pelvis.    Regional skeleton shows degenerative change.                                       Medications   iohexoL (OMNIPAQUE 350) injection 100 mL (100 mLs Intravenous Given 10/19/24 1238)     Medical Decision Making  83-year-old male with abdominal pain since yesterday.  Vital signs notable for mild hypertension.  No fever tachycardia.  Abdominal exam reveals large reducible ventral hernia.  After reduction, patient reports improvement in pain though some residual pain continues.  With small bowel movement this morning, will get labs and imaging to  evaluate for additional pathology.    Amount and/or Complexity of Data Reviewed  Labs: ordered.  Radiology: ordered.               ED Course as of 10/19/24 1305   Sat Oct 19, 2024   1200 CBC Auto Differential(!) [SN]   1228 Lipase(!): 76 [SN]   1228 Comprehensive Metabolic Panel(!) [SN]   1228 Labs unremarkable.  Kidney disease at baseline. [SN]   1305 CT shows no evidence of acute process.  Suspect patient's symptoms are secondary to patient's ventral hernia which is reducible.  Will place urgent referral to General surgery for further evaluation.  Return instructions given. [SN]      ED Course User Index  [SN] Dread Kelley MD                           Clinical Impression:  Final diagnoses:  [K43.9] Ventral hernia without obstruction or gangrene (Primary)          ED Disposition Condition    Discharge Stable          ED Prescriptions    None       Follow-up Information       Follow up With Specialties Details Why Contact Info    General surgery  Schedule an appointment as soon as possible for a visit                Dread Kelley MD  10/19/24 1305       Dread Kelley MD  10/19/24 [id number]

## 2024-10-19 NOTE — DISCHARGE INSTRUCTIONS
Your CT scan showed an enlarged lymph node in your lungs.  This needs outpatient follow up with your primary care doctor.

## 2024-10-22 ENCOUNTER — OFFICE VISIT (OUTPATIENT)
Dept: CARDIOLOGY | Facility: CLINIC | Age: 84
End: 2024-10-22
Payer: MEDICARE

## 2024-10-22 VITALS
BODY MASS INDEX: 28.26 KG/M2 | HEART RATE: 77 BPM | WEIGHT: 185.88 LBS | DIASTOLIC BLOOD PRESSURE: 67 MMHG | SYSTOLIC BLOOD PRESSURE: 148 MMHG

## 2024-10-22 DIAGNOSIS — I73.9 PAD (PERIPHERAL ARTERY DISEASE): ICD-10-CM

## 2024-10-22 DIAGNOSIS — N18.32 STAGE 3B CHRONIC KIDNEY DISEASE: ICD-10-CM

## 2024-10-22 DIAGNOSIS — I70.0 ABDOMINAL AORTIC ATHEROSCLEROSIS: Primary | ICD-10-CM

## 2024-10-22 DIAGNOSIS — I25.10 CORONARY ARTERY DISEASE INVOLVING NATIVE CORONARY ARTERY OF NATIVE HEART WITHOUT ANGINA PECTORIS: ICD-10-CM

## 2024-10-22 DIAGNOSIS — I10 PRIMARY HYPERTENSION: ICD-10-CM

## 2024-10-22 DIAGNOSIS — I35.0 AORTIC STENOSIS, SEVERE: ICD-10-CM

## 2024-10-22 DIAGNOSIS — E78.2 MIXED HYPERLIPIDEMIA: ICD-10-CM

## 2024-10-22 PROCEDURE — 99213 OFFICE O/P EST LOW 20 MIN: CPT | Mod: PBBFAC | Performed by: INTERNAL MEDICINE

## 2024-10-22 PROCEDURE — 99214 OFFICE O/P EST MOD 30 MIN: CPT | Mod: S$PBB,,, | Performed by: INTERNAL MEDICINE

## 2024-10-22 PROCEDURE — 99999 PR PBB SHADOW E&M-EST. PATIENT-LVL III: CPT | Mod: PBBFAC,,, | Performed by: INTERNAL MEDICINE

## 2024-10-22 NOTE — PROGRESS NOTES
HISTORY:    83-year-old male with a history of CAD s/p PCI '19, severe aortic stenosis, hypertension, hyperlipidemia, PAD, CKD presenting for follow-up.     Patient referred to me for PAD management in late '23. Already following w Dr. Edouard for severe AS.     Patient denies any LE claudication.     No CP, SOB at rest. No edema or orthopnea. At last visit was complaining of some CARTER. But not today.    Activity levels moderate. Left hip pain/sciatica improved w pain management. Completes ADLs and does tasks around the house without issue. Involved in the community.     Tolerates clopidogrel 75 x 1, amlodipine 5x1, losartan 50 x 1, metoprolol 25 x 1, atorvastatin 80 x 1.    PHYSICAL EXAM:    Vitals:    10/22/24 0928   BP: (!) 148/67   Pulse: 77       NAD, A+Ox3.  No jvd, no bruit.  RRR nml s1,s2. 4/6 AS murmur  CTA B no wheezes or crackles.  No edema. B DP/PT present on Doppler.     LABS/STUDIES (imaging reviewed during clinic visit):    October 2024 hemoglobin 12.5/MCV 83.  Creatinine 1.6/BUN 25/GFR 42 (baseline).  Albumin 3.5.  April 2024 /HDL 29/LDL 65.  A1c 6.3.   EKG February 2023 sinus rhythm with no Q-waves.  PVCs.    TTE September 2024 normal LV size with concentric remodeling and an EF of 60-65%.  Grade 2 diastology.  Severe aortic stenosis with a peak velocity of 4.6 m/sec.  Lots of ectopy noted, excluding post PVC beats peak velocity appears to be in the low 4s.  Mild AI.  CVP 3.    Cardiac catheterization February 2023 patent left main and left circ stent.  Luminal irregularities of the LAD system.  Small non dominant RCA.    RON February 2023 0.92/0.59.  Arterial duplex September 2023 suggests a left mid SFA occlusion.      ASSESSMENT & PLAN:    1. Abdominal aortic atherosclerosis    2. Coronary artery disease involving native coronary artery of native heart without angina pectoris    3. Aortic stenosis, severe    4. Mixed hyperlipidemia    5. Primary hypertension    6. PAD (peripheral artery  disease)    7. Stage 3b chronic kidney disease            Orders Placed This Encounter    Echo        Pt with known PAD and LLE SFA stenosis. Asymptomatic. Continue with med management/RF modification.    SIHD s/p LCX PCI '19. Asymptomatic with patent cors on early '23 cath. On clopidogrel 75x1.     Severe, AS. Evaluated by Dr. Edouard in late '23 and opted for expectant management at that time. Did note some CARTER in early '24 w me, but not c/o CARTER today. TTE stable. Cont w expectant management.     Bps controlled on losartan 50 x 1, metoprolol 25 x 1, amlodipine 5x1.     LDL at goal on atorvastatin 80 x 1.    Recent ED presentation for abdominal discomfort related to hernia. Pain resolved. Would not have surgical intervention for abdominal hernia before AS intervention unless emergent.     Follow up in about 6 months (around 4/22/2025).      Ese Lucas MD

## 2024-10-23 LAB
OHS QRS DURATION: 72 MS
OHS QTC CALCULATION: 399 MS

## 2024-10-30 ENCOUNTER — TELEPHONE (OUTPATIENT)
Dept: CARDIOLOGY | Facility: CLINIC | Age: 84
End: 2024-10-30
Payer: MEDICARE

## 2024-10-31 ENCOUNTER — PATIENT MESSAGE (OUTPATIENT)
Dept: ADMINISTRATIVE | Facility: HOSPITAL | Age: 84
End: 2024-10-31
Payer: MEDICARE

## 2024-10-31 DIAGNOSIS — I35.0 AORTIC STENOSIS, SEVERE: Primary | ICD-10-CM

## 2024-11-26 ENCOUNTER — LAB VISIT (OUTPATIENT)
Dept: LAB | Facility: HOSPITAL | Age: 84
End: 2024-11-26
Attending: FAMILY MEDICINE
Payer: MEDICARE

## 2024-11-26 DIAGNOSIS — D63.8 CHRONIC DISEASE ANEMIA: ICD-10-CM

## 2024-11-26 DIAGNOSIS — I10 ESSENTIAL HYPERTENSION: ICD-10-CM

## 2024-11-26 DIAGNOSIS — E11.65 TYPE 2 DIABETES MELLITUS WITH HYPERGLYCEMIA, WITHOUT LONG-TERM CURRENT USE OF INSULIN: ICD-10-CM

## 2024-11-26 DIAGNOSIS — N18.32 STAGE 3B CHRONIC KIDNEY DISEASE: ICD-10-CM

## 2024-11-26 LAB
ALBUMIN SERPL BCP-MCNC: 3.6 G/DL (ref 3.5–5.2)
ALP SERPL-CCNC: 66 U/L (ref 40–150)
ALT SERPL W/O P-5'-P-CCNC: 15 U/L (ref 10–44)
ANION GAP SERPL CALC-SCNC: 6 MMOL/L (ref 8–16)
AST SERPL-CCNC: 18 U/L (ref 10–40)
BASOPHILS # BLD AUTO: 0.05 K/UL (ref 0–0.2)
BASOPHILS NFR BLD: 0.8 % (ref 0–1.9)
BILIRUB SERPL-MCNC: 0.4 MG/DL (ref 0.1–1)
BUN SERPL-MCNC: 22 MG/DL (ref 8–23)
CALCIUM SERPL-MCNC: 8.6 MG/DL (ref 8.7–10.5)
CHLORIDE SERPL-SCNC: 111 MMOL/L (ref 95–110)
CHOLEST SERPL-MCNC: 135 MG/DL (ref 120–199)
CHOLEST/HDLC SERPL: 4 {RATIO} (ref 2–5)
CO2 SERPL-SCNC: 23 MMOL/L (ref 23–29)
CREAT SERPL-MCNC: 1.4 MG/DL (ref 0.5–1.4)
DIFFERENTIAL METHOD BLD: ABNORMAL
EOSINOPHIL # BLD AUTO: 0.2 K/UL (ref 0–0.5)
EOSINOPHIL NFR BLD: 4 % (ref 0–8)
ERYTHROCYTE [DISTWIDTH] IN BLOOD BY AUTOMATED COUNT: 15.4 % (ref 11.5–14.5)
EST. GFR  (NO RACE VARIABLE): 49.3 ML/MIN/1.73 M^2
ESTIMATED AVG GLUCOSE: 137 MG/DL (ref 68–131)
GLUCOSE SERPL-MCNC: 95 MG/DL (ref 70–110)
HBA1C MFR BLD: 6.4 % (ref 4–5.6)
HCT VFR BLD AUTO: 36.6 % (ref 40–54)
HDLC SERPL-MCNC: 34 MG/DL (ref 40–75)
HDLC SERPL: 25.2 % (ref 20–50)
HGB BLD-MCNC: 11.9 G/DL (ref 14–18)
IMM GRANULOCYTES # BLD AUTO: 0.04 K/UL (ref 0–0.04)
IMM GRANULOCYTES NFR BLD AUTO: 0.7 % (ref 0–0.5)
LDLC SERPL CALC-MCNC: 78 MG/DL (ref 63–159)
LYMPHOCYTES # BLD AUTO: 1.7 K/UL (ref 1–4.8)
LYMPHOCYTES NFR BLD: 28 % (ref 18–48)
MCH RBC QN AUTO: 27 PG (ref 27–31)
MCHC RBC AUTO-ENTMCNC: 32.5 G/DL (ref 32–36)
MCV RBC AUTO: 83 FL (ref 82–98)
MONOCYTES # BLD AUTO: 0.5 K/UL (ref 0.3–1)
MONOCYTES NFR BLD: 8.9 % (ref 4–15)
NEUTROPHILS # BLD AUTO: 3.4 K/UL (ref 1.8–7.7)
NEUTROPHILS NFR BLD: 57.6 % (ref 38–73)
NONHDLC SERPL-MCNC: 101 MG/DL
NRBC BLD-RTO: 0 /100 WBC
PLATELET # BLD AUTO: 164 K/UL (ref 150–450)
PMV BLD AUTO: 11.9 FL (ref 9.2–12.9)
POTASSIUM SERPL-SCNC: 4.4 MMOL/L (ref 3.5–5.1)
PROT SERPL-MCNC: 6.4 G/DL (ref 6–8.4)
RBC # BLD AUTO: 4.4 M/UL (ref 4.6–6.2)
SODIUM SERPL-SCNC: 140 MMOL/L (ref 136–145)
TRIGL SERPL-MCNC: 115 MG/DL (ref 30–150)
WBC # BLD AUTO: 5.96 K/UL (ref 3.9–12.7)

## 2024-11-26 PROCEDURE — 80061 LIPID PANEL: CPT | Performed by: FAMILY MEDICINE

## 2024-11-26 PROCEDURE — 83036 HEMOGLOBIN GLYCOSYLATED A1C: CPT | Performed by: FAMILY MEDICINE

## 2024-11-26 PROCEDURE — 80053 COMPREHEN METABOLIC PANEL: CPT | Performed by: FAMILY MEDICINE

## 2024-11-26 PROCEDURE — 36415 COLL VENOUS BLD VENIPUNCTURE: CPT | Mod: PO | Performed by: FAMILY MEDICINE

## 2024-11-26 PROCEDURE — 85025 COMPLETE CBC W/AUTO DIFF WBC: CPT | Performed by: FAMILY MEDICINE

## 2024-12-05 ENCOUNTER — OFFICE VISIT (OUTPATIENT)
Dept: FAMILY MEDICINE | Facility: CLINIC | Age: 84
End: 2024-12-05
Payer: MEDICARE

## 2024-12-05 VITALS
SYSTOLIC BLOOD PRESSURE: 130 MMHG | DIASTOLIC BLOOD PRESSURE: 60 MMHG | HEART RATE: 91 BPM | BODY MASS INDEX: 29.34 KG/M2 | WEIGHT: 193.56 LBS | OXYGEN SATURATION: 98 % | HEIGHT: 68 IN

## 2024-12-05 DIAGNOSIS — D63.8 CHRONIC DISEASE ANEMIA: ICD-10-CM

## 2024-12-05 DIAGNOSIS — I25.10 CORONARY ARTERY DISEASE INVOLVING NATIVE CORONARY ARTERY OF NATIVE HEART WITHOUT ANGINA PECTORIS: ICD-10-CM

## 2024-12-05 DIAGNOSIS — E78.5 HYPERLIPIDEMIA, UNSPECIFIED HYPERLIPIDEMIA TYPE: ICD-10-CM

## 2024-12-05 DIAGNOSIS — I10 PRIMARY HYPERTENSION: Primary | ICD-10-CM

## 2024-12-05 DIAGNOSIS — N18.32 STAGE 3B CHRONIC KIDNEY DISEASE: ICD-10-CM

## 2024-12-05 DIAGNOSIS — M70.62 TROCHANTERIC BURSITIS OF LEFT HIP: ICD-10-CM

## 2024-12-05 DIAGNOSIS — R73.03 PREDIABETES: ICD-10-CM

## 2024-12-05 PROCEDURE — 99215 OFFICE O/P EST HI 40 MIN: CPT | Mod: S$PBB,,, | Performed by: FAMILY MEDICINE

## 2024-12-05 PROCEDURE — 99999PBSHW PR PBB SHADOW TECHNICAL ONLY FILED TO HB: Mod: PBBFAC,,,

## 2024-12-05 PROCEDURE — 96372 THER/PROPH/DIAG INJ SC/IM: CPT | Mod: PBBFAC,PO

## 2024-12-05 PROCEDURE — 99999 PR PBB SHADOW E&M-EST. PATIENT-LVL IV: CPT | Mod: PBBFAC,,, | Performed by: FAMILY MEDICINE

## 2024-12-05 PROCEDURE — 99214 OFFICE O/P EST MOD 30 MIN: CPT | Mod: PBBFAC,PO | Performed by: FAMILY MEDICINE

## 2024-12-05 RX ORDER — KETOROLAC TROMETHAMINE 30 MG/ML
30 INJECTION, SOLUTION INTRAMUSCULAR; INTRAVENOUS ONCE
Status: COMPLETED | OUTPATIENT
Start: 2024-12-05 | End: 2024-12-05

## 2024-12-05 RX ADMIN — KETOROLAC TROMETHAMINE 30 MG: 30 INJECTION, SOLUTION INTRAMUSCULAR; INTRAVENOUS at 01:12

## 2024-12-07 NOTE — PROGRESS NOTES
Subjective     Patient ID: Laura Harris is a 84 y.o. male.    Chief Complaint: Hip Pain and Diabetes    84 years old male came to the clinic for the blood pressure check.  Blood pressure today was stable.  Patient with good compliance with cholesterol regimen.  Last A1c at the level of prediabetes.  Patient was treated with intra-articular injection for bursitis.    Hip Pain   The incident occurred more than 1 week ago. The incident occurred at home. There was no injury mechanism. The pain is present in the left hip. The quality of the pain is described as aching. The pain is at a severity of 6/10. The pain is moderate. The pain has been Intermittent since onset. Pertinent negatives include no numbness or tingling. The symptoms are aggravated by movement, palpation and weight bearing. He has tried acetaminophen for the symptoms. The treatment provided mild relief.   Hypertension  This is a chronic problem. The current episode started more than 1 year ago. The problem is unchanged. The problem is controlled. Pertinent negatives include no chest pain, orthopnea, palpitations or peripheral edema. There are no associated agents to hypertension. Risk factors for coronary artery disease include male gender. Past treatments include calcium channel blockers and angiotensin blockers. The current treatment provides significant improvement. Compliance problems include exercise.  There is no history of angina. PVD: hyperli.There is no history of a hypertension causing med or a thyroid problem.   Hyperlipidemia  This is a chronic problem. The problem is controlled. Recent lipid tests were reviewed and are normal. He has no history of obesity. Pertinent negatives include no chest pain or myalgias. Current antihyperlipidemic treatment includes statins. The current treatment provides significant improvement of lipids. Compliance problems include adherence to exercise.  Risk factors for coronary artery disease include a  sedentary lifestyle, hypertension and male sex.     Review of Systems   Constitutional: Negative.    HENT: Negative.     Eyes: Negative.    Respiratory: Negative.     Cardiovascular: Negative.  Negative for chest pain, palpitations and orthopnea.   Gastrointestinal: Negative.    Genitourinary: Negative.    Musculoskeletal: Negative.  Negative for myalgias.   Integumentary:  Negative.   Neurological: Negative.  Negative for tingling and numbness.   Psychiatric/Behavioral: Negative.            Objective     Physical Exam  Vitals and nursing note reviewed.   Constitutional:       General: He is not in acute distress.     Appearance: He is well-developed. He is not diaphoretic.   HENT:      Head: Normocephalic and atraumatic.      Right Ear: External ear normal.      Left Ear: External ear normal.      Nose: Nose normal.      Mouth/Throat:      Pharynx: No oropharyngeal exudate.   Eyes:      General: No scleral icterus.        Right eye: No discharge.         Left eye: No discharge.      Conjunctiva/sclera: Conjunctivae normal.      Pupils: Pupils are equal, round, and reactive to light.   Neck:      Thyroid: No thyromegaly.      Vascular: No JVD.      Trachea: No tracheal deviation.   Cardiovascular:      Rate and Rhythm: Normal rate and regular rhythm.      Heart sounds: Normal heart sounds. No murmur heard.     No friction rub. No gallop.   Pulmonary:      Effort: Pulmonary effort is normal. No respiratory distress.      Breath sounds: Normal breath sounds. No stridor. No wheezing or rales.   Chest:      Chest wall: No tenderness.   Abdominal:      General: Bowel sounds are normal. There is no distension.      Palpations: Abdomen is soft. There is no mass.      Tenderness: There is no abdominal tenderness. There is no guarding or rebound.   Musculoskeletal:         General: Normal range of motion.      Cervical back: Normal range of motion and neck supple.      Right hip: No tenderness.      Left hip: Tenderness  present.   Lymphadenopathy:      Cervical: No cervical adenopathy.   Skin:     General: Skin is warm and dry.      Coloration: Skin is not pale.      Findings: No erythema or rash.   Neurological:      Mental Status: He is alert and oriented to person, place, and time.      Cranial Nerves: No cranial nerve deficit.      Motor: No abnormal muscle tone.      Coordination: Coordination normal.      Deep Tendon Reflexes: Reflexes are normal and symmetric. Reflexes normal.   Psychiatric:         Behavior: Behavior normal.         Thought Content: Thought content normal.         Judgment: Judgment normal.            Assessment and Plan     1. Primary hypertension  -     Comprehensive Metabolic Panel; Future; Expected date: 12/05/2024  -     CBC Auto Differential; Future; Expected date: 12/05/2024  -     Hemoglobin A1C; Future; Expected date: 12/05/2024    2. Coronary artery disease involving native coronary artery of native heart without angina pectoris  Overview:      3/2019 PCI of NSTEMI    PCI with RAFAELA (2.75 x 18) to mid LCX for NSTEMI  Normal EF  Grade I diastolic dysfunction  Mild AS wth MG 14 mmHg        3. Hyperlipidemia, unspecified hyperlipidemia type  -     Comprehensive Metabolic Panel; Future; Expected date: 12/05/2024  -     Lipid Panel; Future; Expected date: 12/05/2024    4. Prediabetes  -     Hemoglobin A1C; Future; Expected date: 12/05/2024    5. Stage 3b chronic kidney disease  -     Comprehensive Metabolic Panel; Future; Expected date: 12/05/2024  -     CBC Auto Differential; Future; Expected date: 12/05/2024    6. Chronic disease anemia  -     CBC Auto Differential; Future; Expected date: 12/05/2024    7. Trochanteric bursitis of left hip  -     ketorolac injection 30 mg        I spent a total of 43 minutes on the day of the visit.This includes face to face time and non-face to face time preparing to see the patient (eg, review of tests), obtaining and/or reviewing separately obtained history,  documenting clinical information in the electronic or other health record, independently interpreting results and communicating results to the patient/family/caregiver, or care coordinator.          Follow up in about 6 months (around 6/5/2025), or if symptoms worsen or fail to improve.

## 2024-12-18 DIAGNOSIS — I10 ESSENTIAL HYPERTENSION: ICD-10-CM

## 2024-12-18 RX ORDER — LOSARTAN POTASSIUM 50 MG/1
50 TABLET ORAL
Qty: 90 TABLET | Refills: 3 | Status: SHIPPED | OUTPATIENT
Start: 2024-12-18

## 2024-12-18 NOTE — PROGRESS NOTES
SUBJECTIVE:      Chief Complaint: No chief complaint on file.      History of Present Illness:  Pt is an 83 y/o male who presents for f/u of L greater trochanter bursitis  Last saw me on 9/20/2024  Given CSI to L greater trochanter, which provided significant relief x 2-3 months  Given ketorolac injection om 12/5/2024, which provided no relief  Today, pt experiencing recurrence of L greater trochanter bursitis pain  No recent trauma  Denies numbness/tingling  Interested in repeat L greater trochanter CSI today    Previous hx:  Patient is a 84 y.o. who presents today with complaints of lateral left hip pain.   Pain began 1year ago but recently worsened over the last 2-3 weeks. no history of trauma.  Pain is gradually worsening located to left lateral hip and described as aching and dull.  Denies groin pain. Denies numbness/tingling.  Symtoms are aggravated by  prolonged sitting and walking .  Symptoms are alleviated by rest and tylenol .  Attemptepd treatment(s) and/or interventions include Tylenol, medrol dose pack, CSI with PCP 7-8 months ago, voltaren gel without adequate response. Previous CSI with PCP helped.     Pain is 8 /10  today and 10 /10 at its worst.  Is affecting ADLs and limiting desired level of activity. Denies numbness and tingling  No saddle anesthesia or loss of bowel/bladder function    Mechanical symptoms: none  Subjective instability: (--)   Nocturnal symptoms: (+)    Recent ER visit for similar symptoms on 7/13/24 and was treated with medrol dose pack with minimal relief for greater trochanteric bursitis.     Patient previously treated by Dr. Wade in 2018 for sciatica as well as greater trochanteric bursitis     Smoking: No  DM: No  Occupation: retired      Past Medical History:   Diagnosis Date    Anemia of chronic disease 11/19/2019    Arthritis     Cataract     Chronic obstructive pulmonary disease, unspecified COPD type 1/26/2022    CKD (chronic kidney disease) stage 3, GFR  30-59 ml/min 5/1/2014    Colon polyp 05/12/2015    Coronary artery disease involving native coronary artery of native heart without angina pectoris 10/7/2019    Hyperlipidemia     Hypertension     Low tension glaucoma     Type 2 diabetes mellitus with hyperglycemia, without long-term current use of insulin 2/6/2024     Past Surgical History:   Procedure Laterality Date    COLONOSCOPY N/A 9/16/2021    Procedure: COLONOSCOPY;  Surgeon: Kit Nicholson MD;  Location: [facility] ENDO;  Service: Endoscopy;  Laterality: N/A;    CORONARY STENT PLACEMENT N/A 3/4/2019    Procedure: INSERTION, STENT, CORONARY ARTERY;  Surgeon: Brennan Stein MD;  Location: [facility] CATH LAB/EP;  Service: Cardiology;  Laterality: N/A;    CYSTOSCOPY      ESOPHAGOGASTRODUODENOSCOPY N/A 9/16/2021    Procedure: EGD (ESOPHAGOGASTRODUODENOSCOPY);  Surgeon: Kit Nicholson MD;  Location: [facility] ENDO;  Service: Endoscopy;  Laterality: N/A;    LEFT HEART CATHETERIZATION N/A 3/4/2019    Procedure: Left heart cath;  Surgeon: Brennan Stein MD;  Location: [facility] CATH LAB/EP;  Service: Cardiology;  Laterality: N/A;    LEFT HEART CATHETERIZATION Left 2/24/2023    Procedure: Left heart cath;  Surgeon: Bob Pinto MD;  Location: [facility] CATH LAB/EP;  Service: Cardiology;  Laterality: Left;     Review of patient's allergies indicates:   Allergen Reactions    Ace inhibitors      Other reaction(s): Angioedema    Tizanidine Hives    Ultram [tramadol] Nausea Only and Other (See Comments)     Dizziness     Social History     Social History Narrative    Not on file     Family History   Problem Relation Name Age of Onset    No Known Problems Sister 2     No Known Problems Brother 1     Cancer Brother 1         lung cancer, smoker    Kidney disease Sister 2     Melanoma Neg Hx           Current Outpatient Medications:     acetaminophen (TYLENOL) 500 MG tablet, Take 1 tablet (500 mg total) by mouth every 6 (six) hours as needed for Pain., Disp: 30 tablet,  Rfl: 0    amLODIPine (NORVASC) 5 MG tablet, Take 1 tablet (5 mg total) by mouth once daily., Disp: 90 tablet, Rfl: 3    atorvastatin (LIPITOR) 80 MG tablet, TAKE 1 TABLET(80 MG) BY MOUTH EVERY DAY, Disp: 90 tablet, Rfl: 2    clopidogreL (PLAVIX) 75 mg tablet, Take 1 tablet (75 mg total) by mouth once daily., Disp: 90 tablet, Rfl: 3    krill-om-3-dha-epa-phospho-ast 513-22-21-50 mg Cap, Take 1 capsule by mouth once daily., Disp: , Rfl:     latanoprost 0.005 % ophthalmic solution, INSTILL 1 DROP IN BOTH EYES EVERY EVENING, Disp: 2.5 mL, Rfl: 6    losartan (COZAAR) 50 MG tablet, Take 1 tablet (50 mg total) by mouth once daily., Disp: 90 tablet, Rfl: 3    metoprolol succinate (TOPROL-XL) 25 MG 24 hr tablet, TAKE 1 TABLET(25 MG) BY MOUTH EVERY DAY, Disp: 90 tablet, Rfl: 3    tamsulosin (FLOMAX) 0.4 mg Cap, TAKE 1 CAPSULE(0.4 MG) BY MOUTH AFTER DINNER Strength: 0.4 mg, Disp: 90 capsule, Rfl: 3    UNABLE TO FIND, Take 1 tablet by mouth Daily. Ruby Velazco (Patient not taking: Reported on 12/5/2024), Disp: , Rfl:     Review of Systems   Musculoskeletal:  Positive for joint pain. Negative for back pain, falls and muscle weakness.   All other systems reviewed and are negative.      OBJECTIVE:      Vital Signs (Most Recent):  There were no vitals filed for this visit.  There is no height or weight on file to calculate BMI.      PHYSICAL EXAMINATION:  Vitals:  There were no vitals taken for this visit.   General: The patient is alert and oriented x 3.  Mood is pleasant.  Observation of ears, eyes and nose reveal no gross abnormalities.  No labored breathing observed.    left HIP / L SPINE EXAMINATION     OBSERVATION / INSPECTION  Gait:   Nonantalgic   Alignment:  Neutral   Scars:   None   Muscle atrophy: None   Effusion:  None   Warmth:  None   Discoloration:   None   Leg lengths:   Equal   Pelvis:    Level     TENDERNESS   Trochanteric bursa   +   Piriformis    -   SI joint    -  Psoas tendon   -  Rectus insertion  -  Adductor  insertion  -  Pubic symphysis  -  Midline spine   -  Lubosacral    -    ROM: (* = pain)    Flexion:    120 degrees  External rotation: 40 degrees  Internal rotation: 30 degrees*  Abduction:  45 degrees  Adduction:   20 degrees    SPECIAL TESTS:  Pain w/ forced internal rotation (FADIR):  Negative   Pain w/ forced external rotation (JYOTI):  Negative   Log roll:       Negative   Snapping hip (internal):    Negative   Sit-up pain:      Negative   Resisted sit-up pain:     Negative   Resisted sit-up with adductor contraction pain:  Negative   Step-down test:     Negative   Trendelenburg test:     Negative  Bridge test      Negative   SLR       Negative  Resisted SLR      Negative  Shopping cart sign     Negative    EXTREMITY NEURO-VASCULAR EXAMINATION:   Sensation:  Grossly intact to light touch all dermatomal regions.   Motor Function:  Fully intact motor function at hip, knee, foot and ankle    DTRs;  quadriceps and  achilles 2+.  No clonus and downgoing Babinski.    Vascular status:  DP and PT pulses 2+, brisk capillary refill, symmetric.    Skin:  intact, compartments soft.      Diagnostic Results:  Imaging - I independently viewed the patient's imaging as well as the radiology report.  Xrays of the patient's bilateral hips and pelvis demonstrate no evidence of any acute fractures or dislocations or significant degenerative changes.      ASSESSMENT/PLAN:      No diagnosis found.      84 y.o. y/o male with left greater trochanteric bursitis   Plan: The patient and I had a thorough discussion today.  We discussed the working diagnosis as well as several other potential alternative diagnoses.    We discussed conservative and surgical treatment options. Conservative options include rest, activity modifications, workplace modifications, po and topical analgesia (OTC and rx), formal or home PT, and others. Surgical treatment was also mentioned.    At this time, the patient would like to proceed with the following,  which I agree with.    Pain Management: Continue OTC tylenol as needed    Therapy: Patient not interested in PT or HEP at this time   Procedures: left greater trochanteric bursitis CSI given today   Work status:[employer] with limitation secondary to pain  Follow up: in 3 month(s) with Imelda Sullivan PA-C    All of the patient's questions were answered and the patient will contact us if they have any questions or concerns in the interim.    Case discussed with Dr Mauro Sullivan PA-C  [facility]  Orthopedic Surgery

## 2024-12-20 ENCOUNTER — OFFICE VISIT (OUTPATIENT)
Dept: ORTHOPEDICS | Facility: CLINIC | Age: 84
End: 2024-12-20
Payer: MEDICARE

## 2024-12-20 DIAGNOSIS — R52 PAIN: ICD-10-CM

## 2024-12-20 DIAGNOSIS — M70.62 GREATER TROCHANTERIC BURSITIS OF LEFT HIP: Primary | ICD-10-CM

## 2024-12-20 PROCEDURE — 99999 PR PBB SHADOW E&M-EST. PATIENT-LVL III: CPT | Mod: PBBFAC,,,

## 2024-12-20 PROCEDURE — 99213 OFFICE O/P EST LOW 20 MIN: CPT | Mod: PBBFAC,PN

## 2024-12-20 RX ORDER — TRIAMCINOLONE ACETONIDE 40 MG/ML
40 INJECTION, SUSPENSION INTRA-ARTICULAR; INTRAMUSCULAR
Status: DISCONTINUED | OUTPATIENT
Start: 2024-12-20 | End: 2024-12-20 | Stop reason: HOSPADM

## 2024-12-20 RX ADMIN — TRIAMCINOLONE ACETONIDE 40 MG: 40 INJECTION, SUSPENSION INTRA-ARTICULAR; INTRAMUSCULAR at 10:12

## 2024-12-20 NOTE — PROCEDURES
Large Joint Aspiration/Injection: L greater trochanteric bursa    Date/Time: 12/20/2024 10:30 AM    Performed by: Imelda Sullivan PA-C  Authorized by: Imelda Sullivan PA-C    Consent Done?:  Yes (Verbal)  Indications:  Pain  Site marked: the procedure site was marked    Timeout: prior to procedure the correct patient, procedure, and site was verified    Prep: patient was prepped and draped in usual sterile fashion      Details:  Needle Size:  22 G  Ultrasonic Guidance for needle placement?: No    Approach:  Lateral  Location:  Hip  Site:  L greater trochanteric bursa  Medications:  40 mg triamcinolone acetonide 40 mg/mL  Patient tolerance:  Patient tolerated the procedure well with no immediate complications

## 2025-01-07 ENCOUNTER — HOSPITAL ENCOUNTER (OUTPATIENT)
Dept: RADIOLOGY | Facility: HOSPITAL | Age: 85
Discharge: HOME | End: 2025-01-07
Attending: INTERNAL MEDICINE
Payer: MEDICARE

## 2025-01-07 ENCOUNTER — DOCUMENTATION ONLY (OUTPATIENT)
Dept: CARDIOLOGY | Facility: CLINIC | Age: 85
End: 2025-01-07
Payer: MEDICARE

## 2025-01-07 ENCOUNTER — HOSPITAL ENCOUNTER (OUTPATIENT)
Dept: CARDIOLOGY | Facility: HOSPITAL | Age: 85
Discharge: HOME | End: 2025-01-07
Attending: INTERNAL MEDICINE
Payer: MEDICARE

## 2025-01-07 VITALS
HEIGHT: 68 IN | BODY MASS INDEX: 29.25 KG/M2 | DIASTOLIC BLOOD PRESSURE: 80 MMHG | WEIGHT: 193 LBS | SYSTOLIC BLOOD PRESSURE: 150 MMHG | HEART RATE: 55 BPM

## 2025-01-07 DIAGNOSIS — I35.0 AORTIC STENOSIS, SEVERE: ICD-10-CM

## 2025-01-07 LAB
ASCENDING AORTA: 3.2 CM
AV AREA BY CONTINUOUS VTI: 1.1 CM2
AV INDEX (PROSTH): 0.32
AV LVOT MEAN GRADIENT: 5 MMHG
AV LVOT PEAK GRADIENT: 9 MMHG
AV MEAN GRADIENT: 57.9 MMHG
AV PEAK GRADIENT: 104 MMHG
AV REGURGITATION PRESSURE HALF TIME: 766.04 MS
AV VALVE AREA BY VELOCITY RATIO: 1 CM²
AV VALVE AREA: 1.1 CM2
AV VELOCITY RATIO: 0.29
BSA FOR ECHO PROCEDURE: 2.05 M2
CV ECHO LV RWT: 0.49 CM
DOP CALC AO PEAK VEL: 5.1 M/S
DOP CALC AO VTI: 95.8 CM
DOP CALC LVOT AREA: 3.5 CM2
DOP CALC LVOT DIAMETER: 2.1 CM
DOP CALC LVOT PEAK VEL: 1.5 M/S
DOP CALC LVOT STROKE VOLUME: 105.6 CM3
DOP CALCLVOT PEAK VEL VTI: 30.5 CM
E WAVE DECELERATION TIME: 332.65 MS
E/A RATIO: 0.44
E/E' RATIO: 6.83 M/S
ECHO EF ESTIMATED: 53 %
ECHO LV POSTERIOR WALL: 1.1 CM (ref 0.6–1.1)
FRACTIONAL SHORTENING: 26.7 % (ref 28–44)
HR MV ECHO: 55 BPM
INTERVENTRICULAR SEPTUM: 1.1 CM (ref 0.6–1.1)
IVC DIAMETER: 1.15 CM
LA MAJOR: 4.88 CM
LA MINOR: 4.99 CM
LA WIDTH: 3.6 CM
LEFT ATRIUM SIZE: 4.23 CM
LEFT ATRIUM VOLUME INDEX MOD: 24.7 ML/M2
LEFT ATRIUM VOLUME INDEX: 31.8 ML/M2
LEFT ATRIUM VOLUME MOD: 49.64 ML
LEFT ATRIUM VOLUME: 63.87 CM3
LEFT INTERNAL DIMENSION IN SYSTOLE: 3.3 CM (ref 2.1–4)
LEFT VENTRICLE DIASTOLIC VOLUME INDEX: 45.3 ML/M2
LEFT VENTRICLE DIASTOLIC VOLUME: 91.06 ML
LEFT VENTRICLE MASS INDEX: 87.1 G/M2
LEFT VENTRICLE SYSTOLIC VOLUME INDEX: 21.5 ML/M2
LEFT VENTRICLE SYSTOLIC VOLUME: 43.15 ML
LEFT VENTRICULAR INTERNAL DIMENSION IN DIASTOLE: 4.5 CM (ref 3.5–6)
LEFT VENTRICULAR MASS: 175 G
LV LATERAL E/E' RATIO: 5.86
LV SEPTAL E/E' RATIO: 8.2
MV A" WAVE DURATION": 148.43 MS
MV MEAN GRADIENT: 2 MMHG
MV PEAK A VEL: 0.94 M/S
MV PEAK E VEL: 0.41 M/S
OHS CV RV/LV RATIO: 0.6 CM
PISA TR MAX VEL: 3.08 M/S
PULM VEIN A" WAVE DURATION": 148.43 MS
PULM VEIN S/D RATIO: 2.71
PULMONIC VEIN PEAK A VELOCITY: 0.4 M/S
PV PEAK D VEL: 0.21 M/S
PV PEAK S VEL: 0.57 M/S
RA MAJOR: 4.44 CM
RA PRESSURE ESTIMATED: 3 MMHG
RA WIDTH: 3.24 CM
RIGHT ATRIAL AREA: 12.9 CM2
RIGHT VENTRICLE DIASTOLIC BASEL DIMENSION: 2.7 CM
RV TB RVSP: 6 MMHG
RV TISSUE DOPPLER FREE WALL SYSTOLIC VELOCITY 1 (APICAL 4 CHAMBER VIEW): 19.03 CM/S
SINUS: 3.33 CM
STJ: 3.15 CM
TDI LATERAL: 0.07 M/S
TDI SEPTAL: 0.05 M/S
TDI: 0.06 M/S
TR MAX PG: 38 MMHG
TRICUSPID ANNULAR PLANE SYSTOLIC EXCURSION: 2.23 CM
TV PEAK GRADIENT: 38 MMHG
TV REST PULMONARY ARTERY PRESSURE: 41 MMHG
Z-SCORE OF LEFT VENTRICULAR DIMENSION IN END DIASTOLE: -2.69
Z-SCORE OF LEFT VENTRICULAR DIMENSION IN END SYSTOLE: -0.72

## 2025-01-07 PROCEDURE — 71275 CT ANGIOGRAPHY CHEST: CPT | Mod: TC

## 2025-01-07 PROCEDURE — 74174 CTA ABD&PLVS W/CONTRAST: CPT | Mod: 26,,, | Performed by: RADIOLOGY

## 2025-01-07 PROCEDURE — 25500020 PHARM REV CODE 255: Performed by: INTERNAL MEDICINE

## 2025-01-07 PROCEDURE — 93306 TTE W/DOPPLER COMPLETE: CPT | Mod: 26,,, | Performed by: INTERNAL MEDICINE

## 2025-01-07 PROCEDURE — 93306 TTE W/DOPPLER COMPLETE: CPT

## 2025-01-07 PROCEDURE — 71275 CT ANGIOGRAPHY CHEST: CPT | Mod: 26,,, | Performed by: RADIOLOGY

## 2025-01-07 RX ADMIN — IOHEXOL 120 ML: 350 INJECTION, SOLUTION INTRAVENOUS at 11:01

## 2025-01-07 NOTE — PROGRESS NOTES
Received phone call from Dr. Mccarty/Radiologist regarding CTA results.  Patient has  numerous left lung pulmonary emboli.  Discussed with Dr. Edouard.  Spoke to patient he denies any SOB or chest pain at this time.  Advised him per, Dr. Edouard that if he experiences chest pain or SOB he should go to nearest ED.  Also advised patient that he should see his PCP regarding this issue.  Mr. Harris prefers to see his PCP and not go to ED.  Will forward this note to Dr. Villalobos and staff.

## 2025-01-15 ENCOUNTER — TELEPHONE (OUTPATIENT)
Dept: CARDIOTHORACIC SURGERY | Facility: CLINIC | Age: 85
End: 2025-01-15
Payer: MEDICARE

## 2025-01-15 ENCOUNTER — OFFICE VISIT (OUTPATIENT)
Dept: CARDIOLOGY | Facility: CLINIC | Age: 85
End: 2025-01-15
Payer: MEDICARE

## 2025-01-15 VITALS
HEIGHT: 68 IN | HEART RATE: 61 BPM | WEIGHT: 193 LBS | BODY MASS INDEX: 29.25 KG/M2 | OXYGEN SATURATION: 98 % | DIASTOLIC BLOOD PRESSURE: 90 MMHG | SYSTOLIC BLOOD PRESSURE: 184 MMHG

## 2025-01-15 DIAGNOSIS — I27.20 PULMONARY HYPERTENSION: ICD-10-CM

## 2025-01-15 DIAGNOSIS — E78.2 MIXED HYPERLIPIDEMIA: ICD-10-CM

## 2025-01-15 DIAGNOSIS — I35.0 AORTIC STENOSIS, SEVERE: Primary | ICD-10-CM

## 2025-01-15 DIAGNOSIS — I10 PRIMARY HYPERTENSION: ICD-10-CM

## 2025-01-15 DIAGNOSIS — I35.0 SEVERE AORTIC STENOSIS: Primary | ICD-10-CM

## 2025-01-15 DIAGNOSIS — I70.222 ATHEROSCLEROSIS OF NATIVE ARTERIES OF EXTREMITIES WITH REST PAIN, LEFT LEG: ICD-10-CM

## 2025-01-15 PROCEDURE — 99215 OFFICE O/P EST HI 40 MIN: CPT | Mod: S$PBB,,, | Performed by: INTERNAL MEDICINE

## 2025-01-15 PROCEDURE — 99214 OFFICE O/P EST MOD 30 MIN: CPT | Mod: PBBFAC | Performed by: INTERNAL MEDICINE

## 2025-01-15 PROCEDURE — 99999 PR PBB SHADOW E&M-EST. PATIENT-LVL IV: CPT | Mod: PBBFAC,,, | Performed by: INTERNAL MEDICINE

## 2025-01-15 NOTE — TELEPHONE ENCOUNTER
Called pt to schedule consult with CTS as a part of TAVR workup per Dr. Edouard; now answer. LVM with reason for call and provided contact number.

## 2025-01-15 NOTE — PROGRESS NOTES
Interventional Cardiology Clinic Note    Last Clinic Visit: 10/30/2023  General Cardiologist: Dr. Lucas  PCP: Dr. Villalobos    HPI:     Laura Harris is a 84 y.o. male with HTN, HLD, PAD olaf 2a, CKD, severe AS who presents for follow-up.    Patient last seen over a year ago in clinic.  Was following for asymptomatic severe AS.  Today patient reporting dyspnea on exertion with regular activities. CTA TAVR results showed multifocal pulmonary emboli without any evidence of right heart strain, for which patient is to follow-up with PCP Dr. Villalobos.  Denies chest pains, palpitations, orthopnea, syncope.    ROS:      Review of Systems   Cardiovascular:  Positive for dyspnea on exertion. Negative for chest pain, orthopnea, palpitations, paroxysmal nocturnal dyspnea and syncope.       PMH:     Past Medical History:   Diagnosis Date    Anemia of chronic disease 11/19/2019    Arthritis     Cataract     Chronic obstructive pulmonary disease, unspecified COPD type 1/26/2022    CKD (chronic kidney disease) stage 3, GFR 30-59 ml/min 5/1/2014    Colon polyp 05/12/2015    Coronary artery disease involving native coronary artery of native heart without angina pectoris 10/7/2019    Hyperlipidemia     Hypertension     Low tension glaucoma     Type 2 diabetes mellitus with hyperglycemia, without long-term current use of insulin 2/6/2024     Past Surgical History:   Procedure Laterality Date    COLONOSCOPY N/A 9/16/2021    Procedure: COLONOSCOPY;  Surgeon: Kit Nicholson MD;  Location: [facility] ENDO;  Service: Endoscopy;  Laterality: N/A;    CORONARY STENT PLACEMENT N/A 3/4/2019    Procedure: INSERTION, STENT, CORONARY ARTERY;  Surgeon: Brennan Stein MD;  Location: [facility] CATH LAB/EP;  Service: Cardiology;  Laterality: N/A;    CYSTOSCOPY      ESOPHAGOGASTRODUODENOSCOPY N/A 9/16/2021    Procedure: EGD (ESOPHAGOGASTRODUODENOSCOPY);  Surgeon: Kit Nicholson MD;  Location: [facility] ENDO;  Service:  Endoscopy;  Laterality: N/A;    LEFT HEART CATHETERIZATION N/A 3/4/2019    Procedure: Left heart cath;  Surgeon: Brennan Stein MD;  Location: [facility] CATH LAB/EP;  Service: Cardiology;  Laterality: N/A;    LEFT HEART CATHETERIZATION Left 2/24/2023    Procedure: Left heart cath;  Surgeon: Bob Pinto MD;  Location: [facility] CATH LAB/EP;  Service: Cardiology;  Laterality: Left;     Allergies:     Review of patient's allergies indicates:   Allergen Reactions    Ace inhibitors      Other reaction(s): Angioedema    Tizanidine Hives    Ultram [tramadol] Nausea Only and Other (See Comments)     Dizziness     Medications:     Current Outpatient Medications on File Prior to Visit   Medication Sig Dispense Refill    acetaminophen (TYLENOL) 500 MG tablet Take 1 tablet (500 mg total) by mouth every 6 (six) hours as needed for Pain. 30 tablet 0    amLODIPine (NORVASC) 5 MG tablet Take 1 tablet (5 mg total) by mouth once daily. 90 tablet 3    atorvastatin (LIPITOR) 80 MG tablet TAKE 1 TABLET(80 MG) BY MOUTH EVERY DAY 90 tablet 2    clopidogreL (PLAVIX) 75 mg tablet Take 1 tablet (75 mg total) by mouth once daily. 90 tablet 3    krill-om-3-dha-epa-phospho-ast 453-91-19-50 mg Cap Take 1 capsule by mouth once daily.      latanoprost 0.005 % ophthalmic solution INSTILL 1 DROP IN BOTH EYES EVERY EVENING 2.5 mL 6    losartan (COZAAR) 50 MG tablet TAKE 1 TABLET(50 MG) BY MOUTH EVERY DAY 90 tablet 3    metoprolol succinate (TOPROL-XL) 25 MG 24 hr tablet TAKE 1 TABLET(25 MG) BY MOUTH EVERY DAY 90 tablet 3    tamsulosin (FLOMAX) 0.4 mg Cap TAKE 1 CAPSULE(0.4 MG) BY MOUTH AFTER DINNER Strength: 0.4 mg 90 capsule 3    UNABLE TO FIND Take 1 tablet by mouth Daily. Ruby Velazco       No current facility-administered medications on file prior to visit.     Social History:     Social History     Tobacco Use    Smoking status: Former     Current packs/day: 0.00     Average packs/day: 1 pack/day for 64.0 years (64.0 ttl pk-yrs)     Types: Cigarettes  "    Start date:      Quit date: 2019     Years since quittin.0     Passive exposure: Never    Smokeless tobacco: Never   Substance Use Topics    Alcohol use: No     Family History:     Family History   Problem Relation Name Age of Onset    No Known Problems Sister 2     No Known Problems Brother 1     Cancer Brother 1         lung cancer, smoker    Kidney disease Sister 2     Melanoma Neg Hx       Physical Exam:   BP (!) 184/90 (BP Location: Left arm, Patient Position: Sitting)   Pulse 61   Ht 5' 8" (1.727 m)   Wt 87.5 kg (193 lb)   SpO2 98%   BMI 29.35 kg/m²        Physical Exam  Vitals and nursing note reviewed.   Constitutional:       General: He is not in acute distress.     Appearance: Normal appearance. He is not ill-appearing or toxic-appearing.   HENT:      Head: Normocephalic and atraumatic.   Eyes:      Pupils: Pupils are equal, round, and reactive to light.   Cardiovascular:      Rate and Rhythm: Normal rate and regular rhythm.      Pulses: Normal pulses.      Heart sounds: Murmur heard.      Systolic murmur is present with a grade of 3/6.      No friction rub. No gallop.   Pulmonary:      Effort: Pulmonary effort is normal. No respiratory distress.   Musculoskeletal:         General: Normal range of motion.      Cervical back: Normal range of motion.      Right lower leg: Edema present.      Left lower leg: Edema present.   Skin:     General: Skin is warm and dry.      Capillary Refill: Capillary refill takes less than 2 seconds.   Neurological:      General: No focal deficit present.      Mental Status: He is alert.          Labs:     Lab Results   Component Value Date     2025    K 4.6 2025     2025    CO2 22 (L) 2025    BUN 27 (H) 2025    CREATININE 1.7 (H) 2025    ANIONGAP 12 2025     Lab Results   Component Value Date    HGBA1C 6.4 (H) 2024     Lab Results   Component Value Date    BNP 86 2021     (H) 2020    " BNP 29 10/08/2019    Lab Results   Component Value Date    WBC 5.96 11/26/2024    HGB 11.9 (L) 11/26/2024    HCT 36.6 (L) 11/26/2024     11/26/2024    GRAN 3.4 11/26/2024    GRAN 57.6 11/26/2024     Lab Results   Component Value Date    CHOL 135 11/26/2024    HDL 34 (L) 11/26/2024    LDLCALC 78.0 11/26/2024    TRIG 115 11/26/2024          Lipids:  Recent Labs   Lab 11/26/24  0710   LDL Cholesterol 78.0   HDL 34 L      Renal:  Recent Labs   Lab 01/07/25  1022   Creatinine 1.7 H   Potassium 4.6   CO2 22 L   BUN 27 H     Liver:  Recent Labs   Lab 11/26/24  0710   AST 18   ALT 15       Imaging:     TTE (1/7/2024):    Left Ventricle: The left ventricle is normal in size. Normal wall thickness. There is normal systolic function with a visually estimated ejection fraction of 60 - 65%.    Right Ventricle: Normal right ventricular cavity size. Wall thickness is normal. Systolic function is normal.    Aortic Valve: The aortic valve is a trileaflet valve. There is severe aortic valve sclerosis. Severely restricted motion. There is severe stenosis. Aortic valve area by VTI is 1.1 cm2. Aortic valve peak velocity is 4 m/s. Mean gradient is 44 mmHg. The dimensionless index is 0.32.  After PVC, the gradient increases to 5 m/sec with a mean gradient of 56 mm Hg. There is mild aortic regurgitation.    Mitral Valve: There is mild bileaflet sclerosis.  No stenosis or insufficiency is present    Pulmonary Artery: There is mild pulmonary hypertension. The estimated pulmonary artery systolic pressure is 41 mmHg.    IVC/SVC: Normal venous pressure at 3 mmHg.    Caths:   2/24/2023:  Patent LM  Luminal irregularities in LAD, D1, LCX, OM1, and OM2  Patent LCX stent   Small non dominant RCA    CTA TAVR (1/7/2024):  Multifocal pulmonary emboli without any evidence of right heart strain.      Assessment & Plan:     1. Severe aortic stenosis    2. Primary hypertension    3. Mixed hyperlipidemia    4. Atherosclerosis of native arteries of  extremities with rest pain, left leg    5. Pulmonary hypertension        Atherosclerosis of native arteries of extremities with rest pain, left leg  No claudication. Will reassess after valve disease treated    Pulmonary hypertension  Secondary to AS. Pls see AS plan    Severe aortic stenosis  - symptomatic severe AS  - patient with LHC and CTA completed  - will schedule for CT surgery consult with plan to proceed with TAVR    Hypertension  - Continue current regime. Follow up with PCP     Hyperlipidemia  - Continue high intensity statin therapy      Signed:  Mary Beth Reid PA-C  Interventional Cardiology

## 2025-01-15 NOTE — ASSESSMENT & PLAN NOTE
- symptomatic severe AS  - patient with LHC and CTA completed  - will schedule for CT surgery consult with plan to proceed with TAVR

## 2025-01-16 ENCOUNTER — TELEPHONE (OUTPATIENT)
Dept: CARDIOTHORACIC SURGERY | Facility: CLINIC | Age: 85
End: 2025-01-16
Payer: MEDICARE

## 2025-01-16 DIAGNOSIS — I35.0 SEVERE AORTIC STENOSIS: Primary | ICD-10-CM

## 2025-01-16 DIAGNOSIS — I35.0 AORTIC VALVE STENOSIS, ETIOLOGY OF CARDIAC VALVE DISEASE UNSPECIFIED: Primary | ICD-10-CM

## 2025-01-16 RX ORDER — DIPHENHYDRAMINE HCL 50 MG
50 CAPSULE ORAL ONCE
OUTPATIENT
Start: 2025-01-16 | End: 2025-01-16

## 2025-01-16 RX ORDER — SODIUM CHLORIDE 9 MG/ML
INJECTION, SOLUTION INTRAVENOUS CONTINUOUS
OUTPATIENT
Start: 2025-01-16 | End: 2025-01-16

## 2025-01-16 RX ORDER — SODIUM CHLORIDE 0.9 % (FLUSH) 0.9 %
10 SYRINGE (ML) INJECTION
Status: SHIPPED | OUTPATIENT
Start: 2025-01-16

## 2025-01-16 NOTE — TELEPHONE ENCOUNTER
Called pt about scheduling consult per referral from Dr. Edouard; no answer. LVM with reason for call and requested call back. Contact number provided.

## 2025-01-16 NOTE — TELEPHONE ENCOUNTER
Returned pt's call and scheduled pt for TAVR eval by CTS per referral from Dr. Edouard. Pt is scheduled for consult on 2/4 which pt agreed to. Pt verbalized understanding of appt date, time, and location. Pt denies any questions at this time.

## 2025-01-24 DIAGNOSIS — I27.82 CHRONIC PULMONARY EMBOLISM, UNSPECIFIED PULMONARY EMBOLISM TYPE, UNSPECIFIED WHETHER ACUTE COR PULMONALE PRESENT: Primary | ICD-10-CM

## 2025-02-03 ENCOUNTER — TELEPHONE (OUTPATIENT)
Dept: CARDIOTHORACIC SURGERY | Facility: CLINIC | Age: 85
End: 2025-02-03
Payer: MEDICARE

## 2025-02-03 NOTE — TELEPHONE ENCOUNTER
Spoke with pt and confirmed 2/4 appt with Dr. Friedman. Pt verbalized understanding of appt times and locations. Pt denies any questions at this time.

## 2025-02-03 NOTE — PROGRESS NOTES
Subjective:      Patient ID: Laura Harris is a 84 y.o. male.    Chief Complaint: No chief complaint on file.      HPI:  Laura Harris is a 84 y.o. male who presents for surgical evaluation of AS. Medical conditions include CKD3 (creatinine 1.7), anemia, arthritis, COPD, HTN, HLD, DM2, PCI to left CX. Was following with valve clinic for asymptomatic AS but recently developed CARTER. CTA TAVR results showed multifocal pulmonary emboli without any evidence of right heart strain, for which patient is to follow-up with PCP Dr. Villalobos per cards note. Patient states he was put on Eliquis. Reports that he is short of breath with strenuous activity. Has no problems on a flat surface. Reports swelling in right ankle. No dizziness or syncope. Reports having some episodes of chest pressure but thinks this is just when he is hungry. No prior strokes, seizures,  or sternotomies. Quit smoking in 2018. Thinks mother had some kind of heart issue but she lived to .     Family and social history reviewed    Review of patient's allergies indicates:   Allergen Reactions    Ace inhibitors      Other reaction(s): Angioedema    Tizanidine Hives    Ultram [tramadol] Nausea Only and Other (See Comments)     Dizziness     Past Medical History:   Diagnosis Date    Anemia of chronic disease 11/19/2019    Arthritis     Cataract     Chronic obstructive pulmonary disease, unspecified COPD type 1/26/2022    CKD (chronic kidney disease) stage 3, GFR 30-59 ml/min 5/1/2014    Colon polyp 05/12/2015    Coronary artery disease involving native coronary artery of native heart without angina pectoris 10/7/2019    Hyperlipidemia     Hypertension     Low tension glaucoma     Type 2 diabetes mellitus with hyperglycemia, without long-term current use of insulin 2/6/2024     Past Surgical History:   Procedure Laterality Date    COLONOSCOPY N/A 9/16/2021    Procedure: COLONOSCOPY;  Surgeon: Kit Nicholson MD;  Location: [facility];   Service: Endoscopy;  Laterality: N/A;    CORONARY STENT PLACEMENT N/A 3/4/2019    Procedure: INSERTION, STENT, CORONARY ARTERY;  Surgeon: Brennan Stein MD;  Location: [facility] CATH LAB/EP;  Service: Cardiology;  Laterality: N/A;    CYSTOSCOPY      ESOPHAGOGASTRODUODENOSCOPY N/A 2021    Procedure: EGD (ESOPHAGOGASTRODUODENOSCOPY);  Surgeon: Kit Nicholson MD;  Location: [facility] ENDO;  Service: Endoscopy;  Laterality: N/A;    LEFT HEART CATHETERIZATION N/A 3/4/2019    Procedure: Left heart cath;  Surgeon: Brennan Stein MD;  Location: [facility] CATH LAB/EP;  Service: Cardiology;  Laterality: N/A;    LEFT HEART CATHETERIZATION Left 2023    Procedure: Left heart cath;  Surgeon: Bob Pinto MD;  Location: [facility] CATH LAB/EP;  Service: Cardiology;  Laterality: Left;     Family History       Problem Relation (Age of Onset)    Cancer Brother    Kidney disease Sister    No Known Problems Sister, Brother          Social History     Socioeconomic History    Marital status: Single   Tobacco Use    Smoking status: Former     Current packs/day: 0.00     Average packs/day: 1 pack/day for 64.0 years (64.0 ttl pk-yrs)     Types: Cigarettes     Start date:      Quit date:      Years since quittin.0     Passive exposure: Never    Smokeless tobacco: Never   Substance and Sexual Activity    Alcohol use: No    Drug use: No    Sexual activity: Not Currently     Partners: Female     Social Drivers of Health     Financial Resource Strain: Low Risk  (2024)    Overall Financial Resource Strain (CARDIA)     Difficulty of Paying Living Expenses: Not hard at all   Food Insecurity: No Food Insecurity (2024)    Hunger Vital Sign     Worried About Running Out of Food in the Last Year: Never true     Ran Out of Food in the Last Year: Never true   Transportation Needs: No Transportation Needs (2024)    PRAPARE - Transportation     Lack of Transportation (Medical): No     Lack of Transportation (Non-Medical):  No   Physical Activity: Inactive (2/6/2024)    Exercise Vital Sign     Days of Exercise per Week: 0 days     Minutes of Exercise per Session: 0 min   Stress: No Stress Concern Present (2/6/2024)    [employer] [place] [employer] - Occupational Stress Questionnaire     Feeling of Stress : Not at all   Housing Stability: Low Risk  (2/6/2024)    Housing Stability Vital Sign     Unable to Pay for Housing in the Last Year: No     Number of Places Lived in the Last Year: 1     Unstable Housing in the Last Year: No       Current medications Reviewed    Review of Systems   Constitutional:  Positive for fatigue.   HENT:  Negative for nosebleeds.    Eyes:  Negative for visual disturbance.   Respiratory:  Positive for shortness of breath.    Cardiovascular:  Positive for leg swelling. Negative for chest pain.   Gastrointestinal:  Negative for nausea.   Musculoskeletal:  Negative for gait problem.   Skin:  Negative for color change.   Neurological:  Negative for dizziness, seizures and syncope.   Hematological:  Does not bruise/bleed easily.   Psychiatric/Behavioral:  Negative for sleep disturbance.      Objective:   Physical Exam  Vitals reviewed.   Constitutional:       General: He is not in acute distress.     Appearance: He is well-developed. He is not diaphoretic.   HENT:      Head: Normocephalic and atraumatic.   Eyes:      Pupils: Pupils are equal, round, and reactive to light.   Neck:      Vascular: No JVD.   Cardiovascular:      Rate and Rhythm: Normal rate.   Pulmonary:      Effort: Pulmonary effort is normal. No respiratory distress.   Abdominal:      General: There is no distension.      Palpations: Abdomen is soft.   Musculoskeletal:         General: Normal range of motion.      Cervical back: Normal range of motion.   Skin:     General: Skin is warm and dry.      Capillary Refill: Capillary refill takes less than 2 seconds.   Neurological:      Mental Status: He is alert and oriented to person, place,  and time.   Psychiatric:         Speech: Speech normal.         Behavior: Behavior normal.         Thought Content: Thought content normal.         Judgment: Judgment normal.         Diagnostic Results:   CTA 1/7/25  TAVR measurements as detailed above.  Moderate severe atherosclerotic disease.     Multifocal pulmonary emboli without any evidence of right heart strain.     Additional findings as detailed above.     This critical information above was relayed by Issa Mccarty MD  by telephone to Daniela Olvera RN (ordering nurse) on 01/07/2025 at 0248 pm.    TTE 1/7/25    Left Ventricle: The left ventricle is normal in size. Normal wall thickness. There is normal systolic function with a visually estimated ejection fraction of 60 - 65%.    Right Ventricle: Normal right ventricular cavity size. Wall thickness is normal. Systolic function is normal.    Aortic Valve: The aortic valve is a trileaflet valve. There is severe aortic valve sclerosis. Severely restricted motion. There is severe stenosis. Aortic valve area by VTI is 1.1 cm2. Aortic valve peak velocity is 4 m/s. Mean gradient is 44 mmHg. The dimensionless index is 0.32.  After PVC, the gradient increases to 5 m/sec with a mean gradient of 56 mm Hg. There is mild aortic regurgitation.    Mitral Valve: There is mild bileaflet sclerosis.  No stenosis or insufficiency is present    Pulmonary Artery: There is mild pulmonary hypertension. The estimated pulmonary artery systolic pressure is 41 mmHg.    IVC/SVC: Normal venous pressure at 3 mmHg.    [facility] 2/24/23      Patent LM              Luminal irregularities in LAD, D1, LCX, OM1, and OM2              Patent LCX stent               Small non dominant RCA     Assessment:   AS  Plan:   CTS Attending Note:    I have personally taken the history and examined this patient and agree with the KATHRYN's note as stated above.  84-year-old male with severe symptomatic aortic stenosis.  He would be moderate risk for surgery.  Agree  with a transcatheter approach.

## 2025-02-04 ENCOUNTER — OFFICE VISIT (OUTPATIENT)
Dept: CARDIOTHORACIC SURGERY | Facility: CLINIC | Age: 85
End: 2025-02-04
Payer: MEDICARE

## 2025-02-04 VITALS
DIASTOLIC BLOOD PRESSURE: 91 MMHG | HEIGHT: 68 IN | BODY MASS INDEX: 29.54 KG/M2 | OXYGEN SATURATION: 99 % | WEIGHT: 194.88 LBS | HEART RATE: 54 BPM | SYSTOLIC BLOOD PRESSURE: 186 MMHG

## 2025-02-04 DIAGNOSIS — I35.0 SEVERE AORTIC STENOSIS: Primary | ICD-10-CM

## 2025-02-04 DIAGNOSIS — I35.0 AORTIC STENOSIS, SEVERE: ICD-10-CM

## 2025-02-04 PROCEDURE — 99999 PR PBB SHADOW E&M-EST. PATIENT-LVL III: CPT | Mod: PBBFAC,,, | Performed by: THORACIC SURGERY (CARDIOTHORACIC VASCULAR SURGERY)

## 2025-02-04 PROCEDURE — 99213 OFFICE O/P EST LOW 20 MIN: CPT | Mod: PBBFAC | Performed by: THORACIC SURGERY (CARDIOTHORACIC VASCULAR SURGERY)

## 2025-02-04 PROCEDURE — 99203 OFFICE O/P NEW LOW 30 MIN: CPT | Mod: S$PBB,,, | Performed by: THORACIC SURGERY (CARDIOTHORACIC VASCULAR SURGERY)

## 2025-02-06 ENCOUNTER — EDUCATION (OUTPATIENT)
Dept: CARDIOLOGY | Facility: CLINIC | Age: 85
End: 2025-02-06
Payer: MEDICARE

## 2025-02-06 DIAGNOSIS — I35.0 SEVERE AORTIC STENOSIS: Primary | ICD-10-CM

## 2025-02-06 NOTE — PROGRESS NOTES
TRANSCATHETER AORTIC VALVE REPLACEMENT      PLEASE READ ALL INSTRUCTIONS CAREFULLY     You have been scheduled for your valve replacement on Thursday, February 20, 2025.     Please report to the Cardiology Waiting Area on the Third floor of the hospital and check in at 8 AM.   You will then be taken to the SSCU (Short Stay Cardiac Unit) and prepared for your procedure. Please be aware that this is not the time of your procedure but the time you are to arrive.     Preperations for your procedure:  Shower with Dial soap the night before and the morning of your procedure.  Nothing to eat or drink after MIDNIGHT the night before.                                            You may take your regular morning medications with as much water or clear liquid as necessary.   Bring your cane and/or walker with you to the hospital.  Bring CPAP/BiPAP, or oxygen if you are on oxygen at home.  Call the office for any signs of infection ( fever, cough, pneumonia, urinary tract, etc.) We cannot implant a device in an infected patient.    Medications:  You may take your regular scheduled medications the morning of your procedure with as much water as necessary.  If you are diabetic and on Metformin (Glucophage), do not take it the day before, the day of, and 2 days after your procedure.  Hold Eliquis 3 days prior to your procedure.  If you take a weekly GLP-1 Inhibitor  You must be off that medication for one full week prior to your procedure.  Anesthesia will cancel your procedure if you do not hold this medication for a minimum of 7 days prior to procedure.       How long will the procedure take?  The procedure takes approximately three to four hours.  After the procedure, you will go to an ICU or the Cardiac Step Down Unit.  The decision will be made by the valve team.  You will be monitored closely for the next several hours and remain overnight.       When can I go home?  Your length of stay in the hospital, will be determined by  your physician.  Be prepared to stay 1-3 days.  You will be discharged when your physician thinks it is safe for you to go home.  You must have someone to drive you home when you are discharged.      Follow Up After Valve Replacement:  You will be scheduled for follow up in one month and one year with an echo, lab work, and a clinic visit.    If you are in a research trial, your follow up will be slightly different and according to the trial requirements.  You can follow up with your regular cardiologist regarding any other heart issues.  DO NOT SCHEDULE ANY ELECTIVE PROCEDURES FOR 6 MONTHS AFTER TAVR.  THIS INCLUDES DENTAL WORK, COLONOSCOPY,ETC.         Please contact our office at [phone] for any questions.            THE ABOVE INSTRUCTIONS WERE GIVEN TO THE PATIENT VERBALLY AND THEY VERBALIZED UNDERSTANDING.  THEY DO NOT REQUIRE ANY SPECIAL NEEDS AND DO NOT HAVE ANY LEARNING BARRIERS.          Directions for Reporting to Cardiology Waiting Area in the Hospital  If you park in the Parking Garage:  Take elevators to the1st floor of the parking garage.  Continue past the gift shop, coffee shop, and piano.  Take a right and go to the gold elevators. (Elevator B)  Take the elevator to the 3rd floor.  Follow the arrow on the sign on the wall that says Cath Lab Registration/EP Lab Registration.  Follow the long hallway all the way around until you come to a big open area.  This is the registration area.  Check in at Reception Desk.    OR    If family is dropping you off:  Have them drop you off at the front of the Hospital under the green overhang.  Enter through the doors and take a right.  Take the E elevators to the 3rd floor Cardiology Waiting Area.  Check in at the Reception Desk in the waiting room.

## 2025-02-06 NOTE — PROGRESS NOTES
TAVR DISCHARGE INSTRUCTIONS      1. General Post Op Instructions:  -   No lifting greater than 5 pounds for 5 days  -   No driving or operating heavy machinery for 5 days  -   You may shower as soon as you get home but no bathing or submerging in water       (lakes, pools, tub etc) for 1 week.                -   You may remove the dressing and replace with a band-aid.      -   Keep incision dry and clean.  No lotions, oils, or creams.  You may change the band-aid daily                   until a scab has formed over              -   You may feel a small lump in your groin area due to a closure device used after the procedure.                     The site may be black and blue or swollen and pink for a few days. If the site enlarges,                    becomes painful and/or starts to bleed,please call.               -    You may return to your regular activities as tolerated.        If you develop any fever, urinary tract infection, or any other signs of infection, please call our office immediately.    2.  Preventing infection on Your heart Valve:  -   DO NOT have any dental work, surgery, or any other elective procedures for 6 months.  -   Provide a copy of this card to your Dentist or Gastroenterologist to keep in your chart.  -  You DO need to take antibiotics prior to any routine dental cleaning, GI procedure (colonoscopy, endoscopy), (cystoscopy), or respiratory procedures (bronchoscopy).                -  You DO need antibiotics if you are having a procedure that requires cutting any infected area.              -  Your Dentist or Gastroenterologist will prescribe these for you prior to your appointment.        3.  Managing your heart Failure:  -    Weigh yourself daily and keep track of your weight  -    If you are on Lasix, continue to take it as prescribed.  -    If you gain 3 pounds overnight or 5 pounds over 5 days, double Lasix for 3 days or begin taking  "Lasix once a day for 3 days                   4.  Follow Up:  -   TAVR follow up protocol requires you to return for a one month and a one-year visit. We will schedule an echo, blood work  and an appointment to see a    member of our team.      -   Please continue to see your regular Cardiologist for all other issues.  -   You will receive an ID card from the  of your valve in 4-6 weeks.  Make a copy of the card, and keep it with you at all times.    5.  Discharge:  - If you have any questions or concerns after discharge, please do not hesitate to call our office and speak with a nurse at [phone]              - If you have any problems or concerns related to a Pacemaker or Event monitor please call the Arrhythmia department at [phone].             -  Please address any other concerns with your primary Cardiologist.                   -  We answer messages sent via the "[employer]" portal Monday - Friday 8am - 5pm.               -  Please do not send emergency messages through the portal.                  After hours and weekends, please call [phone] to speak to a Registered Nurse.     "

## 2025-02-10 DIAGNOSIS — E78.2 MIXED HYPERLIPIDEMIA: ICD-10-CM

## 2025-02-10 NOTE — TELEPHONE ENCOUNTER
Refill Routing Note   Medication(s) are not appropriate for processing by [facility] for the following reason(s):        Non-participating provider    [facility] action(s):  Route               Appointments  past 12m or future 3m with PCP    Date Provider   Last Visit   2/6/2024 Citlali Hope NP   Next Visit   Visit date not found Citlali Hope NP   ED visits in past 90 days: 0        Note composed:4:56 PM 02/10/2025

## 2025-02-10 NOTE — TELEPHONE ENCOUNTER
No care due was identified.  [facility] Embedded Care Due Messages. Reference number: [id number].   2/10/2025 12:09:46 PM CST

## 2025-02-11 RX ORDER — ATORVASTATIN CALCIUM 80 MG/1
80 TABLET, FILM COATED ORAL
Qty: 90 TABLET | Refills: 2 | Status: SHIPPED | OUTPATIENT
Start: 2025-02-11

## 2025-02-13 RX ORDER — CLOPIDOGREL BISULFATE 75 MG/1
TABLET ORAL
Qty: 90 TABLET | Refills: 3 | OUTPATIENT
Start: 2025-02-13

## 2025-02-13 NOTE — TELEPHONE ENCOUNTER
Refill Decision Note   Laura Harris  is requesting a refill authorization.    Brief Assessment and Rationale for Refill:   Quick Discontinue       Medication Therapy Plan:   discontinued on 1/24/2025 by Riley Villalobos MD      Comments:     Note composed:3:41 PM 02/13/2025

## 2025-02-19 ENCOUNTER — ANESTHESIA EVENT (OUTPATIENT)
Dept: MEDSURG UNIT | Facility: HOSPITAL | Age: 85
End: 2025-02-19
Payer: MEDICARE

## 2025-02-19 RX ORDER — HALOPERIDOL 5 MG/ML
0.5 INJECTION INTRAMUSCULAR EVERY 10 MIN PRN
OUTPATIENT
Start: 2025-02-19

## 2025-02-19 RX ORDER — HYDROMORPHONE HYDROCHLORIDE 1 MG/ML
0.2 INJECTION, SOLUTION INTRAMUSCULAR; INTRAVENOUS; SUBCUTANEOUS EVERY 5 MIN PRN
Refills: 0 | OUTPATIENT
Start: 2025-02-19

## 2025-02-19 RX ORDER — GLUCAGON 1 MG
1 KIT INJECTION
OUTPATIENT
Start: 2025-02-19

## 2025-02-19 RX ORDER — SODIUM CHLORIDE 0.9 % (FLUSH) 0.9 %
10 SYRINGE (ML) INJECTION
OUTPATIENT
Start: 2025-02-19

## 2025-02-19 NOTE — ANESTHESIA PREPROCEDURE EVALUATION
[facility]  Anesthesia Pre-Operative Evaluation         Patient Name: Laura Harris  YOB: 1940  MRN: [id number]    SUBJECTIVE:     Pre-operative evaluation for Procedure(s) (LRB):  REPLACEMENT, AORTIC VALVE, TRANSCATHETER (TAVR) (N/A)     02/19/2025    Laura Harris is a 84 y.o. male with a significant medical history of CKD3 (creatinine 1.7), anemia, arthritis, COPD, HTN, HLD, DM2, PCI to left CX.     Recently developed CARTER. CTA results showed multifocal pulmonary emboli without any evidence of right heart strain, put on Eliquis. He now presents for the above procedure.    Echo    Interpretation Summary    Left Ventricle: The left ventricle is normal in size. Normal wall thickness. There is normal systolic function with a visually estimated ejection fraction of 60 - 65%.    Right Ventricle: Normal right ventricular cavity size. Wall thickness is normal. Systolic function is normal.    Aortic Valve: The aortic valve is a trileaflet valve. There is severe aortic valve sclerosis. Severely restricted motion. There is severe stenosis. Aortic valve area by VTI is 1.1 cm2. Aortic valve peak velocity is 4 m/s. Mean gradient is 44 mmHg. The dimensionless index is 0.32.  After PVC, the gradient increases to 5 m/sec with a mean gradient of 56 mm Hg. There is mild aortic regurgitation.    Mitral Valve: There is mild bileaflet sclerosis.  No stenosis or insufficiency is present    Pulmonary Artery: There is mild pulmonary hypertension. The estimated pulmonary artery systolic pressure is 41 mmHg.    IVC/SVC: Normal venous pressure at 3 mmHg.      Prev airway: None documented.    Problem List[1]    Review of patient's allergies indicates:   Allergen Reactions    Ace inhibitors      Other reaction(s): Angioedema    Tizanidine Hives    Ultram [tramadol] Nausea Only and Other (See Comments)     Dizziness       Current Inpatient Medications:      Medications Ordered Prior to Encounter[2]    Past  "Surgical History:   Procedure Laterality Date    COLONOSCOPY N/A 9/16/2021    Procedure: COLONOSCOPY;  Surgeon: Kit Nicholson MD;  Location: [facility] ENDO;  Service: Endoscopy;  Laterality: N/A;    CORONARY STENT PLACEMENT N/A 3/4/2019    Procedure: INSERTION, STENT, CORONARY ARTERY;  Surgeon: Brennan Stein MD;  Location: [facility] CATH LAB/EP;  Service: Cardiology;  Laterality: N/A;    CYSTOSCOPY      ESOPHAGOGASTRODUODENOSCOPY N/A 9/16/2021    Procedure: EGD (ESOPHAGOGASTRODUODENOSCOPY);  Surgeon: Kit Nicholson MD;  Location: [facility] ENDO;  Service: Endoscopy;  Laterality: N/A;    LEFT HEART CATHETERIZATION N/A 3/4/2019    Procedure: Left heart cath;  Surgeon: Brnenan Stein MD;  Location: [facility] CATH LAB/EP;  Service: Cardiology;  Laterality: N/A;    LEFT HEART CATHETERIZATION Left 2/24/2023    Procedure: Left heart cath;  Surgeon: Bob Pinto MD;  Location: [facility] CATH LAB/EP;  Service: Cardiology;  Laterality: Left;       Social History[3]    OBJECTIVE:     Vital Signs Range:      1/7/2025     1:52 PM 1/15/2025     1:54 PM 2/4/2025    12:19 PM   Vitals - 1 value per visit   SYSTOLIC 150 210 186   DIASTOLIC 80 91 91   Pulse 55 66 54   SPO2  98 % 99 %   Weight (lb) 193 193 194.89   Weight (kg) 87.544 87.544 88.4   Height 5' 8" (1.727 m) 5' 8" (1.727 m) 5' 8" (1.727 m)   BMI (Calculated) 29.4 29.4 29.6   Pain Score  Five Zero         CBC:   Lab Results   Component Value Date    WBC 5.76 02/04/2025    HGB 13.1 (L) 02/04/2025    HCT 41.8 02/04/2025    MCV 84 02/04/2025     02/04/2025         CMP:   Sodium   Date Value Ref Range Status   02/04/2025 141 136 - 145 mmol/L Final     Potassium   Date Value Ref Range Status   02/04/2025 4.4 3.5 - 5.1 mmol/L Final     Chloride   Date Value Ref Range Status   02/04/2025 108 95 - 110 mmol/L Final     CO2   Date Value Ref Range Status   02/04/2025 24 23 - 29 mmol/L Final     Glucose   Date Value Ref Range Status   02/04/2025 84 70 - 110 mg/dL Final "     BUN   Date Value Ref Range Status   02/04/2025 22 8 - 23 mg/dL Final     Creatinine   Date Value Ref Range Status   02/04/2025 1.6 (H) 0.5 - 1.4 mg/dL Final     Calcium   Date Value Ref Range Status   02/04/2025 9.4 8.7 - 10.5 mg/dL Final     Total Protein   Date Value Ref Range Status   11/26/2024 6.4 6.0 - 8.4 g/dL Final     Albumin   Date Value Ref Range Status   11/26/2024 3.6 3.5 - 5.2 g/dL Final     Total Bilirubin   Date Value Ref Range Status   11/26/2024 0.4 0.1 - 1.0 mg/dL Final     Comment:     For infants and newborns, interpretation of results should be based  on gestational age, weight and in agreement with clinical  observations.    Premature Infant recommended reference ranges:  Up to 24 hours.............<8.0 mg/dL  Up to 48 hours............<12.0 mg/dL  3-5 days..................<15.0 mg/dL  6-29 days.................<15.0 mg/dL       Alkaline Phosphatase   Date Value Ref Range Status   11/26/2024 66 40 - 150 U/L Final     AST   Date Value Ref Range Status   11/26/2024 18 10 - 40 U/L Final     ALT   Date Value Ref Range Status   11/26/2024 15 10 - 44 U/L Final     Anion Gap   Date Value Ref Range Status   02/04/2025 9 8 - 16 mmol/L Final     eGFR if    Date Value Ref Range Status   01/27/2022 45.7 (A) >60 mL/min/1.73 m^2 Final     eGFR if non    Date Value Ref Range Status   01/27/2022 39.5 (A) >60 mL/min/1.73 m^2 Final     Comment:     Calculation used to obtain the estimated glomerular filtration  rate (eGFR) is the CKD-EPI equation.          INR:  Lab Results   Component Value Date    INR 1.0 08/07/2009       EKG:   Results for orders placed or performed in visit on 10/19/24   EKG 12-lead    Collection Time: 10/19/24 11:24 AM   Result Value Ref Range    QRS Duration 72 ms    [facility] QTC Calculation 399 ms    Narrative    Test Reason :     Vent. Rate : 067 BPM     Atrial Rate : 067 BPM     P-R Int : 150 ms          QRS Dur : 072 ms      QT Int : 378 ms       P-R-T  Axes : 072 050 -57 degrees     QTc Int : 399 ms    Sinus rhythm with sinus arrhythmia with frequent Premature ventricular  complexes  Abnormal R wave progression in the precordial leads  Abnormal ECG  When compared with ECG of 24-FEB-2023 10:58,  Anterior infarct is now Present  Confirmed by Daniel Wright MD ([id number]) on 10/23/2024 6:44:24 PM    Referred By: [email or web address]   SELF           Confirmed By:Daniel Wright MD        2D ECHO:   Results for orders placed during the hospital encounter of 01/07/25    Echo    Interpretation Summary    Left Ventricle: The left ventricle is normal in size. Normal wall thickness. There is normal systolic function with a visually estimated ejection fraction of 60 - 65%.    Right Ventricle: Normal right ventricular cavity size. Wall thickness is normal. Systolic function is normal.    Aortic Valve: The aortic valve is a trileaflet valve. There is severe aortic valve sclerosis. Severely restricted motion. There is severe stenosis. Aortic valve area by VTI is 1.1 cm2. Aortic valve peak velocity is 4 m/s. Mean gradient is 44 mmHg. The dimensionless index is 0.32.  After PVC, the gradient increases to 5 m/sec with a mean gradient of 56 mm Hg. There is mild aortic regurgitation.    Mitral Valve: There is mild bileaflet sclerosis.  No stenosis or insufficiency is present    Pulmonary Artery: There is mild pulmonary hypertension. The estimated pulmonary artery systolic pressure is 41 mmHg.    IVC/SVC: Normal venous pressure at 3 mmHg.         ASSESSMENT/PLAN:         Pre-op Assessment    I have reviewed the Patient Summary Reports.    I have reviewed the NPO Status.   I have reviewed the Medications.     Review of Systems  Anesthesia Hx:  No problems with previous Anesthesia   History of prior surgery of interest to airway management or planning:          Denies Family Hx of Anesthesia complications.    Denies Personal Hx of Anesthesia complications.                    Social:  Non-Smoker        Hematology/Oncology:  Hematology Normal   Oncology Normal                                   EENT/Dental:  EENT/Dental Normal           Cardiovascular:  Exercise tolerance: poor   Hypertension Valvular problems/Murmurs Past MI CAD                                          Pulmonary:   COPD                     Renal/:  Chronic Renal Disease, CKD                Hepatic/GI:  Hepatic/GI Normal                    Musculoskeletal:  Musculoskeletal Normal                Neurological:  Neurology Normal   Denies CVA.    Denies Seizures.                                Endocrine:  Diabetes           Dermatological:  Skin Normal    Psych:  Psychiatric Normal                    Physical Exam  General: Well nourished, Cooperative, Alert and Oriented    Airway:  Mallampati: III   Mouth Opening: Normal  TM Distance: Normal  Tongue: Normal  Neck ROM: Normal ROM    Dental:  Periodontal disease        Anesthesia Plan  Type of Anesthesia, risks & benefits discussed:    Anesthesia Type: Gen ETT, Gen Natural Airway, MAC, Gen Supraglottic Airway  Intra-op Monitoring Plan: Standard ASA Monitors and Art Line  Post Op Pain Control Plan: multimodal analgesia and IV/PO Opioids PRN  Induction:  IV  Airway Plan: Direct and Video, Post-Induction  Informed Consent: Informed consent signed with the Patient and all parties understand the risks and agree with anesthesia plan.  All questions answered.   ASA Score: 4  Day of Surgery Review of History & Physical: H&P Update referred to the surgeon/provider.    Ready For Surgery From Anesthesia Perspective.     .           [1]   Patient Active Problem List  Diagnosis    Low tension glaucoma    Nuclear sclerosis - Both Eyes    Exodeviation    Ureteral stricture    Stage 3b chronic kidney disease    Hypertension    Carpal tunnel syndrome on both sides    Carpal tunnel syndrome of left wrist    History of bladder cancer    Tortuous aorta    Coronary artery disease involving native coronary artery of  native heart without angina pectoris    S/P left heart catheterization by percutaneous approach    Anemia of chronic disease    Absolute anemia    Vitamin D deficiency    Hyperlipidemia    PAD (peripheral artery disease)    Tobacco use disorder, moderate, in sustained remission    Abdominal aortic atherosclerosis    Overweight (BMI 25.0-29.9)    Frailty    Chronic obstructive pulmonary disease, unspecified COPD type    Sensorineural hearing loss, bilateral    Patient nonadherence    Severe aortic stenosis    Primary open angle glaucoma (POAG) of left eye, moderate stage    Primary open angle glaucoma (POAG) of right eye, severe stage    Nuclear sclerotic cataract of both eyes    Type 2 diabetes mellitus with hyperglycemia, without long-term current use of insulin    Pulmonary hypertension    Atherosclerosis of native arteries of extremities with rest pain, left leg   [2]   Current Facility-Administered Medications on File Prior to Encounter   Medication Dose Route Frequency Provider Last Rate Last Admin    sodium chloride 0.9% flush 10 mL  10 mL Intravenous PRN Ajay Osullivan MD         Current Outpatient Medications on File Prior to Encounter   Medication Sig Dispense Refill    acetaminophen (TYLENOL) 500 MG tablet Take 1 tablet (500 mg total) by mouth every 6 (six) hours as needed for Pain. 30 tablet 0    amLODIPine (NORVASC) 5 MG tablet Take 1 tablet (5 mg total) by mouth once daily. 90 tablet 3    krill-om-3-dha-epa-phospho-ast 171-39-41-50 mg Cap Take 1 capsule by mouth once daily.      latanoprost 0.005 % ophthalmic solution INSTILL 1 DROP IN BOTH EYES EVERY EVENING 2.5 mL 6    losartan (COZAAR) 50 MG tablet TAKE 1 TABLET(50 MG) BY MOUTH EVERY DAY 90 tablet 3    metoprolol succinate (TOPROL-XL) 25 MG 24 hr tablet TAKE 1 TABLET(25 MG) BY MOUTH EVERY DAY 90 tablet 3    tamsulosin (FLOMAX) 0.4 mg Cap TAKE 1 CAPSULE(0.4 MG) BY MOUTH AFTER DINNER Strength: 0.4 mg 90 capsule 3    UNABLE TO FIND Take 1 tablet  by mouth Daily. Ruby Velazco     [3]   Social History  Socioeconomic History    Marital status: Single   Tobacco Use    Smoking status: Former     Current packs/day: 0.00     Average packs/day: 1 pack/day for 64.0 years (64.0 ttl pk-yrs)     Types: Cigarettes     Start date:      Quit date: 2019     Years since quittin.1     Passive exposure: Never    Smokeless tobacco: Never   Substance and Sexual Activity    Alcohol use: No    Drug use: No    Sexual activity: Not Currently     Partners: Female     Social Drivers of Health     Financial Resource Strain: Low Risk  (2024)    Overall Financial Resource Strain (CARDIA)     Difficulty of Paying Living Expenses: Not hard at all   Food Insecurity: No Food Insecurity (2024)    Hunger Vital Sign     Worried About Running Out of Food in the Last Year: Never true     Ran Out of Food in the Last Year: Never true   Transportation Needs: No Transportation Needs (2024)    PRAPARE - Transportation     Lack of Transportation (Medical): No     Lack of Transportation (Non-Medical): No   Physical Activity: Inactive (2024)    Exercise Vital Sign     Days of Exercise per Week: 0 days     Minutes of Exercise per Session: 0 min   Stress: No Stress Concern Present (2024)    [employer] - Occupational Stress Questionnaire     Feeling of Stress : Not at all   Housing Stability: Low Risk  (2024)    Housing Stability Vital Sign     Unable to Pay for Housing in the Last Year: No     Number of Places Lived in the Last Year: 1     Unstable Housing in the Last Year: No

## 2025-02-20 ENCOUNTER — ANESTHESIA (OUTPATIENT)
Dept: MEDSURG UNIT | Facility: HOSPITAL | Age: 85
End: 2025-02-20
Payer: MEDICARE

## 2025-02-20 ENCOUNTER — HOSPITAL ENCOUNTER (INPATIENT)
Facility: HOSPITAL | Age: 85
LOS: 1 days | Discharge: HOME | DRG: 267 | End: 2025-02-21
Attending: INTERNAL MEDICINE | Admitting: INTERNAL MEDICINE
Payer: MEDICARE

## 2025-02-20 DIAGNOSIS — I35.0 SEVERE AORTIC STENOSIS: ICD-10-CM

## 2025-02-20 DIAGNOSIS — Z01.818 PRE-OP TESTING: ICD-10-CM

## 2025-02-20 DIAGNOSIS — I35.0 AORTIC VALVE STENOSIS, ETIOLOGY OF CARDIAC VALVE DISEASE UNSPECIFIED: ICD-10-CM

## 2025-02-20 LAB
ABO + RH BLD: NORMAL
BLD GP AB SCN CELLS X3 SERPL QL: NORMAL
BNP SERPL-MCNC: 261 PG/ML (ref 0–99)
OHS QRS DURATION: 72 MS
OHS QRS DURATION: 82 MS
OHS QTC CALCULATION: 433 MS
OHS QTC CALCULATION: 456 MS
POCT GLUCOSE: 147 MG/DL (ref 70–110)
SPECIMEN OUTDATE: NORMAL

## 2025-02-20 PROCEDURE — C1894 INTRO/SHEATH, NON-LASER: HCPCS | Performed by: INTERNAL MEDICINE

## 2025-02-20 PROCEDURE — 93010 ELECTROCARDIOGRAM REPORT: CPT | Mod: ,,, | Performed by: INTERNAL MEDICINE

## 2025-02-20 PROCEDURE — 27201423 OPTIME MED/SURG SUP & DEVICES STERILE SUPPLY: Performed by: INTERNAL MEDICINE

## 2025-02-20 PROCEDURE — 37000008 HC ANESTHESIA 1ST 15 MINUTES: Performed by: INTERNAL MEDICINE

## 2025-02-20 PROCEDURE — 86901 BLOOD TYPING SEROLOGIC RH(D): CPT | Performed by: INTERNAL MEDICINE

## 2025-02-20 PROCEDURE — 33361 REPLACE AORTIC VALVE PERQ: CPT | Mod: Q0,62,, | Performed by: STUDENT IN AN ORGANIZED HEALTH CARE EDUCATION/TRAINING PROGRAM

## 2025-02-20 PROCEDURE — 27800903 OPTIME MED/SURG SUP & DEVICES OTHER IMPLANTS: Performed by: INTERNAL MEDICINE

## 2025-02-20 PROCEDURE — 20000000 HC ICU ROOM

## 2025-02-20 PROCEDURE — 63600175 PHARM REV CODE 636 W HCPCS: Performed by: INTERNAL MEDICINE

## 2025-02-20 PROCEDURE — 93010 ELECTROCARDIOGRAM REPORT: CPT | Mod: 76,,, | Performed by: INTERNAL MEDICINE

## 2025-02-20 PROCEDURE — 94761 N-INVAS EAR/PLS OXIMETRY MLT: CPT

## 2025-02-20 PROCEDURE — 83880 ASSAY OF NATRIURETIC PEPTIDE: CPT | Performed by: INTERNAL MEDICINE

## 2025-02-20 PROCEDURE — 93005 ELECTROCARDIOGRAM TRACING: CPT

## 2025-02-20 PROCEDURE — 37000009 HC ANESTHESIA EA ADD 15 MINS: Performed by: INTERNAL MEDICINE

## 2025-02-20 PROCEDURE — 25000003 PHARM REV CODE 250: Performed by: INTERNAL MEDICINE

## 2025-02-20 PROCEDURE — C1753 CATH, INTRAVAS ULTRASOUND: HCPCS | Performed by: INTERNAL MEDICINE

## 2025-02-20 PROCEDURE — 25000003 PHARM REV CODE 250: Performed by: STUDENT IN AN ORGANIZED HEALTH CARE EDUCATION/TRAINING PROGRAM

## 2025-02-20 PROCEDURE — 63600175 PHARM REV CODE 636 W HCPCS: Performed by: STUDENT IN AN ORGANIZED HEALTH CARE EDUCATION/TRAINING PROGRAM

## 2025-02-20 PROCEDURE — 33361 REPLACE AORTIC VALVE PERQ: CPT | Mod: Q0,62,, | Performed by: INTERNAL MEDICINE

## 2025-02-20 PROCEDURE — 36620 INSERTION CATHETER ARTERY: CPT

## 2025-02-20 PROCEDURE — 25500020 PHARM REV CODE 255: Performed by: INTERNAL MEDICINE

## 2025-02-20 PROCEDURE — C1760 CLOSURE DEV, VASC: HCPCS | Performed by: INTERNAL MEDICINE

## 2025-02-20 PROCEDURE — 33361 REPLACE AORTIC VALVE PERQ: CPT | Mod: Q0,62 | Performed by: INTERNAL MEDICINE

## 2025-02-20 PROCEDURE — C1769 GUIDE WIRE: HCPCS | Performed by: INTERNAL MEDICINE

## 2025-02-20 PROCEDURE — 02RF38Z REPLACEMENT OF AORTIC VALVE WITH ZOOPLASTIC TISSUE, PERCUTANEOUS APPROACH: ICD-10-PCS | Performed by: INTERNAL MEDICINE

## 2025-02-20 DEVICE — TAV EVFXPLUS-29 COMM US
Type: IMPLANTABLE DEVICE | Site: HEART | Status: FUNCTIONAL
Brand: EVOLUT™ FX+

## 2025-02-20 DEVICE — SYS EVOLUT FX LOADING 23-29MM: Type: IMPLANTABLE DEVICE | Site: HEART | Status: FUNCTIONAL

## 2025-02-20 DEVICE — SYS EVOLUT FX DELIVERY 23-29MM: Type: IMPLANTABLE DEVICE | Site: HEART | Status: FUNCTIONAL

## 2025-02-20 RX ORDER — ACETAMINOPHEN 325 MG/1
650 TABLET ORAL EVERY 4 HOURS PRN
Status: DISCONTINUED | OUTPATIENT
Start: 2025-02-20 | End: 2025-02-21 | Stop reason: HOSPADM

## 2025-02-20 RX ORDER — DIPHENHYDRAMINE HCL 50 MG
50 CAPSULE ORAL ONCE
Status: COMPLETED | OUTPATIENT
Start: 2025-02-20 | End: 2025-02-20

## 2025-02-20 RX ORDER — NICARDIPINE HYDROCHLORIDE 0.2 MG/ML
INJECTION INTRAVENOUS CONTINUOUS PRN
Status: DISCONTINUED | OUTPATIENT
Start: 2025-02-20 | End: 2025-02-20

## 2025-02-20 RX ORDER — ASPIRIN 325 MG
325 TABLET ORAL ONCE
Status: COMPLETED | OUTPATIENT
Start: 2025-02-20 | End: 2025-02-20

## 2025-02-20 RX ORDER — SODIUM CHLORIDE 9 MG/ML
INJECTION, SOLUTION INTRAVENOUS CONTINUOUS
Status: ACTIVE | OUTPATIENT
Start: 2025-02-20 | End: 2025-02-20

## 2025-02-20 RX ORDER — PROTAMINE SULFATE 10 MG/ML
INJECTION, SOLUTION INTRAVENOUS
Status: DISCONTINUED | OUTPATIENT
Start: 2025-02-20 | End: 2025-02-20

## 2025-02-20 RX ORDER — HEPARIN SODIUM 1000 [USP'U]/ML
INJECTION, SOLUTION INTRAVENOUS; SUBCUTANEOUS
Status: DISCONTINUED | OUTPATIENT
Start: 2025-02-20 | End: 2025-02-20

## 2025-02-20 RX ORDER — LIDOCAINE HYDROCHLORIDE 20 MG/ML
INJECTION, SOLUTION EPIDURAL; INFILTRATION; INTRACAUDAL; PERINEURAL
Status: DISCONTINUED | OUTPATIENT
Start: 2025-02-20 | End: 2025-02-20 | Stop reason: HOSPADM

## 2025-02-20 RX ORDER — DEXMEDETOMIDINE HYDROCHLORIDE 100 UG/ML
INJECTION, SOLUTION INTRAVENOUS
Status: DISCONTINUED | OUTPATIENT
Start: 2025-02-20 | End: 2025-02-20

## 2025-02-20 RX ORDER — PROPOFOL 10 MG/ML
VIAL (ML) INTRAVENOUS CONTINUOUS PRN
Status: DISCONTINUED | OUTPATIENT
Start: 2025-02-20 | End: 2025-02-20

## 2025-02-20 RX ORDER — ONDANSETRON 8 MG/1
8 TABLET, ORALLY DISINTEGRATING ORAL EVERY 8 HOURS PRN
Status: DISCONTINUED | OUTPATIENT
Start: 2025-02-20 | End: 2025-02-21 | Stop reason: HOSPADM

## 2025-02-20 RX ORDER — CEFAZOLIN SODIUM 1 G/3ML
INJECTION, POWDER, FOR SOLUTION INTRAMUSCULAR; INTRAVENOUS
Status: DISCONTINUED | OUTPATIENT
Start: 2025-02-20 | End: 2025-02-20

## 2025-02-20 RX ORDER — PHENYLEPHRINE HCL IN 0.9% NACL 1 MG/10 ML
SYRINGE (ML) INTRAVENOUS
Status: DISCONTINUED | OUTPATIENT
Start: 2025-02-20 | End: 2025-02-20

## 2025-02-20 RX ORDER — SODIUM CHLORIDE 9 MG/ML
INJECTION, SOLUTION INTRAVENOUS CONTINUOUS
Status: DISCONTINUED | OUTPATIENT
Start: 2025-02-20 | End: 2025-02-21 | Stop reason: HOSPADM

## 2025-02-20 RX ORDER — LIDOCAINE HYDROCHLORIDE 20 MG/ML
INJECTION, SOLUTION EPIDURAL; INFILTRATION; INTRACAUDAL; PERINEURAL
Status: DISCONTINUED | OUTPATIENT
Start: 2025-02-20 | End: 2025-02-20

## 2025-02-20 RX ORDER — FENTANYL CITRATE 50 UG/ML
INJECTION, SOLUTION INTRAMUSCULAR; INTRAVENOUS
Status: DISCONTINUED | OUTPATIENT
Start: 2025-02-20 | End: 2025-02-20

## 2025-02-20 RX ADMIN — ASPIRIN 325 MG ORAL TABLET 325 MG: 325 PILL ORAL at 10:02

## 2025-02-20 RX ADMIN — Medication 50 MCG: at 11:02

## 2025-02-20 RX ADMIN — Medication 100 MCG: at 11:02

## 2025-02-20 RX ADMIN — CEFAZOLIN 2 G: 330 INJECTION, POWDER, FOR SOLUTION INTRAMUSCULAR; INTRAVENOUS at 11:02

## 2025-02-20 RX ADMIN — SODIUM CHLORIDE: 9 INJECTION, SOLUTION INTRAVENOUS at 09:02

## 2025-02-20 RX ADMIN — HEPARIN SODIUM 10000 UNITS: 1000 INJECTION, SOLUTION INTRAVENOUS; SUBCUTANEOUS at 11:02

## 2025-02-20 RX ADMIN — PROTAMINE SULFATE 120 MG: 10 INJECTION, SOLUTION INTRAVENOUS at 11:02

## 2025-02-20 RX ADMIN — SODIUM CHLORIDE: 9 INJECTION, SOLUTION INTRAVENOUS at 02:02

## 2025-02-20 RX ADMIN — DIPHENHYDRAMINE HYDROCHLORIDE 50 MG: 50 CAPSULE ORAL at 10:02

## 2025-02-20 RX ADMIN — ACETAMINOPHEN 650 MG: 325 TABLET ORAL at 07:02

## 2025-02-20 RX ADMIN — NOREPINEPHRINE BITARTRATE 0.02 MCG/KG/MIN: 1 INJECTION, SOLUTION, CONCENTRATE INTRAVENOUS at 10:02

## 2025-02-20 RX ADMIN — FENTANYL CITRATE 50 MCG: 50 INJECTION INTRAMUSCULAR; INTRAVENOUS at 10:02

## 2025-02-20 RX ADMIN — SODIUM CHLORIDE: 0.9 INJECTION, SOLUTION INTRAVENOUS at 10:02

## 2025-02-20 RX ADMIN — GLYCOPYRROLATE 0.2 MG: 0.2 INJECTION, SOLUTION INTRAMUSCULAR; INTRAVENOUS at 10:02

## 2025-02-20 RX ADMIN — LIDOCAINE HYDROCHLORIDE 80 MG: 20 INJECTION, SOLUTION EPIDURAL; INFILTRATION; INTRACAUDAL; PERINEURAL at 10:02

## 2025-02-20 RX ADMIN — IOHEXOL 100 ML: 350 INJECTION, SOLUTION INTRAVENOUS at 12:02

## 2025-02-20 RX ADMIN — HEPARIN SODIUM 2000 UNITS: 1000 INJECTION, SOLUTION INTRAVENOUS; SUBCUTANEOUS at 11:02

## 2025-02-20 RX ADMIN — PROPOFOL 100 MCG/KG/MIN: 10 INJECTION, EMULSION INTRAVENOUS at 10:02

## 2025-02-20 RX ADMIN — SODIUM CHLORIDE, SODIUM GLUCONATE, SODIUM ACETATE, POTASSIUM CHLORIDE, MAGNESIUM CHLORIDE, SODIUM PHOSPHATE, DIBASIC, AND POTASSIUM PHOSPHATE: .53; .5; .37; .037; .03; .012; .00082 INJECTION, SOLUTION INTRAVENOUS at 10:02

## 2025-02-20 RX ADMIN — DEXMEDETOMIDINE 8 MCG: 200 INJECTION, SOLUTION INTRAVENOUS at 10:02

## 2025-02-20 RX ADMIN — NICARDIPINE HYDROCHLORIDE 5 MG/HR: 0.2 INJECTION INTRAVENOUS at 12:02

## 2025-02-20 NOTE — Clinical Note
The catheter was repositioned into the aorta. An angiography was performed of the aorta. The angiography was performed via power injection. The injected amount was 30 mL contrast at 20 mL/s. The PSI from the power injection was 700.

## 2025-02-20 NOTE — H&P
H&P  [facility]  Interventional Cardiology      Laura Harris  YOB: 1940  Medical Record Number:  [id number]  Attending Physician:  Ajay Osullivan MD   Date of Admission: (Not on file)       Hospital Day:  0    General Cardiologist: Dr. Lucas  PCP: Dr. Villalobos     HPI:      Laura Harris is a 84 y.o. male with HTN, HLD, PAD olaf 2a, CKD, severe AS who presents for a TAVR     Patient last seen over a year ago in clinic.  Was following for asymptomatic severe AS.  Today patient reporting dyspnea on exertion with regular activities. CTA TAVR results showed multifocal pulmonary emboli without any evidence of right heart strain, for which patient is to follow-up with PCP Dr. Villalobos.  Denies chest pains, palpitations, orthopnea, syncope.    Medications - Outpatient  Prior to Admission medications    Medication Sig Start Date End Date Taking? Authorizing Provider   acetaminophen (TYLENOL) 500 MG tablet Take 1 tablet (500 mg total) by mouth every 6 (six) hours as needed for Pain. 6/6/22   Eddy Mijares PA-C   amLODIPine (NORVASC) 5 MG tablet Take 1 tablet (5 mg total) by mouth once daily. 6/4/24 6/4/25  Riley Villalobos MD   apixaban (ELIQUIS) 2.5 mg Tab Take 1 tablet (2.5 mg total) by mouth 2 (two) times daily. 1/24/25 2/23/25  Riley Villalobos MD   atorvastatin (LIPITOR) 80 MG tablet TAKE 1 TABLET(80 MG) BY MOUTH EVERY DAY 2/11/25   Citlali Hope, NP   dsrtk-wz-1-dha-epa-phospho-ast 938-69-96-50 mg Cap Take 1 capsule by mouth once daily.    Provider, Historical   latanoprost 0.005 % ophthalmic solution INSTILL 1 DROP IN BOTH EYES EVERY EVENING 11/16/23   Brennan Soliz MD   losartan (COZAAR) 50 MG tablet TAKE 1 TABLET(50 MG) BY MOUTH EVERY DAY 12/18/24   Riley Villalobos MD   metoprolol succinate (TOPROL-XL) 25 MG 24 hr tablet TAKE 1 TABLET(25 MG) BY MOUTH EVERY DAY 3/26/24   Citlali Hope, NP   tamsulosin (FLOMAX) 0.4 mg Cap  TAKE 1 CAPSULE(0.4 MG) BY MOUTH AFTER DINNER Strength: 0.4 mg 10/12/23   Riley Villalobos MD   UNABLE TO FIND Take 1 tablet by mouth Daily. Ruby Velazco    Provider, Historical         Medications - Current  Scheduled Meds:  Continuous Infusions:  PRN Meds:.  Current Facility-Administered Medications:     sodium chloride 0.9%, 10 mL, Intravenous, PRN      Allergies  Review of patient's allergies indicates:   Allergen Reactions    Ace inhibitors      Other reaction(s): Angioedema    Tizanidine Hives    Ultram [tramadol] Nausea Only and Other (See Comments)     Dizziness         Past Medical History  Past Medical History:   Diagnosis Date    Anemia of chronic disease 11/19/2019    Arthritis     Cataract     Chronic obstructive pulmonary disease, unspecified COPD type 1/26/2022    CKD (chronic kidney disease) stage 3, GFR 30-59 ml/min 5/1/2014    Colon polyp 05/12/2015    Coronary artery disease involving native coronary artery of native heart without angina pectoris 10/7/2019    Hyperlipidemia     Hypertension     Low tension glaucoma     Type 2 diabetes mellitus with hyperglycemia, without long-term current use of insulin 2/6/2024         Past Surgical History  Past Surgical History:   Procedure Laterality Date    COLONOSCOPY N/A 9/16/2021    Procedure: COLONOSCOPY;  Surgeon: Kit Nicholson MD;  Location: [facility] ENDO;  Service: Endoscopy;  Laterality: N/A;    CORONARY STENT PLACEMENT N/A 3/4/2019    Procedure: INSERTION, STENT, CORONARY ARTERY;  Surgeon: Brennan Stein MD;  Location: [facility] CATH LAB/EP;  Service: Cardiology;  Laterality: N/A;    CYSTOSCOPY      ESOPHAGOGASTRODUODENOSCOPY N/A 9/16/2021    Procedure: EGD (ESOPHAGOGASTRODUODENOSCOPY);  Surgeon: Kit Nicholson MD;  Location: [facility] ENDO;  Service: Endoscopy;  Laterality: N/A;    LEFT HEART CATHETERIZATION N/A 3/4/2019    Procedure: Left heart cath;  Surgeon: Brennan Stein MD;  Location: [facility] CATH LAB/EP;  Service: Cardiology;   Laterality: N/A;    LEFT HEART CATHETERIZATION Left 2023    Procedure: Left heart cath;  Surgeon: Bob Pinto MD;  [facility] CATH LAB[facility]EP;  Service: Cardiology;  Laterality: Left;         Social History  Social History     Socioeconomic History    Marital status: Single   Tobacco Use    Smoking status: Former     Current packs/day: 0.00     Average packs/day: 1 pack/day for 64.0 years (64.0 ttl pk-yrs)     Types: Cigarettes     Start date:      Quit date:      Years since quittin.1     Passive exposure: Never    Smokeless tobacco: Never   Substance and Sexual Activity    Alcohol use: No    Drug use: No    Sexual activity: Not Currently     Partners: Female     Social Drivers of Health     Financial Resource Strain: Low Risk  (2024)    Overall Financial Resource Strain (CARDIA)     Difficulty of Paying Living Expenses: Not hard at all   Food Insecurity: No Food Insecurity (2024)    Hunger Vital Sign     Worried About Running Out of Food in the Last Year: Never true     Ran Out of Food in the Last Year: Never true   Transportation Needs: No Transportation Needs (2024)    PRAPARE - Transportation     Lack of Transportation (Medical): No     Lack of Transportation (Non-Medical): No   Physical Activity: Inactive (2024)    Exercise Vital Sign     Days of Exercise per Week: 0 days     Minutes of Exercise per Session: 0 min   Stress: No Stress Concern Present (2024)    [employer] [place] [employer] - Occupational Stress Questionnaire     Feeling of Stress : Not at all   Housing Stability: Low Risk  (2024)    Housing Stability Vital Sign     Unable to Pay for Housing in the Last Year: No     Number of Places Lived in the Last Year: 1     Unstable Housing in the Last Year: No         ROS:     Cardiovascular:  Positive for dyspnea on exertion. Negative for chest pain, orthopnea, palpitations, paroxysmal nocturnal dyspnea and syncope.       PHYSICAL EXAM:     Vital  "Signs             24 Hour VS Range    BP: ()/()   Arterial Line BP: ()/()   No intake or output data in the 24 hours ending 02/20/25 0742      Constitutional:       General: He is not in acute distress.     Appearance: Normal appearance. He is not ill-appearing or toxic-appearing.   HENT:      Head: Normocephalic and atraumatic.   Eyes:      Pupils: Pupils are equal, round, and reactive to light.   Cardiovascular:      Rate and Rhythm: Normal rate and regular rhythm.      Pulses: Normal pulses.      Heart sounds: Murmur heard.   JVD present     Systolic murmur is present with a grade of 3/6.      No friction rub. No gallop.   Pulmonary:      Effort: Pulmonary effort is normal. No respiratory distress.   Musculoskeletal:         General: Normal range of motion.      Cervical back: Normal range of motion.      Right lower leg: Edema present.      Left lower leg: Edema present.   Skin:     General: Skin is warm and dry.      Capillary Refill: Capillary refill takes less than 2 seconds.   Neurological:      General: No focal deficit present.      Mental Status: He is alert      DATA:       No results for input(s): "WBC", "HGB", "HCT", "PLT" in the last 168 hours.     No results for input(s): "PROTIME", "INR" in the last 168 hours.     No results for input(s): "NA", "K", "CL", "CO2", "BUN", "CREATININE", "ANIONGAP", "CALCIUM" in the last 168 hours.     No results for input(s): "PROT", "ALBUMIN", "BILITOT", "ALKPHOS", "AST", "ALT" in the last 168 hours.     No results for input(s): "TROPONINI" in the last 168 hours.     BNP (pg/mL)   Date Value   07/30/2021 86   11/11/2020 306 (H)   10/08/2019 29   03/01/2019 151 (H)       No results for input(s): "LABBLOO" in the last 168 hours.     TTE (1/7/2024):    Left Ventricle: The left ventricle is normal in size. Normal wall thickness. There is normal systolic function with a visually estimated ejection fraction of 60 - 65%.    Right Ventricle: Normal right ventricular cavity " size. Wall thickness is normal. Systolic function is normal.    Aortic Valve: The aortic valve is a trileaflet valve. There is severe aortic valve sclerosis. Severely restricted motion. There is severe stenosis. Aortic valve area by VTI is 1.1 cm2. Aortic valve peak velocity is 4 m/s. Mean gradient is 44 mmHg. The dimensionless index is 0.32.  After PVC, the gradient increases to 5 m/sec with a mean gradient of 56 mm Hg. There is mild aortic regurgitation.    Mitral Valve: There is mild bileaflet sclerosis.  No stenosis or insufficiency is present    Pulmonary Artery: There is mild pulmonary hypertension. The estimated pulmonary artery systolic pressure is 41 mmHg.    IVC/SVC: Normal venous pressure at 3 mmHg.    Caths:   2/24/2023:  Patent LM  Luminal irregularities in LAD, D1, LCX, OM1, and OM2  Patent LCX stent   Small non dominant RCA     CTA TAVR (1/7/2024):  Multifocal pulmonary emboli without any evidence of right heart strain.    Echo 1/7/25    Left Ventricle: The left ventricle is normal in size. Normal wall thickness. There is normal systolic function with a visually estimated ejection fraction of 60 - 65%.    Right Ventricle: Normal right ventricular cavity size. Wall thickness is normal. Systolic function is normal.    Aortic Valve: The aortic valve is a trileaflet valve. There is severe aortic valve sclerosis. Severely restricted motion. There is severe stenosis. Aortic valve area by VTI is 1.1 cm2. Aortic valve peak velocity is 4 m/s. Mean gradient is 44 mmHg. The dimensionless index is 0.32.  After PVC, the gradient increases to 5 m/sec with a mean gradient of 56 mm Hg. There is mild aortic regurgitation.    Mitral Valve: There is mild bileaflet sclerosis.  No stenosis or insufficiency is present    Pulmonary Artery: There is mild pulmonary hypertension. The estimated pulmonary artery systolic pressure is 41 mmHg.    IVC/SVC: Normal venous pressure at 3 mmHg.    ASSESSMENT/PLAN:      Severe aortic  stenosis      Severe aortic stenosis  - symptomatic severe AS  - patient with LHC and CTA completed  - plan to proceed with TAVR    - TAVR  - Cr 1.6 with CKD 3b  - BNP pending, sent this morning   - Pre-Op Med: Bendaryl 50mg pO   - All patient's questions were answered.  -The risks, benefits and alternatives of the procedure were explained to the patient.   -The risks of TAVR include but are not limited to: CHB needing a PPM, bleeding, infection, heart rhythm abnormalities, allergic reactions, kidney injury and potential need for dialysis, stroke and death.   -The risks of sedation include hypotension, respiratory depression, arrhythmias, bronchospasm, and death.   - Informed consent was obtained and the  patient is agreeable to proceed with the procedure.       Signed:  Tad Marlow  Cardiology Fellow PGY-6  2/20/2025  7:42 AM   [facility] [place] [facility], [place]

## 2025-02-20 NOTE — ANESTHESIA PROCEDURE NOTES
Right Radial Arterial Line    Diagnosis: Aortic Stenosis  Doctor requesting consult: Dr. Edouard    Patient location during procedure: done in OR  Timeout: 2/20/2025 11:00 AM  Procedure end time: 2/20/2025 11:05 AM    Staffing  Authorizing Provider: Tomasz Travis MD  Performing Provider: Bhavesh Navarrete MD    Staffing  Performed by: Bhavesh Navarrete MD  Authorized by: Tomasz Travis MD    Anesthesiologist was present at the time of the procedure.    Preanesthetic Checklist  Completed: patient identified, IV checked, risks and benefits discussed, surgical consent, monitors and equipment checked, pre-op evaluation, timeout performed and anesthesia consent givenRight Radial Arterial Line  Skin Prep: chlorhexidine gluconate and isopropyl alcohol  Local Infiltration: lidocaine  Orientation: right  Location: radial    Catheter Size: 20 G  Catheter placement by Ultrasound guidance. Heme positive aspiration all ports.   Vessel Caliber: medium, patent  Needle advanced into vessel with real time Ultrasound guidance.Insertion Attempts: 1  Assessment  Dressing: secured with tape and tegaderm  Patient: Tolerated well

## 2025-02-20 NOTE — CARE UPDATE
Laura Harris is a 84 y.o. male referred by Dr Lucas for evaluation of severe AS (NYHA Class II-III sx).    The patient has undergone the following TAVR work-up:   ECHO (Date 01/07/25): ALVERTO= 1.1 cm2, MG= 44mmHg, Peak Brooks= 4 m/s, EF= 60-65%.   LHC (Date 02/24/23): patent Lcx stent   STS: 3.4%   Frailty: assessed   Iliacs are >9 on R   LVOT area by CTA is 3.97 cm2 (25.3 mm X 20.6 mm) and Avg Diameter is 22.2 per my independent review of images on [employer].  Incidental findings on CT: multifocal PE, hepatic cyst   CT Surgery risk assessment: moderate risk, per Dr Friedman due to age  Rhythm issues: NSR  PFTs: N/A  KCCQ/5 meter walk: done  Comorbidities: HTN, HLD, CKD3, PAD      Laura Harris is a 26 mm Evolut valve candidate via RTF access.    Transcatheter Aortic valve replacement   Valve: likely 26mm Evolut (catheter sizing)  ECMO:  On Call  TAVR access: RTF  Valvuloplasty balloon: N/A  Viabahn size if needed: 10 x 5  Antithrombotic therapy: eliqius  Temporary pacing wire: No, Will pace from TAVR wire  Pacemaker risk factors: none   EP Consult ordered if post procedure arrhythmias develop  Post op destination: Stepdown  To be done during procedure:  Antibiotics given  CT Surgery present in lab for valve deployment per standard of care.  Surgeon :  Heart failure on admission?  CONSENTING:  The patient was told that the procedure carries around a 12.5% risk of permanent pacemaker requirement and that risk depends on the patients underlying conduction system as described in item 8.   Prior to the [facility] visit, all available records from referring provider were reviewed.  I have personally reviewed all the lab tests available related to the patient's case  The patient's images were reviewed by myself and the procedural planning was done with my own interpretation of the iliac and aortic anatomy based on CTA, angiography or JB. I have reviewed all other imaging studies relevant to the patient including  echocardiography and coronary angiography.  Patient was educated abut the pathophysiology and natural history of severe aortic stenosis and was educated about the treatment options including surgical and transcatheter valve replacement. She agrees to be full code for the forseable future and to undergo workup for treatment of severe AS.   The risks, benefits, and alternatives of transcatheter aortic valve replacement were discussed with the patient. All questions were answered and informed consent was obtained. I had a detailed discussion with the patient regarding risk of stroke, MI, bleeding access site complications including limb loss, allergy, kidney failure including dialysis and death.  The patient understands the risks and benefits and wishes to go ahead with the procedure.  The referring Cardiologist was notified of the plan  All patient's questions were answered    Shared Decision Making:  This patient was presented to a multidisciplinary heart team involving both a Structural Heart Specialist (Interventional Cardiologist) and a Cardiothoracic Surgeon. The patient was involved in shared decision-making to define clearer goals for treatment and align health decisions with their current values.  The patient understands the risks and expected benefits of their treatment options.

## 2025-02-20 NOTE — TRANSFER OF CARE
"Anesthesia Transfer of Care Note    Patient: Laura Harris    Procedure(s) Performed: Procedure(s) (LRB):  REPLACEMENT, AORTIC VALVE, TRANSCATHETER (TAVR) (N/A)  Cardiac Cath Cosurgeon (N/A)  REPLACEMENT, AORTIC VALVE, TRANSCATHETER (TAVR)  Intravascular Ultrasound, Non-Coronary    Patient location: ICU    Anesthesia Type: general    Transport from OR: Transported from OR on 6-10 L/min O2 by face mask with adequate spontaneous ventilation    Post pain: adequate analgesia    Post assessment: no apparent anesthetic complications    Post vital signs: stable    Level of consciousness: responds to stimulation    Nausea/Vomiting: no nausea/vomiting    Complications: none    Transfer of care protocol was followed      Last vitals: Visit Vitals  BP (!) 160/84 (BP Location: Right arm, Patient Position: Lying)   Pulse (!) 59   Temp 36.5 °C (97.7 °F) (Temporal)   Resp 16   Ht 5' 8" (1.727 m)   Wt 86.2 kg (190 lb)   SpO2 98%   BMI 28.89 kg/m²     "

## 2025-02-20 NOTE — NURSING
Pt in room 2 on SSCU to prep for procedure. Pt is AAOx4 and follows commands appropriately. Pt is free from s/s of pain/distress. Preop questions completed, ivs placed, mepilex applied to heals/bottom, 12 lead EKG completed, sites clipped, and procedural consents completed. Safety measures in place.      NS gtt started but Benadryl not given due to pt not having spoken to the ANES MD at this time.

## 2025-02-20 NOTE — Clinical Note
An angiography was performed of the aorta. The angiography was performed via power injection. The injected amount was 40 mL contrast at 20 mL/s. The PSI from the power injection was 700.

## 2025-02-21 VITALS
DIASTOLIC BLOOD PRESSURE: 71 MMHG | TEMPERATURE: 99 F | WEIGHT: 190.06 LBS | OXYGEN SATURATION: 96 % | HEIGHT: 68 IN | RESPIRATION RATE: 18 BRPM | BODY MASS INDEX: 28.8 KG/M2 | HEART RATE: 63 BPM | SYSTOLIC BLOOD PRESSURE: 165 MMHG

## 2025-02-21 DIAGNOSIS — Z00.00 ENCOUNTER FOR MEDICARE ANNUAL WELLNESS EXAM: ICD-10-CM

## 2025-02-21 LAB
OHS QRS DURATION: 76 MS
OHS QRS DURATION: 80 MS
OHS QTC CALCULATION: 416 MS
OHS QTC CALCULATION: 438 MS
POC ACTIVATED CLOTTING TIME K: 250 SEC (ref 74–137)
POC ACTIVATED CLOTTING TIME K: 274 SEC (ref 74–137)
POCT GLUCOSE: 115 MG/DL (ref 70–110)
SAMPLE: ABNORMAL
SAMPLE: ABNORMAL

## 2025-02-21 PROCEDURE — 93005 ELECTROCARDIOGRAM TRACING: CPT

## 2025-02-21 PROCEDURE — 25000003 PHARM REV CODE 250: Performed by: STUDENT IN AN ORGANIZED HEALTH CARE EDUCATION/TRAINING PROGRAM

## 2025-02-21 PROCEDURE — 99239 HOSP IP/OBS DSCHRG MGMT >30: CPT | Mod: GC,,, | Performed by: INTERNAL MEDICINE

## 2025-02-21 PROCEDURE — 93010 ELECTROCARDIOGRAM REPORT: CPT | Mod: ,,, | Performed by: INTERNAL MEDICINE

## 2025-02-21 PROCEDURE — 94761 N-INVAS EAR/PLS OXIMETRY MLT: CPT

## 2025-02-21 RX ORDER — AMLODIPINE BESYLATE 5 MG/1
5 TABLET ORAL DAILY
Status: DISCONTINUED | OUTPATIENT
Start: 2025-02-21 | End: 2025-02-21 | Stop reason: HOSPADM

## 2025-02-21 RX ORDER — METOPROLOL SUCCINATE 25 MG/1
25 TABLET, EXTENDED RELEASE ORAL DAILY
Status: DISCONTINUED | OUTPATIENT
Start: 2025-02-21 | End: 2025-02-21 | Stop reason: HOSPADM

## 2025-02-21 RX ORDER — LOSARTAN POTASSIUM 50 MG/1
50 TABLET ORAL DAILY
Status: DISCONTINUED | OUTPATIENT
Start: 2025-02-21 | End: 2025-02-21 | Stop reason: HOSPADM

## 2025-02-21 RX ADMIN — AMLODIPINE BESYLATE 5 MG: 5 TABLET ORAL at 09:02

## 2025-02-21 RX ADMIN — METOPROLOL SUCCINATE 25 MG: 25 TABLET, EXTENDED RELEASE ORAL at 09:02

## 2025-02-21 RX ADMIN — LOSARTAN POTASSIUM 50 MG: 50 TABLET, FILM COATED ORAL at 09:02

## 2025-02-21 NOTE — PLAN OF CARE
Problem: Adult Inpatient Plan of Care  Goal: Plan of Care Review  Outcome: Progressing  Flowsheets (Taken 2/21/2025 0322)  Plan of Care Reviewed With:   patient   sibling  Goal: Patient-Specific Goal (Individualized)  Outcome: Progressing  Flowsheets (Taken 2/21/2025 0322)  Individualized Care Needs: None  Anxieties, Fears or Concerns: None  Patient/Family-Specific Goals (Include Timeframe): None stated  Goal: Absence of Hospital-Acquired Illness or Injury  Outcome: Progressing     Problem: Fall Injury Risk  Goal: Absence of Fall and Fall-Related Injury  Outcome: Progressing  Intervention: Identify and Manage Contributors  Flowsheets (Taken 2/21/2025 0322)  Self-Care Promotion:   independence encouraged   BADL personal objects within reach   BADL personal routines maintained  Medication Review/Management: medications reviewed  Intervention: Promote Injury-Free Environment  Flowsheets (Taken 2/21/2025 0315)  Safety Promotion/Fall Prevention:   assistive device/personal item within reach   bed alarm set   Fall Risk signage in place   pulse ox   room near unit station   side rails raised x 3   Fall Risk reviewed with patient/family   patient verbalized intentions of noncompliance     Problem: Skin Injury Risk Increased  Goal: Skin Health and Integrity  Outcome: Progressing     Problem: Wound  Goal: Skin Health and Integrity  Outcome: Progressing

## 2025-02-21 NOTE — PLAN OF CARE
CICU DAILY GOALS       A: Awake    RASS: Goal -    Actual - RASS (Roy Agitation-Sedation Scale): alert and calm   Restraint necessity:    B: Breath   SBT: Not intubated   C: Coordinate A & B, analgesics/sedatives   Pain: managed    SAT: Not intubated  D: Delirium   CAM-ICU:    E: Early(intubated/ Progressive (non-intubated) Mobility   MOVE Screen: Pass   Activity: Activity Management: Sitting at edge of bed - L2  FAS: Feeding/Nutrition   Diet order: Diet/Nutrition Received: NPO,   Fluid restriction:     Nutritional Supplement Intake: Quantity 0, Type:  n/a  T: Thrombus   DVT prophylaxis: VTE Core Measure: Pharmacological prophylaxis initiated/maintained  H: HOB Elevation   Head of Bed (HOB) Positioning: HOB at 30 degrees  U: Ulcer Prophylaxis   GI: yes  G: Glucose control   managed Glycemic Management: blood glucose monitored  S: Skin   Bathing/Skin Care: bath, complete (02/20/25 1600)  Wounds: No  Wound care consulted: No  B: Bowel Function   no issues   I: Indwelling Catheters   Michael necessity:     CVC necessity: No  D: De-escalation Antibx   No  Plan for the day   Post tavr icu monitoring.   Family/Goals of care/Code Status   Code Status: Prior     No acute events throughout day, VS and assessment per flow sheet, patient progressing towards goals as tolerated, plan of care reviewed with Laura Harris and family, all concerns addressed, will continue to monitor.

## 2025-02-21 NOTE — ANESTHESIA POSTPROCEDURE EVALUATION
Anesthesia Post Evaluation    Patient: Laura Harris    Procedure(s) Performed: Procedure(s) (LRB):  REPLACEMENT, AORTIC VALVE, TRANSCATHETER (TAVR) (N/A)  Cardiac Cath Cosurgeon (N/A)  REPLACEMENT, AORTIC VALVE, TRANSCATHETER (TAVR)  Intravascular Ultrasound, Non-Coronary    Final Anesthesia Type: general (Natural airway)      Patient location during evaluation: ICU  Patient participation: Yes- Able to Participate  Level of consciousness: awake and alert  Post-procedure vital signs: reviewed and stable  Pain management: adequate  Airway patency: patent    PONV status at discharge: No PONV  Anesthetic complications: no      Cardiovascular status: hemodynamically stable  Respiratory status: unassisted  Hydration status: euvolemic  Follow-up not needed.              Vitals Value Taken Time   /64 02/21/25 06:02   Temp 37.2 °C (99 °F) 02/21/25 03:15   Pulse 64 02/21/25 06:32   Resp 20 02/21/25 06:32   SpO2 92 % 02/21/25 06:32   Vitals shown include unfiled device data.      No case tracking events are documented in the log.      Pain/Patsy Score: Pain Rating Prior to Med Admin: 8 (2/20/2025  7:46 PM)  Pain Rating Post Med Admin: 0 (2/20/2025  8:46 PM)

## 2025-02-21 NOTE — DISCHARGE SUMMARY
[facility] - Cardiac Intensive Care  Interventional Cardiology  Discharge Summary      Patient Name: Laura Harris  MRN: [id number]  Admission Date: 2/20/2025  Hospital Length of Stay: 1 days  Discharge Date and Time:  02/21/2025 8:28 AM  Attending Physician: Ajay Osullivan MD  Discharging Provider: Sunil Toussaint MD  Primary Care Physician: Riley Villalobos MD    HPI:  No notes on file    Procedure(s) (LRB):  REPLACEMENT, AORTIC VALVE, TRANSCATHETER (TAVR) (N/A)  Cardiac Cath Cosurgeon (N/A)  REPLACEMENT, AORTIC VALVE, TRANSCATHETER (TAVR)  Intravascular Ultrasound, Non-Coronary     Indwelling Lines/Drains at time of discharge:  Lines/Drains/Airways       Arterial Line  Duration             Arterial Line 02/20/25 1057 Right Radial <1 day                    Hospital Course:  Pt brought to the cath lab.  LRA and RCFA access obtained.  Pt underwent a successful TAVR with  a 29mm Evolut FX valve.  He had no conduction issues however on his completion angiogram there was concern for possible posterior wall aortic dissection.  PT was emergently taken for CTA of the chest which was negative for aortic dissection.  He was monitored overnight and had no issues.  On day of d/c he was AF wtihs table VS on RA.  He was ambulating without any issues.  QRS remained narrow.  He was subsequently discharged home with appropriate f/u.    Goals of Care Treatment Preferences:  Code Status: Full Code    Living Will: Yes                  Significant Diagnostic Studies: Angiography:   Successful transfemoral aortic valve replacement with a 29 mm Evolut FX valve.  On aortogram at the end of the procedure there was suspicious findings of double contour in the ascending aorta. The patient was transferred to CTA which ruled out any aortic dissection, pls see CTA report.     CTA chest:  1. No CT evidence of aortic dissection.  2. Prominent pulmonary arterial system may represent pulmonary hypertension.  3. Nonspecific  ground-glass in the right upper lobe.  4. Additional findings. For details, see above.    Pending Diagnostic Studies:       Procedure Component Value Units Date/Time    EKG 12-LEAD starting tomorrow [[id number]]     Order Status: Sent Lab Status: No result           No new Assessment & Plan notes have been filed under this hospital service since the last note was generated.  Service: Interventional Cardiology      Discharged Condition: good    Follow Up:    Patient Instructions:   No discharge procedures on file.  Medications:  Reconciled Home Medications:      Medication List        CONTINUE taking these medications      acetaminophen 500 MG tablet  Commonly known as: TYLENOL  Take 1 tablet (500 mg total) by mouth every 6 (six) hours as needed for Pain.     amLODIPine 5 MG tablet  Commonly known as: NORVASC  Take 1 tablet (5 mg total) by mouth once daily.     apixaban 2.5 mg Tab  Commonly known as: ELIQUIS  Take 1 tablet (2.5 mg total) by mouth 2 (two) times daily.     atorvastatin 80 MG tablet  Commonly known as: LIPITOR  TAKE 1 TABLET(80 MG) BY MOUTH EVERY DAY     krill-om-3-dha-epa-phospho-ast 655-38-32-50 mg Cap  Take 1 capsule by mouth once daily.     latanoprost 0.005 % ophthalmic solution  INSTILL 1 DROP IN BOTH EYES EVERY EVENING     losartan 50 MG tablet  Commonly known as: COZAAR  TAKE 1 TABLET(50 MG) BY MOUTH EVERY DAY     metoprolol succinate 25 MG 24 hr tablet  Commonly known as: TOPROL-XL  TAKE 1 TABLET(25 MG) BY MOUTH EVERY DAY     tamsulosin 0.4 mg Cap  Commonly known as: FLOMAX  TAKE 1 CAPSULE(0.4 MG) BY MOUTH AFTER DINNER Strength: 0.4 mg     UNABLE TO FIND  Take 1 tablet by mouth Daily. Ruby Velazco              Time spent on the discharge of patient: 31 minutes    Sunil Toussaint MD  Interventional Cardiology  [facility] - Card[facility]

## 2025-02-21 NOTE — DISCHARGE INSTRUCTIONS
Meds and appointment reviewed with patient. All discharge orders reviewed with patient.  Patient verbalized  understanding .

## 2025-02-24 ENCOUNTER — PATIENT OUTREACH (OUTPATIENT)
Dept: ADMINISTRATIVE | Facility: CLINIC | Age: 85
End: 2025-02-24
Payer: MEDICARE

## 2025-02-24 NOTE — PROGRESS NOTES
C3 nurse spoke with Laura Harris  for a TCC post hospital discharge follow up call. The patient does not have a scheduled HOSFU appointment with Riley Villalobos MD  within 5-7 days post hospital discharge date 2/21/25. C3 nurse was unable to schedule HOSFU appointment in [facility].    Message sent to PCP staff requesting they contact patient and schedule follow up appointment.         Please see attending attestation. - Augustin Cardoza MD

## 2025-02-24 NOTE — OP NOTE
DATE OF PROCEDURE: 2/20/2025    ATTENDING SURGEONS: Ajay Porter MD and Nahun Zapata M.D.    PREOPERATIVE DIAGNOSES:  1. Severe aortic stenosis.    POSTOPERATIVE DIAGNOSES:  1. Severe aortic stenosis      OPERATION PERFORMED: Transcatheter aortic valve insertion via transfemoral approach using a 29mm Evolut FX valve.    ANESTHESIA: MAC    ESTIMATED BLOOD LOSS: 20 mL.    BRIEF HISTORY: This patient is a 84-year-old man with severe aortic stenosis. The patient has had progressive dyspnea on exertion. This prompted a thoughtful and thorough evaluation, which demonstrated severe aortic stenosis. Given the comorbid conditions, and the patients preference, a transcatheter valve insertion was recommended. The patient now presents for transcatheter aortic valve insertion.    PROCEDURE: After obtaining informed and written consent, the patient was brought to the cath lab and placed on the cath table in supine position. All the pressure points were protected. After induction of adequate anesthesia, a transvenous pacing wire was placed by anesthesia. Femoral vascular access was obtained.  A wire was advanced across the aortic valve. It was exchanged for stiff wire, and a 29mm Evolut FX valve  valve was advanced to the level of the aortic annulus. Once the team was satisfied that the valve was in proper position, it was deployed under rapid pacing. Excellent positioning was confirmed on echo and fluoro/angio.  Post-procedure echo demonstrated no aortic insufficiency. The femoral access sites were controlled using percutaneous techniques. The patient tolerated the procedure well, there were no complications.   At the conclusion of the case, sponge and instrument counts were correct.    @Nahun Zapata MD  Cardiac Surgery@

## 2025-02-26 ENCOUNTER — SSC ENCOUNTER (OUTPATIENT)
Dept: ADMINISTRATIVE | Facility: OTHER | Age: 85
End: 2025-02-26
Payer: MEDICARE

## 2025-03-03 ENCOUNTER — OFFICE VISIT (OUTPATIENT)
Dept: FAMILY MEDICINE | Facility: CLINIC | Age: 85
End: 2025-03-03
Payer: MEDICARE

## 2025-03-03 VITALS
HEIGHT: 68 IN | BODY MASS INDEX: 30.01 KG/M2 | OXYGEN SATURATION: 98 % | HEART RATE: 66 BPM | WEIGHT: 198 LBS | DIASTOLIC BLOOD PRESSURE: 72 MMHG | SYSTOLIC BLOOD PRESSURE: 158 MMHG

## 2025-03-03 DIAGNOSIS — I10 PRIMARY HYPERTENSION: ICD-10-CM

## 2025-03-03 DIAGNOSIS — I25.10 CORONARY ARTERY DISEASE INVOLVING NATIVE CORONARY ARTERY OF NATIVE HEART WITHOUT ANGINA PECTORIS: ICD-10-CM

## 2025-03-03 DIAGNOSIS — I35.0 SEVERE AORTIC STENOSIS: Primary | ICD-10-CM

## 2025-03-03 DIAGNOSIS — Z98.890 S/P LEFT HEART CATHETERIZATION BY PERCUTANEOUS APPROACH: ICD-10-CM

## 2025-03-03 DIAGNOSIS — N18.32 STAGE 3B CHRONIC KIDNEY DISEASE: ICD-10-CM

## 2025-03-03 DIAGNOSIS — I27.82 CHRONIC PULMONARY EMBOLISM, UNSPECIFIED PULMONARY EMBOLISM TYPE, UNSPECIFIED WHETHER ACUTE COR PULMONALE PRESENT: ICD-10-CM

## 2025-03-03 DIAGNOSIS — Z09 HOSPITAL DISCHARGE FOLLOW-UP: ICD-10-CM

## 2025-03-03 DIAGNOSIS — Z95.3 S/P TAVR (TRANSCATHETER AORTIC VALVE REPLACEMENT): ICD-10-CM

## 2025-03-03 PROCEDURE — 99999 PR PBB SHADOW E&M-EST. PATIENT-LVL IV: CPT | Mod: PBBFAC,,, | Performed by: FAMILY MEDICINE

## 2025-03-03 PROCEDURE — 99214 OFFICE O/P EST MOD 30 MIN: CPT | Mod: PBBFAC,PO | Performed by: FAMILY MEDICINE

## 2025-03-03 PROCEDURE — 99214 OFFICE O/P EST MOD 30 MIN: CPT | Mod: S$PBB,,, | Performed by: FAMILY MEDICINE

## 2025-03-03 PROCEDURE — G2211 COMPLEX E/M VISIT ADD ON: HCPCS | Mod: S$PBB,,, | Performed by: FAMILY MEDICINE

## 2025-03-03 RX ORDER — LOSARTAN POTASSIUM 100 MG/1
100 TABLET ORAL DAILY
Qty: 90 TABLET | Refills: 3 | Status: SHIPPED | OUTPATIENT
Start: 2025-03-03

## 2025-03-03 NOTE — PROGRESS NOTES
Subjective:         Patient ID: Laura Harris is a 84 y.o. male.    Chief Complaint: Follow-up    Patient Active Problem List   Diagnosis    Low tension glaucoma    Nuclear sclerosis - Both Eyes    Exodeviation    Ureteral stricture    Stage 3b chronic kidney disease    Hypertension    Carpal tunnel syndrome on both sides    Carpal tunnel syndrome of left wrist    History of bladder cancer    Tortuous aorta    Coronary artery disease involving native coronary artery of native heart without angina pectoris    S/P left heart catheterization by percutaneous approach    Anemia of chronic disease    Absolute anemia    Vitamin D deficiency    Hyperlipidemia    PAD (peripheral artery disease)    Tobacco use disorder, moderate, in sustained remission    Abdominal aortic atherosclerosis    Overweight (BMI 25.0-29.9)    Frailty    Chronic obstructive pulmonary disease, unspecified COPD type    Sensorineural hearing loss, bilateral    Patient nonadherence    Severe aortic stenosis    Primary open angle glaucoma (POAG) of left eye, moderate stage    Primary open angle glaucoma (POAG) of right eye, severe stage    Nuclear sclerotic cataract of both eyes    Type 2 diabetes mellitus with hyperglycemia, without long-term current use of insulin    Pulmonary hypertension    Atherosclerosis of native arteries of extremities with rest pain, left leg      GHANSHYAM Sanchez is a 84 y.o. male    History of Present Illness    CHIEF COMPLAINT:  84-year-old male presents today for hospital follow-up after TAVR procedure.    HISTORY OF PRESENT ILLNESS:  He underwent transcatheter aortic valve replacement (TAVR) on February 20th with discharge on February 21st. During the procedure, there was concern for possible posterior wall aortic dissection. CT angiography of chest was negative for aortic dissection.     MEDICATIONS:  He discontinued Eliquis 2 days ago due to new onset dizziness which has since resolved. He denies any adverse effects with  "Eliquis prior to surgery. He also discontinued Clopidogrel 75mg.    MEDICAL HISTORY:  His history is significant for hypertension, hyperlipidemia, peripheral artery disease, chronic kidney disease, and severe aortic stenosis. He previously experienced dyspnea with exertion during regular activities. CT angiogram showed multifocal pulmonary emboli without evidence of right heart strain.    SLEEP:  He reports getting approximately 4 hours of sleep per night with early morning awakening at 1:00 AM. After waking, he sits in a chair before returning to bed. His daily pattern includes having breakfast followed by a nap. He questions if his sleep pattern is age-related.    SOCIAL HISTORY:  He lives alone with his sister residing across the driveway.       Objective:     Vitals:    03/03/25 0859 03/03/25 0956   BP: (!) 152/80 (!) 158/72   BP Location: Right arm Left arm   Patient Position: Sitting Sitting   Pulse: 66    SpO2: 98%    Weight: 89.8 kg (197 lb 15.6 oz)    Height: 5' 8" (1.727 m)          Physical Exam  Vitals and nursing note reviewed.   Constitutional:       General: He is not in acute distress.     Appearance: Normal appearance. He is not ill-appearing, toxic-appearing or diaphoretic.   HENT:      Head: Normocephalic and atraumatic.   Eyes:      General: No scleral icterus.     Conjunctiva/sclera: Conjunctivae normal.   Cardiovascular:      Rate and Rhythm: Normal rate.   Pulmonary:      Effort: Pulmonary effort is normal. No respiratory distress.   Skin:     Coloration: Skin is not pale.   Neurological:      Mental Status: He is alert. Mental status is at baseline.   Psychiatric:         Attention and Perception: Attention and perception normal.         Mood and Affect: Mood and affect normal.         Speech: Speech normal.         Behavior: Behavior normal.         Cognition and Memory: Cognition and memory normal.         Judgment: Judgment normal.       Assessment:       1. Severe aortic stenosis    2. " S/P TAVR (transcatheter aortic valve replacement)    3. Hospital discharge follow-up    4. Primary hypertension    5. S/P left heart catheterization by percutaneous approach    6. Coronary artery disease involving native coronary artery of native heart without angina pectoris    7. Stage 3b chronic kidney disease    8. Chronic pulmonary embolism, unspecified pulmonary embolism type, unspecified whether acute cor pulmonale present          Plan:   Recent relevant labs results reviewed with patient.         Assessment & Plan    Assessed patient's post-TAVR recovery and current symptoms  Reviewed hospital notes and discharge summary from recent TAVR procedure  Evaluated blood pressure control, noting elevated readings in office  Determined need for increased antihypertensive therapy  Considered resumption of anticoagulation (Eliquis) post-surgery  Deferred decision on hernia management due to recent cardiac surgery  Will contact patient regarding long-term plan for anticoagulation after consulting with Dr. Jefferson    - Increased Losartan dosage from 50mg to 100mg to address this issue.    STAGE 3B CHRONIC KIDNEY DISEASE:  - Evaluated the patient's chronic kidney disease (CKD) in conjunction with elevated blood pressure.  - Increased Losartan dosage from 50mg to 100mg for better blood pressure control, which may help manage CKD progression.  - Scheduled follow-up visits including labs to monitor kidney function and treatment efficacy.    AORTIC STENOSIS:  - Laura underwent transcatheter aortic valve replacement (TAVR) on February 20th for severe symptomatic aortic stenosis.  - Has appropriate follow up scheduled.     PULMONARY EMBOLISM:  - CTA TAVR results showed multifocal pulmonary emboli without evidence of right heart strain.  - Resume Eliquis. Follow up discussion with PCP for duration.   Likely lifelong.   Monitor if dizziness returns. May be more related to immediate post op period.     FOLLOW UP:  - Reviewed  hospital notes and discharge summary.  - Has scheduled follow-up visits, including heart ultrasound and visit with cardiologist          1. Severe aortic stenosis    2. S/P TAVR (transcatheter aortic valve replacement)    3. Hospital discharge follow-up    4. Primary hypertension  -     losartan (COZAAR) 100 MG tablet; Take 1 tablet (100 mg total) by mouth once daily.  Dispense: 90 tablet; Refill: 3    5. S/P left heart catheterization by percutaneous approach    6. Coronary artery disease involving native coronary artery of native heart without angina pectoris    7. Stage 3b chronic kidney disease    8. Chronic pulmonary embolism, unspecified pulmonary embolism type, unspecified whether acute cor pulmonale present  Resume Eliquis. Refill script x 30 days. Messaged PCP if managing for lifelong anticoagulation given findings.     Patient's questions answered. Plan reviewed with patient at the end of visit. Relevant precautions to chief complaint and reasons to seek further medical care or to contact the office sooner reviewed with patient.     Follow up if symptoms worsen or fail to improve, for w/ PCP.        Part of this note was dictated using voice recognition software. Please excuse any typographical errors.     This note was generated with the assistance of ambient listening technology. Verbal consent was obtained by the patient and accompanying visitor(s) for the recording of patient appointment to facilitate this note. I attest to having reviewed and edited the generated note for accuracy, though some syntax or spelling errors may persist. Please contact the author of this note for any clarification.

## 2025-03-05 ENCOUNTER — OUTPATIENT CASE MANAGEMENT (OUTPATIENT)
Dept: ADMINISTRATIVE | Facility: OTHER | Age: 85
End: 2025-03-05
Payer: MEDICARE

## 2025-03-10 ENCOUNTER — HOSPITAL ENCOUNTER (EMERGENCY)
Facility: HOSPITAL | Age: 85
Discharge: HOME | End: 2025-03-10
Attending: STUDENT IN AN ORGANIZED HEALTH CARE EDUCATION/TRAINING PROGRAM
Payer: MEDICARE

## 2025-03-10 VITALS
RESPIRATION RATE: 17 BRPM | SYSTOLIC BLOOD PRESSURE: 191 MMHG | BODY MASS INDEX: 28.79 KG/M2 | DIASTOLIC BLOOD PRESSURE: 93 MMHG | OXYGEN SATURATION: 97 % | HEART RATE: 69 BPM | TEMPERATURE: 98 F | WEIGHT: 190 LBS | HEIGHT: 68 IN

## 2025-03-10 DIAGNOSIS — R42 LIGHTHEADEDNESS: Primary | ICD-10-CM

## 2025-03-10 PROCEDURE — 99284 EMERGENCY DEPT VISIT MOD MDM: CPT | Mod: 25

## 2025-03-10 NOTE — FIRST PROVIDER EVALUATION
Emergency Department TeleTriage Encounter Note      CHIEF COMPLAINT    Chief Complaint   Patient presents with    Headache     Reports intermittent headaches lasting 1-2 minutes. Describes as severe, intense pains. Onset of symptoms since recent AVR.        VITAL SIGNS   Initial Vitals [03/10/25 1547]   BP Pulse Resp Temp SpO2   (!) 191/93 69 17 97.8 °F (36.6 °C) 97 %      MAP       --            ALLERGIES    Review of patient's allergies indicates:   Allergen Reactions    Ace inhibitors      Other reaction(s): Angioedema    Tizanidine Hives    Ultram [tramadol] Nausea Only and Other (See Comments)     Dizziness       PROVIDER TRIAGE NOTE  Patient presents with complaint of intermittent headaches that last about 1-2 minutes and then resolve.  Reports symptoms have been present for about a week now.  Reports no other complaints or deficits.      Phy:   Constitutional: well nourished, well developed, appearing stated age, NAD        Initial orders will be placed and care will be transferred to an alternate provider when patient is roomed for a full evaluation. Any additional orders and the final disposition will be determined by that provider.        ORDERS  Labs Reviewed - No data to display    ED Orders (720h ago, onward)      None              Virtual Visit Note: The provider triage portion of this emergency department evaluation and documentation was performed via [employer], a HIPAA-compliant telemedicine application, in concert with a tele-presenter in the room. A face to face patient evaluation with one of my colleagues will occur once the patient is placed in an emergency department room.      DISCLAIMER: This note was prepared with [employer] voice recognition transcription software. Garbled syntax, mangled pronouns, and other bizarre constructions may be attributed to that software system.

## 2025-03-10 NOTE — ED TRIAGE NOTES
States over the past week he has had multiple sharp, intermittent pains in head that last no longer than a minute. Not associated with any other symptoms. 2 episodes yesterday and 3 today. Pain is not precipitated by activity, diet, or change in position. States Lotensin increased from 50 mg to 100 mg last week - otherwise no changes in medication. Presents awake, alert. No pain at this time. No h/o trauma

## 2025-03-11 NOTE — ED NOTES
Dr. Singleton at bedside to evaluate pt. Pt refuses treatment options or cardiac monitor placement. Pt refuse vital sign reassessment. Pt request to leave AMA without further treatment.   Risks of leaving AMA discussed with pt per provider. V/u. Myself at beside as witness.   Refuses to wait or sign paperwork. Pt ambulatory to exit. Steady gait. NADN.

## 2025-03-11 NOTE — ED PROVIDER NOTES
"NAME:  Laura Harris  CSN:     [id number]  MRN:    [id number]  ADMIT DATE: 3/10/2025        eMERGENCY dEPARTMENT eNCOUnter    CHIEF COMPLAINT    Chief Complaint   Patient presents with    Headache     Reports intermittent headaches lasting 1-2 minutes. Describes as severe, intense pains. Onset of symptoms since recent AVR.        HPI    Laura Harris is a 84 y.o. male with a past medical history of  has a past medical history of Anemia of chronic disease (11/19/2019), Arthritis, Cataract, Chronic obstructive pulmonary disease, unspecified COPD type (01/26/2022), CKD (chronic kidney disease) stage 3, GFR 30-59 ml/min (05/01/2014), Colon polyp (05/12/2015), Coronary artery disease involving native coronary artery of native heart without angina pectoris (10/07/2019), Heart attack, Hyperlipidemia, Hypertension, Low tension glaucoma, and Type 2 diabetes mellitus with hyperglycemia, without long-term current use of insulin (02/06/2024).     Appreciate tele triage note and nursing triage note but patient denies adamantly any headache or pain.    Report Transient light-headedness, occurs with position changes in the past. However, no occurs at rest. Lasts for a minute or 2 and has to brace himself when it is occurring.  States it does not last long enough for him to pass out.. No pain associated with this. No LOC. Hasn't  his head. No new numbness or weakness.  He states since his TAVR on February 20th he sleeps more, feels and run down.    Notes chronic paresthesias over the past month down the RUE since the surgery.      States he takes losartan BID    Teletriage "Patient presents with complaint of intermittent headaches that last about 1-2 minutes and then resolve.  Reports symptoms have been present for about a week now.  Reports no other complaints or deficits."    HPI       PAST MEDICAL HISTORY  Past Medical History:   Diagnosis Date    Anemia of chronic disease 11/19/2019    Arthritis     Cataract     Chronic " obstructive pulmonary disease, unspecified COPD type 01/26/2022    CKD (chronic kidney disease) stage 3, GFR 30-59 ml/min 05/01/2014    Colon polyp 05/12/2015    Coronary artery disease involving native coronary artery of native heart without angina pectoris 10/07/2019    Heart attack     Hyperlipidemia     Hypertension     Low tension glaucoma     Type 2 diabetes mellitus with hyperglycemia, without long-term current use of insulin 02/06/2024       SURGICAL HISTORY    Past Surgical History:   Procedure Laterality Date    CARDIAC CATH COSURGEON N/A 2/20/2025    Procedure: Cardiac Cath Cosurgeon;  Surgeon: Nahun Zapata MD;  Location: [facility] CATH LAB;  Service: Cardiothoracic;  Laterality: N/A;    COLONOSCOPY N/A 9/16/2021    Procedure: COLONOSCOPY;  Surgeon: Kit Nicholson MD;  Location: [facility] ENDO;  Service: Endoscopy;  Laterality: N/A;    CORONARY STENT PLACEMENT N/A 3/4/2019    Procedure: INSERTION, STENT, CORONARY ARTERY;  Surgeon: Brennan Stein MD;  Location: [facility] CATH LAB/EP;  Service: Cardiology;  Laterality: N/A;    CYSTOSCOPY      ESOPHAGOGASTRODUODENOSCOPY N/A 9/16/2021    Procedure: EGD (ESOPHAGOGASTRODUODENOSCOPY);  Surgeon: Kit Nicholson MD;  Location: [facility];  Service: Endoscopy;  Laterality: N/A;    INTRAVASCULAR ULTRASOUND, NON-CORONARY  2/20/2025    Procedure: Intravascular Ultrasound, Non-Coronary;  Surgeon: Ajay Osullivan MD;  Location: [facility] CATH LAB;  Service: Cardiology;;    LEFT HEART CATHETERIZATION N/A 3/4/2019    Procedure: Left heart cath;  Surgeon: Brennan Stein MD;  Location: [facility] CATH LAB/EP;  Service: Cardiology;  Laterality: N/A;    LEFT HEART CATHETERIZATION Left 2/24/2023    Procedure: Left heart cath;  Surgeon: Bob Pinto MD;  Location: [facility] CATH LAB/EP;  Service: Cardiology;  Laterality: Left;    TRANSCATHETER AORTIC VALVE REPLACEMENT (TAVR) N/A 2/20/2025    Procedure: REPLACEMENT, AORTIC VALVE, TRANSCATHETER (TAVR);  Surgeon: Javan Porter  Ajay FRANCOIS MD;  Location: [facility] CATH LAB;  Service: Cardiology;  Laterality: N/A;    TRANSCATHETER AORTIC VALVE REPLACEMENT (TAVR)  2025    Procedure: REPLACEMENT, AORTIC VALVE, TRANSCATHETER (TAVR);  Surgeon: Nahun Zapata MD;  Location: [facility] CATH LAB;  Service: Cardiothoracic;;       FAMILY HISTORY    Family History   Problem Relation Name Age of Onset    No Known Problems Sister 2     No Known Problems Brother 1     Cancer Brother 1         lung cancer, smoker    Kidney disease Sister 2     Melanoma Neg Hx         SOCIAL HISTORY    Social History     Socioeconomic History    Marital status: Single   Tobacco Use    Smoking status: Former     Current packs/day: 0.00     Average packs/day: 1 pack/day for 64.0 years (64.0 ttl pk-yrs)     Types: Cigarettes     Start date:      Quit date:      Years since quittin.1     Passive exposure: Never    Smokeless tobacco: Never   Substance and Sexual Activity    Alcohol use: No    Drug use: No    Sexual activity: Not Currently     Partners: Female     Social Drivers of Health     Financial Resource Strain: Low Risk  (2025)    Overall Financial Resource Strain (CARDIA)     Difficulty of Paying Living Expenses: Not hard at all   Food Insecurity: No Food Insecurity (2025)    Hunger Vital Sign     Worried About Running Out of Food in the Last Year: Never true     Ran Out of Food in the Last Year: Never true   Transportation Needs: No Transportation Needs (2024)    PRAPARE - Transportation     Lack of Transportation (Medical): No     Lack of Transportation (Non-Medical): No   Physical Activity: Inactive (2024)    Exercise Vital Sign     Days of Exercise per Week: 0 days     Minutes of Exercise per Session: 0 min   Stress: No Stress Concern Present (2025)    Citizen of the [place] of Occupational Health - Occupational Stress Questionnaire     Feeling of Stress : Not at all   Housing Stability: Low Risk  (2025)    Housing Stability Vital Sign      "Unable to Pay for Housing in the Last Year: No     Homeless in the Last Year: No       MEDICATIONS  Current Outpatient Medications   Medication Instructions    acetaminophen (TYLENOL) 500 mg, Oral, Every 6 hours PRN    amLODIPine (NORVASC) 5 mg, Oral, Daily    apixaban (ELIQUIS) 2.5 mg, Oral, 2 times daily    atorvastatin (LIPITOR) 80 mg, Oral    krill-om-3-dha-epa-phospho-ast 614-57-03-50 mg Cap 1 capsule, Daily    latanoprost 0.005 % ophthalmic solution 1 drop, Both Eyes, Nightly    losartan (COZAAR) 100 mg, Oral, Daily    metoprolol succinate (TOPROL-XL) 25 mg, Oral    tamsulosin (FLOMAX) 0.4 mg Cap TAKE 1 CAPSULE(0.4 MG) BY MOUTH AFTER DINNER Strength: 0.4 mg    UNABLE TO FIND 1 tablet, Daily       ALLERGIES    Review of patient's allergies indicates:   Allergen Reactions    Ace inhibitors      Other reaction(s): Angioedema    Tizanidine Hives    Ultram [tramadol] Nausea Only and Other (See Comments)     Dizziness         REVIEW OF SYSTEMS   Review of Systems       PHYSICAL EXAM    Reviewed Triage Note    VITAL SIGNS:   ED Triage Vitals [03/10/25 1547]   Encounter Vitals Group      BP (!) 191/93      Systolic BP Percentile       Diastolic BP Percentile       Pulse 69      Resp 17      Temp 97.8 °F (36.6 °C)      Temp Source Oral      SpO2 97 %      Weight 190 lb      Height 5' 8"      Head Circumference       Peak Flow       Pain Score       Pain Loc       Pain Education       Exclude from Growth Chart        Patient Vitals for the past 24 hrs:   BP Temp Temp src Pulse Resp SpO2 Height Weight   03/10/25 1547 (!) 191/93 97.8 °F (36.6 °C) Oral 69 17 97 % 5' 8" (1.727 m) 86.2 kg (190 lb)       Physical Exam    Nursing note and vitals reviewed.  Constitutional: He appears well-developed and well-nourished.   HENT:   Head: Normocephalic and atraumatic.   Eyes: EOM are normal. Pupils are equal, round, and reactive to light.   Neck: Neck supple.   Normal range of motion.  Cardiovascular:  Normal rate and regular " rhythm.           Murmur heard.  Pulmonary/Chest: Breath sounds normal. No respiratory distress.   Abdominal: Abdomen is soft. There is no abdominal tenderness.   Musculoskeletal:         General: Normal range of motion.      Cervical back: Normal range of motion and neck supple.     Neurological: He is alert and oriented to person, place, and time.   Ambulates with slow steady gait   Skin: Skin is warm and dry.   Psychiatric: He has a normal mood and affect.          EKG     Interpreted by EM physician if performed:               LABS  Pertinent labs reviewed. (See chart for details)   Labs Reviewed - No data to display      RADIOLOGY          Imaging Results              CT Head Without Contrast (Final result)  Result time 03/10/25 17:20:59      Final result by Rhona Rosales MD (03/10/25 17:20:59)                   Impression:      Remote infarction left corona radiata.    No acute intracranial process seen.      Electronically signed by: Rhona Rosales MD  Date:    03/10/2025  Time:    17:20               Narrative:    EXAMINATION:  CT HEAD WITHOUT CONTRAST    CLINICAL HISTORY:  Headache, new or worsening (Age >= 50y);    TECHNIQUE:  Low dose axial CT images obtained throughout the head without the use of intravenous contrast.  Axial, sagittal and coronal reconstructions were performed.    COMPARISON:  MRI 12/28/2012    FINDINGS:  Intracranial compartment:    Ventricles and sulci are normal in size for age without evidence of hydrocephalus.    The brain parenchyma appears within normal limits.  No parenchymal  hemorrhage, edema, mass effect or major vascular area of hypoattenuation left corona radiata suggestive of a remote area of infarction.  Confluent areas of abnormal hypoattenuation in the periventricular white matter of the bilateral cerebral hemispheres, nonspecific often seen in the setting of small vessel chronic ischemic changes.  No definite areas of hypoattenuation seen to strongly  suggest an acute infarction.  No intracranial mass or hemorrhage seen.  No subdural fluid collection.    Skull/extracranial contents (limited evaluation):    No displaced calvarial fracture.    The visualized mastoid air cells are well aerated. The  visualized paranasal sinuses are essentially clear.                                        PROCEDURES    Procedures      ED COURSE & MEDICAL DECISION MAKING    Pertinent Labs & Imaging studies reviewed. (See chart for details and specific orders.)          Summary of review of records:   Last seen by primary care on March 3rd at which time they encouraged him to restart his apixaban and doubled his losartan    He is status post TAVR on February 20th.    Medical Decision Making  Amount and/or Complexity of Data Reviewed  Radiology:  Decision-making details documented in ED Course.      Laura Harris is a 84 y.o. male who is in the emergency department for 5-1/2 hours prior to evaluation by physician.  Tele triage did order a CT of his head which does not have any acute findings.  Based on his recent history of TAVR and transient lightheadedness, explained extensively to him that I would want to get EKG, lab work and keep him on cardiac monitor as well as closely monitoring his blood pressure.  He is not agreeable to any additional evaluation stating that he is cold tired and hungry and does not want to be in the emergency department any longer.  He does understands risks involved with foregoing evaluation and I have again told him that I have not evaluated his heart or lungs or the etiology of his symptomatology at all.  He is barely agreeable to physical exam and will not allow us to get a repeat set of vitals.  States he will come back tomorrow or the next day to reassessment.  He was advised not to drive while having these spells of lightheadedness as he could put himself or others and danger.  RN Clemencia was present for this conversation    Differential includes but is  not limited to medication reaction, dysrhythmia, cardiogenic etiology for near-syncope, electrolyte dyscrasia amongst others.            Medications - No data to display    ED Course as of 03/10/25 2106   Mon Mar 10, 2025   2027 CT Head Without Contrast  Remote infarction left corona radiata.     No acute intracranial process seen.   [HL]      ED Course User Index  [HL] Luz Elena Singleton., DO       This patient has elected to leave against medical advice. In my opinion, the patient has capacity to leave made decisions and leave AMA. The patient is clinically sober, free from distracting injury, appears to have intact insight, judgment, and reason. I explained to the patient that hissymptoms may represent cardiopulmonary pathology and the patient verbalized understanding of my concerns.     I had a discussion with the patient about their workup and results, and that they may still have an issue with their heart or lungs. I informed the patient that the next step in diagnosis and treatment would be EKG, lab work, and they verbalized understanding of this as well. I explained the risks of leaving without further workup or treatment, which included reasonably foreseeable complications such as death, serious injury, permanent disability.      The patient is refusing any further care, specifically a repeat set of vitals and an EKG only, and is leaving against medical advice. I am unable to convince the patient to stay. I have asked them to return as soon as possible to complete their evaluation, and also explained that they were welcome to return to the ER for further evaluation whenever they choose. I have asked the patient to follow up with their primary doctor as soon as possible. I have answered all their questions. Patient did not sign AMA paperwork.       FINAL IMPRESSION    Final diagnoses:  [R42] Lightheadedness (Primary)       DISPOSITION  Patient left AMA in stable condition             DISCLAIMER: This note was  prepared with [email or web address] voice recognition transcription software. Garbled syntax, mangled pronouns, and other bizarre constructions may be attributed to that software system.             Luz Elena Singleton,   03/10/25 2102

## 2025-03-12 DIAGNOSIS — I10 ESSENTIAL HYPERTENSION: ICD-10-CM

## 2025-03-12 RX ORDER — AMLODIPINE BESYLATE 5 MG/1
5 TABLET ORAL
Qty: 90 TABLET | Refills: 3 | Status: SHIPPED | OUTPATIENT
Start: 2025-03-12

## 2025-03-12 NOTE — TELEPHONE ENCOUNTER
No care due was identified.  Health [facility] Embedded Care Due Messages. Reference number: [id number].   3/12/2025 5:20:44 AM CDT

## 2025-03-18 RX ORDER — METOPROLOL SUCCINATE 25 MG/1
25 TABLET, EXTENDED RELEASE ORAL
Qty: 90 TABLET | Refills: 3 | Status: SHIPPED | OUTPATIENT
Start: 2025-03-18

## 2025-03-18 NOTE — TELEPHONE ENCOUNTER
Refill Routing Note   Medication(s) are not appropriate for processing by [facility] for the following reason(s):        Non-participating provider    [facility] action(s):  Route               Appointments  past 12m or future 3m with PCP    Date Provider   Last Visit   2/6/2024 Citlali Hope NP   Next Visit   Visit date not found Citlali Hope NP   ED visits in past 90 days: 1        Note composed:2:18 PM 03/18/2025

## 2025-03-25 ENCOUNTER — LAB VISIT (OUTPATIENT)
Dept: LAB | Facility: HOSPITAL | Age: 85
End: 2025-03-25
Payer: MEDICARE

## 2025-03-25 ENCOUNTER — HOSPITAL ENCOUNTER (OUTPATIENT)
Dept: CARDIOLOGY | Facility: HOSPITAL | Age: 85
Discharge: HOME | End: 2025-03-25
Payer: MEDICARE

## 2025-03-25 VITALS — WEIGHT: 190 LBS | HEIGHT: 68 IN | BODY MASS INDEX: 28.79 KG/M2

## 2025-03-25 DIAGNOSIS — I35.0 SEVERE AORTIC STENOSIS: ICD-10-CM

## 2025-03-25 DIAGNOSIS — I35.0 AORTIC STENOSIS, SEVERE: ICD-10-CM

## 2025-03-25 LAB
ALBUMIN SERPL BCP-MCNC: 3.8 G/DL (ref 3.5–5.2)
ANION GAP (OHS): 10 MMOL/L (ref 8–16)
APICAL FOUR CHAMBER EJECTION FRACTION: 59 %
APICAL TWO CHAMBER EJECTION FRACTION: 46 %
AV INDEX (PROSTH): 0.89
AV MEAN GRADIENT: 7 MMHG
AV PEAK GRADIENT: 16 MMHG
AV REGURGITATION PRESSURE HALF TIME: 501 MS
AV VALVE AREA BY VELOCITY RATIO: 2.3 CM²
AV VALVE AREA: 2.5 CM²
AV VELOCITY RATIO: 0.8
BSA FOR ECHO PROCEDURE: 2.03 M2
BUN SERPL-MCNC: 26 MG/DL (ref 8–23)
CALCIUM SERPL-MCNC: 9.3 MG/DL (ref 8.7–10.5)
CHLORIDE SERPL-SCNC: 112 MMOL/L (ref 95–110)
CO2 SERPL-SCNC: 19 MMOL/L (ref 23–29)
CREAT SERPL-MCNC: 1.7 MG/DL (ref 0.5–1.4)
CV ECHO LV RWT: 0.31 CM
DOP CALC AO PEAK VEL: 2 M/S
DOP CALC AO VTI: 38.5 CM
DOP CALC LVOT AREA: 2.8 CM2
DOP CALC LVOT DIAMETER: 1.9 CM
DOP CALC LVOT PEAK VEL: 1.6 M/S
DOP CALC LVOT STROKE VOLUME: 96.9 CM3
DOP CALC MV VTI: 40.6 CM
DOP CALCLVOT PEAK VEL VTI: 34.2 CM
E WAVE DECELERATION TIME: 214 MSEC
E/A RATIO: 0.81
E/E' RATIO: 14 M/S
ECHO LV POSTERIOR WALL: 0.8 CM (ref 0.6–1.1)
ERYTHROCYTE [DISTWIDTH] IN BLOOD BY AUTOMATED COUNT: 14.4 % (ref 11.5–14.5)
FRACTIONAL SHORTENING: 34.6 % (ref 28–44)
GFR SERPLBLD CREATININE-BSD FMLA CKD-EPI: 39 ML/MIN/1.73/M2
GLUCOSE SERPL-MCNC: 91 MG/DL (ref 70–110)
HCT VFR BLD AUTO: 39.5 % (ref 40–54)
HGB BLD-MCNC: 12.3 GM/DL (ref 14–18)
INTERVENTRICULAR SEPTUM: 0.8 CM (ref 0.6–1.1)
IVC DIAMETER: 1.08 CM
LEFT ATRIUM AREA SYSTOLIC (APICAL 2 CHAMBER): 25.62 CM2
LEFT ATRIUM AREA SYSTOLIC (APICAL 4 CHAMBER): 25.78 CM2
LEFT ATRIUM VOLUME INDEX MOD: 46 ML/M2
LEFT ATRIUM VOLUME MOD: 91 ML
LEFT INTERNAL DIMENSION IN SYSTOLE: 3.4 CM (ref 2.1–4)
LEFT VENTRICLE DIASTOLIC VOLUME INDEX: 64 ML/M2
LEFT VENTRICLE DIASTOLIC VOLUME: 128 ML
LEFT VENTRICLE END DIASTOLIC VOLUME APICAL 2 CHAMBER: 93.29 ML
LEFT VENTRICLE END DIASTOLIC VOLUME APICAL 4 CHAMBER: 108.63 ML
LEFT VENTRICLE END SYSTOLIC VOLUME APICAL 2 CHAMBER: 84.42 ML
LEFT VENTRICLE END SYSTOLIC VOLUME APICAL 4 CHAMBER: 92.06 ML
LEFT VENTRICLE MASS INDEX: 72.6 G/M2
LEFT VENTRICLE SYSTOLIC VOLUME INDEX: 24 ML/M2
LEFT VENTRICLE SYSTOLIC VOLUME: 48 ML
LEFT VENTRICULAR INTERNAL DIMENSION IN DIASTOLE: 5.2 CM (ref 3.5–6)
LEFT VENTRICULAR MASS: 145.2 G
LV LATERAL E/E' RATIO: 11.5 M/S
LV SEPTAL E/E' RATIO: 18.4 M/S
LVED V (TEICH): 127.54 ML
LVES V (TEICH): 47.75 ML
LVOT MG: 5.53 MMHG
LVOT MV: 1.1 CM/S
MCH RBC QN AUTO: 25.7 PG (ref 27–50)
MCHC RBC AUTO-ENTMCNC: 31.1 G/DL (ref 32–36)
MCV RBC AUTO: 83 FL (ref 82–98)
MV MEAN GRADIENT: 2 MMHG
MV PEAK A VEL: 1.14 M/S
MV PEAK E VEL: 0.92 M/S
MV PEAK GRADIENT: 6 MMHG
MV STENOSIS PRESSURE HALF TIME: 62.13 MS
MV VALVE AREA BY CONTINUITY EQUATION: 2.39 CM2
MV VALVE AREA P 1/2 METHOD: 3.54 CM2
OHS CV RV/LV RATIO: 0.65 CM
OHS LV EJECTION FRACTION SIMPSONS BIPLANE MOD: 52 %
PISA AR MAX VEL: 3.47 M/S
PISA MRMAX VEL: 5.73 M/S
PISA TR MAX VEL: 3.7 M/S
PLATELET # BLD AUTO: 165 K/UL (ref 150–450)
PMV BLD AUTO: 12.4 FL (ref 9.2–12.9)
POTASSIUM SERPL-SCNC: 4.6 MMOL/L (ref 3.5–5.1)
PULM VEIN S/D RATIO: 1.54
PV PEAK D VEL: 0.41 M/S
PV PEAK GRADIENT: 4 MMHG
PV PEAK S VEL: 0.63 M/S
PV PEAK VELOCITY: 0.99 M/S
RA PRESSURE ESTIMATED: 3 MMHG
RA VOL SYS: 49.57 ML
RBC # BLD AUTO: 4.78 M/UL (ref 4.6–6.2)
RIGHT ATRIAL AREA: 17 CM2
RIGHT ATRIUM VOLUME AREA LENGTH APICAL 4 CHAMBER: 45.4 ML
RIGHT VENTRICLE DIASTOLIC BASEL DIMENSION: 3.4 CM
RV TB RVSP: 7 MMHG
RV TISSUE DOPPLER FREE WALL SYSTOLIC VELOCITY 1 (APICAL 4 CHAMBER VIEW): 16.11 CM/S
SODIUM SERPL-SCNC: 141 MMOL/L (ref 136–145)
TDI LATERAL: 0.08 M/S
TDI SEPTAL: 0.05 M/S
TDI: 0.07 M/S
TR MAX PG: 55 MMHG
TRICUSPID ANNULAR PLANE SYSTOLIC EXCURSION: 2.82 CM
TV REST PULMONARY ARTERY PRESSURE: 58 MMHG
WBC # BLD AUTO: 5.8 K/UL (ref 3.9–12.7)
Z-SCORE OF LEFT VENTRICULAR DIMENSION IN END DIASTOLE: -1.14
Z-SCORE OF LEFT VENTRICULAR DIMENSION IN END SYSTOLE: -0.4

## 2025-03-25 PROCEDURE — 93306 TTE W/DOPPLER COMPLETE: CPT

## 2025-03-25 PROCEDURE — 93306 TTE W/DOPPLER COMPLETE: CPT | Mod: 26,,, | Performed by: INTERNAL MEDICINE

## 2025-03-25 PROCEDURE — 82040 ASSAY OF SERUM ALBUMIN: CPT

## 2025-03-25 PROCEDURE — 85027 COMPLETE CBC AUTOMATED: CPT

## 2025-03-25 PROCEDURE — 80048 BASIC METABOLIC PNL TOTAL CA: CPT

## 2025-03-25 PROCEDURE — 36415 COLL VENOUS BLD VENIPUNCTURE: CPT | Mod: PO

## 2025-03-28 ENCOUNTER — OFFICE VISIT (OUTPATIENT)
Dept: ORTHOPEDICS | Facility: CLINIC | Age: 85
End: 2025-03-28
Payer: MEDICARE

## 2025-03-28 ENCOUNTER — HOSPITAL ENCOUNTER (OUTPATIENT)
Facility: HOSPITAL | Age: 85
Discharge: HOME | End: 2025-03-28
Payer: MEDICARE

## 2025-03-28 ENCOUNTER — TELEPHONE (OUTPATIENT)
Dept: ORTHOPEDICS | Facility: CLINIC | Age: 85
End: 2025-03-28
Payer: MEDICARE

## 2025-03-28 DIAGNOSIS — M70.62 GREATER TROCHANTERIC BURSITIS OF LEFT HIP: Primary | ICD-10-CM

## 2025-03-28 DIAGNOSIS — R52 PAIN: Primary | ICD-10-CM

## 2025-03-28 DIAGNOSIS — M47.816 OSTEOARTHRITIS OF LUMBAR SPINE, UNSPECIFIED SPINAL OSTEOARTHRITIS COMPLICATION STATUS: ICD-10-CM

## 2025-03-28 DIAGNOSIS — R52 PAIN: ICD-10-CM

## 2025-03-28 PROCEDURE — 72114 X-RAY EXAM L-S SPINE BENDING: CPT | Mod: TC,PN

## 2025-03-28 PROCEDURE — 99213 OFFICE O/P EST LOW 20 MIN: CPT | Mod: PBBFAC,PN,25

## 2025-03-28 PROCEDURE — 72114 X-RAY EXAM L-S SPINE BENDING: CPT | Mod: 26,,, | Performed by: RADIOLOGY

## 2025-03-28 PROCEDURE — 99999 PR PBB SHADOW E&M-EST. PATIENT-LVL III: CPT | Mod: PBBFAC,,,

## 2025-03-28 RX ORDER — TRIAMCINOLONE ACETONIDE 40 MG/ML
20 INJECTION, SUSPENSION INTRA-ARTICULAR; INTRAMUSCULAR
Status: DISCONTINUED | OUTPATIENT
Start: 2025-03-28 | End: 2025-03-28 | Stop reason: HOSPADM

## 2025-03-28 RX ADMIN — TRIAMCINOLONE ACETONIDE 20 MG: 40 INJECTION, SUSPENSION INTRA-ARTICULAR; INTRAMUSCULAR at 10:03

## 2025-03-28 NOTE — PROCEDURES
Large Joint Aspiration/Injection: L greater trochanteric bursa    Date/Time: 3/28/2025 10:00 AM    Performed by: Imelda Sullivan PA-C  Authorized by: Imelda Sullivan PA-C    Consent Done?:  Yes (Verbal)  Indications:  Pain  Site marked: the procedure site was marked    Timeout: prior to procedure the correct patient, procedure, and site was verified    Prep: patient was prepped and draped in usual sterile fashion      Details:  Needle Size:  22 G  Ultrasonic Guidance for needle placement?: No    Approach:  Lateral  Location:  Hip  Site:  L greater trochanteric bursa  Medications:  20 mg triamcinolone acetonide 40 mg/mL  Patient tolerance:  Patient tolerated the procedure well with no immediate complications

## 2025-03-28 NOTE — PROGRESS NOTES
SUBJECTIVE:      Chief Complaint: Pain of the Left Hip      History of Present Illness:  Pt is an 85 y/o male who presents for f/u of L greater trochanter bursitis  Last saw me on 12/20/2024  Given CSI to L greater trochanter, which provided significant relief  Occasional breakthrough symptoms  Today, pt experiencing recurrence of L lateral hip pain  Over the past few months, reports new L lateral hip pain related to occasional LBP  No recent trauma  Denies numbness/tingling, incontinence, saddle anesthesia  Interested in repeat L greater trochanter CSI today    Previous hx:  Patient is a 84 y.o. who presents today with complaints of lateral left hip pain.   Pain began 1year ago but recently worsened over the last 2-3 weeks. no history of trauma.  Pain is gradually worsening located to left lateral hip and described as aching and dull.  Denies groin pain. Denies numbness/tingling.  Symtoms are aggravated by  prolonged sitting and walking .  Symptoms are alleviated by rest and tylenol .  Attemptepd treatment(s) and/or interventions include Tylenol, medrol dose pack, CSI with PCP 7-8 months ago, voltaren gel without adequate response.   Toradol injxn without relief  CSI to greater trochanter provides significant symptomatic relief    Pain is 8 /10  today and 10 /10 at its worst.  Is affecting ADLs and limiting desired level of activity. Denies numbness and tingling  No saddle anesthesia or loss of bowel/bladder function    Mechanical symptoms: none  Subjective instability: (--)   Nocturnal symptoms: (+)    Recent ER visit for similar symptoms on 7/13/24 and was treated with medrol dose pack with minimal relief for greater trochanteric bursitis.     Patient previously treated by Dr. Wade in 2018 for sciatica as well as greater trochanteric bursitis     Smoking: No  DM: No  Occupation: retired      Past Medical History:   Diagnosis Date    Anemia of chronic disease 11/19/2019    Arthritis     Cataract      Chronic obstructive pulmonary disease, unspecified COPD type 01/26/2022    CKD (chronic kidney disease) stage 3, GFR 30-59 ml/min 05/01/2014    Colon polyp 05/12/2015    Coronary artery disease involving native coronary artery of native heart without angina pectoris 10/07/2019    Heart attack     Hyperlipidemia     Hypertension     Low tension glaucoma     Type 2 diabetes mellitus with hyperglycemia, without long-term current use of insulin 02/06/2024     Past Surgical History:   Procedure Laterality Date    CARDIAC CATH COSURGEON N/A 2/20/2025    Procedure: Cardiac Cath Cosurgeon;  Surgeon: Nahun Zapata MD;  Location: [facility] CATH LAB;  Service: Cardiothoracic;  Laterality: N/A;    COLONOSCOPY N/A 9/16/2021    Procedure: COLONOSCOPY;  Surgeon: Kit Nicholson MD;  Location: [facility] ENDO;  Service: Endoscopy;  Laterality: N/A;    CORONARY STENT PLACEMENT N/A 3/4/2019    Procedure: INSERTION, STENT, CORONARY ARTERY;  Surgeon: Brennan Stein MD;  Location: [facility] CATH LAB/EP;  Service: Cardiology;  Laterality: N/A;    CYSTOSCOPY      ESOPHAGOGASTRODUODENOSCOPY N/A 9/16/2021    Procedure: EGD (ESOPHAGOGASTRODUODENOSCOPY);  Surgeon: Kit Nicholson MD;  Location: [facility];  Service: Endoscopy;  Laterality: N/A;    INTRAVASCULAR ULTRASOUND, NON-CORONARY  2/20/2025    Procedure: Intravascular Ultrasound, Non-Coronary;  Surgeon: Ajay Osullivan MD;  Location: [facility] CATH LAB;  Service: Cardiology;;    LEFT HEART CATHETERIZATION N/A 3/4/2019    Procedure: Left heart cath;  Surgeon: Brennan Stein MD;  Location: [facility] CATH LAB/EP;  Service: Cardiology;  Laterality: N/A;    LEFT HEART CATHETERIZATION Left 2/24/2023    Procedure: Left heart cath;  Surgeon: Bob Pinto MD;  Location: [facility] CATH LAB/EP;  Service: Cardiology;  Laterality: Left;    TRANSCATHETER AORTIC VALVE REPLACEMENT (TAVR) N/A 2/20/2025    Procedure: REPLACEMENT, AORTIC VALVE, TRANSCATHETER (TAVR);  Surgeon: Ajay Osullivan MD;   Location: [facility] CATH LAB;  Service: Cardiology;  Laterality: N/A;    TRANSCATHETER AORTIC VALVE REPLACEMENT (TAVR)  2/20/2025    Procedure: REPLACEMENT, AORTIC VALVE, TRANSCATHETER (TAVR);  Surgeon: Nahun Zapata MD;  Location: [facility] CATH LAB;  Service: Cardiothoracic;;     Review of patient's allergies indicates:   Allergen Reactions    Ace inhibitors      Other reaction(s): Angioedema    Tizanidine Hives    Ultram [tramadol] Nausea Only and Other (See Comments)     Dizziness     Social History     Social History Narrative    Not on file     Family History   Problem Relation Name Age of Onset    No Known Problems Sister 2     No Known Problems Brother 1     Cancer Brother 1         lung cancer, smoker    Kidney disease Sister 2     Melanoma Neg Hx           Current Outpatient Medications:     acetaminophen (TYLENOL) 500 MG tablet, Take 1 tablet (500 mg total) by mouth every 6 (six) hours as needed for Pain., Disp: 30 tablet, Rfl: 0    amLODIPine (NORVASC) 5 MG tablet, TAKE 1 TABLET(5 MG) BY MOUTH DAILY, Disp: 90 tablet, Rfl: 3    apixaban (ELIQUIS) 2.5 mg Tab, Take 1 tablet (2.5 mg total) by mouth 2 (two) times daily., Disp: 60 tablet, Rfl: 0    atorvastatin (LIPITOR) 80 MG tablet, TAKE 1 TABLET(80 MG) BY MOUTH EVERY DAY, Disp: 90 tablet, Rfl: 2    latanoprost 0.005 % ophthalmic solution, INSTILL 1 DROP IN BOTH EYES EVERY EVENING, Disp: 2.5 mL, Rfl: 6    losartan (COZAAR) 100 MG tablet, Take 1 tablet (100 mg total) by mouth once daily., Disp: 90 tablet, Rfl: 3    metoprolol succinate (TOPROL-XL) 25 MG 24 hr tablet, TAKE 1 TABLET(25 MG) BY MOUTH EVERY DAY, Disp: 90 tablet, Rfl: 3    tamsulosin (FLOMAX) 0.4 mg Cap, TAKE 1 CAPSULE(0.4 MG) BY MOUTH AFTER DINNER Strength: 0.4 mg, Disp: 90 capsule, Rfl: 3    UNABLE TO FIND, Take 1 tablet by mouth Daily. Ruby Velazco, Disp: , Rfl:     krill-om-3-dha-epa-phospho-ast 345-81-35-50 mg Cap, Take 1 capsule by mouth once daily. (Patient not taking: Reported on 2/24/2025), Disp: ,  Rfl:     Current Facility-Administered Medications:     sodium chloride 0.9% flush 10 mL, 10 mL, Intravenous, PRN, Ajay Osullivan MD    Review of Systems   Musculoskeletal:  Positive for joint pain. Negative for back pain, falls and muscle weakness.   All other systems reviewed and are negative.      OBJECTIVE:      Vital Signs (Most Recent):  There were no vitals filed for this visit.  There is no height or weight on file to calculate BMI.      PHYSICAL EXAMINATION:  Vitals:  There were no vitals taken for this visit.   General: The patient is alert and oriented x 3.  Mood is pleasant.  Observation of ears, eyes and nose reveal no gross abnormalities.  No labored breathing observed.    left HIP / L SPINE EXAMINATION     OBSERVATION / INSPECTION  Gait:   Nonantalgic   Alignment:  Neutral   Scars:   None   Muscle atrophy: None   Effusion:  None   Warmth:  None   Discoloration:   None   Leg lengths:   Equal   Pelvis:    Level     TENDERNESS   Trochanteric bursa   + (minimally)  Piriformis    -   SI joint    -  Psoas tendon   -  Rectus insertion  -  Adductor insertion  -  Pubic symphysis  -  Midline spine   -  Lubosacral    -    ROM: (* = pain)    Flexion:    120 degrees  External rotation: 40 degrees  Internal rotation: 30 degrees*  Abduction:  45 degrees  Adduction:   20 degrees    SPECIAL TESTS:  Pain w/ forced internal rotation (FADIR):  Negative   Pain w/ forced external rotation (JYOTI):  Negative   Log roll:       Negative   Snapping hip (internal):    Negative   Sit-up pain:      Negative   Resisted sit-up pain:     Negative   Resisted sit-up with adductor contraction pain:  Negative   Step-down test:     Negative   Trendelenburg test:     Negative  Bridge test      Negative   SLR       Negative  Resisted SLR      Negative  Shopping cart sign     Negative    EXTREMITY NEURO-VASCULAR EXAMINATION:   Sensation:  Grossly intact to light touch all dermatomal regions.   Motor Function:  Fully intact motor  function at hip, knee, foot and ankle    DTRs;  quadriceps and  achilles 2+.  No clonus and downgoing Babinski.    Vascular status:  DP and PT pulses 2+, brisk capillary refill, symmetric.    Skin:  intact, compartments soft.      Diagnostic Results:  Imaging - I independently viewed the patient's imaging as well as the radiology report.  Xrays of the patient's bilateral hips and pelvis demonstrate no evidence of any acute fractures or dislocations or significant degenerative changes.    I independently interpreted previous L-spine x-ray from 2017, which shows osteophytes and arthritic changes    ASSESSMENT/PLAN:      1. Greater trochanteric bursitis of left hip    2. Osteoarthritis of lumbar spine, unspecified spinal osteoarthritis complication status          84 y.o. y/o male with left greater trochanteric bursitis   Plan: The patient and I had a thorough discussion today.  We discussed the working diagnosis as well as several other potential alternative diagnoses.    We discussed conservative and surgical treatment options. Conservative options include rest, activity modifications, workplace modifications, po and topical analgesia (OTC and rx), formal or home PT, and others. Surgical treatment was also mentioned.    At this time, the patient would like to proceed with the following, which I agree with.    Pain Management: Continue OTC tylenol as needed    Therapy: Patient not interested in PT or HEP at this time   Procedures: left greater trochanteric bursitis CSI given today   Imaging:  X-ray ordered today of L-spine with flexion and extension to further evaluate for L-spine arthritis, as symptoms sound like they might also be stemming from the lower back.  Last L-spine x-ray in 2017 showed arthritic changes  Referrals:  Referral placed today for neurosurgery for further evaluation possible L-spine symptoms  Work status:[employer] with limitation secondary to pain  Follow up: in 3 month(s) with Imelda Sullivan  GER    All of the patient's questions were answered and the patient will contact us if they have any questions or concerns in the interim.      Imelda Sullivan PA-C  [facility]  Orthopedic Surgery        I spent a total of 30 minutes on the day of the visit.  This includes face to face time and non-face to face time preparing to see the patient (eg, review of tests), obtaining and/or reviewing separately obtained history, documenting clinical information in the electronic or other health record, independently interpreting results and communicating results to the patient/family/caregiver, or care coordinator.

## 2025-03-31 ENCOUNTER — OFFICE VISIT (OUTPATIENT)
Dept: CARDIOLOGY | Facility: CLINIC | Age: 85
End: 2025-03-31
Payer: MEDICARE

## 2025-03-31 VITALS
BODY MASS INDEX: 29.1 KG/M2 | SYSTOLIC BLOOD PRESSURE: 189 MMHG | HEART RATE: 65 BPM | HEIGHT: 68 IN | OXYGEN SATURATION: 98 % | DIASTOLIC BLOOD PRESSURE: 84 MMHG | WEIGHT: 192 LBS

## 2025-03-31 DIAGNOSIS — E11.22 TYPE 2 DIABETES MELLITUS WITH STAGE 3B CHRONIC KIDNEY DISEASE, WITHOUT LONG-TERM CURRENT USE OF INSULIN: ICD-10-CM

## 2025-03-31 DIAGNOSIS — Z95.3 S/P TAVR (TRANSCATHETER AORTIC VALVE REPLACEMENT): Primary | ICD-10-CM

## 2025-03-31 DIAGNOSIS — I35.0 NONRHEUMATIC AORTIC VALVE STENOSIS: ICD-10-CM

## 2025-03-31 DIAGNOSIS — N18.32 TYPE 2 DIABETES MELLITUS WITH STAGE 3B CHRONIC KIDNEY DISEASE, WITHOUT LONG-TERM CURRENT USE OF INSULIN: ICD-10-CM

## 2025-03-31 PROBLEM — E11.29 TYPE 2 DIABETES MELLITUS WITH KIDNEY COMPLICATION, WITHOUT LONG-TERM CURRENT USE OF INSULIN: Status: ACTIVE | Noted: 2024-02-06

## 2025-03-31 PROCEDURE — 99999 PR PBB SHADOW E&M-EST. PATIENT-LVL IV: CPT | Mod: PBBFAC,,,

## 2025-03-31 PROCEDURE — 99214 OFFICE O/P EST MOD 30 MIN: CPT | Mod: S$PBB,,,

## 2025-03-31 PROCEDURE — 99214 OFFICE O/P EST MOD 30 MIN: CPT | Mod: PBBFAC

## 2025-03-31 NOTE — ASSESSMENT & PLAN NOTE
- One month TAVR follow-up  - TTE reviewed today, shows normal functioning valve with low mean gradients. PVL was noted on immediate post-op echo; PVL continued to be seen on repeat imaging. Patient denies shortness of breath, orthopnea, worsening LE edema. Labs stable, no signs of hemolysis. Will repeat echo on next follow-up visit.  - Continue Eliquis as is. Discussed with patient that it would be recommended to continue at least on aspirin 81 daily if he comes off Eliquis in the future.  - Follow up regularly with general cardiologist; next appt scheduled for next month  - Discussed waiting 6 months before scheduling dental and elective procedures. SBEP with antibiotics for life.  - Will follow-up in 11 months for annual TAVR visit.

## 2025-03-31 NOTE — PROGRESS NOTES
Interventional Cardiology Clinic Note    General Cardiologist: Dr. Lucas    HPI:     Laura Harris is a 84 y.o. male with HTN, HLD, PAD olaf 2a, CKD, multifocal pulmonary emboli seen on CTA on Eliquis, severe AS s/p TAVR 2/2025 who presents for 1 month TAVR follow-up.    Patient had successful TAVR with 29 mm Evolut FX valve placed on 2/20/2025 by Dr. Edouard. On aortogram at the end of the procedure, there was suspicious findings of double contour in the ascending aorta, however, CTA ruled out aortic dissection.     Today, patient reports doing well overall following TAVR procedure. Notes that his blood pressure have been more controlled at home since TAVR and increase in losartan to BID. States that over the past week, blood pressures have been running about 140 systolic which is improved for him. He reports of intermittent episodes of dizziness that occur for a few seconds then clears. Denies vision changes, headaches, numbness/weakness, syncope, or falls associated. Denies any distinct aggravating or alleviating factors. Can occur at rest or with positional change. He denies other concerns at this time. He has chronic leg swelling R>L that has been unchanged from baseline. Denies chest pain, shortness of breath, palpitations, orthopnea, PND. Compliant with Eliquis since his CTA TAVR results showed multifocal pulmonary emboli.  No acute concerns at this time. Following with General Cardiology Dr. Lucas, appt next month.    NYHA II / CCS 0    ROS:      Review of Systems   Constitutional: Negative for malaise/fatigue.   Eyes:  Negative for blurred vision, double vision and visual disturbance.   Cardiovascular:  Positive for leg swelling (chronic). Negative for chest pain, dyspnea on exertion, irregular heartbeat, near-syncope, orthopnea, palpitations, paroxysmal nocturnal dyspnea and syncope.   Neurological:  Positive for dizziness. Negative for focal weakness, headaches, light-headedness, loss of balance,  numbness and weakness.       PMH:     Past Medical History:   Diagnosis Date    Anemia of chronic disease 11/19/2019    Arthritis     Cataract     Chronic obstructive pulmonary disease, unspecified COPD type 01/26/2022    CKD (chronic kidney disease) stage 3, GFR 30-59 ml/min 05/01/2014    Colon polyp 05/12/2015    Coronary artery disease involving native coronary artery of native heart without angina pectoris 10/07/2019    Heart attack     Hyperlipidemia     Hypertension     Low tension glaucoma     Type 2 diabetes mellitus with hyperglycemia, without long-term current use of insulin 02/06/2024     Past Surgical History:   Procedure Laterality Date    CARDIAC CATH COSURGEON N/A 2/20/2025    Procedure: Cardiac Cath Cosurgeon;  Surgeon: aNhun Zapata MD;  Location: [facility] CATH LAB;  Service: Cardiothoracic;  Laterality: N/A;    COLONOSCOPY N/A 9/16/2021    Procedure: COLONOSCOPY;  Surgeon: Kit Nicholson MD;  Location: [facility];  Service: Endoscopy;  Laterality: N/A;    CORONARY STENT PLACEMENT N/A 3/4/2019    Procedure: INSERTION, STENT, CORONARY ARTERY;  Surgeon: Brennan Stein MD;  Location: [facility] CATH LAB/EP;  Service: Cardiology;  Laterality: N/A;    CYSTOSCOPY      ESOPHAGOGASTRODUODENOSCOPY N/A 9/16/2021    Procedure: EGD (ESOPHAGOGASTRODUODENOSCOPY);  Surgeon: Kit Nicholson MD;  Location: [facility] ENDO;  Service: Endoscopy;  Laterality: N/A;    INTRAVASCULAR ULTRASOUND, NON-CORONARY  2/20/2025    Procedure: Intravascular Ultrasound, Non-Coronary;  Surgeon: Ajay Osullivan MD;  Location: [facility] CATH LAB;  Service: Cardiology;;    LEFT HEART CATHETERIZATION N/A 3/4/2019    Procedure: Left heart cath;  Surgeon: Brennan Stein MD;  Location: [facility] CATH LAB/EP;  Service: Cardiology;  Laterality: N/A;    LEFT HEART CATHETERIZATION Left 2/24/2023    Procedure: Left heart cath;  Surgeon: Bob Pinto MD;  Location: [facility] CATH LAB/EP;  Service: Cardiology;  Laterality: Left;    TRANSCATHETER  "AORTIC VALVE REPLACEMENT (TAVR) N/A 2025    Procedure: REPLACEMENT, AORTIC VALVE, TRANSCATHETER (TAVR);  Surgeon: Ajay Osullivan MD;  Location: [facility] CATH LAB;  Service: Cardiology;  Laterality: N/A;    TRANSCATHETER AORTIC VALVE REPLACEMENT (TAVR)  2025    Procedure: REPLACEMENT, AORTIC VALVE, TRANSCATHETER (TAVR);  Surgeon: Nahun Zapata MD;  Location: [facility] CATH LAB;  Service: Cardiothoracic;;     Allergies:     Review of patient's allergies indicates:   Allergen Reactions    Ace inhibitors      Other reaction(s): Angioedema    Tizanidine Hives    Ultram [tramadol] Nausea Only and Other (See Comments)     Dizziness     Medications:   Medications Ordered Prior to Encounter[1]  Social History:     Social History     Tobacco Use    Smoking status: Former     Current packs/day: 0.00     Average packs/day: 1 pack/day for 64.0 years (64.0 ttl pk-yrs)     Types: Cigarettes     Start date:      Quit date:      Years since quittin.2     Passive exposure: Never    Smokeless tobacco: Never   Substance Use Topics    Alcohol use: No     Family History:     Family History   Problem Relation Name Age of Onset    No Known Problems Sister 2     No Known Problems Brother 1     Cancer Brother 1         lung cancer, smoker    Kidney disease Sister 2     Melanoma Neg Hx       Physical Exam:   BP (!) 189/84 (BP Location: Left arm, Patient Position: Sitting)   Pulse 65   Ht 5' 8" (1.727 m)   Wt 87.1 kg (192 lb 0.3 oz)   SpO2 98%   BMI 29.20 kg/m²        Physical Exam  Vitals and nursing note reviewed.   Constitutional:       General: He is not in acute distress.     Appearance: Normal appearance. He is not ill-appearing or toxic-appearing.   HENT:      Head: Normocephalic and atraumatic.   Eyes:      Pupils: Pupils are equal, round, and reactive to light.   Cardiovascular:      Rate and Rhythm: Normal rate and regular rhythm.      Pulses: Normal pulses.      Heart sounds: Murmur heard.   Pulmonary:      " Effort: Pulmonary effort is normal. No respiratory distress.   Musculoskeletal:         General: Normal range of motion.      Cervical back: Normal range of motion.      Right lower leg: Edema present.      Left lower leg: Edema present.      Comments: R>L, chronic   Skin:     General: Skin is warm and dry.      Capillary Refill: Capillary refill takes less than 2 seconds.   Neurological:      General: No focal deficit present.      Mental Status: He is alert.          Labs:     Lab Results   Component Value Date     03/25/2025     02/04/2025    K 4.6 03/25/2025    K 4.4 02/04/2025     (H) 03/25/2025     02/04/2025    CO2 19 (L) 03/25/2025    CO2 24 02/04/2025    BUN 26 (H) 03/25/2025    CREATININE 1.7 (H) 03/25/2025    GLUCOSE 91 03/25/2025    ANIONGAP 10 03/25/2025     Lab Results   Component Value Date    HGBA1C 6.4 (H) 11/26/2024     Lab Results   Component Value Date     (H) 02/20/2025    BNP 86 07/30/2021     (H) 11/11/2020    Lab Results   Component Value Date    WBC 5.80 03/25/2025    HGB 12.3 (L) 03/25/2025    HGB 13.1 (L) 02/04/2025    HCT 39.5 (L) 03/25/2025    HCT 41.8 02/04/2025     03/25/2025     02/04/2025    GRAN 3.4 11/26/2024    GRAN 57.6 11/26/2024     Lab Results   Component Value Date    CHOL 135 11/26/2024    HDL 34 (L) 11/26/2024    LDLCALC 78.0 11/26/2024    TRIG 115 11/26/2024          Lipids:  Recent Labs   Lab 11/26/24  0710   LDL Cholesterol 78.0   HDL 34 L      Renal:  Recent Labs   Lab 03/25/25  1031   Creatinine 1.7 H   Potassium 4.6   CO2 19 L   BUN 26 H     Liver:  Recent Labs   Lab 11/26/24  0710   AST 18   ALT 15       Imaging:     TTE (3/25/2025):    Left Ventricle: The left ventricle is normal in size. Normal wall thickness. Normal wall motion. There is normal systolic function with a visually estimated ejection fraction of 55 - 60%. Quantitated ejection fraction is 52%. Grade II diastolic dysfunction.    Right Ventricle: The  right ventricle is normal in size. Systolic function is normal.    Left Atrium: Mildly dilated    Aortic Valve: There is a transcatheter valve replacement in the aortic position. It is reported to be a 29 mm EvolutFX+ valve. Aortic valve area by VTI is 2.5 cm². Aortic valve peak velocity is 2.0 m/s. Mean gradient is 7 mmHg. The dimensionless index is 0.89.  Gradients within normal limits of prosthetic valve function Moderate paravalvular regurgitation from 11 to  2 o'clock position with PHT of 501 msec.    Mitral Valve: There is mild regurgitation.    Tricuspid Valve: There is mild to moderate regurgitation.    Pulmonic Valve: There is trace regurgitation.    Pulmonary Artery: The estimated pulmonary artery systolic pressure is 58 mmHg.    IVC/SVC: Normal venous pressure at 3 mmHg.    Pericardium: There is no pericardial effusion.    Assessment & Plan:     1. S/P TAVR (transcatheter aortic valve replacement)    2. Nonrheumatic aortic valve stenosis    3. Type 2 diabetes mellitus with stage 3b chronic kidney disease, without long-term current use of insulin        S/P TAVR (transcatheter aortic valve replacement)  - One month TAVR follow-up  - TTE reviewed today, shows normal functioning valve with low mean gradients. PVL was noted on immediate post-op echo; PVL continued to be seen on repeat imaging. Patient denies shortness of breath, orthopnea, worsening LE edema. Labs stable, no signs of hemolysis. Will repeat echo on next follow-up visit.  - Continue Eliquis as is. Discussed with patient that it would be recommended to continue at least on aspirin 81 daily if he comes off Eliquis in the future.  - Follow up regularly with general cardiologist; next appt scheduled for next month  - Discussed waiting 6 months before scheduling dental and elective procedures. SBEP with antibiotics for life.  - Will follow-up in 11 months for annual TAVR visit.    Nonrheumatic aortic valve stenosis  - s/p TAVR, asymptomatic  - will  follow-up for annual visit    Type 2 diabetes mellitus with kidney complication, without long-term current use of insulin  - GFR 39, Cr 1.7; stable  - continue diabetic regimen  - avoid nephrotoxic agents    Follow up in clinic with echo on/around 2/20/2026, 1 year from TAVR.    Signed:  Mary Beth Reid PA-C  Interventional Cardiology         [1]   Current Outpatient Medications on File Prior to Visit   Medication Sig Dispense Refill    acetaminophen (TYLENOL) 500 MG tablet Take 1 tablet (500 mg total) by mouth every 6 (six) hours as needed for Pain. 30 tablet 0    amLODIPine (NORVASC) 5 MG tablet TAKE 1 TABLET(5 MG) BY MOUTH DAILY 90 tablet 3    apixaban (ELIQUIS) 2.5 mg Tab Take 1 tablet (2.5 mg total) by mouth 2 (two) times daily. 60 tablet 0    atorvastatin (LIPITOR) 80 MG tablet TAKE 1 TABLET(80 MG) BY MOUTH EVERY DAY 90 tablet 2    latanoprost 0.005 % ophthalmic solution INSTILL 1 DROP IN BOTH EYES EVERY EVENING 2.5 mL 6    losartan (COZAAR) 100 MG tablet Take 1 tablet (100 mg total) by mouth once daily. 90 tablet 3    metoprolol succinate (TOPROL-XL) 25 MG 24 hr tablet TAKE 1 TABLET(25 MG) BY MOUTH EVERY DAY 90 tablet 3    tamsulosin (FLOMAX) 0.4 mg Cap TAKE 1 CAPSULE(0.4 MG) BY MOUTH AFTER DINNER Strength: 0.4 mg 90 capsule 3    UNABLE TO FIND Take 1 tablet by mouth Daily. Ruby alamo-om-3-dha-epa-phospho-ast 688-74-59-50 mg Cap Take 1 capsule by mouth once daily. (Patient not taking: Reported on 3/31/2025)       Current Facility-Administered Medications on File Prior to Visit   Medication Dose Route Frequency Provider Last Rate Last Admin    sodium chloride 0.9% flush 10 mL  10 mL Intravenous PRN Ajay Osullivan MD          DISPLAY PLAN FREE TEXT

## 2025-04-01 ENCOUNTER — TELEPHONE (OUTPATIENT)
Dept: ORTHOPEDICS | Facility: CLINIC | Age: 85
End: 2025-04-01
Payer: MEDICARE

## 2025-04-01 NOTE — TELEPHONE ENCOUNTER
----- Message from Janice sent at 4/1/2025  1:44 PM CDT -----  Regarding: Patient Advice-X-Ray Lumbar Complete Including Flex And Ext Results  4/1/2025  Hello,  I received a call from SONALI HDEZ [[id number]] regarding his X-Ray Lumbar Complete Including Flex And Ext results. Please give him a call. He can be reached at [phone].  Thank you, Janice LA[facility] Access Navigator

## 2025-04-02 ENCOUNTER — PATIENT MESSAGE (OUTPATIENT)
Dept: ADMINISTRATIVE | Facility: HOSPITAL | Age: 85
End: 2025-04-02
Payer: MEDICARE

## 2025-04-16 ENCOUNTER — HOSPITAL ENCOUNTER (OUTPATIENT)
Dept: CARDIOLOGY | Facility: HOSPITAL | Age: 85
Discharge: HOME | End: 2025-04-16
Attending: INTERNAL MEDICINE
Payer: MEDICARE

## 2025-04-16 VITALS
DIASTOLIC BLOOD PRESSURE: 70 MMHG | HEART RATE: 50 BPM | WEIGHT: 192 LBS | SYSTOLIC BLOOD PRESSURE: 122 MMHG | BODY MASS INDEX: 29.1 KG/M2 | HEIGHT: 68 IN

## 2025-04-16 DIAGNOSIS — I35.0 AORTIC STENOSIS, SEVERE: ICD-10-CM

## 2025-04-16 PROCEDURE — 93306 TTE W/DOPPLER COMPLETE: CPT | Mod: 26,,, | Performed by: INTERNAL MEDICINE

## 2025-04-16 PROCEDURE — 93306 TTE W/DOPPLER COMPLETE: CPT

## 2025-04-17 LAB
ASCENDING AORTA: 3.32 CM
AV AREA BY CONTINUOUS VTI: 2.6 CM2
AV INDEX (PROSTH): 0.52
AV LVOT MEAN GRADIENT: 3 MMHG
AV LVOT PEAK GRADIENT: 4 MMHG
AV MEAN GRADIENT: 9 MMHG
AV PEAK GRADIENT: 18 MMHG
AV REGURGITATION PRESSURE HALF TIME: 711 MS
AV VALVE AREA BY VELOCITY RATIO: 2.3 CM²
AV VALVE AREA: 2.6 CM2
AV VELOCITY RATIO: 0.48
BSA FOR ECHO PROCEDURE: 2.04 M2
CV ECHO LV RWT: 0.41 CM
DOP CALC AO PEAK VEL: 2.1 M/S
DOP CALC AO VTI: 45.2 CM
DOP CALC LVOT AREA: 4.9 CM2
DOP CALC LVOT DIAMETER: 2.5 CM
DOP CALC LVOT PEAK VEL: 1 M/S
DOP CALC LVOT STROKE VOLUME: 116.3 CM3
DOP CALC RVOT AREA: 4.05 CM2
DOP CALC RVOT DIAMETER: 2.27 CM
DOP CALCLVOT PEAK VEL VTI: 23.7 CM
E WAVE DECELERATION TIME: 275 MS
E/A RATIO: 1.77
E/E' RATIO: 11 M/S
ECHO EF ESTIMATED: 53 %
ECHO LV POSTERIOR WALL: 0.9 CM (ref 0.6–1.1)
FRACTIONAL SHORTENING: 27.3 % (ref 28–44)
HR MV ECHO: 50 BPM
INTERVENTRICULAR SEPTUM: 0.8 CM (ref 0.6–1.1)
IVC DIAMETER: 1.38 CM
LA MAJOR: 5 CM
LA MINOR: 4.9 CM
LA WIDTH: 3.9 CM
LEFT ATRIUM SIZE: 4.4 CM
LEFT ATRIUM VOLUME INDEX MOD: 25 ML/M2
LEFT ATRIUM VOLUME INDEX: 36 ML/M2
LEFT ATRIUM VOLUME MOD: 51 ML
LEFT ATRIUM VOLUME: 72 CM3
LEFT INTERNAL DIMENSION IN SYSTOLE: 3.2 CM (ref 2.1–4)
LEFT VENTRICLE DIASTOLIC VOLUME INDEX: 44.28 ML/M2
LEFT VENTRICLE DIASTOLIC VOLUME: 89 ML
LEFT VENTRICLE MASS INDEX: 59 G/M2
LEFT VENTRICLE SYSTOLIC VOLUME INDEX: 20.9 ML/M2
LEFT VENTRICLE SYSTOLIC VOLUME: 42 ML
LEFT VENTRICULAR INTERNAL DIMENSION IN DIASTOLE: 4.4 CM (ref 3.5–6)
LEFT VENTRICULAR MASS: 118.6 G
LV LATERAL E/E' RATIO: 8.7
LV SEPTAL E/E' RATIO: 15.6
MV A" WAVE DURATION": 228.35 MS
MV MEAN GRADIENT: 2 MMHG
MV PEAK A VEL: 0.44 M/S
MV PEAK E VEL: 0.78 M/S
MV PEAK GRADIENT: 8 MMHG
OHS CV RV/LV RATIO: 0.61 CM
PISA TR MAX VEL: 3 M/S
PULM VEIN A" WAVE DURATION": 228.35 MS
PULM VEIN S/D RATIO: 1.67
PULMONIC VEIN PEAK A VELOCITY: 0.6 M/S
PV PEAK D VEL: 0.42 M/S
PV PEAK S VEL: 0.7 M/S
RA MAJOR: 4.29 CM
RA PRESSURE ESTIMATED: 3 MMHG
RA WIDTH: 2.77 CM
RIGHT ATRIAL AREA: 11.1 CM2
RIGHT VENTRICLE DIASTOLIC BASEL DIMENSION: 2.7 CM
RV TB RVSP: 6 MMHG
RV TISSUE DOPPLER FREE WALL SYSTOLIC VELOCITY 1 (APICAL 4 CHAMBER VIEW): 19.51 CM/S
SINUS: 3.19 CM
STJ: 3.27 CM
TDI LATERAL: 0.09 M/S
TDI SEPTAL: 0.05 M/S
TDI: 0.07 M/S
TRICUSPID ANNULAR PLANE SYSTOLIC EXCURSION: 2.02 CM
TV PEAK GRADIENT: 35 MMHG
TV REST PULMONARY ARTERY PRESSURE: 39 MMHG
Z-SCORE OF LEFT VENTRICULAR DIMENSION IN END DIASTOLE: -2.92
Z-SCORE OF LEFT VENTRICULAR DIMENSION IN END SYSTOLE: -0.96

## 2025-04-23 ENCOUNTER — OFFICE VISIT (OUTPATIENT)
Dept: CARDIOLOGY | Facility: CLINIC | Age: 85
End: 2025-04-23
Payer: MEDICARE

## 2025-04-23 VITALS
WEIGHT: 186.94 LBS | DIASTOLIC BLOOD PRESSURE: 64 MMHG | HEART RATE: 54 BPM | SYSTOLIC BLOOD PRESSURE: 166 MMHG | BODY MASS INDEX: 28.43 KG/M2

## 2025-04-23 DIAGNOSIS — I73.9 PAD (PERIPHERAL ARTERY DISEASE): ICD-10-CM

## 2025-04-23 DIAGNOSIS — I10 PRIMARY HYPERTENSION: ICD-10-CM

## 2025-04-23 DIAGNOSIS — I27.82 CHRONIC PULMONARY EMBOLISM, UNSPECIFIED PULMONARY EMBOLISM TYPE, UNSPECIFIED WHETHER ACUTE COR PULMONALE PRESENT: ICD-10-CM

## 2025-04-23 DIAGNOSIS — E78.2 MIXED HYPERLIPIDEMIA: ICD-10-CM

## 2025-04-23 DIAGNOSIS — Z95.3 S/P TAVR (TRANSCATHETER AORTIC VALVE REPLACEMENT): ICD-10-CM

## 2025-04-23 DIAGNOSIS — N18.32 STAGE 3B CHRONIC KIDNEY DISEASE: ICD-10-CM

## 2025-04-23 DIAGNOSIS — I25.10 CORONARY ARTERY DISEASE INVOLVING NATIVE CORONARY ARTERY OF NATIVE HEART WITHOUT ANGINA PECTORIS: Primary | ICD-10-CM

## 2025-04-23 DIAGNOSIS — I70.0 ABDOMINAL AORTIC ATHEROSCLEROSIS: ICD-10-CM

## 2025-04-23 DIAGNOSIS — I27.20 PULMONARY HYPERTENSION: ICD-10-CM

## 2025-04-23 PROCEDURE — 99999 PR PBB SHADOW E&M-EST. PATIENT-LVL III: CPT | Mod: PBBFAC,,, | Performed by: INTERNAL MEDICINE

## 2025-04-23 PROCEDURE — 99213 OFFICE O/P EST LOW 20 MIN: CPT | Mod: PBBFAC | Performed by: INTERNAL MEDICINE

## 2025-04-23 PROCEDURE — 99215 OFFICE O/P EST HI 40 MIN: CPT | Mod: S$PBB,,, | Performed by: INTERNAL MEDICINE

## 2025-04-23 NOTE — PROGRESS NOTES
HISTORY:    84-year-old male with a history of CAD s/p PCI '19, severe aortic stenosis status post TAVR '25, hypertension, hyperlipidemia, PAD, PAH, CKD presenting for follow-up.     Patient referred to me for PAD management in late '23. Already following w Dr. Edouard for severe AS at that time and now status post TAVR in February '25.     Doing well post TAVR.    No CP, SOB at rest. No edema or orthopnea. No light headedness or dizziness.     Patient with mild RLE claudication.     Activity levels moderate. Left hip pain/sciatica improved w pain management. Completes ADLs and does tasks around the house without issue. Involved in the community.     Tolerates apixiban 2.5x2, amlodipine 5x1, losartan 100 x 1, metoprolol 25 x 1, atorvastatin 80 x 1.    Extensive inpatient and outpatient chart review since Jan '25.     PHYSICAL EXAM:    Vitals:    04/23/25 1038   BP: (!) 166/64   Pulse: (!) 54         NAD, A+Ox3.  No jvd, no bruit.  RRR nml s1,s2. 3/6 AS murmur  CTA B no wheezes or crackles.  No edema.     LABS/STUDIES (imaging reviewed during clinic visit):    March 2025 hemoglobin 12.3/MCV 83.  Creatinine 1.7/BUN 26/GFR 39.  Albumin 3.9.  .  November 2024 /HDL 34/LDL 78/.  A1c 6.4.  October 2024 hemoglobin 12.5/MCV 83.  Creatinine 1.6/BUN 25/GFR 42 (baseline).  Albumin 3.5.  April 2024 /HDL 29/LDL 65.  A1c 6.3.   EKG February 2025 sinus rhythm with no Q-waves. Chronic repol abnormality.     TTE April 2025 normal LV size and function with EF 60 65%.  Grade 2 diastology.  Normal bioprosthetic valve function with a peak velocity 2.1 m/sec across the valve.  CVP 3.  September 2024 normal LV size with concentric remodeling and an EF of 60-65%.  Grade 2 diastology.  Severe aortic stenosis with a peak velocity of 4.6 m/sec.  Lots of ectopy noted, excluding post PVC beats peak velocity appears to be in the low 4s.  Mild AI.  CVP 3.    Cardiac catheterization February 2025 status post 29 mm Evolut FX  valve.  February 2023 patent left main and left circ stent.  Luminal irregularities of the LAD system.  Small non dominant RCA.    CTA-TAVR January 2025 Multifocal pulmonary emboli.  February 2025 No e/o PE.   RON February 2023 0.92/0.59.  Arterial duplex September 2023 suggests a left mid SFA occlusion.      ASSESSMENT & PLAN:    1. Coronary artery disease involving native coronary artery of native heart without angina pectoris    2. Mixed hyperlipidemia    3. Primary hypertension    4. PAD (peripheral artery disease)    5. Abdominal aortic atherosclerosis    6. S/P TAVR (transcatheter aortic valve replacement)    7. Pulmonary hypertension    8. Stage 3b chronic kidney disease    9. Chronic pulmonary embolism, unspecified pulmonary embolism type, unspecified whether acute cor pulmonale present              Orders Placed This Encounter    apixaban (ELIQUIS) 2.5 mg Tab           Severe, AS status post TAVR '25 with normal gradients across the valve. On NOAC.     SIHD s/p LCX PCI '19. Asymptomatic with patent cors on early '23 cath. On NOAC.      Pt with known PAD and LLE SFA stenosis. Asymptomatic on that side. Mild RLE claudication. Continue with med management/RF modification.On NOAC.     Incidental finding of PE on January CTA TAVR protocol. Now on apixiban 2.5x2. follow-up CTA w no e/o PE.     Chronic PAH in the setting of chronic AS w likely elevated left sided pressures. Now s/p TAVR and on ca channel blocker. Goal will be BP control at this time.     Bps controlled on home log on losartan 100 x 1, metoprolol 25 x 1, amlodipine 5x1.     LDL at goal on atorvastatin 80 x 1.    Patient has an abdominal hernia. Would be okay with intervention if necessary 6 months post TAVR. No acute symptoms at this time.      Follow up in about 6 months (around 10/23/2025).      Ese Lucas MD

## 2025-05-27 ENCOUNTER — LAB VISIT (OUTPATIENT)
Dept: LAB | Facility: HOSPITAL | Age: 85
End: 2025-05-27
Attending: FAMILY MEDICINE
Payer: MEDICARE

## 2025-05-27 DIAGNOSIS — R73.03 PREDIABETES: ICD-10-CM

## 2025-05-27 DIAGNOSIS — D63.8 CHRONIC DISEASE ANEMIA: ICD-10-CM

## 2025-05-27 DIAGNOSIS — I10 PRIMARY HYPERTENSION: ICD-10-CM

## 2025-05-27 DIAGNOSIS — E78.5 HYPERLIPIDEMIA, UNSPECIFIED HYPERLIPIDEMIA TYPE: ICD-10-CM

## 2025-05-27 DIAGNOSIS — N18.32 STAGE 3B CHRONIC KIDNEY DISEASE: ICD-10-CM

## 2025-05-27 LAB
ABSOLUTE EOSINOPHIL (OHS): 0.35 K/UL
ABSOLUTE MONOCYTE (OHS): 0.58 K/UL (ref 0.3–1)
ABSOLUTE NEUTROPHIL COUNT (OHS): 4.09 K/UL (ref 1.8–7.7)
ALBUMIN SERPL BCP-MCNC: 3.4 G/DL (ref 3.5–5.2)
ALP SERPL-CCNC: 74 UNIT/L (ref 40–150)
ALT SERPL W/O P-5'-P-CCNC: 14 UNIT/L (ref 10–44)
ANION GAP (OHS): 14 MMOL/L (ref 8–16)
AST SERPL-CCNC: 19 UNIT/L (ref 11–45)
BASOPHILS # BLD AUTO: 0.06 K/UL
BASOPHILS NFR BLD AUTO: 0.9 %
BILIRUB SERPL-MCNC: 0.5 MG/DL (ref 0.1–1)
BUN SERPL-MCNC: 18 MG/DL (ref 8–23)
CALCIUM SERPL-MCNC: 8.4 MG/DL (ref 8.7–10.5)
CHLORIDE SERPL-SCNC: 113 MMOL/L (ref 95–110)
CHOLEST SERPL-MCNC: 112 MG/DL (ref 120–199)
CHOLEST/HDLC SERPL: 3.6 {RATIO} (ref 2–5)
CO2 SERPL-SCNC: 17 MMOL/L (ref 23–29)
CREAT SERPL-MCNC: 1.7 MG/DL (ref 0.5–1.4)
EAG (OHS): 134 MG/DL (ref 68–131)
ERYTHROCYTE [DISTWIDTH] IN BLOOD BY AUTOMATED COUNT: 15.1 % (ref 11.5–14.5)
GFR SERPLBLD CREATININE-BSD FMLA CKD-EPI: 39 ML/MIN/1.73/M2
GLUCOSE SERPL-MCNC: 93 MG/DL (ref 70–110)
HBA1C MFR BLD: 6.3 % (ref 4–5.6)
HCT VFR BLD AUTO: 41.5 % (ref 40–54)
HDLC SERPL-MCNC: 31 MG/DL (ref 40–75)
HDLC SERPL: 27.7 % (ref 20–50)
HGB BLD-MCNC: 12.5 GM/DL (ref 14–18)
IMM GRANULOCYTES # BLD AUTO: 0.02 K/UL (ref 0–0.04)
IMM GRANULOCYTES NFR BLD AUTO: 0.3 % (ref 0–0.5)
LDLC SERPL CALC-MCNC: 62.4 MG/DL (ref 63–159)
LYMPHOCYTES # BLD AUTO: 1.36 K/UL (ref 1–4.8)
MCH RBC QN AUTO: 25.9 PG (ref 27–31)
MCHC RBC AUTO-ENTMCNC: 30.1 G/DL (ref 32–36)
MCV RBC AUTO: 86 FL (ref 82–98)
NONHDLC SERPL-MCNC: 81 MG/DL
NUCLEATED RBC (/100WBC) (OHS): 0 /100 WBC
PLATELET # BLD AUTO: 194 K/UL (ref 150–450)
PMV BLD AUTO: 12 FL (ref 9.2–12.9)
POTASSIUM SERPL-SCNC: 3.9 MMOL/L (ref 3.5–5.1)
PROT SERPL-MCNC: 6.6 GM/DL (ref 6–8.4)
RBC # BLD AUTO: 4.82 M/UL (ref 4.6–6.2)
RELATIVE EOSINOPHIL (OHS): 5.4 %
RELATIVE LYMPHOCYTE (OHS): 21.1 % (ref 18–48)
RELATIVE MONOCYTE (OHS): 9 % (ref 4–15)
RELATIVE NEUTROPHIL (OHS): 63.3 % (ref 38–73)
SODIUM SERPL-SCNC: 144 MMOL/L (ref 136–145)
TRIGL SERPL-MCNC: 93 MG/DL (ref 30–150)
WBC # BLD AUTO: 6.46 K/UL (ref 3.9–12.7)

## 2025-05-27 PROCEDURE — 36415 COLL VENOUS BLD VENIPUNCTURE: CPT | Mod: PO

## 2025-05-27 PROCEDURE — 80053 COMPREHEN METABOLIC PANEL: CPT

## 2025-05-27 PROCEDURE — 85025 COMPLETE CBC W/AUTO DIFF WBC: CPT

## 2025-05-27 PROCEDURE — 80061 LIPID PANEL: CPT

## 2025-05-27 PROCEDURE — 83036 HEMOGLOBIN GLYCOSYLATED A1C: CPT

## 2025-06-06 ENCOUNTER — PATIENT MESSAGE (OUTPATIENT)
Dept: ADMINISTRATIVE | Facility: HOSPITAL | Age: 85
End: 2025-06-06
Payer: MEDICARE

## 2025-06-10 ENCOUNTER — RESULTS FOLLOW-UP (OUTPATIENT)
Dept: FAMILY MEDICINE | Facility: CLINIC | Age: 85
End: 2025-06-10

## 2025-06-10 ENCOUNTER — OFFICE VISIT (OUTPATIENT)
Dept: FAMILY MEDICINE | Facility: CLINIC | Age: 85
End: 2025-06-10
Payer: MEDICARE

## 2025-06-10 VITALS
HEIGHT: 68 IN | OXYGEN SATURATION: 96 % | SYSTOLIC BLOOD PRESSURE: 136 MMHG | HEART RATE: 71 BPM | BODY MASS INDEX: 29.4 KG/M2 | WEIGHT: 194 LBS | DIASTOLIC BLOOD PRESSURE: 60 MMHG

## 2025-06-10 DIAGNOSIS — I10 PRIMARY HYPERTENSION: ICD-10-CM

## 2025-06-10 DIAGNOSIS — D63.8 CHRONIC DISEASE ANEMIA: ICD-10-CM

## 2025-06-10 DIAGNOSIS — N18.32 STAGE 3B CHRONIC KIDNEY DISEASE: Primary | ICD-10-CM

## 2025-06-10 DIAGNOSIS — R60.0 LEG EDEMA: ICD-10-CM

## 2025-06-10 PROCEDURE — 99215 OFFICE O/P EST HI 40 MIN: CPT | Mod: S$PBB,,, | Performed by: FAMILY MEDICINE

## 2025-06-10 PROCEDURE — 99213 OFFICE O/P EST LOW 20 MIN: CPT | Mod: PBBFAC,PO | Performed by: FAMILY MEDICINE

## 2025-06-10 PROCEDURE — 99999 PR PBB SHADOW E&M-EST. PATIENT-LVL III: CPT | Mod: PBBFAC,,, | Performed by: FAMILY MEDICINE

## 2025-06-10 RX ORDER — HYDROCHLOROTHIAZIDE 12.5 MG/1
12.5 TABLET ORAL DAILY
Qty: 90 TABLET | Refills: 3 | Status: SHIPPED | OUTPATIENT
Start: 2025-06-10 | End: 2026-06-10

## 2025-06-10 NOTE — PROGRESS NOTES
Subjective     Patient ID: Laura Harris is a 84 y.o. male.    Chief Complaint: Hypertension and Anemia    84 years old male came to the clinic  for blood pressure check.  Blood pressure today was stable.  Patient reports lower extremities edema.  Patient currently taking amlodipine.  He is willing to try other medicine for blood pressure.  Patient with decreased kidney function but stable in comparison with previous reports.  Patient with chronic anemia with no significant changes.    Hypertension  This is a chronic problem. The current episode started more than 1 year ago. The problem is unchanged. The problem is controlled. Associated symptoms include peripheral edema. Pertinent negatives include no chest pain, orthopnea or palpitations. There are no associated agents to hypertension. Risk factors for coronary artery disease include male gender and sedentary lifestyle. Past treatments include calcium channel blockers, beta blockers and angiotensin blockers. The current treatment provides moderate improvement. Compliance problems include exercise.  There is no history of angina. There is no history of a hypertension causing med or a thyroid problem.   Anemia  Presents for follow-up visit. There has been no light-headedness or palpitations. There are no compliance problems.      Review of Systems   Constitutional: Negative.    HENT: Negative.     Eyes: Negative.    Respiratory: Negative.     Cardiovascular: Negative.  Negative for chest pain, palpitations and orthopnea.   Gastrointestinal: Negative.    Genitourinary: Negative.    Musculoskeletal: Negative.    Integumentary:  Negative.   Neurological: Negative.  Negative for light-headedness.   Psychiatric/Behavioral: Negative.            Objective     Physical Exam  Vitals and nursing note reviewed.   Constitutional:       General: He is not in acute distress.     Appearance: He is well-developed. He is not diaphoretic.   HENT:      Head: Normocephalic and  atraumatic.      Right Ear: External ear normal.      Left Ear: External ear normal.      Nose: Nose normal.      Mouth/Throat:      Pharynx: No oropharyngeal exudate.   Eyes:      General: No scleral icterus.        Right eye: No discharge.         Left eye: No discharge.      Conjunctiva/sclera: Conjunctivae normal.      Pupils: Pupils are equal, round, and reactive to light.   Neck:      Thyroid: No thyromegaly.      Vascular: No JVD.      Trachea: No tracheal deviation.   Cardiovascular:      Rate and Rhythm: Normal rate and regular rhythm.      Heart sounds: Normal heart sounds. No murmur heard.     No friction rub. No gallop.   Pulmonary:      Effort: Pulmonary effort is normal. No respiratory distress.      Breath sounds: Normal breath sounds. No stridor. No wheezing or rales.   Chest:      Chest wall: No tenderness.   Abdominal:      General: Bowel sounds are normal. There is no distension.      Palpations: Abdomen is soft. There is no mass.      Tenderness: There is no abdominal tenderness. There is no guarding or rebound.   Musculoskeletal:         General: No tenderness. Normal range of motion.      Cervical back: Normal range of motion and neck supple.      Right lower leg: Edema present.      Left lower leg: Edema present.   Lymphadenopathy:      Cervical: No cervical adenopathy.   Skin:     General: Skin is warm and dry.      Coloration: Skin is not pale.      Findings: No erythema or rash.   Neurological:      Mental Status: He is alert and oriented to person, place, and time.      Cranial Nerves: No cranial nerve deficit.      Motor: No abnormal muscle tone.      Coordination: Coordination abnormal.      Gait: Gait abnormal.      Deep Tendon Reflexes: Reflexes are normal and symmetric. Reflexes normal.   Psychiatric:         Behavior: Behavior normal.         Thought Content: Thought content normal.         Judgment: Judgment normal.            Assessment and Plan     1. Stage 3b chronic kidney  disease    2. Primary hypertension  -     hydroCHLOROthiazide 12.5 MG Tab; Take 1 tablet (12.5 mg total) by mouth once daily.  Dispense: 90 tablet; Refill: 3  -     Comprehensive Metabolic Panel; Future; Expected date: 06/10/2025  -     Lipid Panel; Future; Expected date: 06/10/2025  -     TSH; Future; Expected date: 06/10/2025  -     CBC Auto Differential; Future; Expected date: 06/10/2025  -     Basic Metabolic Panel; Future; Expected date: 06/10/2025    3. Chronic disease anemia  -     CBC Auto Differential; Future; Expected date: 06/10/2025    4. Leg edema  -     hydroCHLOROthiazide 12.5 MG Tab; Take 1 tablet (12.5 mg total) by mouth once daily.  Dispense: 90 tablet; Refill: 3    BMP to recheck kidney function in 4 weeks.  Discontinue amlodipine.  Last 2D echo with good ejection fraction.  Continue monitoring blood pressure at home, low sodium diet.   I spent a total of 41 minutes on the day of the visit.This includes face to face time and non-face to face time preparing to see the patient (eg, review of tests), obtaining and/or reviewing separately obtained history, documenting clinical information in the electronic or other health record, independently interpreting results and communicating results to the patient/family/caregiver, or care coordinator.          Follow up in about 6 months (around 12/10/2025), or if symptoms worsen or fail to improve.

## 2025-06-23 ENCOUNTER — TELEPHONE (OUTPATIENT)
Dept: ORTHOPEDICS | Facility: CLINIC | Age: 85
End: 2025-06-23
Payer: MEDICARE

## 2025-06-23 NOTE — TELEPHONE ENCOUNTER
Left message to contact clinic regarding 06/30 appointment. PA will be in surgery. Appointment has been rescheduled sooner on 06/25 at 11:30.

## 2025-07-14 ENCOUNTER — OFFICE VISIT (OUTPATIENT)
Dept: ORTHOPEDICS | Facility: CLINIC | Age: 85
End: 2025-07-14
Payer: MEDICARE

## 2025-07-14 DIAGNOSIS — M70.62 GREATER TROCHANTERIC BURSITIS OF LEFT HIP: Primary | ICD-10-CM

## 2025-07-14 DIAGNOSIS — M47.816 OSTEOARTHRITIS OF LUMBAR SPINE, UNSPECIFIED SPINAL OSTEOARTHRITIS COMPLICATION STATUS: ICD-10-CM

## 2025-07-14 PROCEDURE — 99213 OFFICE O/P EST LOW 20 MIN: CPT | Mod: PBBFAC,PN

## 2025-07-14 PROCEDURE — 99999 PR PBB SHADOW E&M-EST. PATIENT-LVL III: CPT | Mod: PBBFAC,,,

## 2025-07-14 PROCEDURE — 99213 OFFICE O/P EST LOW 20 MIN: CPT | Mod: S$PBB,,,

## 2025-07-14 NOTE — PROGRESS NOTES
SUBJECTIVE:      Chief Complaint: Pain of the Left Hip      History of Present Illness:  Pt is an 83 y/o male who presents for f/u of L greater trochanter bursitis  Last saw me on 3/28/2025  Given CSI to L greater trochanter, which provided significant relief  Denies breakthrough symptoms  Previously sent referral for neurosurgery, but pt has not scheduled appt due to symptoms resolution with L greater trochanter CSI.  No recent trauma  Denies numbness/tingling, incontinence, saddle anesthesia      Previous hx:  Patient is a 84 y.o. who presents today with complaints of lateral left hip pain.   Pain began 1year ago but recently worsened over the last 2-3 weeks. no history of trauma.  Pain is gradually worsening located to left lateral hip and described as aching and dull.  Denies groin pain. Denies numbness/tingling.  Symtoms are aggravated by prolonged sitting and walking.  Symptoms are alleviated by rest and tylenol.  Attemptepd treatment(s) and/or interventions include Tylenol, medrol dose pack, CSI with PCP 7-8 months ago, voltaren gel without adequate response.   Toradol injxn without relief  CSI to greater trochanter provides significant symptomatic relief    Pain is 8 /10  today and 10 /10 at its worst.  Is affecting ADLs and limiting desired level of activity. Denies numbness and tingling  No saddle anesthesia or loss of bowel/bladder function    Mechanical symptoms: none  Subjective instability: (--)   Nocturnal symptoms: (+)    Recent ER visit for similar symptoms on 7/13/24 and was treated with medrol dose pack with minimal relief for greater trochanteric bursitis.     Patient previously treated by Dr. Wade in 2018 for sciatica as well as greater trochanteric bursitis     Smoking: No  DM: No  Occupation: retired      Past Medical History:   Diagnosis Date    Anemia of chronic disease 11/19/2019    Arthritis     Cataract     Chronic obstructive pulmonary disease, unspecified COPD type  01/26/2022    CKD (chronic kidney disease) stage 3, GFR 30-59 ml/min 05/01/2014    Colon polyp 05/12/2015    Coronary artery disease involving native coronary artery of native heart without angina pectoris 10/07/2019    Heart attack     Hyperlipidemia     Hypertension     Low tension glaucoma     Type 2 diabetes mellitus with hyperglycemia, without long-term current use of insulin 02/06/2024     Past Surgical History:   Procedure Laterality Date    CARDIAC CATH COSURGEON N/A 2/20/2025    Procedure: Cardiac Cath Cosurgeon;  Surgeon: Nahun Zapata MD;  Location: [facility] CATH LAB;  Service: Cardiothoracic;  Laterality: N/A;    COLONOSCOPY N/A 9/16/2021    Procedure: COLONOSCOPY;  Surgeon: Kit Nicholson MD;  Location: [facility] ENDO;  Service: Endoscopy;  Laterality: N/A;    CORONARY STENT PLACEMENT N/A 3/4/2019    Procedure: INSERTION, STENT, CORONARY ARTERY;  Surgeon: Brennan Stein MD;  Location: [facility] CATH LAB/EP;  Service: Cardiology;  Laterality: N/A;    CYSTOSCOPY      ESOPHAGOGASTRODUODENOSCOPY N/A 9/16/2021    Procedure: EGD (ESOPHAGOGASTRODUODENOSCOPY);  Surgeon: Kit Nicholson MD;  Location: [facility] ENDO;  Service: Endoscopy;  Laterality: N/A;    INTRAVASCULAR ULTRASOUND, NON-CORONARY  2/20/2025    Procedure: Intravascular Ultrasound, Non-Coronary;  Surgeon: Ajay Osullivan MD;  Location: [facility] CATH LAB;  Service: Cardiology;;    LEFT HEART CATHETERIZATION N/A 3/4/2019    Procedure: Left heart cath;  Surgeon: Brennan Stein MD;  Location: [facility] CATH LAB/EP;  Service: Cardiology;  Laterality: N/A;    LEFT HEART CATHETERIZATION Left 2/24/2023    Procedure: Left heart cath;  Surgeon: Bob Pinto MD;  Location: [facility] CATH LAB/EP;  Service: Cardiology;  Laterality: Left;    TRANSCATHETER AORTIC VALVE REPLACEMENT (TAVR) N/A 2/20/2025    Procedure: REPLACEMENT, AORTIC VALVE, TRANSCATHETER (TAVR);  Surgeon: Ajay Osullivan MD;  Location: [facility] CATH LAB;  Service: Cardiology;  Laterality: N/A;     TRANSCATHETER AORTIC VALVE REPLACEMENT (TAVR)  2/20/2025    Procedure: REPLACEMENT, AORTIC VALVE, TRANSCATHETER (TAVR);  Surgeon: Nahun Zapata MD;  Location: [facility] CATH LAB;  Service: Cardiothoracic;;     Review of patient's allergies indicates:   Allergen Reactions    Ace inhibitors      Other reaction(s): Angioedema    Tizanidine Hives    Ultram [tramadol] Nausea Only and Other (See Comments)     Dizziness     Social History     Social History Narrative    Not on file     Family History   Problem Relation Name Age of Onset    No Known Problems Sister 2     No Known Problems Brother 1     Cancer Brother 1         lung cancer, smoker    Kidney disease Sister 2     Melanoma Neg Hx           Current Outpatient Medications:     acetaminophen (TYLENOL) 500 MG tablet, Take 1 tablet (500 mg total) by mouth every 6 (six) hours as needed for Pain., Disp: 30 tablet, Rfl: 0    apixaban (ELIQUIS) 2.5 mg Tab, Take 1 tablet (2.5 mg total) by mouth 2 (two) times daily., Disp: 180 tablet, Rfl: 3    atorvastatin (LIPITOR) 80 MG tablet, TAKE 1 TABLET(80 MG) BY MOUTH EVERY DAY, Disp: 90 tablet, Rfl: 2    hydroCHLOROthiazide 12.5 MG Tab, Take 1 tablet (12.5 mg total) by mouth once daily., Disp: 90 tablet, Rfl: 3    krill-om-3-dha-epa-phospho-ast 587-28-10-50 mg Cap, Take 1 capsule by mouth once daily., Disp: , Rfl:     latanoprost 0.005 % ophthalmic solution, INSTILL 1 DROP IN BOTH EYES EVERY EVENING, Disp: 2.5 mL, Rfl: 6    losartan (COZAAR) 100 MG tablet, Take 1 tablet (100 mg total) by mouth once daily., Disp: 90 tablet, Rfl: 3    metoprolol succinate (TOPROL-XL) 25 MG 24 hr tablet, TAKE 1 TABLET(25 MG) BY MOUTH EVERY DAY, Disp: 90 tablet, Rfl: 3    tamsulosin (FLOMAX) 0.4 mg Cap, TAKE 1 CAPSULE(0.4 MG) BY MOUTH AFTER DINNER Strength: 0.4 mg, Disp: 90 capsule, Rfl: 3    UNABLE TO FIND, Take 1 tablet by mouth Daily. Ruby Velazco, Disp: , Rfl:     Current Facility-Administered Medications:     sodium chloride 0.9% flush 10 mL, 10  mL, Intravenous, PRN, Ajay Osullivan MD    Review of Systems   Musculoskeletal:  Positive for joint pain. Negative for back pain, falls and muscle weakness.   All other systems reviewed and are negative.      OBJECTIVE:      Vital Signs (Most Recent):  There were no vitals filed for this visit.  There is no height or weight on file to calculate BMI.      PHYSICAL EXAMINATION:  Vitals:  There were no vitals taken for this visit.   General: The patient is alert and oriented x 3.  Mood is pleasant.  Observation of ears, eyes and nose reveal no gross abnormalities.  No labored breathing observed.    left HIP / L SPINE EXAMINATION     OBSERVATION / INSPECTION  Gait:   Nonantalgic   Alignment:  Neutral   Scars:   None   Muscle atrophy: None   Effusion:  None   Warmth:  None   Discoloration:   None   Leg lengths:   Equal   Pelvis:    Level     TENDERNESS   Trochanteric bursa   + (minimally)  Piriformis    -   SI joint    -  Psoas tendon   -  Rectus insertion  -  Adductor insertion  -  Pubic symphysis  -  Midline spine   -  Lubosacral    -    ROM: (* = pain)    Flexion:    120 degrees  External rotation: 40 degrees  Internal rotation: 30 degrees*  Abduction:  45 degrees  Adduction:   20 degrees    SPECIAL TESTS:  Pain w/ forced internal rotation (FADIR):  Negative   Pain w/ forced external rotation (JYOTI):  Negative   Log roll:       Negative   Snapping hip (internal):    Negative   Sit-up pain:      Negative   Resisted sit-up pain:     Negative   Resisted sit-up with adductor contraction pain:  Negative   Step-down test:     Negative   Trendelenburg test:     Negative  Bridge test      Negative   SLR       Negative  Resisted SLR      Negative  Shopping cart sign     Negative    EXTREMITY NEURO-VASCULAR EXAMINATION:   Sensation:  Grossly intact to light touch all dermatomal regions.   Motor Function:  Fully intact motor function at hip, knee, foot and ankle    DTRs;  quadriceps and  achilles 2+.  No clonus and  downgoing Babinski.    Vascular status:  DP and PT pulses 2+, brisk capillary refill, symmetric.    Skin:  intact, compartments soft.      Diagnostic Results:  Imaging - I independently viewed the patient's imaging as well as the radiology report.  Xrays of the patient's bilateral hips and pelvis demonstrate no evidence of any acute fractures or dislocations or significant degenerative changes.    I independently interpreted previous L-spine x-ray from 2017, which shows osteophytes and arthritic changes    ASSESSMENT/PLAN:      1. Greater trochanteric bursitis of left hip    2. Osteoarthritis of lumbar spine, unspecified spinal osteoarthritis complication status            84 y.o. y/o male with left greater trochanteric bursitis   Plan: The patient and I had a thorough discussion today.  We discussed the working diagnosis as well as several other potential alternative diagnoses.    We discussed conservative and surgical treatment options. Conservative options include rest, activity modifications, workplace modifications, po and topical analgesia (OTC and rx), formal or home PT, and others. Surgical treatment was also mentioned.    At this time, the patient would like to proceed with the following, which I agree with.    Pain Management: Continue OTC tylenol as needed    Therapy: Patient not interested in PT or HEP at this time   Procedures:  Repeat CSI for left greater trochanter not necessary today due to symptomatic resolution  Work status:[employer] with limitation secondary to pain  Follow up: in 3 month(s) with Imelda Sullivan PA-C.  If patient needs sooner appointment, he will contact us.  If symptoms have continued to be resolved, he can cancel this appointment    All of the patient's questions were answered and the patient will contact us if they have any questions or concerns in the interim.      Imelda Sullivan PA-C  [facility]  Orthopedic Surgery        No LOS data to display  This includes face to face time and  non-face to face time preparing to see the patient (eg, review of tests), obtaining and/or reviewing separately obtained history, documenting clinical information in the electronic or other health record, independently interpreting results and communicating results to the patient/family/caregiver, or care coordinator.

## 2025-07-15 ENCOUNTER — LAB VISIT (OUTPATIENT)
Dept: LAB | Facility: HOSPITAL | Age: 85
End: 2025-07-15
Attending: FAMILY MEDICINE
Payer: MEDICARE

## 2025-07-15 DIAGNOSIS — I10 PRIMARY HYPERTENSION: ICD-10-CM

## 2025-07-15 DIAGNOSIS — D63.8 CHRONIC DISEASE ANEMIA: ICD-10-CM

## 2025-07-15 LAB
ABSOLUTE EOSINOPHIL (OHS): 0.3 K/UL
ABSOLUTE MONOCYTE (OHS): 0.62 K/UL (ref 0.3–1)
ABSOLUTE NEUTROPHIL COUNT (OHS): 3.44 K/UL (ref 1.8–7.7)
ALBUMIN SERPL BCP-MCNC: 3.7 G/DL (ref 3.5–5.2)
ALP SERPL-CCNC: 78 UNIT/L (ref 40–150)
ALT SERPL W/O P-5'-P-CCNC: 15 UNIT/L (ref 10–44)
ANION GAP (OHS): 8 MMOL/L (ref 8–16)
AST SERPL-CCNC: 13 UNIT/L (ref 11–45)
BASOPHILS # BLD AUTO: 0.07 K/UL
BASOPHILS NFR BLD AUTO: 1.1 %
BILIRUB SERPL-MCNC: 0.4 MG/DL (ref 0.1–1)
BUN SERPL-MCNC: 21 MG/DL (ref 8–23)
CALCIUM SERPL-MCNC: 9 MG/DL (ref 8.7–10.5)
CHLORIDE SERPL-SCNC: 115 MMOL/L (ref 95–110)
CHOLEST SERPL-MCNC: 137 MG/DL (ref 120–199)
CHOLEST/HDLC SERPL: 4.9 {RATIO} (ref 2–5)
CO2 SERPL-SCNC: 20 MMOL/L (ref 23–29)
CREAT SERPL-MCNC: 2 MG/DL (ref 0.5–1.4)
ERYTHROCYTE [DISTWIDTH] IN BLOOD BY AUTOMATED COUNT: 14.9 % (ref 11.5–14.5)
GFR SERPLBLD CREATININE-BSD FMLA CKD-EPI: 32 ML/MIN/1.73/M2
GLUCOSE SERPL-MCNC: 105 MG/DL (ref 70–110)
HCT VFR BLD AUTO: 39.5 % (ref 40–54)
HDLC SERPL-MCNC: 28 MG/DL (ref 40–75)
HDLC SERPL: 20.4 % (ref 20–50)
HGB BLD-MCNC: 12.4 GM/DL (ref 14–18)
IMM GRANULOCYTES # BLD AUTO: 0.02 K/UL (ref 0–0.04)
IMM GRANULOCYTES NFR BLD AUTO: 0.3 % (ref 0–0.5)
LDLC SERPL CALC-MCNC: 73.4 MG/DL (ref 63–159)
LYMPHOCYTES # BLD AUTO: 1.82 K/UL (ref 1–4.8)
MCH RBC QN AUTO: 26.1 PG (ref 27–31)
MCHC RBC AUTO-ENTMCNC: 31.4 G/DL (ref 32–36)
MCV RBC AUTO: 83 FL (ref 82–98)
NONHDLC SERPL-MCNC: 109 MG/DL
NUCLEATED RBC (/100WBC) (OHS): 0 /100 WBC
PLATELET # BLD AUTO: 193 K/UL (ref 150–450)
PMV BLD AUTO: 11.4 FL (ref 9.2–12.9)
POTASSIUM SERPL-SCNC: 4.3 MMOL/L (ref 3.5–5.1)
PROT SERPL-MCNC: 6.7 GM/DL (ref 6–8.4)
RBC # BLD AUTO: 4.75 M/UL (ref 4.6–6.2)
RELATIVE EOSINOPHIL (OHS): 4.8 %
RELATIVE LYMPHOCYTE (OHS): 29 % (ref 18–48)
RELATIVE MONOCYTE (OHS): 9.9 % (ref 4–15)
RELATIVE NEUTROPHIL (OHS): 54.9 % (ref 38–73)
SODIUM SERPL-SCNC: 143 MMOL/L (ref 136–145)
T4 FREE SERPL-MCNC: 0.75 NG/DL (ref 0.71–1.51)
TRIGL SERPL-MCNC: 178 MG/DL (ref 30–150)
TSH SERPL-ACNC: 4.09 UIU/ML (ref 0.4–4)
WBC # BLD AUTO: 6.27 K/UL (ref 3.9–12.7)

## 2025-07-15 PROCEDURE — 80061 LIPID PANEL: CPT

## 2025-07-15 PROCEDURE — 82040 ASSAY OF SERUM ALBUMIN: CPT

## 2025-07-15 PROCEDURE — 36415 COLL VENOUS BLD VENIPUNCTURE: CPT | Mod: PO

## 2025-07-15 PROCEDURE — 84439 ASSAY OF FREE THYROXINE: CPT

## 2025-07-15 PROCEDURE — 85025 COMPLETE CBC W/AUTO DIFF WBC: CPT

## 2025-07-15 PROCEDURE — 84443 ASSAY THYROID STIM HORMONE: CPT

## 2025-08-12 ENCOUNTER — HOSPITAL ENCOUNTER (EMERGENCY)
Facility: HOSPITAL | Age: 85
Discharge: HOME | End: 2025-08-12
Attending: EMERGENCY MEDICINE
Payer: MEDICARE

## 2025-08-12 VITALS
SYSTOLIC BLOOD PRESSURE: 201 MMHG | DIASTOLIC BLOOD PRESSURE: 83 MMHG | OXYGEN SATURATION: 99 % | TEMPERATURE: 98 F | HEART RATE: 53 BPM | WEIGHT: 194 LBS | BODY MASS INDEX: 29.4 KG/M2 | HEIGHT: 68 IN | RESPIRATION RATE: 22 BRPM

## 2025-08-12 DIAGNOSIS — K92.1 MELENA: ICD-10-CM

## 2025-08-12 DIAGNOSIS — M25.471 RIGHT ANKLE SWELLING: Primary | ICD-10-CM

## 2025-08-12 LAB
ABSOLUTE EOSINOPHIL (OHS): 0.31 K/UL
ABSOLUTE MONOCYTE (OHS): 0.62 K/UL (ref 0.3–1)
ABSOLUTE NEUTROPHIL COUNT (OHS): 3.18 K/UL (ref 1.8–7.7)
ALBUMIN SERPL BCP-MCNC: 4 G/DL (ref 3.5–5.2)
ALP SERPL-CCNC: 77 UNIT/L (ref 40–150)
ALT SERPL W/O P-5'-P-CCNC: <8 UNIT/L (ref 10–44)
ANION GAP (OHS): 7 MMOL/L (ref 8–16)
AST SERPL-CCNC: 17 UNIT/L (ref 11–45)
BASOPHILS # BLD AUTO: 0.04 K/UL
BASOPHILS NFR BLD AUTO: 0.7 %
BILIRUB SERPL-MCNC: 0.5 MG/DL (ref 0.1–1)
BUN SERPL-MCNC: 26 MG/DL (ref 8–23)
CALCIUM SERPL-MCNC: 9.6 MG/DL (ref 8.7–10.5)
CHLORIDE SERPL-SCNC: 114 MMOL/L (ref 95–110)
CO2 SERPL-SCNC: 23 MMOL/L (ref 23–29)
CREAT SERPL-MCNC: 2 MG/DL (ref 0.5–1.4)
ERYTHROCYTE [DISTWIDTH] IN BLOOD BY AUTOMATED COUNT: 14.7 % (ref 11.5–14.5)
GFR SERPLBLD CREATININE-BSD FMLA CKD-EPI: 32 ML/MIN/1.73/M2
GLUCOSE SERPL-MCNC: 86 MG/DL (ref 70–110)
HCT VFR BLD AUTO: 40.9 % (ref 40–54)
HGB BLD-MCNC: 13.3 GM/DL (ref 14–18)
HOLD SPECIMEN: NORMAL
IMM GRANULOCYTES # BLD AUTO: 0.02 K/UL (ref 0–0.04)
IMM GRANULOCYTES NFR BLD AUTO: 0.3 % (ref 0–0.5)
INDIRECT COOMBS: NORMAL
LYMPHOCYTES # BLD AUTO: 1.57 K/UL (ref 1–4.8)
MCH RBC QN AUTO: 26.8 PG (ref 27–31)
MCHC RBC AUTO-ENTMCNC: 32.5 G/DL (ref 32–36)
MCV RBC AUTO: 83 FL (ref 82–98)
NUCLEATED RBC (/100WBC) (OHS): 0 /100 WBC
OB PNL STL: POSITIVE
PLATELET # BLD AUTO: 185 K/UL (ref 150–450)
PMV BLD AUTO: 10.8 FL (ref 9.2–12.9)
POTASSIUM SERPL-SCNC: 4.7 MMOL/L (ref 3.5–5.1)
PROT SERPL-MCNC: 7 GM/DL (ref 6–8.4)
RBC # BLD AUTO: 4.96 M/UL (ref 4.6–6.2)
RELATIVE EOSINOPHIL (OHS): 5.4 %
RELATIVE LYMPHOCYTE (OHS): 27.4 % (ref 18–48)
RELATIVE MONOCYTE (OHS): 10.8 % (ref 4–15)
RELATIVE NEUTROPHIL (OHS): 55.4 % (ref 38–73)
RH BLD: NORMAL
SODIUM SERPL-SCNC: 144 MMOL/L (ref 136–145)
SPECIMEN OUTDATE: NORMAL
URATE SERPL-MCNC: 10.8 MG/DL (ref 3.4–7)
WBC # BLD AUTO: 5.74 K/UL (ref 3.9–12.7)

## 2025-08-12 PROCEDURE — 85025 COMPLETE CBC W/AUTO DIFF WBC: CPT | Performed by: PHYSICIAN ASSISTANT

## 2025-08-12 PROCEDURE — 99284 EMERGENCY DEPT VISIT MOD MDM: CPT | Mod: 25

## 2025-08-12 PROCEDURE — 84550 ASSAY OF BLOOD/URIC ACID: CPT | Performed by: PHYSICIAN ASSISTANT

## 2025-08-12 PROCEDURE — 80053 COMPREHEN METABOLIC PANEL: CPT | Performed by: PHYSICIAN ASSISTANT

## 2025-08-12 PROCEDURE — 86900 BLOOD TYPING SEROLOGIC ABO: CPT | Performed by: PHYSICIAN ASSISTANT

## 2025-08-12 PROCEDURE — 82272 OCCULT BLD FECES 1-3 TESTS: CPT | Performed by: PHYSICIAN ASSISTANT

## 2025-09-05 ENCOUNTER — OFFICE VISIT (OUTPATIENT)
Dept: GASTROENTEROLOGY | Facility: CLINIC | Age: 85
End: 2025-09-05
Payer: MEDICARE

## 2025-09-05 VITALS — BODY MASS INDEX: 28.22 KG/M2 | WEIGHT: 185.63 LBS

## 2025-09-05 DIAGNOSIS — R19.5 DARK STOOLS: Primary | ICD-10-CM

## 2025-09-05 DIAGNOSIS — K55.20 AVM (ARTERIOVENOUS MALFORMATION) OF SMALL BOWEL, ACQUIRED: ICD-10-CM

## 2025-09-05 DIAGNOSIS — D50.9 IRON DEFICIENCY ANEMIA, UNSPECIFIED IRON DEFICIENCY ANEMIA TYPE: ICD-10-CM

## 2025-09-05 PROCEDURE — 99213 OFFICE O/P EST LOW 20 MIN: CPT | Mod: PBBFAC,PN | Performed by: INTERNAL MEDICINE

## 2025-09-05 PROCEDURE — 99999 PR PBB SHADOW E&M-EST. PATIENT-LVL III: CPT | Mod: PBBFAC,,, | Performed by: INTERNAL MEDICINE

## (undated) DEVICE — CATH EAGLE EYE ST .014X20X150

## (undated) DEVICE — TRAY CATH LAB OMC

## (undated) DEVICE — Device

## (undated) DEVICE — VALVE CONTROL COPILOT

## (undated) DEVICE — CONTRAST VISIPAQUE 150ML

## (undated) DEVICE — TRANSDUCER TRU WAVE

## (undated) DEVICE — GUIDEWIRE STF .035X180CM ANG

## (undated) DEVICE — GUIDEWIRE CONFIDA BECKER CURVE

## (undated) DEVICE — KIT GLIDESHEATH SLEND 6FR 10CM

## (undated) DEVICE — GUIDEWIRE SUPRA CORE 035 190CM

## (undated) DEVICE — OMNIPAQUE CONTRAST 350MG/100ML

## (undated) DEVICE — HEMOSTAT VASC BAND REG 24CM

## (undated) DEVICE — CATH VISIONS PV IVUS .035

## (undated) DEVICE — SYR MARK 7 ARTERION 150ML

## (undated) DEVICE — GUIDEWIRE X SPORT .014IN 190CM

## (undated) DEVICE — TUBING PRSS MON M/M LL 72IN

## (undated) DEVICE — SUT PERCLOSE PROSTYLE MEDIATE

## (undated) DEVICE — SPIKE SHORT LG BORE 1-WAY 2IN

## (undated) DEVICE — GUIDE LAUNCHER 6FR EBU 3.5

## (undated) DEVICE — CATH IMPULSE 5FR PIGTAIL 125CM

## (undated) DEVICE — CATH JACKY RADIAL 3.5 110CM

## (undated) DEVICE — TUBING HPCIL ROT M/F ADPT 48IN

## (undated) DEVICE — KIT LEFT HEART MANIFOLD CUSTOM

## (undated) DEVICE — KIT CUSTOM MANIFOLD

## (undated) DEVICE — CATH JR4 125CM 5FR

## (undated) DEVICE — INFLATOR ENCORE 26 BLLN INFL

## (undated) DEVICE — GUIDEWIRE STF .035X260CM STR

## (undated) DEVICE — COVER TABLE 44X90 STERILE

## (undated) DEVICE — STOPCOCK 3-WAY

## (undated) DEVICE — DRAPE ANGIO BRACH 38X44IN

## (undated) DEVICE — SEE MEDLINE ITEM 157187

## (undated) DEVICE — CATH MPA2 INFINITI 4FR 100CM

## (undated) DEVICE — FLEXSHEATH DRYSEAL 14FR 33CM

## (undated) DEVICE — CATH EMERGE MR 15 X 2.50

## (undated) DEVICE — TUBING HPCIL ROT M/F ADPT 10IN

## (undated) DEVICE — PAD DEFIB CADENCE ADULT R2

## (undated) DEVICE — CATH IMPULSE 5F 100CM FR4

## (undated) DEVICE — KIT INTRODUCER W/GUIDEWIRE

## (undated) DEVICE — PAD RADI FEMORAL

## (undated) DEVICE — CATH IMPULSE D6F AL2 5PK

## (undated) DEVICE — CATH IMPULSE PIGTAIL 6F 110CM

## (undated) DEVICE — CABLE PACING ALLGTR CLIP 12FT

## (undated) DEVICE — DRAPE PED LAP SURG 108X77IN

## (undated) DEVICE — KIT MNTR POLE MT DUL 12&48 MAC

## (undated) DEVICE — COVER PROBE US 5.5X58L NON LTX

## (undated) DEVICE — DEFIBRILLATOR STAT PADZ II

## (undated) DEVICE — SHEATH PINNACLE 8FR

## (undated) DEVICE — KIT MICROINTRO 4F .018X40X7CM

## (undated) DEVICE — GUIDE WIRE BMW 014 X190

## (undated) DEVICE — GUIDEWIRE EMERALD 150CM PTFE